# Patient Record
Sex: FEMALE | Race: WHITE | NOT HISPANIC OR LATINO | Employment: OTHER | URBAN - METROPOLITAN AREA
[De-identification: names, ages, dates, MRNs, and addresses within clinical notes are randomized per-mention and may not be internally consistent; named-entity substitution may affect disease eponyms.]

---

## 2017-02-09 ENCOUNTER — ALLSCRIPTS OFFICE VISIT (OUTPATIENT)
Dept: OTHER | Facility: OTHER | Age: 67
End: 2017-02-09

## 2017-02-09 ENCOUNTER — TRANSCRIBE ORDERS (OUTPATIENT)
Dept: ADMINISTRATIVE | Facility: HOSPITAL | Age: 67
End: 2017-02-09

## 2017-02-09 DIAGNOSIS — R10.13 EPIGASTRIC PAIN: Primary | ICD-10-CM

## 2017-02-13 ENCOUNTER — GENERIC CONVERSION - ENCOUNTER (OUTPATIENT)
Dept: OTHER | Facility: OTHER | Age: 67
End: 2017-02-13

## 2017-02-13 ENCOUNTER — LAB CONVERSION - ENCOUNTER (OUTPATIENT)
Dept: OTHER | Facility: OTHER | Age: 67
End: 2017-02-13

## 2017-02-13 LAB
A/G RATIO (HISTORICAL): 1.5 (CALC) (ref 1–2.5)
ALBUMIN SERPL BCP-MCNC: 3.8 G/DL (ref 3.6–5.1)
ALP SERPL-CCNC: 60 U/L (ref 33–130)
ALT SERPL W P-5'-P-CCNC: 6 U/L (ref 6–29)
AMYLASE (HISTORICAL): 26 U/L (ref 21–101)
AST SERPL W P-5'-P-CCNC: 10 U/L (ref 10–35)
BASOPHILS # BLD AUTO: 0.7 %
BASOPHILS # BLD AUTO: 38 CELLS/UL (ref 0–200)
BILIRUB SERPL-MCNC: 0.4 MG/DL (ref 0.2–1.2)
BUN SERPL-MCNC: 14 MG/DL (ref 7–25)
BUN/CREA RATIO (HISTORICAL): NORMAL (CALC) (ref 6–22)
CALCIUM SERPL-MCNC: 9 MG/DL (ref 8.6–10.4)
CHLORIDE SERPL-SCNC: 106 MMOL/L (ref 98–110)
CHOLEST SERPL-MCNC: 242 MG/DL (ref 125–200)
CHOLEST/HDLC SERPL: 3.5 (CALC)
CO2 SERPL-SCNC: 29 MMOL/L (ref 20–31)
CREAT SERPL-MCNC: 0.86 MG/DL (ref 0.5–0.99)
DEPRECATED RDW RBC AUTO: 12.4 % (ref 11–15)
EGFR AFRICAN AMERICAN (HISTORICAL): 82 ML/MIN/1.73M2
EGFR-AMERICAN CALC (HISTORICAL): 71 ML/MIN/1.73M2
EOSINOPHIL # BLD AUTO: 140 CELLS/UL (ref 15–500)
EOSINOPHIL # BLD AUTO: 2.6 %
GAMMA GLOBULIN (HISTORICAL): 2.5 G/DL (CALC) (ref 1.9–3.7)
GLUCOSE (HISTORICAL): 82 MG/DL (ref 65–99)
HCT VFR BLD AUTO: 41.6 % (ref 35–45)
HDLC SERPL-MCNC: 70 MG/DL
HGB BLD-MCNC: 13.8 G/DL (ref 11.7–15.5)
LDL CHOLESTEROL (HISTORICAL): 157 MG/DL (CALC)
LIPASE SERPL-CCNC: 12 U/L (ref 7–60)
LYMPHOCYTES # BLD AUTO: 2111 CELLS/UL (ref 850–3900)
LYMPHOCYTES # BLD AUTO: 39.1 %
MCH RBC QN AUTO: 32.3 PG (ref 27–33)
MCHC RBC AUTO-ENTMCNC: 33.1 G/DL (ref 32–36)
MCV RBC AUTO: 97.4 FL (ref 80–100)
MONOCYTES # BLD AUTO: 405 CELLS/UL (ref 200–950)
MONOCYTES (HISTORICAL): 7.5 %
NEUTROPHILS # BLD AUTO: 2705 CELLS/UL (ref 1500–7800)
NEUTROPHILS # BLD AUTO: 50.1 %
NON-HDL-CHOL (CHOL-HDL) (HISTORICAL): 172 MG/DL (CALC)
PLATELET # BLD AUTO: 226 THOUSAND/UL (ref 140–400)
PMV BLD AUTO: 9.6 FL (ref 7.5–12.5)
POTASSIUM SERPL-SCNC: 4.3 MMOL/L (ref 3.5–5.3)
RBC # BLD AUTO: 4.27 MILLION/UL (ref 3.8–5.1)
SODIUM SERPL-SCNC: 143 MMOL/L (ref 135–146)
T4 FREE SERPL-MCNC: 0.8 NG/DL (ref 0.8–1.8)
TOTAL PROTEIN (HISTORICAL): 6.3 G/DL (ref 6.1–8.1)
TRIGL SERPL-MCNC: 74 MG/DL
TSH SERPL DL<=0.05 MIU/L-ACNC: 5.35 MIU/L (ref 0.4–4.5)
VIT B12 SERPL-MCNC: 246 PG/ML (ref 200–1100)
WBC # BLD AUTO: 5.4 THOUSAND/UL (ref 3.8–10.8)

## 2017-02-16 ENCOUNTER — HOSPITAL ENCOUNTER (OUTPATIENT)
Dept: CT IMAGING | Facility: HOSPITAL | Age: 67
Discharge: HOME | End: 2017-02-16
Payer: MEDICARE

## 2017-02-16 DIAGNOSIS — R10.13 EPIGASTRIC PAIN: ICD-10-CM

## 2017-02-16 PROCEDURE — 74177 CT ABD & PELVIS W/CONTRAST: CPT

## 2017-02-16 RX ADMIN — IOHEXOL 100 ML: 350 INJECTION, SOLUTION INTRAVENOUS at 19:12

## 2017-02-20 ENCOUNTER — GENERIC CONVERSION - ENCOUNTER (OUTPATIENT)
Dept: OTHER | Facility: OTHER | Age: 67
End: 2017-02-20

## 2017-03-30 ENCOUNTER — GENERIC CONVERSION - ENCOUNTER (OUTPATIENT)
Dept: OTHER | Facility: OTHER | Age: 67
End: 2017-03-30

## 2017-05-11 ENCOUNTER — ALLSCRIPTS OFFICE VISIT (OUTPATIENT)
Dept: OTHER | Facility: OTHER | Age: 67
End: 2017-05-11

## 2017-05-30 ENCOUNTER — HOSPITAL ENCOUNTER (OUTPATIENT)
Facility: HOSPITAL | Age: 67
Setting detail: OUTPATIENT SURGERY
Discharge: HOME | End: 2017-05-30
Attending: INTERNAL MEDICINE | Admitting: INTERNAL MEDICINE
Payer: MEDICARE

## 2017-05-30 ENCOUNTER — GENERIC CONVERSION - ENCOUNTER (OUTPATIENT)
Dept: OTHER | Facility: OTHER | Age: 67
End: 2017-05-30

## 2017-05-30 ENCOUNTER — ANESTHESIA EVENT (OUTPATIENT)
Dept: GASTROENTEROLOGY | Facility: HOSPITAL | Age: 67
End: 2017-05-30
Payer: MEDICARE

## 2017-05-30 ENCOUNTER — ANESTHESIA (OUTPATIENT)
Dept: GASTROENTEROLOGY | Facility: HOSPITAL | Age: 67
End: 2017-05-30
Payer: MEDICARE

## 2017-05-30 VITALS
HEIGHT: 65 IN | HEART RATE: 73 BPM | BODY MASS INDEX: 27.58 KG/M2 | DIASTOLIC BLOOD PRESSURE: 78 MMHG | OXYGEN SATURATION: 98 % | SYSTOLIC BLOOD PRESSURE: 121 MMHG | RESPIRATION RATE: 16 BRPM | WEIGHT: 165.57 LBS | TEMPERATURE: 97.6 F

## 2017-05-30 DIAGNOSIS — Q43.3 INTESTINAL MALROTATION: ICD-10-CM

## 2017-05-30 DIAGNOSIS — R10.13 ABDOMINAL PAIN, EPIGASTRIC: ICD-10-CM

## 2017-05-30 PROCEDURE — 88305 TISSUE EXAM BY PATHOLOGIST: CPT | Performed by: INTERNAL MEDICINE

## 2017-05-30 PROCEDURE — 88342 IMHCHEM/IMCYTCHM 1ST ANTB: CPT | Performed by: INTERNAL MEDICINE

## 2017-05-30 RX ORDER — ESCITALOPRAM OXALATE 20 MG/1
20 TABLET ORAL DAILY
COMMUNITY
End: 2018-02-07 | Stop reason: SDUPTHER

## 2017-05-30 RX ORDER — LISINOPRIL 20 MG/1
20 TABLET ORAL DAILY
COMMUNITY
End: 2019-09-05 | Stop reason: SDUPTHER

## 2017-05-30 RX ORDER — ZOLPIDEM TARTRATE 10 MG/1
10 TABLET ORAL
COMMUNITY
End: 2018-02-07 | Stop reason: SDUPTHER

## 2017-05-30 RX ORDER — PANTOPRAZOLE SODIUM 40 MG/1
40 TABLET, DELAYED RELEASE ORAL DAILY
COMMUNITY
End: 2018-07-15 | Stop reason: SDUPTHER

## 2017-05-30 RX ORDER — PROPOFOL 10 MG/ML
INJECTION, EMULSION INTRAVENOUS AS NEEDED
Status: DISCONTINUED | OUTPATIENT
Start: 2017-05-30 | End: 2017-05-30 | Stop reason: SURG

## 2017-05-30 RX ORDER — ALPRAZOLAM 0.5 MG/1
0.5 TABLET ORAL AS NEEDED
COMMUNITY
End: 2018-02-07 | Stop reason: SDUPTHER

## 2017-05-30 RX ORDER — SODIUM CHLORIDE, SODIUM LACTATE, POTASSIUM CHLORIDE, CALCIUM CHLORIDE 600; 310; 30; 20 MG/100ML; MG/100ML; MG/100ML; MG/100ML
50 INJECTION, SOLUTION INTRAVENOUS CONTINUOUS
Status: DISCONTINUED | OUTPATIENT
Start: 2017-05-30 | End: 2017-05-30 | Stop reason: HOSPADM

## 2017-05-30 RX ADMIN — PROPOFOL 30 MG: 10 INJECTION, EMULSION INTRAVENOUS at 10:26

## 2017-05-30 RX ADMIN — PROPOFOL 30 MG: 10 INJECTION, EMULSION INTRAVENOUS at 10:30

## 2017-05-30 RX ADMIN — PROPOFOL 30 MG: 10 INJECTION, EMULSION INTRAVENOUS at 10:31

## 2017-05-30 RX ADMIN — PROPOFOL 30 MG: 10 INJECTION, EMULSION INTRAVENOUS at 10:37

## 2017-05-30 RX ADMIN — PROPOFOL 30 MG: 10 INJECTION, EMULSION INTRAVENOUS at 10:28

## 2017-05-30 RX ADMIN — PROPOFOL 100 MG: 10 INJECTION, EMULSION INTRAVENOUS at 10:22

## 2017-05-30 RX ADMIN — PROPOFOL 30 MG: 10 INJECTION, EMULSION INTRAVENOUS at 10:34

## 2017-05-30 RX ADMIN — PROPOFOL 30 MG: 10 INJECTION, EMULSION INTRAVENOUS at 10:23

## 2017-05-30 RX ADMIN — PROPOFOL 30 MG: 10 INJECTION, EMULSION INTRAVENOUS at 10:32

## 2017-05-30 RX ADMIN — PROPOFOL 30 MG: 10 INJECTION, EMULSION INTRAVENOUS at 10:40

## 2017-05-30 RX ADMIN — PROPOFOL 30 MG: 10 INJECTION, EMULSION INTRAVENOUS at 10:35

## 2017-05-30 RX ADMIN — PROPOFOL 30 MG: 10 INJECTION, EMULSION INTRAVENOUS at 10:38

## 2017-05-30 RX ADMIN — SODIUM CHLORIDE, SODIUM LACTATE, POTASSIUM CHLORIDE, AND CALCIUM CHLORIDE 50 ML/HR: .6; .31; .03; .02 INJECTION, SOLUTION INTRAVENOUS at 09:34

## 2017-05-30 RX ADMIN — PROPOFOL 30 MG: 10 INJECTION, EMULSION INTRAVENOUS at 10:25

## 2017-06-04 ENCOUNTER — GENERIC CONVERSION - ENCOUNTER (OUTPATIENT)
Dept: OTHER | Facility: OTHER | Age: 67
End: 2017-06-04

## 2017-07-21 ENCOUNTER — HOSPITAL ENCOUNTER (EMERGENCY)
Facility: HOSPITAL | Age: 67
Discharge: HOME | End: 2017-07-21
Attending: EMERGENCY MEDICINE
Payer: MEDICARE

## 2017-07-21 VITALS
TEMPERATURE: 98.5 F | HEART RATE: 90 BPM | BODY MASS INDEX: 28.1 KG/M2 | OXYGEN SATURATION: 98 % | DIASTOLIC BLOOD PRESSURE: 79 MMHG | SYSTOLIC BLOOD PRESSURE: 187 MMHG | RESPIRATION RATE: 18 BRPM | WEIGHT: 168.87 LBS

## 2017-07-21 DIAGNOSIS — B37.3 VAGINAL CANDIDIASIS: Primary | ICD-10-CM

## 2017-07-21 PROCEDURE — 99283 EMERGENCY DEPT VISIT LOW MDM: CPT

## 2017-07-21 RX ORDER — FLUCONAZOLE 150 MG/1
150 TABLET ORAL ONCE
Status: COMPLETED | OUTPATIENT
Start: 2017-07-21 | End: 2017-07-21

## 2017-07-21 RX ORDER — FLUCONAZOLE 150 MG/1
TABLET ORAL
Qty: 1 TABLET | Refills: 1 | Status: SHIPPED | OUTPATIENT
Start: 2017-07-21 | End: 2019-09-05 | Stop reason: ALTCHOICE

## 2017-07-21 RX ADMIN — FLUCONAZOLE 150 MG: 150 TABLET ORAL at 18:28

## 2017-09-20 ENCOUNTER — GENERIC CONVERSION - ENCOUNTER (OUTPATIENT)
Dept: OTHER | Facility: OTHER | Age: 67
End: 2017-09-20

## 2018-01-10 NOTE — RESULT NOTES
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  I recommend a repeat colonoscopy in 10 years, please put in for recall  Please have the patient follow up in the office in 6 months or as needed  Please have her call with any questions or concerns or recurrent abdominal pain  Verified Results  (1) TISSUE EXAM [id number] 10:26AM Cyrilla Providence     Test Name Result Flag Reference   LAB AP CASE REPORT (Report)     Surgical Pathology Report             Case: [id number]                   Authorizing Provider: Juan Carlos Altamirano MD      Collected:      05/30/2017 1026        Ordering Location:   [facility]    Received:      05/30/2017 [facility]                               Pathologist:      Júnior Puga MD                                Specimens:  A) - Duodenum, Bx Duodenum                                       B) - Stomach, Bx Stomach body gastritis                                C) - Large Intestine, Cecum, Bx Cecal nodule suspect lipoma   LAB AP FINAL DIAGNOSIS (Report)     A  Duodenum, Bx Duodenum biopsy:   -Benign small intestinal mucosa with retained villous architecture and no   evidence of increased intraepithelial lymphocytes  -Mild non-specific chronic inflammation of lamina propria seen   -No evidence of dysplasia or malignancy  B  Stomach, Bx Stomach body gastritis biopsy:  -Benign gastric-oxyntoantral type mucosa with mild chronic inactive   gastritis   -No evidence of Paneth cell metaplasia seen   -No evidence of dysplasia or malignancy   -No H Pylori organisms identified on immunohistochemical stained slide     Control reacted appropriately    C  Large Intestine, Cecum, Bx Cecal nodule suspect lipoma:  -Benign colonic mucosa with single intramucosal lymphoid aggregate   -No submucosal tissue seen  No evidence of dysplasia or malignancy  Electronically signed by Arie Alexander MD on 6/1/2017 at 11:56 AM   LAB AP NOTE      Interpretation performed at [address] [place] [address]    [facility] (Report)     These tests were developed and their performance characteristics   determined by [facility] or [employer]  They may not be cleared or approved by the [place]  Food and   Drug Administration  The FDA has determined that such clearance or   approval is not necessary  These tests are used for clinical purposes  They should not be regarded as investigational or for research  This   laboratory has been approved by [facility], designated as a high-complexity   laboratory and is qualified to perform these tests  LAB AP GROSS DESCRIPTION (Report)     A  The specimen is received in formalin, labeled with the patient's name   and hospital number, and is designated biopsy duodenum rule out celiac  The specimen consists of 3 tan soft tissue fragments each measuring   0 2-0 3 cm  Entirely submitted  One cassette  B  The specimen is received in formalin, labeled with the patient's name   and hospital number, and is designated biopsy stomach body gastritis   rule out H  pylori  The specimen consists of multiple tan soft tissue   fragments measuring in aggregate 0 6 x 0 6 x 0 1 cm  Entirely submitted  One cassette  Note: The estimated total formalin fixation time based upon information   provided by the submitting clinician and the standard processing schedule   is 18 0 hours  C  The specimen is received in formalin, labeled with the patient's name   and hospital number, and is designated biopsy cecal nodule suspect   lipoma  The specimen consists of multiple tan soft tissue fragments   measuring in aggregate 0 5 x 0 4 x 0 1 cm  Entirely submitted  One   cassette      Note: The estimated total formalin fixation time based upon information   provided by the submitting clinician and the standard processing schedule   is 17 75 hours      [facility]   LAB AP CLINICAL INFORMATION R/O celiac     R/O celiac

## 2018-01-11 NOTE — PROGRESS NOTES
Assessment    1  Encounter for preventive health examination (V70 0) (Z00 00)    Plan  Anxiety, Vitamin B12 deficiency    · (1) TSH WITH FT4 REFLEX; Status:Active; Requested for:22Dww2979;   High blood pressure    · Benicar HCT 40-12 5 MG Oral Tablet; TAKE 1 TABLET DAILY   · Follow-up visit in 6 months Evaluation and Treatment  Follow-up  Status: Hold For -  Scheduling  Requested for: 73Dar6348   · (1) CBC/PLT/DIFF; Status:Active; Requested for:82Tbc6906;    · (1) COMPREHENSIVE METABOLIC PANEL; Status:Active; Requested for:29Dsh3551;    · (1) LIPID PANEL, FASTING; Status:Active; Requested for:95Gxs4755;   Menopause    · * DXA BONE DENSITY SPINE HIP AND PELVIS; Status:Hold For - Scheduling;  Requested for:03Kzj1738;   Menopause, Visit for screening mammogram    · * MAMMO SCREENING BILATERAL W CAD; Status:Hold For - Scheduling; Requested  for:24Umj5508;   PMH: Abdominal pain, RUQ    · Pantoprazole Sodium 40 MG Oral Tablet Delayed Release; TAKE 1 TABLET  DAILY  Vitamin B12 deficiency    · (1) VITAMIN B12; Status:Active; Requested for:36Fvz5085;     Discussion/Summary    PPP given  Impression: Welcome to Medicare Visit, with preventive exam as well as age and risk appropriate counseling completed  Cardiovascular screening and counseling: the risks and benefits of screening were discussed and screening is current  Diabetes screening and counseling: the risks and benefits of screening were discussed and due for blood glucose  Colorectal cancer screening and counseling: screening is current, counseling was given on ways to eat a high fiber diet and had in [address] [place] [address] before moving     Breast cancer screening and counseling: the risks and benefits of screening were discussed and due for a screening mammogram    Osteoporosis screening and counseling: the risks and benefits of screening were discussed, counseling was given on obtaining adequate amounts of calcium and vitamin D on a daily basis, counseling was given on the importance of regular weightbearing exercise and due for DXA axial skeleton  Abdominal aortic aneurysm screening and counseling: the risks and benefits of screening were discussed and screening not indicated  Glaucoma screening and counseling: the risks and benefits of screening were discussed and screening is current  HIV screening and counseling: the risks and benefits of screening were discussed and screening not indicated  Immunizations: the patient declines the influenza vaccination, the patient declines the pneumococcal vaccination, hepatitis B vaccination series is not indicated at this time due to the patient's low risk of anne-marie the disease, the patient declines the Zostavax vaccine, the patient declines the Td vaccine and the patient declines the Tdap vaccine  Advance Directive Planning: complete and up to date, asked to bring in copies for our records  Patient Discussion: plan discussed with the patient, follow-up visit needed in one year  Chief Complaint  Welcome to Medicare, sees GYN      History of Present Illness  HPI: Here for Welcome to Medicare visit, has not taken bp meds in a week or so because she ran out  Welcome to [employer] and Wellness Visits: The patient is being seen for the welcome to medicare visit  Medicare Screening and Risk Factors   Hospitalizations: she has been previously hospitalizied and she has been hospitalized 1 times  Medicare Screening Tests Risk Questions   Abdominal aortic aneurysm risk assessment: none indicated  Osteoporosis risk assessment: , female gender and over 48years of age  HIV risk assessment: none indicated  Co-Managers and Medical Equipment/Suppliers: See Patient Care Team      Review of Systems    Constitutional: negative  Eyes: negative  ENT: negative  Cardiovascular: negative  Respiratory: negative  Gastrointestinal: negative  Genitourinary: negative  Musculoskeletal: negative     Integumentary and Breasts: negative  Neurological: negative  Psychiatric: as noted in HPI  Endocrine: negative  Hematologic and Lymphatic: negative  Over the past 2 weeks, how often have you been bothered by the following problems? 1 ) Little interest or pleasure in doing things? Nearly every day  2 ) Feeling down, depressed or hopeless? Nearly every day  3 ) Trouble falling asleep or sleeping too much? Several days  4 ) Feeling tired or having little energy? Nearly every day  5 ) Poor appetite or overeating? Half the days or more  6 ) Feeling bad about yourself, or that you are a failure, or have let yourself or your family down? Not at all    7 ) Trouble concentrating on things, such as reading a newspaper or watching television? Not at all    8 ) Moving or speaking so slowly that other people could have noticed, or the opposite, moving or speaking faster than usual? Not at all    9 ) Thoughts that you would be better off dead or of hurting yourself in some way? Not at all  severity of depression is moderate   Score 12      Active Problems    1  Anxiety (300 00) (F41 9)   2  Congenital absence of one kidney (753 0) (Q60 0)   3  Depression (311) (F32 9)   4  Esophageal reflux (530 81) (K21 9)   5  High blood pressure (401 9) (I10)   6  History of abnormal mammogram (V15 89) (C82 892)   7  Visit for screening mammogram (V76 12) (Z12 31)   8  Vitamin B12 deficiency (266 2) (E53 8)    Past Medical History    The active problems and past medical history were reviewed and updated today  Surgical History    · History of Breast Surgery Reduction Procedure Bilateral   · History of Shoulder Surgery    The surgical history was reviewed and updated today  Family History  Mother    · Family history of Lung cancer  Father    · Family history of Heart disease  Brother    · Family history of Lung cancer    The family history was reviewed and updated today         Social History    · Never a smoker  The social history was reviewed and updated today  The social history was reviewed and is unchanged  Current Meds   1  ALPRAZolam 0 5 MG Oral Tablet; TAKE ONE TABLET BY MOUTH ONCE DAILY AS   NEEDED; Therapy: 22HZT3400 to (Evaluate:11Oct2016)  Requested for: 11Sep2016; Last   Rx:11Sep2016 Ordered   2  Benicar HCT 40-12 5 MG Oral Tablet; TAKE 1 TABLET DAILY; Therapy: 65FZA6114 to (Luz Marina Parmar)  Requested for: 03Sep2015; Last   Rx:03Sep2015 Ordered   3  Escitalopram Oxalate 20 MG Oral Tablet; TAKE ONE TABLET BY MOUTH ONCE DAILY; Therapy: 99PFC9323 to (Evaluate:11Oct2016)  Requested for: 11Sep2016; Last   Rx:11Sep2016 Ordered   4  Pantoprazole Sodium 40 MG Oral Tablet Delayed Release; TAKE 1 TABLET DAILY; Therapy: 87ISV8474 to (Evaluate:07Sep2016)  Requested for: 67HNQ9811; Last   Rx:53Mtu2305 Ordered    The medication list was reviewed and updated today  Allergies    1  No Known Drug Allergies    Vitals  Signs    Systolic: 352  Diastolic: 90  Heart Rate: 72  Respiration: 16  Temperature: 96 8 F  Height: 5 ft 5 in  Weight: 172 lb   BMI Calculated: 28 62  BSA Calculated: 1 86    Physical Exam    Constitutional   General appearance: No acute distress, well appearing and well nourished  Eyes   Conjunctiva and lids: No swelling, erythema or discharge  Pupils and irises: Equal, round, reactive to light  Ears, Nose, Mouth, and Throat   External inspection of ears and nose: Normal     Otoscopic examination: Tympanic membranes translucent with normal light reflex  Canals patent without erythema  Hearing: Normal     Nasal mucosa, septum, and turbinates: Normal without edema or erythema  Oropharynx: Normal with no erythema, edema, exudate or lesions  Neck   Neck: Supple, symmetric, trachea midline, no masses  Pulmonary   Respiratory effort: No increased work of breathing or signs of respiratory distress  Percussion of chest: Normal     Auscultation of lungs: Clear to auscultation  Cardiovascular   Auscultation of heart: Normal rate and rhythm, normal S1 and S2, no murmurs  Carotid pulses: 2+ bilaterally  Pedal pulses: 2+ bilaterally  Examination of extremities for edema and/or varicosities: Normal     Abdomen   Abdomen: Non-tender, no masses  Liver and spleen: No hepatomegaly or splenomegaly  Examination for hernias: No hernia appreciated  Lymphatic   Palpation of lymph nodes in neck: No lymphadenopathy  Palpation of lymph nodes in other areas: No lymphadenopathy      Musculoskeletal   Gait and station: Normal        Procedure    Procedure:   Results: 20/25 in both eyes with corrective device, 20/25 in the right eye with corrective device, 20/40 in the left eye with corrective device      Signatures   Electronically signed by : BOBO Stanford ; Sep 12 2016  9:29AM EST                       (Author)

## 2018-01-11 NOTE — RESULT NOTES
Verified Results  * CT ABDOMEN PELVIS W CONTRAST 67MZQ2284 06:48PM Pramod Sandoval     Test Name Result Flag Reference   CT ABDOMEN PELVIS W CONTRAST (Report)     CT ABDOMEN AND PELVIS WITH IV CONTRAST     INDICATION: Epigastric pain 2 weeks with nausea  Reported history of congenital unilateral kidney     COMPARISON: None  TECHNIQUE: CT examination of the abdomen and pelvis  Axial, sagittal and coronal reformatted projections were created  This examination, like all CT scans performed in the [facility], was performed utilizing techniques to    minimize radiation dose exposure, including the use of iterative reconstruction and automated exposure control  IV Contrast: iohexol (OMNIPAQUE) 350 MG/ML injection (MULTI-DOSE) 100 mL--barium sulfate 2 1 % suspension 900 mL Note: (SINGLE DOSE/MULTI DOSE) information refers to the container from which the contrast was acquired  Contrast was injected one time    intravenously without immediate complication  Enteric Contrast: Enteric contrast was administered  FINDINGS:     ABDOMEN     LOWER CHEST: Lung bases are clear  Heart size is normal      LIVER/BILIARY TREE: Unremarkable  GALLBLADDER: No calcified gallstones  No pericholecystic inflammatory change  SPLEEN: Unremarkable  PANCREAS: Unremarkable  ADRENAL GLANDS: Unremarkable  KIDNEYS/URETERS: The right kidney is absent  Left kidney appears normal      STOMACH AND BOWEL: 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 or other acute complications  The jejunum is in the right upper quadrant  The cecum is in the midline of the pelvis and the ascending colon is    to the left of midline in the abdomen  There is no area of bowel wall thickening identified  APPENDIX: No findings to suggest appendicitis  ABDOMINOPELVIC CAVITY: No ascites or free intraperitoneal air  No lymphadenopathy       VESSELS: Atherosclerotic changes are present  No evidence of aneurysm  PELVIS     REPRODUCTIVE ORGANS: The uterus is heterogeneous attenuation and the contour suggests the presence of possible fibroid towards the right side  URINARY BLADDER: Urinary bladder has an abnormal appearance  Specifically, there seems to be a thin curvilinear line traversing the lumen near the expected location of the right ureterovesical junction  Ordinarily, I would suggest that this is    probably a ureterocele  However, the patient seems to have congenital absence of the right kidney and therefore I am uncertain as to whether there could be some of the ureter present  There seems to be a somewhat dilated fluid density tubular structure   extending from this region upwards along the right retroperitoneum along the expected course of the ureter, however, it terminates apparently blindly at about the mid abdominal level  Calcification is visible posteriorly in the bladder in the region of   the above-mentioned structure  These presumably represent bladder calculi  The possibility of a low-density bladder mass rather than ureterocele could not be excluded  A targeted ultrasound of the bladder might be helpful for further assessment  Alternatively, repeat CT scan with additional delayed phase images through the pelvic area to allow for filling of the bladder with contrast enhanced urine might be helpful for further assessment of the findings  ABDOMINAL WALL/INGUINAL REGIONS: Unremarkable  OSSEOUS STRUCTURES: There is a mild compression fracture of L1 superior endplate  The area is sclerotic and is probably chronic  Please correlate for any back pain or recent injury  IMPRESSION:     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 kidney and malrotation of the bowel       Indeterminate finding in the bladder at the expected location of the right UVJ including what appears to be possible ureterocele or low density bladder mass with some small calcifications in the region as well  Consider bladder ultrasound or repeat CT    with delayed images to allow filling of the bladder with contrast enhanced urine  Most definitive evaluation would probably require cystoscopy  Slightly dilated low-density tubular structure along the right side of the retroperitoneum seems to extend up from the bladder and would presumably be right ureter although in the absence of her right kidney, I am uncertain as to whether this is    possible  Alternative consideration would be a varix along the gonadal vein although I believe I see the gonadal vein as a separate adjacent small caliber structure  Debbie Cutting        ##sigslh##sigslh       Workstation performed: [id number]     Signed by:   Nazanin Oakley MD   2/20/17

## 2018-01-12 VITALS
TEMPERATURE: 96.7 F | SYSTOLIC BLOOD PRESSURE: 118 MMHG | RESPIRATION RATE: 20 BRPM | BODY MASS INDEX: 28.66 KG/M2 | HEIGHT: 65 IN | DIASTOLIC BLOOD PRESSURE: 86 MMHG | WEIGHT: 172 LBS | HEART RATE: 76 BPM

## 2018-01-13 VITALS
RESPIRATION RATE: 20 BRPM | BODY MASS INDEX: 28.49 KG/M2 | OXYGEN SATURATION: 96 % | HEIGHT: 65 IN | DIASTOLIC BLOOD PRESSURE: 96 MMHG | WEIGHT: 171 LBS | HEART RATE: 71 BPM | SYSTOLIC BLOOD PRESSURE: 160 MMHG | TEMPERATURE: 96 F

## 2018-01-17 NOTE — RESULT NOTES
Verified Results  (1) LIPASE [id number] 08:23AM Ky Rangel   REPORT COMMENT:  INCLUDES LAB REF [id number]  FASTING:YES AN UPDATE OR CORRECTION HAS BEEN MADE TO NAME     Test Name Result Flag Reference   LIPASE 12 U/L  7-60     (1) LIPID PANEL, FASTING [id number] 08:23AM Ky Rangel     Test Name Result Flag Reference   CHOLESTEROL, TOTAL 242 mg/dL H 125-200   HDL CHOLESTEROL 70 mg/dL  > OR = 46   TRIGLICERIDES 74 mg/dL  <176   LDL-CHOLESTEROL 157 mg/dL (calc) H <130   Desirable range <100 mg/dL for patients with CHD or  diabetes and <70 mg/dL for diabetic patients with  known heart disease  CHOL/HDLC RATIO 3 5 (calc)  < OR = 5 0   NON HDL CHOLESTEROL 172 mg/dL (calc) H    Target for non-HDL cholesterol is 30 mg/dL higher than   LDL cholesterol target  (1) COMPREHENSIVE METABOLIC PANEL [id number] 61:28NR Ky Rangel     Test Name Result Flag Reference   GLUCOSE 82 mg/dL  65-99   Fasting reference interval   UREA NITROGEN (BUN) 14 mg/dL  7-25   CREATININE 0 86 mg/dL  0 50-0 99   For patients >52years of age, the reference limit  for Creatinine is approximately 13% higher for people  identified as -American  eGFR NON-AFR   AMERICAN 71 mL/min/1 73m2  > OR = 60   eGFR AFRICAN AMERICAN 82 mL/min/1 73m2  > OR = 60   BUN/CREATININE RATIO   8-71   NOT APPLICABLE (calc)   SODIUM 143 mmol/L  135-146   POTASSIUM 4 3 mmol/L  3 5-5 3   CHLORIDE 106 mmol/L     CARBON DIOXIDE 29 mmol/L  20-31   CALCIUM 9 0 mg/dL  8 6-10 4   PROTEIN, TOTAL 6 3 g/dL  6 1-8 1   ALBUMIN 3 8 g/dL  3 6-5 1   GLOBULIN 2 5 g/dL (calc)  1 9-3 7   ALBUMIN/GLOBULIN RATIO 1 5 (calc)  1 0-2 5   BILIRUBIN, TOTAL 0 4 mg/dL  0 2-1 2   ALKALINE PHOSPHATASE 60 U/L     AST 10 U/L  10-35   ALT 6 U/L  6-29     (1) CBC/PLT/DIFF 57RUE5942 08:23AM Ky Rangel     Test Name Result Flag Reference   WHITE BLOOD CELL COUNT 5 4 Thousand/uL  3 8-10 8   RED BLOOD CELL COUNT 4 27 Million/uL  3 80-5 10   HEMOGLOBIN 13 8 g/dL  11 7-15 5 HEMATOCRIT 41 6 %  35 0-45 0   MCV 97 4 fL  80 0-100 0   MCH 32 3 pg  27 0-33 0   MCHC 33 1 g/dL  32 0-36 0   RDW 12 4 %  11 0-15 0   PLATELET COUNT 793 Thousand/uL  140-400   MPV 9 6 fL  7 5-12 5   ABSOLUTE NEUTROPHILS 2705 cells/uL  2267-3302   ABSOLUTE LYMPHOCYTES 2111 cells/uL  850-3900   ABSOLUTE MONOCYTES 405 cells/uL  200-950   ABSOLUTE EOSINOPHILS 140 cells/uL     ABSOLUTE BASOPHILS 38 cells/uL  0-200   NEUTROPHILS 50 1 %     LYMPHOCYTES 39 1 %     MONOCYTES 7 5 %     EOSINOPHILS 2 6 %     BASOPHILS 0 7 %       (Q) TSH, 3RD GENERATION W/REFLEX TO FT4 [id number] 08:23AM [facility]     Test Name Result Flag Reference   T4, FREE 0 8 ng/dL  0 8-1 8   TSH W/REFLEX TO FT4 5 35 mIU/L H 0 40-4 50     (1) VITAMIN B12 [id number] 08:23AM [facility]     Test Name Result Flag Reference   VITAMIN B12 246 pg/mL  200-1100   Please Note: Although the reference range for vitamin  B12 is 200-1100 pg/mL, it has been reported that between  5 and 10% of patients with values between 200 and 400  pg/mL may experience neuropsychiatric and hematologic  abnormalities due to occult B12 deficiency; less than 1%  of patients with values above 400 pg/mL will have symptoms  (Q) AMYLASE [id number] 08:23AM [facility]     Test Name Result Flag Reference   AMYLASE 26 U/L         Discussion/Summary   Cholesterol is a little high, other bloodwork looks good    Please follow low fat diet and exercise  - Dr Luh Guevara

## 2018-02-07 DIAGNOSIS — F41.9 ANXIETY: ICD-10-CM

## 2018-02-07 DIAGNOSIS — G47.00 INSOMNIA, UNSPECIFIED TYPE: Primary | ICD-10-CM

## 2018-02-07 RX ORDER — ESCITALOPRAM OXALATE 20 MG/1
TABLET ORAL
Qty: 30 TABLET | Refills: 0 | Status: SHIPPED | OUTPATIENT
Start: 2018-02-07 | End: 2019-01-28 | Stop reason: SDUPTHER

## 2018-02-07 RX ORDER — ZOLPIDEM TARTRATE 10 MG/1
TABLET ORAL
Qty: 15 TABLET | Refills: 0 | OUTPATIENT
Start: 2018-02-07 | End: 2018-04-26 | Stop reason: SDUPTHER

## 2018-02-07 RX ORDER — ALPRAZOLAM 0.5 MG/1
TABLET ORAL
Qty: 15 TABLET | Refills: 0 | OUTPATIENT
Start: 2018-02-07 | End: 2018-04-26 | Stop reason: SDUPTHER

## 2018-02-07 NOTE — TELEPHONE ENCOUNTER
I did a small # of her refills  She is due for an appointment, not seen since 2/17  Please call in the xanax and the zolpidem

## 2018-04-26 ENCOUNTER — OFFICE VISIT (OUTPATIENT)
Dept: FAMILY MEDICINE CLINIC | Facility: CLINIC | Age: 68
End: 2018-04-26
Payer: MEDICARE

## 2018-04-26 VITALS
BODY MASS INDEX: 27.32 KG/M2 | WEIGHT: 164 LBS | HEART RATE: 72 BPM | TEMPERATURE: 98.2 F | RESPIRATION RATE: 18 BRPM | HEIGHT: 65 IN | SYSTOLIC BLOOD PRESSURE: 124 MMHG | DIASTOLIC BLOOD PRESSURE: 76 MMHG

## 2018-04-26 DIAGNOSIS — F41.9 ANXIETY: ICD-10-CM

## 2018-04-26 DIAGNOSIS — G47.00 INSOMNIA, UNSPECIFIED TYPE: ICD-10-CM

## 2018-04-26 DIAGNOSIS — Z00.00 MEDICARE ANNUAL WELLNESS VISIT, INITIAL: Primary | ICD-10-CM

## 2018-04-26 DIAGNOSIS — Z12.31 SCREENING MAMMOGRAM, ENCOUNTER FOR: ICD-10-CM

## 2018-04-26 DIAGNOSIS — F32.A DEPRESSION, UNSPECIFIED DEPRESSION TYPE: Primary | ICD-10-CM

## 2018-04-26 DIAGNOSIS — E78.2 MIXED HYPERLIPIDEMIA: ICD-10-CM

## 2018-04-26 PROCEDURE — G0438 PPPS, INITIAL VISIT: HCPCS | Performed by: INTERNAL MEDICINE

## 2018-04-26 RX ORDER — ESCITALOPRAM OXALATE 20 MG/1
1 TABLET ORAL DAILY
COMMUNITY
Start: 2015-01-08 | End: 2018-12-27 | Stop reason: SDUPTHER

## 2018-04-26 RX ORDER — ZOLPIDEM TARTRATE 10 MG/1
10 TABLET ORAL
Qty: 30 TABLET | Refills: 0 | Status: SHIPPED | OUTPATIENT
Start: 2018-04-26 | End: 2018-06-02 | Stop reason: SDUPTHER

## 2018-04-26 RX ORDER — ESCITALOPRAM OXALATE 20 MG/1
TABLET ORAL
Qty: 90 TABLET | Refills: 1 | Status: SHIPPED | OUTPATIENT
Start: 2018-04-26 | End: 2018-11-05 | Stop reason: SDUPTHER

## 2018-04-26 RX ORDER — ALPRAZOLAM 0.5 MG/1
0.5 TABLET ORAL DAILY
Qty: 30 TABLET | Refills: 0 | Status: SHIPPED | OUTPATIENT
Start: 2018-04-26 | End: 2018-06-02 | Stop reason: SDUPTHER

## 2018-04-26 RX ORDER — HYOSCYAMINE SULFATE 0.12 MG/1
1 TABLET SUBLINGUAL 3 TIMES DAILY PRN
COMMUNITY
Start: 2017-05-11 | End: 2018-12-27 | Stop reason: SDUPTHER

## 2018-04-26 NOTE — PROGRESS NOTES
[facility] Clinical     ISAR:   Previous hospitalizations?:  No       Once in a Lifetime Medicare Screening:   EKG performed?:  Yes        Medicare Screening Tests and Risk Assessment:   AAA Risk Assessment    None Indicated:  Yes    Osteoporosis Risk Assessment    :  Yes    Age over 48:  Yes    HIV Risk Assessment    None indicated:  Yes        Drug and Alcohol Use:   Tobacco use    Cigarettes:  never smoker    Tobacco use duration    Tobacco Cessation Readiness    Alcohol use    Alcohol use:  occasional use    Alcohol Treatment Readiness   Illicit Drug Use    Drug use:  never    Drug type:  no sedative use       Diet & Exercise:   Diet   What is your diet?:  Regular   How many servings a day of the following:   Exercise    Do you currently exercise?:  currently not exercising       Cognitive Impairment Screening:   Anxiety screenings preformed:   Yes Anxiety screen score:  0   Depression screening preformed:  Yes Depression screen score:  0   Depression screening results:  negative for symptoms   Cognitive Impairment Screening    Do you have difficulty learning or retaining new information?:  No Do you have difficulty handling new tasks?:  No   Do you have difficulty with reasoning?:  No Do you have difficulty with spatial ability and orientation?:  No   Do you have difficulty with language?:  No Do you have difficulty with behavior?:  No       Functional Ability/Level of Safety:   Hearing    Hearing difficulties:  No    Hearing aid:  No    Hearing Impairment Assessment    Current Activities    Status:  unlimited ADL's, unlimited driving, unlimited IADL's, unlimited social activities   Help needed with the folllowing:    ADL    Fall Risk   Have you fallen in the last 12 months?:  No    Injury History   Polypharmacy:  No Antidepressant Use:  No   Sedative Use:  No Antihypertensive Use:  Yes   Previous Fall:  No Alcohol Use:  No   Deconditioning:  No Visual Impairment:  No   Cogitive Impairment:  No Mmobility Impairment:  No   Postural Hypotension:  No Urinary Incontinence:  No       Home Safety:   Home Safety Risk Factors   Unfamilar with surroundings:  No Uneven floors:  No   Stairs or handrail saftey risk:  No Loose rugs:  No   Household clutter:  No Poor household lighting:  No   No grab bars in bathroom:  No Further evaluation needed:  No       Advanced Directives:   Advanced Directives    Living Will:  Yes Durable POA for healthcare: Yes   Advanced directive:  Yes    Patient's End of Life Decisions        Urinary Incontinence:   Do you have urinary incontinence?:  No        Glaucoma:             HPI:  Maday Sheppard is a 80 y o  female here for her Initial Wellness Visit  Patient Active Problem List   Diagnosis    Anxiety    Depression    Esophageal reflux    Insomnia    Vitamin B12 deficiency    High blood pressure     Past Medical History:   Diagnosis Date    Anxiety     Congenital absence of one kidney     Depression     GERD (gastroesophageal reflux disease)     History of transfusion     Hypertension     Insomnia     Menopause     Vitamin B12 deficiency      Past Surgical History:   Procedure Laterality Date    TN COLONOSCOPY FLX DX W/COLLJ SPEC WHEN PFRMD N/A 5/30/2017    Procedure: EGD AND COLONOSCOPY;  Surgeon: Regi Reich MD;  Location[facility];   Service: Gastroenterology    REDUCTION MAMMAPLASTY      REDUCTION MAMMAPLASTY      SHOULDER SURGERY       Family History   Problem Relation Age of Onset    Lung cancer Mother     Heart disease Father     Lung cancer Brother      History   Smoking Status    Never Smoker   Smokeless Tobacco    Never Used     History   Alcohol Use    4 2 oz/week    7 Glasses of wine per week     Comment: 1 glass of wine nightly       History   Drug Use No     /76   Pulse 72   Temp 98 2 °F (36 8 °C)   Resp 18   Ht 5' 5" (1 651 m)   Wt 74 4 kg (164 lb)   LMP  (LMP Unknown)   BMI 27 29 kg/m²       Current Outpatient Prescriptions   Medication Sig Dispense Refill    ALPRAZolam (XANAX) 0 5 mg tablet Take 1 tablet (0 5 mg total) by mouth daily PRN 30 tablet 0    escitalopram (LEXAPRO) 20 mg tablet TAKE ONE TABLET BY MOUTH ONCE DAILY 30 tablet 0    escitalopram (LEXAPRO) 20 mg tablet Take 1 tablet by mouth daily      Hyoscyamine Sulfate SL (LEVSIN/SL) 0 125 MG SUBL Place 1 tablet under the tongue 3 (three) times a day as needed      pantoprazole (PROTONIX) 40 mg tablet Take 40 mg by mouth daily      zolpidem (AMBIEN) 10 mg tablet Take 1 tablet (10 mg total) by mouth daily at bedtime 30 tablet 0    fluconazole (DIFLUCAN) 150 mg tablet Take 1 tablet on 7/28 if symptoms have not improved 1 tablet 1    hyoscyamine (LEVSIN/SL) 0 125 mg SL tablet Take 0 125 mg by mouth 3 (three) times a day as needed for cramping      lisinopril (ZESTRIL) 20 mg tablet Take 20 mg by mouth daily       No current facility-administered medications for this visit  No Known Allergies    There is no immunization history on file for this patient      Patient Care Team:  Deandre Yates MD as PCP - MD Gabby Curtis MD Nita Claw, MD    Medicare Screening Tests and Risk Assessments:  [facility]     ISAR:   Previous hospitalizations?:  No       Once in a Lifetime Medicare Screening:   EKG performed?:  Yes        Medicare Screening Tests and Risk Assessment:   AAA Risk Assessment    None Indicated:  Yes    Osteoporosis Risk Assessment    :  Yes    Age over 48:  Yes    HIV Risk Assessment    None indicated:  Yes        Drug and Alcohol Use:   Tobacco use    Cigarettes:  never smoker    Tobacco use duration    Tobacco Cessation Readiness    Alcohol use    Alcohol use:  occasional use    Alcohol Treatment Readiness   Illicit Drug Use    Drug use:  never    Drug type:  no sedative use       Diet & Exercise:   Diet   What is your diet?:  Regular   How many servings a day of the following:   Exercise    Do you currently exercise?: currently not exercising       Cognitive Impairment Screening:   Anxiety screenings preformed: Yes Anxiety screen score:  0   Depression screening preformed:  Yes Depression screen score:  0   Depression screening results:  negative for symptoms   Cognitive Impairment Screening    Do you have difficulty learning or retaining new information?:  No Do you have difficulty handling new tasks?:  No   Do you have difficulty with reasoning?:  No Do you have difficulty with spatial ability and orientation?:  No   Do you have difficulty with language?:  No Do you have difficulty with behavior?:  No       Functional Ability/Level of Safety:   Hearing    Hearing difficulties:  No    Hearing aid:  No    Hearing Impairment Assessment    Current Activities    Status:  unlimited ADL's, unlimited driving, unlimited IADL's, unlimited social activities   Help needed with the folllowing:    ADL    Fall Risk   Have you fallen in the last 12 months?:  No    Injury History   Polypharmacy:  No Antidepressant Use:  No   Sedative Use:  No Antihypertensive Use:  Yes   Previous Fall:  No Alcohol Use:  No   Deconditioning:  No Visual Impairment:  No   Cogitive Impairment:  No Mmobility Impairment:  No   Postural Hypotension:  No Urinary Incontinence:  No       Home Safety:   Home Safety Risk Factors   Unfamilar with surroundings:  No Uneven floors:  No   Stairs or handrail saftey risk:  No Loose rugs:  No   Household clutter:  No Poor household lighting:  No   No grab bars in bathroom:  No Further evaluation needed:  No       Advanced Directives:   Advanced Directives    Living Will:  Yes Durable POA for healthcare:   Yes   Advanced directive:  Yes    Patient's End of Life Decisions        Urinary Incontinence:   Do you have urinary incontinence?:  No        Glaucoma:            Provider Screening     Preventative Screening/Counseling:   Cardiovascular Screening/Counseling:   (Labs Q5 years, EKG optional one-time)   General:  Risks and Benefits Discussed, Screening Current  Due for: Lab Panel/Analytes:  Lipid Panel         Diabetes Screening/Counseling:   (2 tests/year if Pre-Diabetes or 1 test/year if no Diabetes)   General:  Risks and Benefits Discussed, Screening Current  Due for: Labs:  Blood Glucose         Colorectal Cancer Screening/Counseling:   (FOBT Q1 yr; Flex Sig Q4 yrs or Q10 yrs after Screening Colonoscopy; Screening Colonoscpy Q2 yrs High Risk or Q10 yrs Low Risk; Barium Enema Q2 yrs High Risk or Q4 yrs Low Risk)   General:  Screening Current           Prostate Cancer Screening/Counseling:   (Annual)          Breast Cancer Screening/Counseling:   (Baseline Age 28 - 43; Annual Age 36+)    Due for: Studies:  mammogram         Cervical Cancer Screening/Counseling:   (Annual for High Risk or Childbearing Age with Abnormal Pap in Last 3 yrs; Every 2 all others)   General:  Screening Not Indicated           Osteoporosis Screening/Counseling:   (Every 2 Yrs if at risk or more if medically necessary)   General:  Patient Declines           AAA Screening/Counseling:   (Once per Lifetime with risk factors)    General:  Screening Not Indicated           Glaucoma Screening/Counseling:   (Annual)   General:  Screening Current          HIV Screening/Counseling:   (Voluntary;  Once annually for high risk OR 3 times for Pregnancy at diagnosis of IUP; 3rd trimester; and at Labor   General:  Screening Not Indicated           Hepatitis C Screening:             Immunizations:   Influenza (annual):  Patient Declines   Pneumococcal (Once in a Lifetime):  Patient Declines   Hepatitis B Series (low risk patients):  Series Not Indicated   Zostavax (Medicare D Coverage, Pt >70 yo):  Patient Declines   TD (Non-Medicare Wellness  Visit required):  Patient Declines       Other Preventative Couseling (Non-Medicare Wellness Visit Required):   nutrition counseling performed, weight reduction was discussed, Increased physical activity counseling given       Referrals (Non-Medicare Wellness Visit Required):       Medical Equipment/Suppliers:           No exam data present    Physical Exam :  Physical Exam   Constitutional: She is oriented to person, place, and time  She appears well-developed and well-nourished  No distress  HENT:   Head: Normocephalic and atraumatic  Right Ear: External ear normal    Left Ear: External ear normal    Nose: Nose normal    Mouth/Throat: Oropharynx is clear and moist    Eyes: EOM are normal  Pupils are equal, round, and reactive to light  Neck: Normal range of motion  Neck supple  No JVD present  No thyromegaly present  Cardiovascular: Normal rate, regular rhythm, normal heart sounds and intact distal pulses  Exam reveals no gallop and no friction rub  No murmur heard  Pulmonary/Chest: Effort normal and breath sounds normal  She has no wheezes  She has no rales  Abdominal: Soft  Bowel sounds are normal  She exhibits no distension  There is no tenderness  Musculoskeletal: Normal range of motion  She exhibits no edema or deformity  Neurological: She is alert and oriented to person, place, and time  She displays normal reflexes  No cranial nerve deficit or sensory deficit  She exhibits normal muscle tone  Coordination normal    Skin: Skin is warm and dry  No rash noted  Psychiatric: She has a normal mood and affect  Her behavior is normal  Judgment and thought content normal    Nursing note and vitals reviewed  Reviewed Updated [facility] Prior Wellness Visits:   Last Medicare wellness visit information was reviewed, patient interviewed , no change since last AWVyes  Last Medicare wellness visit information was reviewed, patient interviewed and updates made to the record today yes    Assessment and Plan:  1  Medicare annual wellness visit, initial     2  Insomnia, unspecified type  zolpidem (AMBIEN) 10 mg tablet   3  Anxiety  ALPRAZolam (XANAX) 0 5 mg tablet   4   Mixed hyperlipidemia  CBC and differential    Comprehensive metabolic panel    Lipid panel   5   Screening mammogram, encounter for  Mammo screening bilateral w cad       Health Maintenance Due   Topic Date Due    Hepatitis C Screening  1950    Depression Screening PHQ-9  03/09/1963    DTaP,Tdap,and Td Vaccines (1 - Tdap) 03/09/1972    Fall Risk  03/09/2016    Urinary Incontinence Screening  03/09/2016    PNEUMOCOCCAL POLYSACCHARIDE VACCINE AGE 65 AND OVER  03/09/2016    INFLUENZA VACCINE  09/01/2017    GLAUCOMA SCREENING 67+ YR  03/09/2018

## 2018-04-26 NOTE — PATIENT INSTRUCTIONS

## 2018-06-02 DIAGNOSIS — F41.9 ANXIETY: ICD-10-CM

## 2018-06-02 DIAGNOSIS — G47.00 INSOMNIA, UNSPECIFIED TYPE: ICD-10-CM

## 2018-06-02 RX ORDER — ALPRAZOLAM 0.5 MG/1
TABLET ORAL
Qty: 30 TABLET | Refills: 0 | Status: SHIPPED | OUTPATIENT
Start: 2018-06-02 | End: 2018-07-15 | Stop reason: SDUPTHER

## 2018-06-02 RX ORDER — ZOLPIDEM TARTRATE 10 MG/1
TABLET ORAL
Qty: 30 TABLET | Refills: 0 | Status: SHIPPED | OUTPATIENT
Start: 2018-06-02 | End: 2018-07-15 | Stop reason: SDUPTHER

## 2018-07-15 DIAGNOSIS — G47.00 INSOMNIA, UNSPECIFIED TYPE: ICD-10-CM

## 2018-07-15 DIAGNOSIS — K21.9 GASTROESOPHAGEAL REFLUX DISEASE, ESOPHAGITIS PRESENCE NOT SPECIFIED: Primary | ICD-10-CM

## 2018-07-15 DIAGNOSIS — F41.9 ANXIETY: ICD-10-CM

## 2018-07-16 RX ORDER — ZOLPIDEM TARTRATE 10 MG/1
TABLET ORAL
Qty: 30 TABLET | Refills: 0 | Status: SHIPPED | OUTPATIENT
Start: 2018-07-16 | End: 2018-08-17 | Stop reason: SDUPTHER

## 2018-07-16 RX ORDER — PANTOPRAZOLE SODIUM 40 MG/1
TABLET, DELAYED RELEASE ORAL
Qty: 30 TABLET | Refills: 0 | Status: SHIPPED | OUTPATIENT
Start: 2018-07-16 | End: 2018-08-17 | Stop reason: SDUPTHER

## 2018-07-16 RX ORDER — ALPRAZOLAM 0.5 MG/1
TABLET ORAL
Qty: 30 TABLET | Refills: 0 | Status: SHIPPED | OUTPATIENT
Start: 2018-07-16 | End: 2018-08-17 | Stop reason: SDUPTHER

## 2018-08-17 DIAGNOSIS — F41.9 ANXIETY: ICD-10-CM

## 2018-08-17 DIAGNOSIS — G47.00 INSOMNIA, UNSPECIFIED TYPE: ICD-10-CM

## 2018-08-17 DIAGNOSIS — K21.9 GASTROESOPHAGEAL REFLUX DISEASE, ESOPHAGITIS PRESENCE NOT SPECIFIED: ICD-10-CM

## 2018-08-18 RX ORDER — ALPRAZOLAM 0.5 MG/1
TABLET ORAL
Qty: 30 TABLET | Refills: 0 | Status: SHIPPED | OUTPATIENT
Start: 2018-08-18 | End: 2018-10-05 | Stop reason: SDUPTHER

## 2018-08-18 RX ORDER — ZOLPIDEM TARTRATE 10 MG/1
TABLET ORAL
Qty: 30 TABLET | Refills: 0 | Status: SHIPPED | OUTPATIENT
Start: 2018-08-18 | End: 2018-11-05 | Stop reason: SDUPTHER

## 2018-08-18 RX ORDER — PANTOPRAZOLE SODIUM 40 MG/1
TABLET, DELAYED RELEASE ORAL
Qty: 30 TABLET | Refills: 5 | Status: SHIPPED | OUTPATIENT
Start: 2018-08-18 | End: 2019-07-07 | Stop reason: SDUPTHER

## 2018-10-05 DIAGNOSIS — F41.9 ANXIETY: ICD-10-CM

## 2018-10-07 RX ORDER — ALPRAZOLAM 0.5 MG/1
TABLET ORAL
Qty: 30 TABLET | Refills: 0 | Status: SHIPPED | OUTPATIENT
Start: 2018-10-07 | End: 2018-11-17 | Stop reason: SDUPTHER

## 2018-11-05 DIAGNOSIS — G47.00 INSOMNIA, UNSPECIFIED TYPE: ICD-10-CM

## 2018-11-05 DIAGNOSIS — F32.A DEPRESSION, UNSPECIFIED DEPRESSION TYPE: ICD-10-CM

## 2018-11-06 RX ORDER — ZOLPIDEM TARTRATE 10 MG/1
TABLET ORAL
Qty: 30 TABLET | Refills: 0 | Status: SHIPPED | OUTPATIENT
Start: 2018-11-06 | End: 2018-12-19 | Stop reason: SDUPTHER

## 2018-11-06 RX ORDER — ESCITALOPRAM OXALATE 20 MG/1
TABLET ORAL
Qty: 30 TABLET | Refills: 0 | Status: SHIPPED | OUTPATIENT
Start: 2018-11-06 | End: 2018-12-19 | Stop reason: SDUPTHER

## 2018-11-06 NOTE — TELEPHONE ENCOUNTER
Left message on machine requesting a call back  Please have pt schedule a follow up appointment  Thank you   Shahriar Prabhakar

## 2018-11-17 DIAGNOSIS — F41.9 ANXIETY: ICD-10-CM

## 2018-11-18 RX ORDER — ALPRAZOLAM 0.5 MG/1
TABLET ORAL
Qty: 30 TABLET | Refills: 0 | Status: SHIPPED | OUTPATIENT
Start: 2018-11-18 | End: 2018-12-19 | Stop reason: SDUPTHER

## 2018-12-19 DIAGNOSIS — G47.00 INSOMNIA, UNSPECIFIED TYPE: ICD-10-CM

## 2018-12-19 DIAGNOSIS — F41.9 ANXIETY: ICD-10-CM

## 2018-12-19 DIAGNOSIS — F32.A DEPRESSION, UNSPECIFIED DEPRESSION TYPE: ICD-10-CM

## 2018-12-20 RX ORDER — ALPRAZOLAM 0.5 MG/1
TABLET ORAL
Qty: 30 TABLET | Refills: 0 | Status: SHIPPED | OUTPATIENT
Start: 2018-12-20 | End: 2019-01-25 | Stop reason: SDUPTHER

## 2018-12-20 RX ORDER — ESCITALOPRAM OXALATE 20 MG/1
TABLET ORAL
Qty: 30 TABLET | Refills: 0 | Status: SHIPPED | OUTPATIENT
Start: 2018-12-20 | End: 2019-01-25 | Stop reason: SDUPTHER

## 2018-12-20 RX ORDER — ZOLPIDEM TARTRATE 10 MG/1
TABLET ORAL
Qty: 30 TABLET | Refills: 0 | Status: SHIPPED | OUTPATIENT
Start: 2018-12-20 | End: 2019-01-25 | Stop reason: SDUPTHER

## 2018-12-21 NOTE — TELEPHONE ENCOUNTER
Left message on machine requesting a call back  Please have pt schedule a follow up appointment as she is due for further refills   Shahriar Diaz

## 2018-12-26 NOTE — PROGRESS NOTES
Subjective:      Patient ID: Naomi Pratt is a 80 y o  female  Chief Complaint   Patient presents with    Follow-up     prcma       She does not feel that the lexapro is helping  She cries all the time  She has some passive suicidal ideation but she would never act on it  Over past year has had 4 episodes of mid substernal chest pain, will get severe "to a 20/10" and her L molar will start to hurt  After about 10-25 minutes will subside  Will get very lightheaded with this and feel like she is going to pass out  Will occur when she is at work, not strenuous labor, does not happen when she is at rest   Father had CAD,  at 79, was a heavy smoker  The following portions of the patient's history were reviewed and updated as appropriate: allergies, current medications, past family history, past medical history, past social history, past surgical history and problem list     Review of Systems   Constitutional: Negative  Respiratory: Negative  Cardiovascular: Positive for chest pain           Current Outpatient Prescriptions   Medication Sig Dispense Refill    ALPRAZolam (XANAX) 0 5 mg tablet TAKE 1 TABLET BY MOUTH ONCE DAILY AS NEEDED 30 tablet 0    escitalopram (LEXAPRO) 20 mg tablet TAKE ONE TABLET BY MOUTH ONCE DAILY 30 tablet 0    escitalopram (LEXAPRO) 20 mg tablet TAKE 1 TABLET BY MOUTH ONCE DAILY 30 tablet 0    fluconazole (DIFLUCAN) 150 mg tablet Take 1 tablet on  if symptoms have not improved 1 tablet 1    hyoscyamine (LEVSIN/SL) 0 125 mg SL tablet Take 0 125 mg by mouth 3 (three) times a day as needed for cramping      lisinopril (ZESTRIL) 20 mg tablet Take 20 mg by mouth daily      pantoprazole (PROTONIX) 40 mg tablet TAKE ONE TABLET BY MOUTH ONCE DAILY 30 tablet 5    zolpidem (AMBIEN) 10 mg tablet TAKE 1 TABLET BY MOUTH AT BEDTIME 30 tablet 0    buPROPion (WELLBUTRIN SR) 100 mg 12 hr tablet Take 1 tablet (100 mg total) by mouth daily 30 tablet 3     No current facility-administered medications for this visit  Objective:    /90   Pulse 60   Temp (!) 97 °F (36 1 °C)   Ht 5' 5" (1 651 m)   Wt 74 4 kg (164 lb)   LMP  (LMP Unknown)   BMI 27 29 kg/m²        Physical Exam   Constitutional: She appears well-developed and well-nourished  Eyes: Conjunctivae are normal    Neck: Neck supple  No JVD present  No thyromegaly present  Cardiovascular: Normal rate, regular rhythm, normal heart sounds and intact distal pulses  Exam reveals no gallop and no friction rub  No murmur heard  Pulmonary/Chest: Effort normal and breath sounds normal  She has no wheezes  She has no rales  Abdominal: Soft  Bowel sounds are normal  She exhibits no distension  There is no tenderness  Musculoskeletal: She exhibits no edema  EKG: normal EKG,  sinus bradycardia @ 58 bpm, no acute ischemia  Assessment/Plan:    Essential hypertension  Stable on lisinopril 20 mg, will continue  Depression  The lexapro had been working for her in the past but she is progressively more depressed  Will add low dose bupropion  Discussed possible side effects  She was asked to follow up in 2-3 weeks for recheck  Diagnoses and all orders for this visit:    Depression, unspecified depression type  -     TSH, 3rd generation with Free T4 reflex; Future  -     buPROPion (WELLBUTRIN SR) 100 mg 12 hr tablet; Take 1 tablet (100 mg total) by mouth daily    Chest pain, unspecified type  Comments: Will check labs and stress test   If this recurs she should go to the ER  Orders:  -     POCT ECG  -     CBC and differential; Future  -     Comprehensive metabolic panel; Future  -     Lipid panel; Future  -     Stress test only, exercise; Future    Screening breast examination  -     Mammo screening bilateral w cad; Future    Essential hypertension  -     Stress test only, exercise; Future          Return in about 3 weeks (around 1/17/2019)         Emma Saini MD

## 2018-12-27 ENCOUNTER — OFFICE VISIT (OUTPATIENT)
Dept: FAMILY MEDICINE CLINIC | Facility: CLINIC | Age: 68
End: 2018-12-27
Payer: MEDICARE

## 2018-12-27 VITALS
WEIGHT: 164 LBS | BODY MASS INDEX: 27.32 KG/M2 | TEMPERATURE: 97 F | SYSTOLIC BLOOD PRESSURE: 130 MMHG | HEART RATE: 60 BPM | HEIGHT: 65 IN | DIASTOLIC BLOOD PRESSURE: 90 MMHG

## 2018-12-27 DIAGNOSIS — Z12.39 SCREENING BREAST EXAMINATION: ICD-10-CM

## 2018-12-27 DIAGNOSIS — I10 ESSENTIAL HYPERTENSION: ICD-10-CM

## 2018-12-27 DIAGNOSIS — F32.A DEPRESSION, UNSPECIFIED DEPRESSION TYPE: Primary | ICD-10-CM

## 2018-12-27 DIAGNOSIS — R07.9 CHEST PAIN, UNSPECIFIED TYPE: ICD-10-CM

## 2018-12-27 PROCEDURE — 99214 OFFICE O/P EST MOD 30 MIN: CPT | Performed by: INTERNAL MEDICINE

## 2018-12-27 PROCEDURE — 93000 ELECTROCARDIOGRAM COMPLETE: CPT | Performed by: INTERNAL MEDICINE

## 2018-12-27 RX ORDER — BUPROPION HYDROCHLORIDE 100 MG/1
100 TABLET, EXTENDED RELEASE ORAL DAILY
Qty: 30 TABLET | Refills: 3 | Status: SHIPPED | OUTPATIENT
Start: 2018-12-27 | End: 2019-01-31 | Stop reason: SINTOL

## 2018-12-27 NOTE — ASSESSMENT & PLAN NOTE
The lexapro had been working for her in the past but she is progressively more depressed  Will add low dose bupropion  Discussed possible side effects  She was asked to follow up in 2-3 weeks for recheck

## 2019-01-25 DIAGNOSIS — F41.9 ANXIETY: ICD-10-CM

## 2019-01-25 DIAGNOSIS — G47.00 INSOMNIA, UNSPECIFIED TYPE: ICD-10-CM

## 2019-01-25 DIAGNOSIS — F32.A DEPRESSION, UNSPECIFIED DEPRESSION TYPE: ICD-10-CM

## 2019-01-26 LAB
ALBUMIN SERPL-MCNC: 4.2 G/DL (ref 3.6–5.1)
ALBUMIN/GLOB SERPL: 1.7 (CALC) (ref 1–2.5)
ALP SERPL-CCNC: 65 U/L (ref 33–130)
ALT SERPL-CCNC: 8 U/L (ref 6–29)
AST SERPL-CCNC: 13 U/L (ref 10–35)
BASOPHILS # BLD AUTO: 51 CELLS/UL (ref 0–200)
BASOPHILS NFR BLD AUTO: 1.1 %
BILIRUB SERPL-MCNC: 0.5 MG/DL (ref 0.2–1.2)
BUN SERPL-MCNC: 12 MG/DL (ref 7–25)
BUN/CREAT SERPL: NORMAL (CALC) (ref 6–22)
CALCIUM SERPL-MCNC: 9.2 MG/DL (ref 8.6–10.4)
CHLORIDE SERPL-SCNC: 103 MMOL/L (ref 98–110)
CHOLEST SERPL-MCNC: 243 MG/DL
CHOLEST/HDLC SERPL: 2.4 (CALC)
CO2 SERPL-SCNC: 28 MMOL/L (ref 20–32)
CREAT SERPL-MCNC: 0.71 MG/DL (ref 0.5–0.99)
EOSINOPHIL # BLD AUTO: 216 CELLS/UL (ref 15–500)
EOSINOPHIL NFR BLD AUTO: 4.7 %
ERYTHROCYTE [DISTWIDTH] IN BLOOD BY AUTOMATED COUNT: 12.5 % (ref 11–15)
GLOBULIN SER CALC-MCNC: 2.5 G/DL (CALC) (ref 1.9–3.7)
GLUCOSE SERPL-MCNC: 87 MG/DL (ref 65–99)
HCT VFR BLD AUTO: 45.2 % (ref 35–45)
HDLC SERPL-MCNC: 103 MG/DL
HGB BLD-MCNC: 15.2 G/DL (ref 11.7–15.5)
LDLC SERPL CALC-MCNC: 119 MG/DL (CALC)
LYMPHOCYTES # BLD AUTO: 2033 CELLS/UL (ref 850–3900)
LYMPHOCYTES NFR BLD AUTO: 44.2 %
MCH RBC QN AUTO: 32.1 PG (ref 27–33)
MCHC RBC AUTO-ENTMCNC: 33.6 G/DL (ref 32–36)
MCV RBC AUTO: 95.6 FL (ref 80–100)
MONOCYTES # BLD AUTO: 713 CELLS/UL (ref 200–950)
MONOCYTES NFR BLD AUTO: 15.5 %
NEUTROPHILS # BLD AUTO: 1587 CELLS/UL (ref 1500–7800)
NEUTROPHILS NFR BLD AUTO: 34.5 %
NONHDLC SERPL-MCNC: 140 MG/DL (CALC)
PLATELET # BLD AUTO: 231 THOUSAND/UL (ref 140–400)
PMV BLD REES-ECKER: 10.8 FL (ref 7.5–12.5)
POTASSIUM SERPL-SCNC: 3.9 MMOL/L (ref 3.5–5.3)
PROT SERPL-MCNC: 6.7 G/DL (ref 6.1–8.1)
RBC # BLD AUTO: 4.73 MILLION/UL (ref 3.8–5.1)
SL AMB EGFR AFRICAN AMERICAN: 102 ML/MIN/1.73M2
SL AMB EGFR NON AFRICAN AMERICAN: 88 ML/MIN/1.73M2
SODIUM SERPL-SCNC: 140 MMOL/L (ref 135–146)
T4 FREE SERPL-MCNC: 1 NG/DL (ref 0.8–1.8)
TRIGL SERPL-MCNC: 106 MG/DL
TSH SERPL-ACNC: 12.41 MIU/L (ref 0.4–4.5)
WBC # BLD AUTO: 4.6 THOUSAND/UL (ref 3.8–10.8)

## 2019-01-26 RX ORDER — ESCITALOPRAM OXALATE 20 MG/1
TABLET ORAL
Qty: 90 TABLET | Refills: 1 | Status: SHIPPED | OUTPATIENT
Start: 2019-01-26 | End: 2020-04-16 | Stop reason: SDUPTHER

## 2019-01-26 RX ORDER — ZOLPIDEM TARTRATE 10 MG/1
TABLET ORAL
Qty: 30 TABLET | Refills: 0 | Status: SHIPPED | OUTPATIENT
Start: 2019-01-26 | End: 2019-02-18 | Stop reason: SDUPTHER

## 2019-01-26 RX ORDER — ALPRAZOLAM 0.5 MG/1
TABLET ORAL
Qty: 30 TABLET | Refills: 0 | Status: SHIPPED | OUTPATIENT
Start: 2019-01-26 | End: 2019-02-18 | Stop reason: SDUPTHER

## 2019-01-28 DIAGNOSIS — E03.8 OTHER SPECIFIED HYPOTHYROIDISM: Primary | ICD-10-CM

## 2019-01-28 DIAGNOSIS — F41.9 ANXIETY: ICD-10-CM

## 2019-01-28 RX ORDER — LEVOTHYROXINE SODIUM 0.03 MG/1
25 TABLET ORAL DAILY
Qty: 30 TABLET | Refills: 1 | Status: SHIPPED | OUTPATIENT
Start: 2019-01-28 | End: 2019-03-23 | Stop reason: SDUPTHER

## 2019-01-28 RX ORDER — ESCITALOPRAM OXALATE 20 MG/1
20 TABLET ORAL DAILY
Qty: 90 TABLET | Refills: 1 | Status: SHIPPED | OUTPATIENT
Start: 2019-01-28 | End: 2019-09-05 | Stop reason: SDUPTHER

## 2019-01-28 NOTE — TELEPHONE ENCOUNTER
I called and LMOM for patient to call back regarding labs  TSH is elevated and she needs to start on levothyroxine  She is hypothyroid and this can make her feel tired, depressed, gain weight  I will send an rx to her pharmacy for levothyroxine and ordered repeat TSH to be done in 6 weeks

## 2019-01-30 ENCOUNTER — TELEPHONE (OUTPATIENT)
Dept: FAMILY MEDICINE CLINIC | Facility: CLINIC | Age: 69
End: 2019-01-30

## 2019-01-30 NOTE — TELEPHONE ENCOUNTER
Pt was just  Calling to let Dr Rashad Chiang know that she received the messages and knows that her thyroid is high and needs to take medications  Her sister in law has pancreatic cancer so she has been in [place] tending to her  She is home now and would like to discuss thyroid issue

## 2019-01-31 NOTE — TELEPHONE ENCOUNTER
I spoke to patient and explained her lab testing  She will start levothyroxine and follow up with labs in 6 weeks, she will  the slip  She has been having some increased depression recently and we discussed that this may be a factor  She is not reacting well with the wellbutrin and will stop this and see if she starts to feel better with the levothyroxine  Will follow up with patient once 6 week labwork is back  No further action is needed

## 2019-02-18 DIAGNOSIS — F41.9 ANXIETY: ICD-10-CM

## 2019-02-18 DIAGNOSIS — G47.00 INSOMNIA, UNSPECIFIED TYPE: ICD-10-CM

## 2019-02-18 RX ORDER — ZOLPIDEM TARTRATE 10 MG/1
TABLET ORAL
Qty: 30 TABLET | Refills: 0 | Status: SHIPPED | OUTPATIENT
Start: 2019-02-18 | End: 2019-03-23 | Stop reason: SDUPTHER

## 2019-02-18 RX ORDER — ALPRAZOLAM 0.5 MG/1
TABLET ORAL
Qty: 30 TABLET | Refills: 0 | Status: SHIPPED | OUTPATIENT
Start: 2019-02-18 | End: 2019-03-23 | Stop reason: SDUPTHER

## 2019-03-06 LAB — TSH SERPL-ACNC: 2.78 MIU/L (ref 0.4–4.5)

## 2019-03-23 DIAGNOSIS — E03.8 OTHER SPECIFIED HYPOTHYROIDISM: ICD-10-CM

## 2019-03-23 DIAGNOSIS — G47.00 INSOMNIA, UNSPECIFIED TYPE: ICD-10-CM

## 2019-03-23 DIAGNOSIS — F41.9 ANXIETY: ICD-10-CM

## 2019-03-24 RX ORDER — LEVOTHYROXINE SODIUM 0.03 MG/1
TABLET ORAL
Qty: 30 TABLET | Refills: 1 | Status: SHIPPED | OUTPATIENT
Start: 2019-03-24 | End: 2019-06-07 | Stop reason: SDUPTHER

## 2019-03-24 RX ORDER — ALPRAZOLAM 0.5 MG/1
TABLET ORAL
Qty: 30 TABLET | Refills: 0 | Status: SHIPPED | OUTPATIENT
Start: 2019-03-24 | End: 2019-12-24 | Stop reason: SDUPTHER

## 2019-03-24 RX ORDER — ZOLPIDEM TARTRATE 10 MG/1
TABLET ORAL
Qty: 30 TABLET | Refills: 0 | Status: SHIPPED | OUTPATIENT
Start: 2019-03-24 | End: 2019-07-07 | Stop reason: SDUPTHER

## 2019-04-26 DIAGNOSIS — F41.9 ANXIETY: ICD-10-CM

## 2019-04-27 RX ORDER — ALPRAZOLAM 0.5 MG/1
TABLET ORAL
Qty: 30 TABLET | Refills: 0 | Status: SHIPPED | OUTPATIENT
Start: 2019-04-27 | End: 2019-06-03 | Stop reason: SDUPTHER

## 2019-05-20 ENCOUNTER — HOSPITAL ENCOUNTER (OUTPATIENT)
Dept: MAMMOGRAPHY | Facility: HOSPITAL | Age: 69
Discharge: HOME | End: 2019-05-20
Payer: MEDICARE

## 2019-05-20 VITALS — WEIGHT: 164 LBS | HEIGHT: 65 IN | BODY MASS INDEX: 27.32 KG/M2

## 2019-05-20 DIAGNOSIS — Z12.31 SCREENING MAMMOGRAM, ENCOUNTER FOR: ICD-10-CM

## 2019-05-20 PROCEDURE — 77067 SCR MAMMO BI INCL CAD: CPT

## 2019-06-03 DIAGNOSIS — F41.9 ANXIETY: ICD-10-CM

## 2019-06-04 RX ORDER — ALPRAZOLAM 0.5 MG/1
TABLET ORAL
Qty: 30 TABLET | Refills: 0 | Status: SHIPPED | OUTPATIENT
Start: 2019-06-04 | End: 2019-07-01 | Stop reason: SDUPTHER

## 2019-06-07 DIAGNOSIS — E03.8 OTHER SPECIFIED HYPOTHYROIDISM: ICD-10-CM

## 2019-06-09 RX ORDER — LEVOTHYROXINE SODIUM 0.03 MG/1
TABLET ORAL
Qty: 30 TABLET | Refills: 5 | Status: SHIPPED | OUTPATIENT
Start: 2019-06-09 | End: 2020-02-02

## 2019-07-01 DIAGNOSIS — F41.9 ANXIETY: ICD-10-CM

## 2019-07-01 RX ORDER — ALPRAZOLAM 0.5 MG/1
TABLET ORAL
Qty: 30 TABLET | Refills: 0 | Status: SHIPPED | OUTPATIENT
Start: 2019-07-01 | End: 2019-09-05 | Stop reason: SDUPTHER

## 2019-07-07 DIAGNOSIS — F41.9 ANXIETY: ICD-10-CM

## 2019-07-07 DIAGNOSIS — K21.9 GASTROESOPHAGEAL REFLUX DISEASE, ESOPHAGITIS PRESENCE NOT SPECIFIED: ICD-10-CM

## 2019-07-07 DIAGNOSIS — G47.00 INSOMNIA, UNSPECIFIED TYPE: ICD-10-CM

## 2019-07-08 ENCOUNTER — TELEPHONE (OUTPATIENT)
Dept: FAMILY MEDICINE CLINIC | Facility: CLINIC | Age: 69
End: 2019-07-08

## 2019-07-08 RX ORDER — ZOLPIDEM TARTRATE 10 MG/1
TABLET ORAL
Qty: 30 TABLET | Refills: 0 | Status: SHIPPED | OUTPATIENT
Start: 2019-07-08 | End: 2019-08-19 | Stop reason: SDUPTHER

## 2019-07-08 RX ORDER — ALPRAZOLAM 0.5 MG/1
TABLET ORAL
Qty: 30 TABLET | Refills: 0 | Status: SHIPPED | OUTPATIENT
Start: 2019-07-08 | End: 2019-09-05 | Stop reason: SDUPTHER

## 2019-07-08 RX ORDER — PANTOPRAZOLE SODIUM 40 MG/1
TABLET, DELAYED RELEASE ORAL
Qty: 30 TABLET | Refills: 5 | Status: SHIPPED | OUTPATIENT
Start: 2019-07-08 | End: 2020-01-07 | Stop reason: SDUPTHER

## 2019-07-08 NOTE — TELEPHONE ENCOUNTER
Left message on machine requesting a call back  Please let her know she is not due for lab work yet   Shahriar Samano

## 2019-07-10 ENCOUNTER — TELEPHONE (OUTPATIENT)
Dept: FAMILY MEDICINE CLINIC | Facility: CLINIC | Age: 69
End: 2019-07-10

## 2019-07-10 NOTE — TELEPHONE ENCOUNTER
Tried to call pt twice, person who was picking up phone, keep hanging up  Will try again later       Erick Zaragoza MA

## 2019-07-11 NOTE — TELEPHONE ENCOUNTER
If patient calls back please inform her that TSH was just done in March and was normal, and per Dr Aretha Carpenter patient not due yet

## 2019-08-05 DIAGNOSIS — F32.A DEPRESSION, UNSPECIFIED DEPRESSION TYPE: ICD-10-CM

## 2019-08-05 DIAGNOSIS — F41.9 ANXIETY: ICD-10-CM

## 2019-08-05 DIAGNOSIS — G47.00 INSOMNIA, UNSPECIFIED TYPE: ICD-10-CM

## 2019-08-12 RX ORDER — ALPRAZOLAM 0.5 MG/1
TABLET ORAL
Qty: 30 TABLET | Refills: 0 | OUTPATIENT
Start: 2019-08-12

## 2019-08-12 RX ORDER — BUPROPION HYDROCHLORIDE 100 MG/1
TABLET, EXTENDED RELEASE ORAL
Qty: 30 TABLET | Refills: 3 | OUTPATIENT
Start: 2019-08-12

## 2019-08-12 RX ORDER — ZOLPIDEM TARTRATE 10 MG/1
TABLET ORAL
Qty: 30 TABLET | Refills: 0 | OUTPATIENT
Start: 2019-08-12

## 2019-08-19 DIAGNOSIS — G47.00 INSOMNIA, UNSPECIFIED TYPE: ICD-10-CM

## 2019-08-19 DIAGNOSIS — F32.A DEPRESSION, UNSPECIFIED DEPRESSION TYPE: ICD-10-CM

## 2019-08-21 RX ORDER — ZOLPIDEM TARTRATE 10 MG/1
TABLET ORAL
Qty: 30 TABLET | Refills: 0 | Status: SHIPPED | OUTPATIENT
Start: 2019-08-21 | End: 2019-10-03 | Stop reason: SDUPTHER

## 2019-08-21 RX ORDER — BUPROPION HYDROCHLORIDE 100 MG/1
TABLET, EXTENDED RELEASE ORAL
Qty: 30 TABLET | Refills: 3 | Status: SHIPPED | OUTPATIENT
Start: 2019-08-21 | End: 2019-09-05 | Stop reason: SDUPTHER

## 2019-09-04 NOTE — PROGRESS NOTES
Subjective:      Patient ID: Charlene Garcia is a 77 y o  female  No chief complaint on file  She has been feeling more depressed  Her sister in law just passed away after a 7 month manzo with stage IV pancreatic cancer  She has always refused counselling but is asking for a couple of names, thinks she would like to start  She is tearful and not sleeping well  She denies active SI but feels sometimes life is "not worth living," denies she would ever act on anything  Does feel as though the medication is still helping her  No side effects  Otherwise denies chest pain, dyspnea, edema, palpitations  Weight has been stable  No cold or heat intolerance  The following portions of the patient's history were reviewed and updated as appropriate: allergies, current medications, past family history, past medical history, past social history, past surgical history and problem list     Review of Systems   Constitutional: Negative  Respiratory: Negative  Cardiovascular: Negative  Psychiatric/Behavioral: Positive for dysphoric mood and sleep disturbance  Negative for suicidal ideas           Current Outpatient Medications   Medication Sig Dispense Refill    ALPRAZolam (XANAX) 0 5 mg tablet TAKE 1 TABLET BY MOUTH ONCE DAILY AS NEEDED 30 tablet 0    ALPRAZolam (XANAX) 0 5 mg tablet TAKE 1 TABLET BY MOUTH ONCE DAILY AS NEEDED 30 tablet 0    ALPRAZolam (XANAX) 0 5 mg tablet TAKE 1 TABLET BY MOUTH ONCE DAILY AS NEEDED 30 tablet 0    buPROPion (WELLBUTRIN SR) 100 mg 12 hr tablet Take 100 mg by mouth daily  3    buPROPion (WELLBUTRIN SR) 100 mg 12 hr tablet TAKE 1 TABLET BY MOUTH ONCE DAILY 30 tablet 3    escitalopram (LEXAPRO) 20 mg tablet TAKE 1 TABLET BY MOUTH ONCE DAILY 90 tablet 1    escitalopram (LEXAPRO) 20 mg tablet Take 1 tablet (20 mg total) by mouth daily 90 tablet 1    fluconazole (DIFLUCAN) 150 mg tablet Take 1 tablet on 7/28 if symptoms have not improved 1 tablet 1    hyoscyamine (LEVSIN/SL) 0 125 mg SL tablet Take 0 125 mg by mouth 3 (three) times a day as needed for cramping      levothyroxine 25 mcg tablet TAKE 1 TABLET BY MOUTH ONCE DAILY 30 tablet 5    lisinopril (ZESTRIL) 20 mg tablet Take 20 mg by mouth daily      pantoprazole (PROTONIX) 40 mg tablet TAKE 1 TABLET BY MOUTH ONCE DAILY 30 tablet 5    zolpidem (AMBIEN) 10 mg tablet TAKE 1 TABLET BY MOUTH AT BEDTIME 30 tablet 0     No current facility-administered medications for this visit  Objective:    LMP  (LMP Unknown)        Physical Exam   Constitutional: She appears well-developed and well-nourished  Eyes: Conjunctivae are normal    Neck: Neck supple  No JVD present  No thyromegaly present  Cardiovascular: Normal rate, regular rhythm, normal heart sounds and intact distal pulses  Exam reveals no gallop and no friction rub  No murmur heard  Pulmonary/Chest: Effort normal and breath sounds normal  She has no wheezes  She has no rales  Abdominal: Soft  Bowel sounds are normal  She exhibits no distension  There is no tenderness  Musculoskeletal: She exhibits no edema  Psychiatric: She exhibits a depressed mood  She expresses no homicidal and no suicidal ideation  She expresses no suicidal plans  Assessment/Plan:    No problem-specific Assessment & Plan notes found for this encounter  BMI Counseling: There is no height or weight on file to calculate BMI  Discussed the patient's BMI with her  The BMI is above average  BMI counseling and education was provided to the patient  Nutrition recommendations include reducing portion sizes and decreasing overall calorie intake  Exercise recommendations include moderate aerobic physical activity for 150 minutes/week  There are no diagnoses linked to this encounter  No follow-ups on file         Cass Bartholomew MD

## 2019-09-05 ENCOUNTER — OFFICE VISIT (OUTPATIENT)
Dept: FAMILY MEDICINE CLINIC | Facility: CLINIC | Age: 69
End: 2019-09-05
Payer: MEDICARE

## 2019-09-05 VITALS
TEMPERATURE: 97.4 F | OXYGEN SATURATION: 97 % | RESPIRATION RATE: 16 BRPM | HEIGHT: 65 IN | BODY MASS INDEX: 27.29 KG/M2 | HEART RATE: 66 BPM | SYSTOLIC BLOOD PRESSURE: 140 MMHG | DIASTOLIC BLOOD PRESSURE: 100 MMHG

## 2019-09-05 DIAGNOSIS — Z11.59 ENCOUNTER FOR HEPATITIS C SCREENING TEST FOR LOW RISK PATIENT: ICD-10-CM

## 2019-09-05 DIAGNOSIS — F32.A DEPRESSION, UNSPECIFIED DEPRESSION TYPE: ICD-10-CM

## 2019-09-05 DIAGNOSIS — Z00.00 MEDICARE ANNUAL WELLNESS VISIT, SUBSEQUENT: Primary | ICD-10-CM

## 2019-09-05 DIAGNOSIS — E03.8 OTHER SPECIFIED HYPOTHYROIDISM: ICD-10-CM

## 2019-09-05 DIAGNOSIS — I10 ESSENTIAL HYPERTENSION: ICD-10-CM

## 2019-09-05 DIAGNOSIS — E53.8 VITAMIN B12 DEFICIENCY: ICD-10-CM

## 2019-09-05 PROCEDURE — G0439 PPPS, SUBSEQ VISIT: HCPCS | Performed by: INTERNAL MEDICINE

## 2019-09-05 PROCEDURE — 99214 OFFICE O/P EST MOD 30 MIN: CPT | Performed by: INTERNAL MEDICINE

## 2019-09-05 RX ORDER — BUPROPION HYDROCHLORIDE 150 MG/1
150 TABLET ORAL DAILY
Qty: 30 TABLET | Refills: 5 | Status: SHIPPED | OUTPATIENT
Start: 2019-09-05 | End: 2020-08-21

## 2019-09-05 RX ORDER — LISINOPRIL 10 MG/1
10 TABLET ORAL DAILY
Qty: 30 TABLET | Refills: 5 | Status: SHIPPED | OUTPATIENT
Start: 2019-09-05 | End: 2019-11-04 | Stop reason: SDUPTHER

## 2019-09-05 NOTE — ASSESSMENT & PLAN NOTE
This is elevated today, and she states her lisinopril was stopped several months ago  Will restart at 10 mg daily and will repeat her bp measurement when she returns for follow up in 2 weeks, will titrate at that time if necessary

## 2019-09-05 NOTE — PATIENT INSTRUCTIONS
Obesity   AMBULATORY CARE:   Obesity  is when your body mass index (BMI) is greater than 30  Your healthcare provider will use your height and weight to measure your BMI  The risks of obesity include  many health problems, such as injuries or physical disability  You may need tests to check for the following:  · Diabetes     · High blood pressure or high cholesterol     · Heart disease     · Gallbladder or liver disease     · Cancer of the colon, breast, prostate, liver, or kidney     · Sleep apnea     · Arthritis or gout  Seek care immediately if:   · You have a severe headache, confusion, or difficulty speaking  · You have weakness on one side of your body  · You have chest pain, sweating, or shortness of breath  Contact your healthcare provider if:   · You have symptoms of gallbladder or liver disease, such as pain in your upper abdomen  · You have knee or hip pain and discomfort while walking  · You have symptoms of diabetes, such as intense hunger and thirst, and frequent urination  · You have symptoms of sleep apnea, such as snoring or daytime sleepiness  · You have questions or concerns about your condition or care  Treatment for obesity  focuses on helping you lose weight to improve your health  Even a small decrease in BMI can reduce the risk for many health problems  Your healthcare provider will help you set a weight-loss goal   · Lifestyle changes  are the first step in treating obesity  These include making healthy food choices and getting regular physical activity  Your healthcare provider may suggest a weight-loss program that involves coaching, education, and therapy  · Medicine  may help you lose weight when it is used with a healthy diet and physical activity  · Surgery  can help you lose weight if you are very obese and have other health problems  There are several types of weight-loss surgery  Ask your healthcare provider for more information    Be successful losing weight:   · Set small, realistic goals  An example of a small goal is to walk for 20 minutes 5 days a week  Anther goal is to lose 5% of your body weight  · Tell friends, family members, and coworkers about your goals  and ask for their support  Ask a friend to lose weight with you, or join a weight-loss support group  · Identify foods or triggers that may cause you to overeat , and find ways to avoid them  Remove tempting high-calorie foods from your home and workplace  Place a bowl of fresh fruit on your kitchen counter  If stress causes you to eat, then find other ways to cope with stress  · Keep a diary to track what you eat and drink  Also write down how many minutes of physical activity you do each day  Weigh yourself once a week and record it in your diary  Eating changes: You will need to eat 500 to 1,000 fewer calories each day than you currently eat to lose 1 to 2 pounds a week  The following changes will help you cut calories:  · Eat smaller portions  Use small plates, no larger than 9 inches in diameter  Fill your plate half full of fruits and vegetables  Measure your food using measuring cups until you know what a serving size looks like  · Eat 3 meals and 1 or 2 snacks each day  Plan your meals in advance  [email or web address] and eat at home most of the time  Eat slowly  · Eat fruits and vegetables at every meal   They are low in calories and high in fiber, which makes you feel full  Do not add butter, margarine, or cream sauce to vegetables  Use herbs to season steamed vegetables  · Eat less fat and fewer fried foods  Eat more baked or grilled chicken and fish  These protein sources are lower in calories and fat than red meat  Limit fast food  Dress your salads with olive oil and vinegar instead of bottled dressing  · Limit the amount of sugar you eat  Do not drink sugary beverages  Limit alcohol  Activity changes:  Physical activity is good for your body in many ways   It helps you burn calories and build strong muscles  It decreases stress and depression, and improves your mood  It can also help you sleep better  Talk to your healthcare provider before you begin an exercise program   · Exercise for at least 30 minutes 5 days a week  Start slowly  Set aside time each day for physical activity that you enjoy and that is convenient for you  It is best to do both weight training and an activity that increases your heart rate, such as walking, bicycling, or swimming  · Find ways to be more active  Do yard work and housecleaning  Walk up the stairs instead of using elevators  Spend your leisure time going to events that require walking, such as outdoor festivals or fairs  This extra physical activity can help you lose weight and keep it off  Follow up with your healthcare provider as directed: You may need to meet with a dietitian  Write down your questions so you remember to ask them during your visits  © 2017 [address] [employer] Information is for End User's use only and may not be sold, redistributed or otherwise used for commercial purposes  All illustrations and images included in CareNotes® are the copyrighted property of [employer]  or Cristhian Schwartz  The above information is an  only  It is not intended as medical advice for individual conditions or treatments  Talk to your doctor, nurse or pharmacist before following any medical regimen to see if it is safe and effective for you  Urinary Incontinence   WHAT YOU NEED TO KNOW:   What is urinary incontinence? Urinary incontinence (UI) is when you lose control of your bladder  What causes UI? UI occurs because your bladder cannot store or empty urine properly  The following are the most common types of UI:  · Stress incontinence  is when you leak urine due to increased bladder pressure  This may happen when you cough, sneeze, or exercise       · Urge incontinence  is when you feel the need to urinate right away and leak urine accidentally  · Mixed incontinence  is when you have both stress and urge UI  What are the signs and symptoms of UI?   · You feel like your bladder does not empty completely when you urinate  · You urinate often and need to urinate immediately  · You leak urine when you sleep, or you wake up with the urge to urinate  · You leak urine when you cough, sneeze, exercise, or laugh  How is UI diagnosed? Your healthcare provider will ask how often you leak urine and whether you have stress or urge symptoms  Tell him which medicines you take, how often you urinate, and how much liquid you drink each day  You may need any of the following tests:  · Urine tests  may show infection or kidney function  · A pelvic exam  may be done to check for blockages  A pelvic exam will also show if your bladder, uterus, or other organs have moved out of place  · An x-ray, ultrasound, or CT  may show problems with parts of your urinary system  You may be given contrast liquid to help your organs show up better in the pictures  Tell the healthcare provider if you have ever had an allergic reaction to contrast liquid  Do not enter the MRI room with anything metal  Metal can cause serious injury  Tell the healthcare provider if you have any metal in or on your body  · A bladder scan  will show how much urine is left in your bladder after you urinate  You will be asked to urinate and then healthcare providers will use a small ultrasound machine to check the urine left in your bladder  · Cystometry  is used to check the function of your urinary system  Your healthcare provider checks the pressure in your bladder while filling it with fluid  Your bladder pressure may also be tested when your bladder is full and while you urinate  How is UI treated? · Medicines  can help strengthen your bladder control      · Electrical stimulation  is used to send a small amount of electrical energy to your pelvic floor muscles  This helps control your bladder function  Electrodes may be placed outside your body or in your rectum  For women, the electrodes may be placed in the vagina  · A bulking agent  may be injected into the wall of your urethra to make it thicker  This helps keep your urethra closed and decreases urine leakage  · Devices  such as a clamp, pessary, or tampon may help stop urine leaks  Ask your healthcare provider for more information about these and other devices  · Surgery  may be needed if other treatments do not work  Several types of surgery can help improve your bladder control  Ask your healthcare provider for more information about the surgery you may need  How can I manage my symptoms? · Do pelvic muscle exercises often  Your pelvic muscles help you stop urinating  Squeeze these muscles tight for 5 seconds, then relax for 5 seconds  Gradually work up to squeezing for 10 seconds  Do 3 sets of 15 repetitions a day, or as directed  This will help strengthen your pelvic muscles and improve bladder control  · A catheter  may be used to help empty your bladder  A catheter is a tiny, plastic tube that is put into your bladder to drain your urine  Your healthcare provider may tell you to use a catheter to prevent your bladder from getting too full and leaking urine  · Keep a UI record  Write down how often you leak urine and how much you leak  Make a note of what you were doing when you leaked urine  · Train your bladder  Go to the bathroom at set times, such as every 2 hours, even if you do not feel the urge to go  You can also try to hold your urine when you feel the urge to go  For example, hold your urine for 5 minutes when you feel the urge to go  As that becomes easier, hold your urine for 10 minutes  · Drink liquids as directed  Ask your healthcare provider how much liquid to drink each day and which liquids are best for you   You may need to limit the amount of liquid you drink to help control your urine leakage  Limit or do not have drinks that contain caffeine or alcohol  Do not drink any liquid right before you go to bed  · Prevent constipation  Eat a variety of high-fiber foods  Good examples are high-fiber cereals, beans, vegetables, and whole-grain breads  Prune juice may help make your bowel movement softer  Walking is the best way to trigger your intestines to have a bowel movement  · Exercise regularly and maintain a healthy weight  Ask your healthcare provider how much you should weigh and about the best exercise plan for you  Weight loss and exercise will decrease pressure on your bladder and help you control your leakage  Ask him to help you create a weight loss plan if you are overweight  When should I seek immediate care? · You have severe pain  · You are confused or cannot think clearly  When should I contact my healthcare provider? · You have a fever  · You see blood in your urine  · You have pain when you urinate  · You have new or worse pain, even after treatment  · Your mouth feels dry or you have vision changes  · Your urine is cloudy or smells bad  · You have questions or concerns about your condition or care  CARE AGREEMENT:   You have the right to help plan your care  Learn about your health condition and how it may be treated  Discuss treatment options with your caregivers to decide what care you want to receive  You always have the right to refuse treatment  The above information is an  only  It is not intended as medical advice for individual conditions or treatments  Talk to your doctor, nurse or pharmacist before following any medical regimen to see if it is safe and effective for you  © 2017 [address] [employer] Information is for End User's use only and may not be sold, redistributed or otherwise used for commercial purposes   All illustrations and images included in CareNotes® are the copyrighted property of A [employer] A M [employer] Inc  or Cristhian Schwartz  Cigarette Smoking and Your Health   AMBULATORY CARE:   Risks to your health if you smoke:  Nicotine and other chemicals found in tobacco damage every cell in your body  Even if you are a light smoker, you have an increased risk for cancer, heart disease, and lung disease  If you are pregnant or have diabetes, smoking increases your risk for complications  Benefits to your health if you stop smoking:   · You decrease respiratory symptoms such as coughing, wheezing, and shortness of breath  · You reduce your risk for cancers of the lung, mouth, throat, kidney, bladder, pancreas, stomach, and cervix  If you already have cancer, you increase the benefits of chemotherapy  You also reduce your risk for cancer returning or a second cancer from developing  · You reduce your risk for heart disease, blood clots, heart attack, and stroke  · You reduce your risk for lung infections, and diseases such as pneumonia, asthma, chronic bronchitis, and emphysema  · Your circulation improves  More oxygen can be delivered to your body  If you have diabetes, you lower your risk for complications, such as kidney, artery, and eye diseases  You also lower your risk for nerve damage  Nerve damage can lead to amputations, poor vision, and blindness  · You improve your body's ability to heal and to fight infections  Benefits to the health of others if you stop smoking:  Tobacco is harmful to nonsmokers who breathe in your secondhand smoke  The following are ways the health of others around you may improve when you stop smoking:  · You lower the risks for lung cancer and heart disease in nonsmoking adults  · If you are pregnant, you lower the risk for miscarriage, early delivery, low birth weight, and stillbirth  You also lower your baby's risk for SIDS, obesity, developmental delay, and neurobehavioral problems, such as ADHD  · If you have children, you lower their risk for ear infections, colds, pneumonia, bronchitis, and asthma  For more information and support to stop smoking:   · Smokefree  gov  Phone: 0- 648 - 658-7952  Web Address: www smokefree  gov  Follow up with your healthcare provider as directed:  Write down your questions so you remember to ask them during your visits  © 2017 [place] Information is for End User's use only and may not be sold, redistributed or otherwise used for commercial purposes  All illustrations and images included in CareNotes® are the copyrighted property of A [employer] M [employer]  or Cristhian Schwartz  The above information is an  only  It is not intended as medical advice for individual conditions or treatments  Talk to your doctor, nurse or pharmacist before following any medical regimen to see if it is safe and effective for you  Fall Prevention   AMBULATORY CARE:   Fall prevention  includes ways to make your home and other areas safer  It also includes ways you can move more carefully to prevent a fall  Health conditions that cause changes in your blood pressure, vision, or muscle strength and coordination may increase your risk for falls  Medicines may also increase your risk for falls if they make you dizzy, weak, or sleepy  Call 911 or have someone else call if:   · You have fallen and are unconscious  · You have fallen and cannot move part of your body  Contact your healthcare provider if:   · You have fallen and have pain or a headache  · You have questions or concerns about your condition or care  Fall prevention tips:   · Stand or sit up slowly  This may help you keep your balance and prevent falls  · Use assistive devices as directed  Your healthcare provider may suggest that you use a cane or walker to help you keep your balance  You may need to have grab bars put in your bathroom near the toilet or in the shower      · Wear shoes that fit well and have soles that   Wear shoes both inside and outside  Use slippers with good   Do not wear shoes with high heels  · Wear a personal alarm  This is a device that allows you to call 911 if you fall and need help  Ask your healthcare provider for more information  · Stay active  Exercise can help strengthen your muscles and improve your balance  Your healthcare provider may recommend water aerobics or walking  He or she may also recommend physical therapy to improve your coordination  Never start an exercise program without talking to your healthcare provider first      · Manage your medical conditions  Keep all appointments with your healthcare providers  Visit your eye doctor as directed  Home safety tips:   · Add items to prevent falls in the bathroom  Put nonslip strips on your bath or shower floor to prevent you from slipping  Use a bath mat if you do not have carpet in the bathroom  This will prevent you from falling when you step out of the bath or shower  Use a shower seat so you do not need to stand while you shower  Sit on the toilet or a chair in your bathroom to dry yourself and put on clothing  This will prevent you from losing your balance from drying or dressing yourself while you are standing  · Keep paths clear  Remove books, shoes, and other objects from walkways and stairs  Place cords for telephones and lamps out of the way so that you do not need to walk over them  Tape them down if you cannot move them  Remove small rugs  If you cannot remove a rug, secure it with double-sided tape  This will prevent you from tripping  · Install bright lights in your home  Use night lights to help light paths to the bathroom or kitchen  Always turn on the light before you start walking  · Keep items you use often on shelves within reach  Do not use a step stool to help you reach an item  · Paint or place reflective tape on the edges of your stairs    This will help you see the stairs better  Follow up with your healthcare provider as directed:  Write down your questions so you remember to ask them during your visits  © 2017 [address] Information is for End User's use only and may not be sold, redistributed or otherwise used for commercial purposes  All illustrations and images included in CareNotes® are the copyrighted property of A [employer]  or Cristhian Schwartz  The above information is an  only  It is not intended as medical advice for individual conditions or treatments  Talk to your doctor, nurse or pharmacist before following any medical regimen to see if it is safe and effective for you  Advance Directives   WHAT YOU NEED TO KNOW:   What are advance directives? Advance directives are legal documents that state your wishes and plans for medical care  These plans are made ahead of time in case you lose your ability to make decisions for yourself  Advance directives can apply to any medical decision, such as the treatments you want, and if you want to donate organs  What are the types of advance directives? There are many types of advance directives, and each state has rules about how to use them  You may choose a combination of any of the following:  · Living will: This is a written record of the treatment you want  You can also choose which treatments you do not want, which to limit, and which to stop at a certain time  This includes surgery, medicine, IV fluid, and tube feedings  · Durable power of  for healthcare [facility]): This is a written record that states who you want to make healthcare choices for you when you are unable to make them for yourself  This person, called a proxy, is usually a family member or a friend  You may choose more than 1 proxy  · Do not resuscitate (DNR) order:  A DNR order is used in case your heart stops beating or you stop breathing   It is a request not to have certain forms of treatment, such as CPR  A DNR order may be included in other types of advance directives  · Medical directive: This covers the care that you want if you are in a coma, near death, or unable to make decisions for yourself  You can list the treatments you want for each condition  Treatment may include pain medicine, surgery, blood transfusions, dialysis, IV or tube feedings, and a ventilator (breathing machine)  · Values history: This document has questions about your views, beliefs, and how you feel and think about life  This information can help others choose the care that you would choose  Why are advance directives important? An advance directive helps you control your care  Although spoken wishes may be used, it is better to have your wishes written down  Spoken wishes can be misunderstood, or not followed  Treatments may be given even if you do not want them  An advance directive may make it easier for your family to make difficult choices about your care  How do I decide what to put in my advance directives? · Make informed decisions:  Make sure you fully understand treatments or care you may receive  Think about the benefits and problems your decisions could cause for you or your family  Talk to healthcare providers if you have concerns or questions before you write down your wishes  You may also want to talk with your [employer] or , or a   Check your state laws to make sure that what you put in your advance directive is legal      · Sign all forms:  Sign and date your advance directive when you have finished  You may also need 2 witnesses to sign the forms  Witnesses cannot be your doctor or his staff, your spouse, heirs or beneficiaries, people you owe money to, or your chosen proxy  Talk to your family, proxy, and healthcare providers about your advance directive  Give each person a copy, and keep one for yourself in a place you can get to easily   Do not keep it hidden or locked away  · Review and revise your plans: You can revise your advance directive at any time, as long as you are able to make decisions  Review your plan every year, and when there are changes in your life, or your health  When you make changes, let your family, proxy, and healthcare providers know  Give each a new copy  Where can I find more information? · [employer]  [address] , [place] [address]  Phone: 3- [phone] - [phone]  Phone: [phone]  Web Address: http://www  aafp org  · [address] [facility])  [address] [address] [facility], [address]  Phone: [phone]  Phone: [phone]  Web Address: Nito cassidy  CARE AGREEMENT:   You have the right to help plan your care  To help with this plan, you must learn about your health condition and treatment options  You must also learn about advance directives and how they are used  Work with your healthcare providers to decide what care will be used to treat you  You always have the right to refuse treatment  The above information is an  only  It is not intended as medical advice for individual conditions or treatments  Talk to your doctor, nurse or pharmacist before following any medical regimen to see if it is safe and effective for you  © 2017 [address] Information is for End User's use only and may not be sold, redistributed or otherwise used for commercial purposes  All illustrations and images included in CareNotes® are the copyrighted property of [employer]  or Cristhian Schwartz  Please call [facility] for counselling at [phone]

## 2019-09-05 NOTE — PROGRESS NOTES
Assessment and Plan:     Problem List Items Addressed This Visit     None         History of Present Illness:     Patient presents for Medicare Annual Wellness visit    Patient Care Team:  Hailee Ayala MD as PCP - MD Berto Poon MD Dolphus Night, MD Dolphus Night, MD as Endoscopist     Problem List:     Patient Active Problem List   Diagnosis    Anxiety    Depression    Esophageal reflux    Insomnia    Vitamin B12 deficiency    Essential hypertension    Other specified hypothyroidism      Past Medical and Surgical History:     Past Medical History:   Diagnosis Date    Anxiety     Congenital absence of one kidney     Depression     GERD (gastroesophageal reflux disease)     History of transfusion     Hypertension     Insomnia     Menopause     Other specified hypothyroidism 1/28/2019    Vitamin B12 deficiency      Past Surgical History:   Procedure Laterality Date    [place] COLONOSCOPY FLX DX W/COLLJ SPEC WHEN PFRMD N/A 5/30/2017    Procedure: EGD AND COLONOSCOPY;  Surgeon: Trent Lowry MD;  [facility];   Service: Gastroenterology    REDUCTION MAMMAPLASTY Bilateral 06/06/2011    REDUCTION MAMMAPLASTY      SHOULDER SURGERY        Family History:     Family History   Problem Relation Age of Onset    Lung cancer Mother 79    Heart disease Father     Lung cancer Brother 48      Social History:     Social History     Tobacco Use   Smoking Status Never Smoker   Smokeless Tobacco Never Used     Social History     Substance and Sexual Activity   Alcohol Use Yes    Alcohol/week: 7 0 standard drinks    Types: 7 Glasses of wine per week    Comment: 1 glass of wine nightly      Social History     Substance and Sexual Activity   Drug Use No      Medications and Allergies:     Current Outpatient Medications   Medication Sig Dispense Refill    ALPRAZolam (XANAX) 0 5 mg tablet TAKE 1 TABLET BY MOUTH ONCE DAILY AS NEEDED 30 tablet 0    buPROPion (WELLBUTRIN SR) 100 mg 12 hr tablet Take 100 mg by mouth daily  3    escitalopram (LEXAPRO) 20 mg tablet TAKE 1 TABLET BY MOUTH ONCE DAILY 90 tablet 1    levothyroxine 25 mcg tablet TAKE 1 TABLET BY MOUTH ONCE DAILY 30 tablet 5    pantoprazole (PROTONIX) 40 mg tablet TAKE 1 TABLET BY MOUTH ONCE DAILY 30 tablet 5    zolpidem (AMBIEN) 10 mg tablet TAKE 1 TABLET BY MOUTH AT BEDTIME 30 tablet 0    lisinopril (ZESTRIL) 20 mg tablet Take 20 mg by mouth daily       No current facility-administered medications for this visit  No Known Allergies   Immunizations: There is no immunization history on file for this patient  Medicare Screening Tests and Risk Assessments:     Todd Sanchez is here for her Subsequent Wellness visit  Health Risk Assessment:  Patient rates overall health as good  Patient feels that their physical health rating is Same  Eyesight was rated as Same  Hearing was rated as Same  Patient feels that their emotional and mental health rating is Slightly worse  Pain experienced by patient in the last 7 days has been None  Patient states that she has experienced no weight loss or gain in last 6 months  Emotional/Mental Health:  Patient has been feeling nervous/anxious  PHQ-9 Depression Screening:    Frequency of the following problems over the past two weeks:      1  Little interest or pleasure in doing things: 3 - nearly every day      2  Feeling down, depressed, or hopeless: 3 - nearly every day      3  Trouble falling or staying asleep, or sleeping too much: 3 - nearly every day      4  Feeling tired or having little energy: 3 - nearly every day      5  Poor appetite or overeatin - not at all      6  Feeling bad about yourself - or that you are a failure or have let yourself or your family down: 3 - nearly every day      7  Trouble concentrating on things, such as reading the newspaper or watching television: 3 - nearly every day      8  Moving or speaking so slowly that other people could have noticed  Or the opposite - being so fidgety or restless that you have been moving around a lot more than usual: 0 - not at all      9  Thoughts that you would be better off dead, or of hurting yourself in some way: 0 - not at all  PHQ-2 Score: 6  PHQ-9 Score: 18    Broken Bones/Falls: Fall Risk Assessment:    In the past year, patient has experienced: No history of falling in past year          Bladder/Bowel:  Patient has not leaked urine accidently in the last six months  Patient reports no loss of bowel control  Immunizations:  Patient has not had a flu vaccination within the last year  Patient has not received a pneumonia shot  Patient has not received a shingles shot  Patient has not received tetanus/diphtheria shot  Home Safety:  Patient does not have trouble with stairs inside or outside of their home  Patient currently reports that there are no safety hazards present in home, working smoke alarms, working carbon monoxide detectors  Preventative Screenings:   Breast cancer screening performed, colon cancer screen completed, cholesterol screen completed, glaucoma eye exam completed,     Nutrition:  Current diet: Regular and Limited junk food with servings of the following:    Medications:  Patient is not currently taking any over-the-counter supplements  Patient is able to manage medications  Lifestyle Choices:  Patient reports no tobacco use  Patient has not smoked or used tobacco in the past   Patient reports alcohol use  Alcohol use per week: socially  Patient drives a vehicle  Patient wears seat belt  Current level of exercise of physical activity described by patient as: N/A          Activities of Daily Living:  Can get out of bed by his or her self, able to dress self, able to make own meals, able to do own shopping, able to bathe self, can do own laundry/housekeeping, can manage own money, pay bills and track expenses    Previous Hospitalizations:  No hospitalization or ED visit in past 12 months        Advanced Directives:  Patient has decided on a power of   Patient has spoken to designated power of   Patient has completed advanced directive  Preventative Screening/Counseling:      Cardiovascular:      General: Risks and Benefits Discussed and Screening Current      Counseling: Healthy Diet, Healthy Weight, Improve Cholesterol, Improve Blood Pressure and Improve Exercise Tolerance          Diabetes:      General: Risks and Benefits Discussed and Screening Current          Colorectal Cancer:      General: Screening Current      Counseling: high fiber diet          Breast Cancer:      General: Screening Current          Cervical Cancer:      General: Screening Not Indicated          Osteoporosis:      General: Patient Declines      Counseling: Calcium and Vitamin D Intake and Regular Weightbearing Exercise          AAA:      General: Screening Not Indicated          Glaucoma:      General: Screening Current          HIV:      General: Screening Not Indicated          Hepatitis C:      General: Risks and Benefits Discussed      Counseling: has received general HCV counseling        Advanced Directives:   Patient has living will for healthcare, has durable POA for healthcare,     Immunizations:      Influenza: Patient Declines      Pneumococcal: Patient Declines      Shingrix: Patient Declines      Hepatitis B (Low risk patients): Series Not Indicated      Other Preventative Counseling (Non-Medicare):   Fall Prevention, Increase physical activity and Weight reduction discussed

## 2019-09-05 NOTE — ASSESSMENT & PLAN NOTE
This is worsening  She was given several names for counselling in the area  Will continue escitalopram and increase bupropion slightly from 100 to 150 mg daily  She will return in 2 weeks for follow up and was urged to call sooner for any complaints, issues, or worsening depression

## 2019-09-20 LAB
ALBUMIN SERPL-MCNC: 3.9 G/DL (ref 3.6–5.1)
ALBUMIN/GLOB SERPL: 1.6 (CALC) (ref 1–2.5)
ALP SERPL-CCNC: 55 U/L (ref 33–130)
ALT SERPL-CCNC: 7 U/L (ref 6–29)
AST SERPL-CCNC: 10 U/L (ref 10–35)
BASOPHILS # BLD AUTO: 29 CELLS/UL (ref 0–200)
BASOPHILS NFR BLD AUTO: 0.6 %
BILIRUB SERPL-MCNC: 0.6 MG/DL (ref 0.2–1.2)
BUN SERPL-MCNC: 12 MG/DL (ref 7–25)
BUN/CREAT SERPL: NORMAL (CALC) (ref 6–22)
CALCIUM SERPL-MCNC: 9.3 MG/DL (ref 8.6–10.4)
CHLORIDE SERPL-SCNC: 105 MMOL/L (ref 98–110)
CHOLEST SERPL-MCNC: 237 MG/DL
CHOLEST/HDLC SERPL: 3.2 (CALC)
CO2 SERPL-SCNC: 30 MMOL/L (ref 20–32)
CREAT SERPL-MCNC: 0.85 MG/DL (ref 0.5–0.99)
EOSINOPHIL # BLD AUTO: 201 CELLS/UL (ref 15–500)
EOSINOPHIL NFR BLD AUTO: 4.1 %
ERYTHROCYTE [DISTWIDTH] IN BLOOD BY AUTOMATED COUNT: 12.3 % (ref 11–15)
GLOBULIN SER CALC-MCNC: 2.5 G/DL (CALC) (ref 1.9–3.7)
GLUCOSE SERPL-MCNC: 90 MG/DL (ref 65–99)
HCT VFR BLD AUTO: 42.2 % (ref 35–45)
HCV AB S/CO SERPL IA: 0.02
HCV AB SERPL QL IA: NORMAL
HDLC SERPL-MCNC: 75 MG/DL
HGB BLD-MCNC: 14.2 G/DL (ref 11.7–15.5)
LDLC SERPL CALC-MCNC: 141 MG/DL (CALC)
LYMPHOCYTES # BLD AUTO: 1558 CELLS/UL (ref 850–3900)
LYMPHOCYTES NFR BLD AUTO: 31.8 %
MCH RBC QN AUTO: 32.5 PG (ref 27–33)
MCHC RBC AUTO-ENTMCNC: 33.6 G/DL (ref 32–36)
MCV RBC AUTO: 96.6 FL (ref 80–100)
MONOCYTES # BLD AUTO: 431 CELLS/UL (ref 200–950)
MONOCYTES NFR BLD AUTO: 8.8 %
NEUTROPHILS # BLD AUTO: 2680 CELLS/UL (ref 1500–7800)
NEUTROPHILS NFR BLD AUTO: 54.7 %
NONHDLC SERPL-MCNC: 162 MG/DL (CALC)
PLATELET # BLD AUTO: 235 THOUSAND/UL (ref 140–400)
PMV BLD REES-ECKER: 11.2 FL (ref 7.5–12.5)
POTASSIUM SERPL-SCNC: 4.5 MMOL/L (ref 3.5–5.3)
PROT SERPL-MCNC: 6.4 G/DL (ref 6.1–8.1)
RBC # BLD AUTO: 4.37 MILLION/UL (ref 3.8–5.1)
SL AMB EGFR AFRICAN AMERICAN: 82 ML/MIN/1.73M2
SL AMB EGFR NON AFRICAN AMERICAN: 70 ML/MIN/1.73M2
SODIUM SERPL-SCNC: 142 MMOL/L (ref 135–146)
TRIGL SERPL-MCNC: 99 MG/DL
TSH SERPL-ACNC: 3.05 MIU/L (ref 0.4–4.5)
VIT B12 SERPL-MCNC: 176 PG/ML (ref 200–1100)
WBC # BLD AUTO: 4.9 THOUSAND/UL (ref 3.8–10.8)

## 2019-09-30 NOTE — PROGRESS NOTES
Subjective:      Patient ID: Markell Thorpe is a 77 y o  female  Chief Complaint   Patient presents with    [facility]       Here fr follow up visit  She is feeling better  She is not as tearful  She still does not have a lot of energy and is sitting around a little too much but feels this is improving as well  Denies side effects  Denies SI/HI  The following portions of the patient's history were reviewed and updated as appropriate: allergies, current medications, past family history, past medical history, past social history, past surgical history and problem list     Review of Systems   Constitutional: Negative  Respiratory: Negative  Cardiovascular: Negative  Psychiatric/Behavioral: Negative for dysphoric mood  The patient is not nervous/anxious  Current Outpatient Medications   Medication Sig Dispense Refill    ALPRAZolam (XANAX) 0 5 mg tablet TAKE 1 TABLET BY MOUTH ONCE DAILY AS NEEDED 30 tablet 0    buPROPion (WELLBUTRIN XL) 150 mg 24 hr tablet Take 1 tablet (150 mg total) by mouth daily 30 tablet 5    escitalopram (LEXAPRO) 20 mg tablet TAKE 1 TABLET BY MOUTH ONCE DAILY 90 tablet 1    levothyroxine 25 mcg tablet TAKE 1 TABLET BY MOUTH ONCE DAILY 30 tablet 5    lisinopril (ZESTRIL) 10 mg tablet Take 1 tablet (10 mg total) by mouth daily 30 tablet 5    pantoprazole (PROTONIX) 40 mg tablet TAKE 1 TABLET BY MOUTH ONCE DAILY 30 tablet 5    zolpidem (AMBIEN) 10 mg tablet TAKE 1 TABLET BY MOUTH AT BEDTIME 30 tablet 0     No current facility-administered medications for this visit  Objective:    /84   Pulse 56   Temp 97 7 °F (36 5 °C)   Resp 18   Ht 5' 5" (1 651 m)   Wt 78 3 kg (172 lb 9 6 oz)   LMP  (LMP Unknown)   SpO2 98%   BMI 28 72 kg/m²        Physical Exam   Constitutional: She appears well-developed and well-nourished  Eyes: Conjunctivae are normal    Neck: Neck supple  No JVD present  No thyromegaly present     Cardiovascular: Normal rate, regular rhythm, normal heart sounds and intact distal pulses  Exam reveals no gallop and no friction rub  No murmur heard  Pulmonary/Chest: Effort normal and breath sounds normal  She has no wheezes  She has no rales  Abdominal: Soft  Bowel sounds are normal  She exhibits no distension  There is no tenderness  Musculoskeletal: She exhibits no edema  Psychiatric: She has a normal mood and affect  Her behavior is normal  Judgment and thought content normal  She expresses no homicidal and no suicidal ideation  Assessment/Plan:    Anxiety and depression  This is improving and she does not want further adjustment on her medications  She will continue current doses of escitalopram and bupropion at this time and will return in 3 months  Encouraged to call sooner if she does not continue to improve or does not feel things are going well  Diagnoses and all orders for this visit:    Anxiety and depression    Vitamin B12 deficiency          No follow-ups on file         Wilton Stokes MD

## 2019-10-02 ENCOUNTER — OFFICE VISIT (OUTPATIENT)
Dept: FAMILY MEDICINE CLINIC | Facility: CLINIC | Age: 69
End: 2019-10-02
Payer: MEDICARE

## 2019-10-02 VITALS
TEMPERATURE: 97.7 F | BODY MASS INDEX: 28.76 KG/M2 | WEIGHT: 172.6 LBS | DIASTOLIC BLOOD PRESSURE: 84 MMHG | HEIGHT: 65 IN | RESPIRATION RATE: 18 BRPM | SYSTOLIC BLOOD PRESSURE: 136 MMHG | OXYGEN SATURATION: 98 % | HEART RATE: 56 BPM

## 2019-10-02 DIAGNOSIS — F41.9 ANXIETY AND DEPRESSION: Primary | ICD-10-CM

## 2019-10-02 DIAGNOSIS — F32.A ANXIETY AND DEPRESSION: Primary | ICD-10-CM

## 2019-10-02 DIAGNOSIS — E53.8 VITAMIN B12 DEFICIENCY: ICD-10-CM

## 2019-10-02 PROCEDURE — 99213 OFFICE O/P EST LOW 20 MIN: CPT | Performed by: INTERNAL MEDICINE

## 2019-10-02 NOTE — ASSESSMENT & PLAN NOTE
This is improving and she does not want further adjustment on her medications  She will continue current doses of escitalopram and bupropion at this time and will return in 3 months  Encouraged to call sooner if she does not continue to improve or does not feel things are going well

## 2019-10-03 DIAGNOSIS — F41.9 ANXIETY: ICD-10-CM

## 2019-10-03 DIAGNOSIS — G47.00 INSOMNIA, UNSPECIFIED TYPE: ICD-10-CM

## 2019-10-03 RX ORDER — ZOLPIDEM TARTRATE 10 MG/1
TABLET ORAL
Qty: 30 TABLET | Refills: 0 | Status: SHIPPED | OUTPATIENT
Start: 2019-10-03 | End: 2019-11-14 | Stop reason: SDUPTHER

## 2019-10-03 RX ORDER — ALPRAZOLAM 0.5 MG/1
TABLET ORAL
Qty: 30 TABLET | Refills: 0 | Status: SHIPPED | OUTPATIENT
Start: 2019-10-03 | End: 2019-11-11 | Stop reason: SDUPTHER

## 2019-11-04 DIAGNOSIS — I10 ESSENTIAL HYPERTENSION: ICD-10-CM

## 2019-11-04 RX ORDER — LISINOPRIL 10 MG/1
10 TABLET ORAL DAILY
Qty: 30 TABLET | Refills: 5 | Status: SHIPPED | OUTPATIENT
Start: 2019-11-04 | End: 2019-12-02 | Stop reason: SDUPTHER

## 2019-11-11 DIAGNOSIS — F41.9 ANXIETY: ICD-10-CM

## 2019-11-12 RX ORDER — ALPRAZOLAM 0.5 MG/1
TABLET ORAL
Qty: 30 TABLET | Refills: 0 | Status: SHIPPED | OUTPATIENT
Start: 2019-11-12 | End: 2019-12-24 | Stop reason: SDUPTHER

## 2019-11-14 DIAGNOSIS — G47.00 INSOMNIA, UNSPECIFIED TYPE: ICD-10-CM

## 2019-11-14 RX ORDER — ZOLPIDEM TARTRATE 10 MG/1
TABLET ORAL
Qty: 30 TABLET | Refills: 0 | Status: SHIPPED | OUTPATIENT
Start: 2019-11-14 | End: 2019-12-24 | Stop reason: SDUPTHER

## 2019-12-02 DIAGNOSIS — I10 ESSENTIAL HYPERTENSION: ICD-10-CM

## 2019-12-02 RX ORDER — LISINOPRIL 10 MG/1
10 TABLET ORAL DAILY
Qty: 30 TABLET | Refills: 5 | Status: SHIPPED | OUTPATIENT
Start: 2019-12-02 | End: 2019-12-09 | Stop reason: SDUPTHER

## 2019-12-09 ENCOUNTER — TELEPHONE (OUTPATIENT)
Dept: FAMILY MEDICINE CLINIC | Facility: CLINIC | Age: 69
End: 2019-12-09

## 2019-12-09 DIAGNOSIS — I10 ESSENTIAL HYPERTENSION: ICD-10-CM

## 2019-12-09 RX ORDER — LISINOPRIL 10 MG/1
10 TABLET ORAL DAILY
Qty: 90 TABLET | Refills: 3 | Status: SHIPPED | OUTPATIENT
Start: 2019-12-09 | End: 2021-05-14 | Stop reason: SDUPTHER

## 2019-12-09 NOTE — TELEPHONE ENCOUNTER
Pharmacy requesting a # 90 day refill of lisinopril -can you change this?  They have #30  With refills--lj

## 2019-12-18 NOTE — TELEPHONE ENCOUNTER
I called her back, no answer and no message  Subsequent Stages Histo Method Verbiage: Using a similar technique to that described above, a thin layer of tissue was removed from all areas where tumor was visible on the previous stage.  The tissue was again oriented, mapped, dyed, and processed as above.

## 2019-12-24 DIAGNOSIS — F41.9 ANXIETY: ICD-10-CM

## 2019-12-24 DIAGNOSIS — G47.00 INSOMNIA, UNSPECIFIED TYPE: ICD-10-CM

## 2019-12-24 RX ORDER — ALPRAZOLAM 0.5 MG/1
TABLET ORAL
Qty: 30 TABLET | Refills: 0 | Status: SHIPPED | OUTPATIENT
Start: 2019-12-24 | End: 2020-02-02

## 2019-12-24 RX ORDER — ZOLPIDEM TARTRATE 10 MG/1
TABLET ORAL
Qty: 30 TABLET | Refills: 0 | Status: SHIPPED | OUTPATIENT
Start: 2019-12-24 | End: 2020-02-02

## 2020-01-07 ENCOUNTER — OFFICE VISIT (OUTPATIENT)
Dept: GASTROENTEROLOGY | Facility: CLINIC | Age: 70
End: 2020-01-07
Payer: MEDICARE

## 2020-01-07 VITALS
HEART RATE: 57 BPM | DIASTOLIC BLOOD PRESSURE: 76 MMHG | SYSTOLIC BLOOD PRESSURE: 120 MMHG | TEMPERATURE: 98.6 F | WEIGHT: 177.13 LBS | BODY MASS INDEX: 29.51 KG/M2 | HEIGHT: 65 IN

## 2020-01-07 DIAGNOSIS — K21.9 GASTROESOPHAGEAL REFLUX DISEASE, ESOPHAGITIS PRESENCE NOT SPECIFIED: ICD-10-CM

## 2020-01-07 DIAGNOSIS — K29.70 GASTRITIS WITHOUT BLEEDING, UNSPECIFIED CHRONICITY, UNSPECIFIED GASTRITIS TYPE: ICD-10-CM

## 2020-01-07 DIAGNOSIS — R10.13 EPIGASTRIC PAIN: Primary | ICD-10-CM

## 2020-01-07 PROCEDURE — 99214 OFFICE O/P EST MOD 30 MIN: CPT | Performed by: PHYSICIAN ASSISTANT

## 2020-01-07 RX ORDER — PANTOPRAZOLE SODIUM 40 MG/1
40 TABLET, DELAYED RELEASE ORAL DAILY
Qty: 30 TABLET | Refills: 5 | Status: SHIPPED | OUTPATIENT
Start: 2020-01-07 | End: 2020-09-28 | Stop reason: SDUPTHER

## 2020-01-08 NOTE — PROGRESS NOTES
[facility] - Outpatient Follow-up Note  Alise Strickland 77 y o  female MRN: [id number]  Encounter: [id number]  ASSESSMENT AND PLAN:      Chronic intermittent abdominal pain  Nausea  -epigastric pain, this has been chronic with negative workup in the past  She does have a history of esophagitis/gastritis/duodenitis and is not on medication  -she believes PPI was helpful in the past and we will retrial this, she only took it for a couple of weeks after her EGD  -we will also plan for HIDA scan rule out biliary dyskinesia  ______________________________________________________________________  SUBJECTIVE:  78 yo female with a history of congenital intestinal malrotation presents for chronic abdominal pain presents for follow up  She states she has had this chronic intermittent epigastric pain for over 20 years with extensive workup in the past  EGD in 2017 with esophagitis, gastritis, duodenitis  She denies any reflux or heartburn  When the pain is severe she does have intermittent nausea without vomiting  She is up to date with colonoscopy  She states U/S and CT in the past were negative  The pain occurs once or twice a week  REVIEW OF SYSTEMS IS OTHERWISE NEGATIVE  Historical Information   Past Medical History:   Diagnosis Date    Anxiety     Congenital absence of one kidney     Depression     GERD (gastroesophageal reflux disease)     History of transfusion     Hypertension     Insomnia     Menopause     Other specified hypothyroidism 1/28/2019    Vitamin B12 deficiency      Past Surgical History:   Procedure Laterality Date    [place] COLONOSCOPY FLX DX W/COLLJ SPEC WHEN PFRMD N/A 5/30/2017    Procedure: EGD AND COLONOSCOPY;  Surgeon: Bree Saldana MD;  Location[facility];   Service: Gastroenterology    REDUCTION MAMMAPLASTY Bilateral 06/06/2011    REDUCTION MAMMAPLASTY      SHOULDER SURGERY       Social History   Social History     Substance and Sexual Activity   Alcohol Use Yes    Alcohol/week: 7 0 standard drinks    Types: 7 Glasses of wine per week    Comment: 1 glass of wine nightly      Social History     Substance and Sexual Activity   Drug Use No     Social History     Tobacco Use   Smoking Status Never Smoker   Smokeless Tobacco Never Used     Family History   Problem Relation Age of Onset    Lung cancer Mother 79    Heart disease Father     Lung cancer Brother 48       Meds/Allergies       Current Outpatient Medications:     ALPRAZolam (XANAX) 0 5 mg tablet    buPROPion (WELLBUTRIN XL) 150 mg 24 hr tablet    escitalopram (LEXAPRO) 20 mg tablet    levothyroxine 25 mcg tablet    lisinopril (ZESTRIL) 10 mg tablet    zolpidem (AMBIEN) 10 mg tablet    pantoprazole (PROTONIX) 40 mg tablet    No Known Allergies        Objective     Blood pressure 120/76, pulse 57, temperature 98 6 °F (37 °C), height 5' 5" (1 651 m), weight 80 3 kg (177 lb 2 oz)  Body mass index is 29 48 kg/m²  PHYSICAL EXAM:      General Appearance:   Alert, cooperative, no distress   HEENT:   Normocephalic, atraumatic, anicteric      Neck:  Supple, symmetrical, trachea midline   Lungs:   Clear to auscultation bilaterally; no rales, rhonchi or wheezing; respirations unlabored    Heart[de-identified]   Regular rate and rhythm; no murmur, rub, or gallop  Abdomen:   Soft, non-tender, non-distended; normal bowel sounds; no masses, no organomegaly    Genitalia:   Deferred    Rectal:   Deferred    Extremities:  No cyanosis, clubbing or edema    Pulses:  2+ and symmetric    Skin:  No jaundice, rashes, or lesions    Lymph nodes:  No palpable cervical lymphadenopathy        Lab Results:   No visits with results within 1 Day(s) from this visit     Latest known visit with results is:   Orders Only on 09/19/2019   Component Date Value    Total Cholesterol 09/19/2019 237*    HDL 09/19/2019 75     Triglycerides 09/19/2019 99     LDL Direct 09/19/2019 141*    Chol HDLC Ratio 09/19/2019 3 2     Non-HDL Cholesterol 09/19/2019 162*    Glucose, Random 09/19/2019 90     BUN 09/19/2019 12     Creatinine 09/19/2019 0 85     eGFR Non  09/19/2019 70     eGFR  09/19/2019 82     SL AMB BUN/CREATININE RA* 77/09/9900 NOT APPLICABLE     Sodium 40/69/7335 142     Potassium 09/19/2019 4 5     Chloride 09/19/2019 105     CO2 09/19/2019 30     SL AMB CALCIUM 09/19/2019 9 3     Protein, Total 09/19/2019 6 4     Albumin 09/19/2019 3 9     Globulin 09/19/2019 2 5     Albumin/Globulin Ratio 09/19/2019 1 6     TOTAL BILIRUBIN 09/19/2019 0 6     Alkaline Phosphatase 09/19/2019 55     AST 09/19/2019 10     ALT 09/19/2019 7     White Blood Cell Count 09/19/2019 4 9     Red Blood Cell Count 09/19/2019 4 37     Hemoglobin 09/19/2019 14 2     HCT 09/19/2019 42 2     MCV 09/19/2019 96 6     MCH 09/19/2019 32 5     MCHC 09/19/2019 33 6     RDW 09/19/2019 12 3     Platelet Count 29/60/9254 235     SL AMB MPV 09/19/2019 11 2     Neutrophils (Absolute) 09/19/2019 2,680     Lymphocytes (Absolute) 09/19/2019 1,558     Monocytes (Absolute) 09/19/2019 431     Eosinophils (Absolute) 09/19/2019 201     Basophils ABS 09/19/2019 29     Neutrophils 09/19/2019 54 7     Lymphocytes 09/19/2019 31 8     Monocytes 09/19/2019 8 8     Eosinophils 09/19/2019 4 1     Basophils PCT 09/19/2019 0 6     Always Message 09/19/2019      HEP C AB 09/19/2019 NON-REACTIVE     Signal to Cut-Off 09/19/2019 0 02     Vitamin B-12 09/19/2019 176*    TSH W/RFX TO FREE T4 09/19/2019 3 05          Radiology Results:   No results found

## 2020-01-10 ENCOUNTER — TELEPHONE (OUTPATIENT)
Dept: GASTROENTEROLOGY | Facility: CLINIC | Age: 70
End: 2020-01-10

## 2020-01-10 NOTE — TELEPHONE ENCOUNTER
Patients GI provider:  Dr Nancy Wolff    Number to return call: [phone]    Reason for call: Pt calling stated pharmacy said pantoprazole was not called in if you can please resend it        Scheduled procedure/appointment date if applicable: Apt/procedure

## 2020-01-10 NOTE — TELEPHONE ENCOUNTER
I confirmed with [facility] pharmacy prescription was received however it is too early to fill  The fill date will be 1/21  I called patient, reached , left message with details of above

## 2020-01-13 NOTE — TELEPHONE ENCOUNTER
Patient has hx of chronic intermittent abd pain seen in office 1/7  She said Dr  Jayson Call had prescribed hyoscyamine a few years ago which relieved the pain but she hasn't been on it for at least a  year  At office visit, she said she discussed with Mauri Dumont PA-C who suggested HIDA scan but would re-order in the meantime for symptom control  Please order if in agreement, thank you

## 2020-01-13 NOTE — TELEPHONE ENCOUNTER
Pt called in to advise that the script requested is for hyoscyamine   125mg not pantoprazole   Please send to [id number] in [place]

## 2020-01-14 DIAGNOSIS — R10.9 ABDOMINAL PAIN, UNSPECIFIED ABDOMINAL LOCATION: Primary | ICD-10-CM

## 2020-01-30 DIAGNOSIS — F41.9 ANXIETY: ICD-10-CM

## 2020-01-30 DIAGNOSIS — G47.00 INSOMNIA, UNSPECIFIED TYPE: ICD-10-CM

## 2020-01-30 DIAGNOSIS — E03.8 OTHER SPECIFIED HYPOTHYROIDISM: ICD-10-CM

## 2020-02-02 RX ORDER — LEVOTHYROXINE SODIUM 0.03 MG/1
TABLET ORAL
Qty: 30 TABLET | Refills: 5 | Status: SHIPPED | OUTPATIENT
Start: 2020-02-02 | End: 2020-08-21

## 2020-02-02 RX ORDER — ZOLPIDEM TARTRATE 10 MG/1
TABLET ORAL
Qty: 30 TABLET | Refills: 0 | Status: SHIPPED | OUTPATIENT
Start: 2020-02-02 | End: 2020-03-09

## 2020-02-02 RX ORDER — ALPRAZOLAM 0.5 MG/1
TABLET ORAL
Qty: 30 TABLET | Refills: 0 | Status: SHIPPED | OUTPATIENT
Start: 2020-02-02 | End: 2020-03-18

## 2020-03-06 DIAGNOSIS — G47.00 INSOMNIA, UNSPECIFIED TYPE: ICD-10-CM

## 2020-03-09 RX ORDER — ZOLPIDEM TARTRATE 10 MG/1
TABLET ORAL
Qty: 30 TABLET | Refills: 0 | Status: SHIPPED | OUTPATIENT
Start: 2020-03-09 | End: 2020-04-13

## 2020-03-18 DIAGNOSIS — F41.9 ANXIETY: ICD-10-CM

## 2020-03-18 RX ORDER — ALPRAZOLAM 0.5 MG/1
TABLET ORAL
Qty: 30 TABLET | Refills: 0 | Status: SHIPPED | OUTPATIENT
Start: 2020-03-18 | End: 2020-04-13

## 2020-04-10 DIAGNOSIS — F41.9 ANXIETY: ICD-10-CM

## 2020-04-10 DIAGNOSIS — G47.00 INSOMNIA, UNSPECIFIED TYPE: ICD-10-CM

## 2020-04-13 RX ORDER — ZOLPIDEM TARTRATE 10 MG/1
TABLET ORAL
Qty: 30 TABLET | Refills: 0 | Status: SHIPPED | OUTPATIENT
Start: 2020-04-13 | End: 2020-05-15

## 2020-04-13 RX ORDER — ALPRAZOLAM 0.5 MG/1
TABLET ORAL
Qty: 30 TABLET | Refills: 0 | Status: SHIPPED | OUTPATIENT
Start: 2020-04-13 | End: 2020-05-15

## 2020-04-16 DIAGNOSIS — F32.A DEPRESSION, UNSPECIFIED DEPRESSION TYPE: ICD-10-CM

## 2020-04-20 RX ORDER — ESCITALOPRAM OXALATE 20 MG/1
20 TABLET ORAL DAILY
Qty: 90 TABLET | Refills: 1 | Status: SHIPPED | OUTPATIENT
Start: 2020-04-20 | End: 2020-11-06

## 2020-04-22 ENCOUNTER — TELEMEDICINE (OUTPATIENT)
Dept: FAMILY MEDICINE CLINIC | Facility: CLINIC | Age: 70
End: 2020-04-22
Payer: MEDICARE

## 2020-04-22 VITALS — BODY MASS INDEX: 28.32 KG/M2 | HEIGHT: 65 IN | WEIGHT: 170 LBS

## 2020-04-22 DIAGNOSIS — F41.9 ANXIETY AND DEPRESSION: ICD-10-CM

## 2020-04-22 DIAGNOSIS — M25.562 CHRONIC PAIN OF LEFT KNEE: ICD-10-CM

## 2020-04-22 DIAGNOSIS — G89.29 CHRONIC PAIN OF LEFT KNEE: ICD-10-CM

## 2020-04-22 DIAGNOSIS — F32.A ANXIETY AND DEPRESSION: ICD-10-CM

## 2020-04-22 DIAGNOSIS — E03.8 OTHER SPECIFIED HYPOTHYROIDISM: ICD-10-CM

## 2020-04-22 DIAGNOSIS — I10 ESSENTIAL HYPERTENSION: Primary | ICD-10-CM

## 2020-04-22 PROCEDURE — 99442 PR PHYS/QHP TELEPHONE EVALUATION 11-20 MIN: CPT | Performed by: INTERNAL MEDICINE

## 2020-05-15 DIAGNOSIS — G47.00 INSOMNIA, UNSPECIFIED TYPE: ICD-10-CM

## 2020-05-15 DIAGNOSIS — F41.9 ANXIETY: ICD-10-CM

## 2020-05-15 RX ORDER — ZOLPIDEM TARTRATE 10 MG/1
TABLET ORAL
Qty: 30 TABLET | Refills: 0 | Status: SHIPPED | OUTPATIENT
Start: 2020-05-15 | End: 2020-06-15

## 2020-05-15 RX ORDER — ALPRAZOLAM 0.5 MG/1
TABLET ORAL
Qty: 30 TABLET | Refills: 0 | Status: SHIPPED | OUTPATIENT
Start: 2020-05-15 | End: 2020-07-01

## 2020-06-02 ENCOUNTER — HOSPITAL ENCOUNTER (OUTPATIENT)
Dept: NUCLEAR MEDICINE | Facility: HOSPITAL | Age: 70
Discharge: HOME | End: 2020-06-02
Payer: MEDICARE

## 2020-06-02 DIAGNOSIS — R10.13 EPIGASTRIC PAIN: ICD-10-CM

## 2020-06-02 PROCEDURE — 78227 HEPATOBIL SYST IMAGE W/DRUG: CPT

## 2020-06-02 PROCEDURE — A9537 TC99M MEBROFENIN: HCPCS

## 2020-06-02 RX ADMIN — SINCALIDE 1.6 MCG: 5 INJECTION, POWDER, LYOPHILIZED, FOR SOLUTION INTRAVENOUS at 11:04

## 2020-06-04 ENCOUNTER — TELEPHONE (OUTPATIENT)
Dept: OTHER | Facility: OTHER | Age: 70
End: 2020-06-04

## 2020-06-05 ENCOUNTER — TELEPHONE (OUTPATIENT)
Dept: GASTROENTEROLOGY | Facility: CLINIC | Age: 70
End: 2020-06-05

## 2020-06-11 ENCOUNTER — TELEPHONE (OUTPATIENT)
Dept: GASTROENTEROLOGY | Facility: AMBULARY SURGERY CENTER | Age: 70
End: 2020-06-11

## 2020-06-11 NOTE — TELEPHONE ENCOUNTER
----- Message from Renee Cárdenas PA-C sent at 6/10/2020  9:28 AM EDT -----  Please inform patient that her gallbladder scan was completely normal

## 2020-06-14 DIAGNOSIS — G47.00 INSOMNIA, UNSPECIFIED TYPE: ICD-10-CM

## 2020-06-15 RX ORDER — ZOLPIDEM TARTRATE 10 MG/1
TABLET ORAL
Qty: 30 TABLET | Refills: 0 | Status: SHIPPED | OUTPATIENT
Start: 2020-06-15 | End: 2020-06-22

## 2020-06-17 DIAGNOSIS — G47.00 INSOMNIA, UNSPECIFIED TYPE: ICD-10-CM

## 2020-06-17 RX ORDER — ZOLPIDEM TARTRATE 10 MG/1
TABLET ORAL
Qty: 30 TABLET | Refills: 0 | OUTPATIENT
Start: 2020-06-17

## 2020-06-17 NOTE — TELEPHONE ENCOUNTER
I called the patient and answered her questions regarding her upcoming injection   Requested medication(s) are due for refill today: Yes  Patient has already received a courtesy refill: No  Other reason request has been forwarded to provider:failed protocol

## 2020-06-20 DIAGNOSIS — G47.00 INSOMNIA, UNSPECIFIED TYPE: ICD-10-CM

## 2020-06-22 DIAGNOSIS — G47.00 INSOMNIA, UNSPECIFIED TYPE: ICD-10-CM

## 2020-06-22 RX ORDER — ZOLPIDEM TARTRATE 10 MG/1
TABLET ORAL
Qty: 30 TABLET | Refills: 0 | Status: SHIPPED | OUTPATIENT
Start: 2020-06-22 | End: 2020-06-24 | Stop reason: SDUPTHER

## 2020-06-23 NOTE — TELEPHONE ENCOUNTER
Dr Maximino Cao, you sent medication in on 6/22  Can you please refuse so that we can close encounter? Thank you    Norma Julio MA

## 2020-06-24 DIAGNOSIS — G47.00 INSOMNIA, UNSPECIFIED TYPE: ICD-10-CM

## 2020-06-24 RX ORDER — ZOLPIDEM TARTRATE 10 MG/1
10 TABLET ORAL
Qty: 30 TABLET | Refills: 0 | Status: SHIPPED | OUTPATIENT
Start: 2020-06-24 | End: 2020-07-22 | Stop reason: SDUPTHER

## 2020-06-24 RX ORDER — ZOLPIDEM TARTRATE 10 MG/1
TABLET ORAL
Qty: 30 TABLET | Refills: 0 | OUTPATIENT
Start: 2020-06-24

## 2020-06-25 RX ORDER — ZOLPIDEM TARTRATE 10 MG/1
TABLET ORAL
Qty: 30 TABLET | Refills: 0 | OUTPATIENT
Start: 2020-06-25

## 2020-06-25 NOTE — TELEPHONE ENCOUNTER
Requested medication(s) are due for refill today: Yes  Patient has already received a courtesy refill: No  Other reason request has been forwarded to provider: DUPLICATE ORDER/failed protocol

## 2020-06-26 ENCOUNTER — APPOINTMENT (OUTPATIENT)
Dept: RADIOLOGY | Facility: CLINIC | Age: 70
End: 2020-06-26
Payer: MEDICARE

## 2020-06-26 ENCOUNTER — OFFICE VISIT (OUTPATIENT)
Dept: URGENT CARE | Facility: CLINIC | Age: 70
End: 2020-06-26
Payer: MEDICARE

## 2020-06-26 VITALS
BODY MASS INDEX: 28.32 KG/M2 | HEART RATE: 61 BPM | DIASTOLIC BLOOD PRESSURE: 88 MMHG | TEMPERATURE: 97.3 F | WEIGHT: 170 LBS | RESPIRATION RATE: 18 BRPM | SYSTOLIC BLOOD PRESSURE: 202 MMHG | HEIGHT: 65 IN | OXYGEN SATURATION: 100 %

## 2020-06-26 DIAGNOSIS — M25.562 CHRONIC PAIN OF LEFT KNEE: ICD-10-CM

## 2020-06-26 DIAGNOSIS — S99.912A INJURY OF LEFT ANKLE, INITIAL ENCOUNTER: ICD-10-CM

## 2020-06-26 DIAGNOSIS — G89.29 CHRONIC PAIN OF LEFT KNEE: ICD-10-CM

## 2020-06-26 DIAGNOSIS — S99.912A INJURY OF LEFT ANKLE, INITIAL ENCOUNTER: Primary | ICD-10-CM

## 2020-06-26 DIAGNOSIS — S82.832A OTHER CLOSED FRACTURE OF DISTAL END OF LEFT FIBULA, INITIAL ENCOUNTER: ICD-10-CM

## 2020-06-26 PROCEDURE — 99213 OFFICE O/P EST LOW 20 MIN: CPT | Performed by: NURSE PRACTITIONER

## 2020-06-26 PROCEDURE — 73562 X-RAY EXAM OF KNEE 3: CPT

## 2020-06-26 PROCEDURE — 73610 X-RAY EXAM OF ANKLE: CPT

## 2020-06-26 PROCEDURE — 73630 X-RAY EXAM OF FOOT: CPT

## 2020-06-26 PROCEDURE — 29515 APPLICATION SHORT LEG SPLINT: CPT | Performed by: NURSE PRACTITIONER

## 2020-06-26 PROCEDURE — G0463 HOSPITAL OUTPT CLINIC VISIT: HCPCS | Performed by: NURSE PRACTITIONER

## 2020-06-30 DIAGNOSIS — F41.9 ANXIETY: ICD-10-CM

## 2020-07-01 ENCOUNTER — APPOINTMENT (OUTPATIENT)
Dept: RADIOLOGY | Facility: CLINIC | Age: 70
End: 2020-07-01
Payer: MEDICARE

## 2020-07-01 ENCOUNTER — OFFICE VISIT (OUTPATIENT)
Dept: OBGYN CLINIC | Facility: CLINIC | Age: 70
End: 2020-07-01
Payer: MEDICARE

## 2020-07-01 VITALS — DIASTOLIC BLOOD PRESSURE: 85 MMHG | SYSTOLIC BLOOD PRESSURE: 143 MMHG | HEART RATE: 69 BPM

## 2020-07-01 DIAGNOSIS — M25.572 PAIN, JOINT, ANKLE AND FOOT, LEFT: ICD-10-CM

## 2020-07-01 DIAGNOSIS — S82.832A CLOSED FRACTURE OF DISTAL END OF LEFT FIBULA, UNSPECIFIED FRACTURE MORPHOLOGY, INITIAL ENCOUNTER: Primary | ICD-10-CM

## 2020-07-01 DIAGNOSIS — F41.9 ANXIETY: ICD-10-CM

## 2020-07-01 PROCEDURE — 1036F TOBACCO NON-USER: CPT | Performed by: ORTHOPAEDIC SURGERY

## 2020-07-01 PROCEDURE — 73610 X-RAY EXAM OF ANKLE: CPT

## 2020-07-01 PROCEDURE — 3079F DIAST BP 80-89 MM HG: CPT | Performed by: ORTHOPAEDIC SURGERY

## 2020-07-01 PROCEDURE — 99203 OFFICE O/P NEW LOW 30 MIN: CPT | Performed by: ORTHOPAEDIC SURGERY

## 2020-07-01 PROCEDURE — 3077F SYST BP >= 140 MM HG: CPT | Performed by: ORTHOPAEDIC SURGERY

## 2020-07-01 PROCEDURE — 1160F RVW MEDS BY RX/DR IN RCRD: CPT | Performed by: ORTHOPAEDIC SURGERY

## 2020-07-01 RX ORDER — ALPRAZOLAM 0.5 MG/1
0.5 TABLET ORAL DAILY PRN
Qty: 30 TABLET | Refills: 0 | OUTPATIENT
Start: 2020-07-01

## 2020-07-01 RX ORDER — ALPRAZOLAM 0.5 MG/1
TABLET ORAL
Qty: 30 TABLET | Refills: 0 | Status: SHIPPED | OUTPATIENT
Start: 2020-07-01 | End: 2020-07-02 | Stop reason: SDUPTHER

## 2020-07-01 NOTE — PROGRESS NOTES
Assessment/Plan:  1  Closed fracture of distal end of left fibula, unspecified fracture morphology, initial encounter     2  Pain, joint, ankle and foot, left  XR ankle 3+ vw left     Scribe Attestation    I,:   Graciela Tomlin MA am acting as a scribe while in the presence of the attending physician :        I,:   Devante Emery,  personally performed the services described in this documentation    as scribed in my presence :              I discussed with Sabi Ramsey today that x-rays demonstrate a comminuted distal fibular fracture  This is equivocal to a bimalleolar ankle fracture due to talar tilt and incongruity of the medial tibial talar joint  There is no lateral subluxation of the talus at this point  With these findings, this does meet surgical requirement however, patient does have significant swelling on exam today  She was placed in a well-padded posterior and U splint  She was advised no weightbearing and may use a kneeling scooter  Patient was provided with stick elevation instructions  She will follow up in 1 week for repeat evaluation and possible surgical planning at that time  Subjective: left ankle    Patient ID: Stuart Benitez is a 72 y o  female  HPI  Sabi Ramsey is a 58-year-old female who presents to the office today for evaluation of left ankle pain  Patient states one week ago she tripped over the garden hose injuring her ankle  She noted immediate pain and swelling to the lateral aspect of her ankle  She presented to urgent care on 6/26/20 where x-rays were taken and she was diagnosed with an ankle fracture  Patient was placed in a splint and told to follow up with orthopedics  Patient states she has been putting some weight on the ankle  She has been taking Aleve OTC as needed with mild relief  Review of Systems   Constitutional: Positive for activity change  Negative for chills and fever  HENT: Negative for drooling and sneezing  Eyes: Negative for redness     Respiratory: Negative for cough and wheezing  Gastrointestinal: Negative for nausea and vomiting  Musculoskeletal: Positive for arthralgias, gait problem and joint swelling  Negative for myalgias  Neurological: Negative for weakness and numbness  Psychiatric/Behavioral: Negative for behavioral problems  The patient is not nervous/anxious  Past Medical History:   Diagnosis Date    Anxiety     Congenital absence of one kidney     Depression     GERD (gastroesophageal reflux disease)     History of transfusion     Hypertension     Insomnia     Menopause     Other specified hypothyroidism 1/28/2019    Vitamin B12 deficiency        Past Surgical History:   Procedure Laterality Date    [place] COLONOSCOPY FLX DX W/COLLJ SPEC WHEN PFRMD N/A 5/30/2017    Procedure: EGD AND COLONOSCOPY;  Surgeon: Yuniel Ordoñez MD;  Location: [facility]; Service: Gastroenterology    REDUCTION MAMMAPLASTY Bilateral 06/06/2011    REDUCTION MAMMAPLASTY      SHOULDER SURGERY         Family History   Problem Relation Age of Onset    Lung cancer Mother 79    Heart disease Father     Lung cancer Brother 48       Social History     Occupational History    Not on file   Tobacco Use    Smoking status: Never Smoker    Smokeless tobacco: Never Used   Substance and Sexual Activity    Alcohol use:  Yes     Alcohol/week: 7 0 standard drinks     Types: 7 Glasses of wine per week     Frequency: Monthly or less     Comment: 1 glass of wine nightly     Drug use: No    Sexual activity: Not on file         Current Outpatient Medications:     ALPRAZolam (XANAX) 0 5 mg tablet, TAKE 1 TABLET BY MOUTH ONCE DAILY AS NEEDED, Disp: 30 tablet, Rfl: 0    buPROPion (WELLBUTRIN XL) 150 mg 24 hr tablet, Take 1 tablet (150 mg total) by mouth daily, Disp: 30 tablet, Rfl: 5    escitalopram (LEXAPRO) 20 mg tablet, Take 1 tablet (20 mg total) by mouth daily, Disp: 90 tablet, Rfl: 1    hyoscyamine (LEVSIN/SL) 0 125 mg SL tablet, Take 1 tablet (0 125 mg total) by mouth every 4 (four) hours as needed for cramping, Disp: 60 tablet, Rfl: 3    levothyroxine 25 mcg tablet, TAKE 1 TABLET BY MOUTH ONCE DAILY, Disp: 30 tablet, Rfl: 5    lisinopril (ZESTRIL) 10 mg tablet, Take 1 tablet (10 mg total) by mouth daily, Disp: 90 tablet, Rfl: 3    pantoprazole (PROTONIX) 40 mg tablet, Take 1 tablet (40 mg total) by mouth daily, Disp: 30 tablet, Rfl: 5    zolpidem (AMBIEN) 10 mg tablet, Take 1 tablet (10 mg total) by mouth daily at bedtime, Disp: 30 tablet, Rfl: 0    No Known Allergies    Objective:  Vitals:    07/01/20 1208   BP: 143/85   Pulse: 69       There is no height or weight on file to calculate BMI  Left Ankle Exam     Tenderness   The patient is experiencing tenderness in the medial malleolus, ATF, lateral malleolus and deltoid (distal fibula )  Swelling: moderate    Range of Motion   Dorsiflexion: abnormal   Plantar flexion: abnormal   Eversion: abnormal   Inversion: abnormal     Muscle Strength   Left ankle normal muscle strength: Decreased secondary to injury  Tests   Anterior drawer: negative  Varus tilt: negative    Other   Erythema: absent  Scars: absent  Sensation: normal  Pulse: present    Comments:  Extensive bruising mid-foot, hind foot, and dorsum   ecchymosis spreading to the toes in various stages of resolution  No fracture blisters  TTP mid foot   Diffuse tenderness palpation on the deltoid ligament complex  No gross deformity of the ankle joint on examination  Neurovascular intact  Compartments are soft in the lower extremity in foot          Observations   Left Ankle/Foot   Negative for adhesive scar  Physical Exam   Constitutional: She is oriented to person, place, and time  She appears well-developed and well-nourished  HENT:   Head: Normocephalic and atraumatic  Eyes: Conjunctivae are normal  Right eye exhibits no discharge  Left eye exhibits no discharge  Neck: Normal range of motion  Neck supple     Cardiovascular: Normal rate and intact distal pulses  Pulmonary/Chest: Effort normal  No respiratory distress  Musculoskeletal:   As noted in HPI   Neurological: She is alert and oriented to person, place, and time  Skin: Skin is warm and dry  Psychiatric: She has a normal mood and affect  Her behavior is normal  Judgment and thought content normal    Nursing note and vitals reviewed  I have personally reviewed pertinent films in PACS  X-ray left ankle performed in the office today demonstrates a comminuted distal fibular fracture at and above syndesmosis with incongruent medial joint

## 2020-07-02 DIAGNOSIS — F41.9 ANXIETY: ICD-10-CM

## 2020-07-02 RX ORDER — ALPRAZOLAM 0.5 MG/1
0.5 TABLET ORAL DAILY PRN
Qty: 30 TABLET | Refills: 0 | Status: SHIPPED | OUTPATIENT
Start: 2020-07-02 | End: 2020-07-22 | Stop reason: SDUPTHER

## 2020-07-08 ENCOUNTER — OFFICE VISIT (OUTPATIENT)
Dept: OBGYN CLINIC | Facility: CLINIC | Age: 70
End: 2020-07-08
Payer: MEDICARE

## 2020-07-08 VITALS — HEART RATE: 65 BPM | TEMPERATURE: 96.6 F | DIASTOLIC BLOOD PRESSURE: 88 MMHG | SYSTOLIC BLOOD PRESSURE: 153 MMHG

## 2020-07-08 DIAGNOSIS — S82.832A CLOSED FRACTURE OF DISTAL END OF LEFT FIBULA, UNSPECIFIED FRACTURE MORPHOLOGY, INITIAL ENCOUNTER: Primary | ICD-10-CM

## 2020-07-08 DIAGNOSIS — M25.572 PAIN, JOINT, ANKLE AND FOOT, LEFT: ICD-10-CM

## 2020-07-08 PROCEDURE — 3079F DIAST BP 80-89 MM HG: CPT | Performed by: ORTHOPAEDIC SURGERY

## 2020-07-08 PROCEDURE — 1036F TOBACCO NON-USER: CPT | Performed by: ORTHOPAEDIC SURGERY

## 2020-07-08 PROCEDURE — 1160F RVW MEDS BY RX/DR IN RCRD: CPT | Performed by: ORTHOPAEDIC SURGERY

## 2020-07-08 PROCEDURE — 99214 OFFICE O/P EST MOD 30 MIN: CPT | Performed by: ORTHOPAEDIC SURGERY

## 2020-07-08 PROCEDURE — 3077F SYST BP >= 140 MM HG: CPT | Performed by: ORTHOPAEDIC SURGERY

## 2020-07-08 RX ORDER — CHLORHEXIDINE GLUCONATE 0.12 MG/ML
15 RINSE ORAL ONCE
Status: CANCELLED | OUTPATIENT
Start: 2020-07-13 | End: 2020-07-08

## 2020-07-08 NOTE — H&P (VIEW-ONLY)
Assessment/Plan:  1  Closed fracture of distal end of left fibula, unspecified fracture morphology, initial encounter  Case request operating room: OPEN REDUCTION W/ INTERNAL FIXATION (ORIF) ANKLE    Ambulatory referral to [facility]    PAT Covid Screening    Type and screen    Comprehensive metabolic panel    CBC and differential    APTT    Protime-INR    MRSA culture    EKG 12 lead    Case request operating room: OPEN REDUCTION W/ INTERNAL FIXATION (ORIF) ANKLE   2  Pain, joint, ankle and foot, left  Case request operating room: OPEN REDUCTION W/ INTERNAL FIXATION (ORIF) ANKLE    Ambulatory referral to [facility]    PAT Covid Screening    Type and screen    Comprehensive metabolic panel    CBC and differential    APTT    Protime-INR    MRSA culture    EKG 12 lead    Case request operating room: OPEN REDUCTION W/ INTERNAL FIXATION (ORIF) ANKLE     Scribe Attestation    I,:   Graciela Tomlin MA am acting as a scribe while in the presence of the attending physician :        I,:   Allyn Parsons, DO personally performed the services described in this documentation    as scribed in my presence :            Lucy Huang is a 78-year-old female who presents to the office toady for follow up evaluation of her left ankle  Patient presents for a skin check prior to surgery  Patient's swelling has greatly improved compared to her previous visit  Patient was informed that her comminuted Farfan C type fibular fracture in combination with medial mortise widening represents a bimalleolar equivalent ankle fracture and is by definition unstable  Non operative and operative treatment interventions were discussed  Surgical intervention was recommended to optimize bone healing potential and maintenance of appropriate ankle mortise mechanics in hopes of a quicker return to weight-bearing  Surgical intervention was discussed in the form of open reduction internal fixation left ankle  Risks and benefits were discussed    Risks included but were not limited to; bleeding , infection , wound healing problems, neurovascular damage , DVT, PE, persistent pain, limp, scarring, nonunion, malunion, hardware failure, need for reoperation, and those risks associated with anesthesia  Consents were signed and placed in the chart  Patient denies a history of blood clot, gastric or peptic ulcers, stroke, heard attack, and a history of MRSA  Patient was placed back into a well-padded posterior an U splint  She will remain in this until surgery  She will continue to be nonweightbearing with the LLE  Patient will require PCP clearance and preoperative lab work  I will see her back in approximately 7-10 days postoperatively for wound check  Subjective: follow up left ankle    Patient ID: Darío Baker is a 72 y o  female  HPI  Candice Pop is a 42-year-old female who presents to the office today for follow up evaluation comminuted distal fibular fracture left ankle  Patient states she is doing well  She states she has been using a seated walker and a wheel chair  Patient states she has been elevating the leg  She states her pain is well controlled  She has been taking aleve OTC as needed for pain  She denies any distal paraesthesias  Patient presents to the office today for a skin check  Review of Systems   Constitutional: Positive for activity change  Negative for chills and fever  HENT: Negative for drooling and sneezing  Eyes: Negative for redness  Respiratory: Negative for cough and wheezing  Gastrointestinal: Negative for nausea and vomiting  Musculoskeletal: Positive for arthralgias, gait problem and joint swelling  Negative for myalgias  Neurological: Negative for weakness and numbness  Psychiatric/Behavioral: Negative for behavioral problems  The patient is not nervous/anxious            Past Medical History:   Diagnosis Date    Anxiety     Congenital absence of one kidney     Depression     GERD (gastroesophageal reflux disease)     History of transfusion     Hypertension     Insomnia     Menopause     Other specified hypothyroidism 1/28/2019    Vitamin B12 deficiency        Past Surgical History:   Procedure Laterality Date    [place] COLONOSCOPY FLX DX W/COLLJ SPEC WHEN PFRMD N/A 5/30/2017    Procedure: EGD AND COLONOSCOPY;  Surgeon: Shari Barnes MD;  Location: [facility]; Service: Gastroenterology    REDUCTION MAMMAPLASTY Bilateral 06/06/2011    REDUCTION MAMMAPLASTY      SHOULDER SURGERY         Family History   Problem Relation Age of Onset    Lung cancer Mother 79    Heart disease Father     Lung cancer Brother 48       Social History     Occupational History    Not on file   Tobacco Use    Smoking status: Never Smoker    Smokeless tobacco: Never Used   Substance and Sexual Activity    Alcohol use:  Yes     Alcohol/week: 7 0 standard drinks     Types: 7 Glasses of wine per week     Frequency: Monthly or less     Comment: 1 glass of wine nightly     Drug use: No    Sexual activity: Not on file         Current Outpatient Medications:     ALPRAZolam (XANAX) 0 5 mg tablet, Take 1 tablet (0 5 mg total) by mouth daily as needed for anxiety, Disp: 30 tablet, Rfl: 0    buPROPion (WELLBUTRIN XL) 150 mg 24 hr tablet, Take 1 tablet (150 mg total) by mouth daily, Disp: 30 tablet, Rfl: 5    escitalopram (LEXAPRO) 20 mg tablet, Take 1 tablet (20 mg total) by mouth daily, Disp: 90 tablet, Rfl: 1    hyoscyamine (LEVSIN/SL) 0 125 mg SL tablet, Take 1 tablet (0 125 mg total) by mouth every 4 (four) hours as needed for cramping, Disp: 60 tablet, Rfl: 3    levothyroxine 25 mcg tablet, TAKE 1 TABLET BY MOUTH ONCE DAILY, Disp: 30 tablet, Rfl: 5    lisinopril (ZESTRIL) 10 mg tablet, Take 1 tablet (10 mg total) by mouth daily, Disp: 90 tablet, Rfl: 3    pantoprazole (PROTONIX) 40 mg tablet, Take 1 tablet (40 mg total) by mouth daily, Disp: 30 tablet, Rfl: 5    zolpidem (AMBIEN) 10 mg tablet, Take 1 tablet (10 mg total) by mouth daily at bedtime, Disp: 30 tablet, Rfl: 0    No Known Allergies    Objective:  Vitals:    07/08/20 1507   BP: 153/88   Pulse: 65   Temp: (!) 96 6 °F (35 9 °C)       There is no height or weight on file to calculate BMI  Left Ankle Exam     Tenderness   The patient is experiencing tenderness in the medial malleolus, ATF, lateral malleolus and deltoid (distal fibula )  Swelling: moderate    Range of Motion   Dorsiflexion: abnormal   Plantar flexion: abnormal   Eversion: abnormal   Inversion: abnormal     Muscle Strength   Left ankle normal muscle strength: Decreased secondary to injury  Tests   Anterior drawer: negative  Varus tilt: negative    Other   Erythema: absent  Scars: absent  Sensation: normal  Pulse: present    Comments:  No fracture blisters  TTP mid foot   Diffuse tenderness palpation on the deltoid ligament complex  No gross deformity of the ankle joint on examination  Neurovascular intact  Compartments are soft in the lower extremity in foot  Swelling much improved compared to prior visit           Observations   Left Ankle/Foot   Negative for adhesive scar  Physical Exam   Constitutional: She is oriented to person, place, and time  She appears well-developed and well-nourished  HENT:   Head: Normocephalic and atraumatic  Eyes: Conjunctivae are normal  Right eye exhibits no discharge  Left eye exhibits no discharge  Neck: Normal range of motion  Neck supple  Cardiovascular: Normal rate and intact distal pulses  Pulmonary/Chest: Effort normal  No respiratory distress  Musculoskeletal:   As noted in HPI   Neurological: She is alert and oriented to person, place, and time  Skin: Skin is warm and dry  Psychiatric: She has a normal mood and affect  Her behavior is normal  Judgment and thought content normal    Nursing note and vitals reviewed

## 2020-07-08 NOTE — PROGRESS NOTES
Assessment/Plan:  1  Closed fracture of distal end of left fibula, unspecified fracture morphology, initial encounter  Case request operating room: OPEN REDUCTION W/ INTERNAL FIXATION (ORIF) ANKLE    Ambulatory referral to [facility]    PAT Covid Screening    Type and screen    Comprehensive metabolic panel    CBC and differential    APTT    Protime-INR    MRSA culture    EKG 12 lead    Case request operating room: OPEN REDUCTION W/ INTERNAL FIXATION (ORIF) ANKLE   2  Pain, joint, ankle and foot, left  Case request operating room: OPEN REDUCTION W/ INTERNAL FIXATION (ORIF) ANKLE    Ambulatory referral to [facility]    PAT Covid Screening    Type and screen    Comprehensive metabolic panel    CBC and differential    APTT    Protime-INR    MRSA culture    EKG 12 lead    Case request operating room: OPEN REDUCTION W/ INTERNAL FIXATION (ORIF) ANKLE     Scribe Attestation    I,:   Graciela Tomlin MA am acting as a scribe while in the presence of the attending physician :        I,:   Suleman Bay DO personally performed the services described in this documentation    as scribed in my presence :            Stacy Kiran is a 29-year-old female who presents to the office toady for follow up evaluation of her left ankle  Patient presents for a skin check prior to surgery  Patient's swelling has greatly improved compared to her previous visit  Patient was informed that her comminuted Farfan C type fibular fracture in combination with medial mortise widening represents a bimalleolar equivalent ankle fracture and is by definition unstable  Non operative and operative treatment interventions were discussed  Surgical intervention was recommended to optimize bone healing potential and maintenance of appropriate ankle mortise mechanics in hopes of a quicker return to weight-bearing  Surgical intervention was discussed in the form of open reduction internal fixation left ankle  Risks and benefits were discussed    Risks included but were not limited to; bleeding , infection , wound healing problems, neurovascular damage , DVT, PE, persistent pain, limp, scarring, nonunion, malunion, hardware failure, need for reoperation, and those risks associated with anesthesia  Consents were signed and placed in the chart  Patient denies a history of blood clot, gastric or peptic ulcers, stroke, heard attack, and a history of MRSA  Patient was placed back into a well-padded posterior an U splint  She will remain in this until surgery  She will continue to be nonweightbearing with the LLE  Patient will require PCP clearance and preoperative lab work  I will see her back in approximately 7-10 days postoperatively for wound check  Subjective: follow up left ankle    Patient ID: Irving Adamson is a 72 y o  female  HPI  Aziza Adams is a 71-year-old female who presents to the office today for follow up evaluation comminuted distal fibular fracture left ankle  Patient states she is doing well  She states she has been using a seated walker and a wheel chair  Patient states she has been elevating the leg  She states her pain is well controlled  She has been taking aleve OTC as needed for pain  She denies any distal paraesthesias  Patient presents to the office today for a skin check  Review of Systems   Constitutional: Positive for activity change  Negative for chills and fever  HENT: Negative for drooling and sneezing  Eyes: Negative for redness  Respiratory: Negative for cough and wheezing  Gastrointestinal: Negative for nausea and vomiting  Musculoskeletal: Positive for arthralgias, gait problem and joint swelling  Negative for myalgias  Neurological: Negative for weakness and numbness  Psychiatric/Behavioral: Negative for behavioral problems  The patient is not nervous/anxious            Past Medical History:   Diagnosis Date    Anxiety     Congenital absence of one kidney     Depression     GERD (gastroesophageal reflux disease)     History of transfusion     Hypertension     Insomnia     Menopause     Other specified hypothyroidism 1/28/2019    Vitamin B12 deficiency        Past Surgical History:   Procedure Laterality Date    [place] COLONOSCOPY FLX DX W/COLLJ SPEC WHEN PFRMD N/A 5/30/2017    Procedure: EGD AND COLONOSCOPY;  Surgeon: Chucho Heaton MD;  Location: [facility]; Service: Gastroenterology    REDUCTION MAMMAPLASTY Bilateral 06/06/2011    REDUCTION MAMMAPLASTY      SHOULDER SURGERY         Family History   Problem Relation Age of Onset    Lung cancer Mother 79    Heart disease Father     Lung cancer Brother 48       Social History     Occupational History    Not on file   Tobacco Use    Smoking status: Never Smoker    Smokeless tobacco: Never Used   Substance and Sexual Activity    Alcohol use:  Yes     Alcohol/week: 7 0 standard drinks     Types: 7 Glasses of wine per week     Frequency: Monthly or less     Comment: 1 glass of wine nightly     Drug use: No    Sexual activity: Not on file         Current Outpatient Medications:     ALPRAZolam (XANAX) 0 5 mg tablet, Take 1 tablet (0 5 mg total) by mouth daily as needed for anxiety, Disp: 30 tablet, Rfl: 0    buPROPion (WELLBUTRIN XL) 150 mg 24 hr tablet, Take 1 tablet (150 mg total) by mouth daily, Disp: 30 tablet, Rfl: 5    escitalopram (LEXAPRO) 20 mg tablet, Take 1 tablet (20 mg total) by mouth daily, Disp: 90 tablet, Rfl: 1    hyoscyamine (LEVSIN/SL) 0 125 mg SL tablet, Take 1 tablet (0 125 mg total) by mouth every 4 (four) hours as needed for cramping, Disp: 60 tablet, Rfl: 3    levothyroxine 25 mcg tablet, TAKE 1 TABLET BY MOUTH ONCE DAILY, Disp: 30 tablet, Rfl: 5    lisinopril (ZESTRIL) 10 mg tablet, Take 1 tablet (10 mg total) by mouth daily, Disp: 90 tablet, Rfl: 3    pantoprazole (PROTONIX) 40 mg tablet, Take 1 tablet (40 mg total) by mouth daily, Disp: 30 tablet, Rfl: 5    zolpidem (AMBIEN) 10 mg tablet, Take 1 tablet (10 mg total) by mouth daily at bedtime, Disp: 30 tablet, Rfl: 0    No Known Allergies    Objective:  Vitals:    07/08/20 1507   BP: 153/88   Pulse: 65   Temp: (!) 96 6 °F (35 9 °C)       There is no height or weight on file to calculate BMI  Left Ankle Exam     Tenderness   The patient is experiencing tenderness in the medial malleolus, ATF, lateral malleolus and deltoid (distal fibula )  Swelling: moderate    Range of Motion   Dorsiflexion: abnormal   Plantar flexion: abnormal   Eversion: abnormal   Inversion: abnormal     Muscle Strength   Left ankle normal muscle strength: Decreased secondary to injury  Tests   Anterior drawer: negative  Varus tilt: negative    Other   Erythema: absent  Scars: absent  Sensation: normal  Pulse: present    Comments:  No fracture blisters  TTP mid foot   Diffuse tenderness palpation on the deltoid ligament complex  No gross deformity of the ankle joint on examination  Neurovascular intact  Compartments are soft in the lower extremity in foot  Swelling much improved compared to prior visit           Observations   Left Ankle/Foot   Negative for adhesive scar  Physical Exam   Constitutional: She is oriented to person, place, and time  She appears well-developed and well-nourished  HENT:   Head: Normocephalic and atraumatic  Eyes: Conjunctivae are normal  Right eye exhibits no discharge  Left eye exhibits no discharge  Neck: Normal range of motion  Neck supple  Cardiovascular: Normal rate and intact distal pulses  Pulmonary/Chest: Effort normal  No respiratory distress  Musculoskeletal:   As noted in HPI   Neurological: She is alert and oriented to person, place, and time  Skin: Skin is warm and dry  Psychiatric: She has a normal mood and affect  Her behavior is normal  Judgment and thought content normal    Nursing note and vitals reviewed

## 2020-07-08 NOTE — TELEPHONE ENCOUNTER
Medication:xanax  PDMP  Active agreement on file -No
Alert-The patient is alert, awake and responds to voice. The patient is oriented to time, place, and person. The triage nurse is able to obtain subjective information.

## 2020-07-09 ENCOUNTER — TRANSCRIBE ORDERS (OUTPATIENT)
Dept: ADMINISTRATIVE | Facility: HOSPITAL | Age: 70
End: 2020-07-09

## 2020-07-09 ENCOUNTER — APPOINTMENT (OUTPATIENT)
Dept: LAB | Facility: HOSPITAL | Age: 70
End: 2020-07-09
Payer: MEDICARE

## 2020-07-09 ENCOUNTER — OFFICE VISIT (OUTPATIENT)
Dept: LAB | Facility: HOSPITAL | Age: 70
End: 2020-07-09
Payer: MEDICARE

## 2020-07-09 DIAGNOSIS — S82.832A CLOSED FRACTURE OF DISTAL END OF LEFT FIBULA, UNSPECIFIED FRACTURE MORPHOLOGY, INITIAL ENCOUNTER: ICD-10-CM

## 2020-07-09 DIAGNOSIS — M25.572 PAIN, JOINT, ANKLE AND FOOT, LEFT: ICD-10-CM

## 2020-07-09 LAB
ABO GROUP BLD: NORMAL
ALBUMIN SERPL BCP-MCNC: 3.6 G/DL (ref 3.5–5)
ALP SERPL-CCNC: 108 U/L (ref 46–116)
ALT SERPL W P-5'-P-CCNC: 17 U/L (ref 12–78)
ANION GAP SERPL CALCULATED.3IONS-SCNC: 6 MMOL/L (ref 4–13)
APTT PPP: 30 SECONDS (ref 23–37)
AST SERPL W P-5'-P-CCNC: 11 U/L (ref 5–45)
BASOPHILS # BLD AUTO: 0.04 THOUSANDS/ΜL (ref 0–0.1)
BASOPHILS NFR BLD AUTO: 1 % (ref 0–1)
BILIRUB SERPL-MCNC: 0.4 MG/DL (ref 0.2–1)
BLD GP AB SCN SERPL QL: NEGATIVE
BUN SERPL-MCNC: 18 MG/DL (ref 5–25)
CALCIUM SERPL-MCNC: 8.5 MG/DL (ref 8.3–10.1)
CHLORIDE SERPL-SCNC: 103 MMOL/L (ref 100–108)
CO2 SERPL-SCNC: 28 MMOL/L (ref 21–32)
CREAT SERPL-MCNC: 0.93 MG/DL (ref 0.6–1.3)
EOSINOPHIL # BLD AUTO: 0.07 THOUSAND/ΜL (ref 0–0.61)
EOSINOPHIL NFR BLD AUTO: 1 % (ref 0–6)
ERYTHROCYTE [DISTWIDTH] IN BLOOD BY AUTOMATED COUNT: 12.5 % (ref 11.6–15.1)
GFR SERPL CREATININE-BSD FRML MDRD: 63 ML/MIN/1.73SQ M
GLUCOSE P FAST SERPL-MCNC: 96 MG/DL (ref 65–99)
HCT VFR BLD AUTO: 42.2 % (ref 34.8–46.1)
HGB BLD-MCNC: 13.7 G/DL (ref 11.5–15.4)
IMM GRANULOCYTES # BLD AUTO: 0.04 THOUSAND/UL (ref 0–0.2)
IMM GRANULOCYTES NFR BLD AUTO: 1 % (ref 0–2)
INR PPP: 0.94 (ref 0.84–1.19)
LYMPHOCYTES # BLD AUTO: 1.71 THOUSANDS/ΜL (ref 0.6–4.47)
LYMPHOCYTES NFR BLD AUTO: 20 % (ref 14–44)
MCH RBC QN AUTO: 31.6 PG (ref 26.8–34.3)
MCHC RBC AUTO-ENTMCNC: 32.5 G/DL (ref 31.4–37.4)
MCV RBC AUTO: 98 FL (ref 82–98)
MONOCYTES # BLD AUTO: 0.73 THOUSAND/ΜL (ref 0.17–1.22)
MONOCYTES NFR BLD AUTO: 8 % (ref 4–12)
NEUTROPHILS # BLD AUTO: 6.16 THOUSANDS/ΜL (ref 1.85–7.62)
NEUTS SEG NFR BLD AUTO: 69 % (ref 43–75)
NRBC BLD AUTO-RTO: 0 /100 WBCS
PLATELET # BLD AUTO: 358 THOUSANDS/UL (ref 149–390)
PMV BLD AUTO: 10.4 FL (ref 8.9–12.7)
POTASSIUM SERPL-SCNC: 4.3 MMOL/L (ref 3.5–5.3)
PROT SERPL-MCNC: 7.1 G/DL (ref 6.4–8.2)
PROTHROMBIN TIME: 12.4 SECONDS (ref 11.6–14.5)
RBC # BLD AUTO: 4.33 MILLION/UL (ref 3.81–5.12)
RH BLD: POSITIVE
SODIUM SERPL-SCNC: 137 MMOL/L (ref 136–145)
SPECIMEN EXPIRATION DATE: NORMAL
WBC # BLD AUTO: 8.75 THOUSAND/UL (ref 4.31–10.16)

## 2020-07-09 PROCEDURE — 86850 RBC ANTIBODY SCREEN: CPT

## 2020-07-09 PROCEDURE — 87081 CULTURE SCREEN ONLY: CPT

## 2020-07-09 PROCEDURE — 86901 BLOOD TYPING SEROLOGIC RH(D): CPT

## 2020-07-09 PROCEDURE — 85730 THROMBOPLASTIN TIME PARTIAL: CPT

## 2020-07-09 PROCEDURE — 85610 PROTHROMBIN TIME: CPT

## 2020-07-09 PROCEDURE — 85025 COMPLETE CBC W/AUTO DIFF WBC: CPT

## 2020-07-09 PROCEDURE — 36415 COLL VENOUS BLD VENIPUNCTURE: CPT

## 2020-07-09 PROCEDURE — 93005 ELECTROCARDIOGRAM TRACING: CPT

## 2020-07-09 PROCEDURE — 80053 COMPREHEN METABOLIC PANEL: CPT

## 2020-07-09 PROCEDURE — 86900 BLOOD TYPING SEROLOGIC ABO: CPT

## 2020-07-10 ENCOUNTER — OFFICE VISIT (OUTPATIENT)
Dept: FAMILY MEDICINE CLINIC | Facility: CLINIC | Age: 70
End: 2020-07-10
Payer: MEDICARE

## 2020-07-10 ENCOUNTER — ANESTHESIA EVENT (OUTPATIENT)
Dept: PERIOP | Facility: HOSPITAL | Age: 70
End: 2020-07-10
Payer: MEDICARE

## 2020-07-10 VITALS
SYSTOLIC BLOOD PRESSURE: 140 MMHG | RESPIRATION RATE: 18 BRPM | DIASTOLIC BLOOD PRESSURE: 70 MMHG | TEMPERATURE: 97.8 F | HEART RATE: 80 BPM | OXYGEN SATURATION: 98 % | WEIGHT: 165 LBS | HEIGHT: 65 IN | BODY MASS INDEX: 27.49 KG/M2

## 2020-07-10 DIAGNOSIS — M25.572 PAIN, JOINT, ANKLE AND FOOT, LEFT: ICD-10-CM

## 2020-07-10 DIAGNOSIS — Z01.818 PREOP EXAMINATION: Primary | ICD-10-CM

## 2020-07-10 DIAGNOSIS — S82.832A CLOSED FRACTURE OF DISTAL END OF LEFT FIBULA, UNSPECIFIED FRACTURE MORPHOLOGY, INITIAL ENCOUNTER: ICD-10-CM

## 2020-07-10 LAB
ATRIAL RATE: 74 BPM
MRSA NOSE QL CULT: NORMAL
P AXIS: 7 DEGREES
PR INTERVAL: 134 MS
QRS AXIS: 15 DEGREES
QRSD INTERVAL: 74 MS
QT INTERVAL: 420 MS
QTC INTERVAL: 466 MS
T WAVE AXIS: 20 DEGREES
VENTRICULAR RATE: 74 BPM

## 2020-07-10 PROCEDURE — 1160F RVW MEDS BY RX/DR IN RCRD: CPT | Performed by: FAMILY MEDICINE

## 2020-07-10 PROCEDURE — 3008F BODY MASS INDEX DOCD: CPT | Performed by: FAMILY MEDICINE

## 2020-07-10 PROCEDURE — 3077F SYST BP >= 140 MM HG: CPT | Performed by: FAMILY MEDICINE

## 2020-07-10 PROCEDURE — 1036F TOBACCO NON-USER: CPT | Performed by: FAMILY MEDICINE

## 2020-07-10 PROCEDURE — 3078F DIAST BP <80 MM HG: CPT | Performed by: FAMILY MEDICINE

## 2020-07-10 PROCEDURE — 99214 OFFICE O/P EST MOD 30 MIN: CPT | Performed by: FAMILY MEDICINE

## 2020-07-10 PROCEDURE — 93010 ELECTROCARDIOGRAM REPORT: CPT | Performed by: INTERNAL MEDICINE

## 2020-07-10 NOTE — PROGRESS NOTES
[address] [facility]    NAME: Anita Reese  AGE: 72 y o  SEX: female  : 1950     DATE: 7/10/2020        Surgeon:  Dr Uli Parmar  Planned procedure:  Open reduction with internal fixation of left ankle  Diagnosis for procedure:  Closed fracture of distal end of left fibula  Procedure date: 2020    Assessment/Plan:    Patient is medically optimized (cleared) for the planned procedure  Further testing/evaluation is not required  Postop concerns: no    Problem List Items Addressed This Visit     None      Visit Diagnoses     Closed fracture of distal end of left fibula, unspecified fracture morphology, initial encounter        Pain, joint, ankle and foot, left                Pre-Surgery Instructions:   Medication Instructions    ALPRAZolam (XANAX) 0 5 mg tablet per anesthesia guidelines     buPROPion (WELLBUTRIN XL) 150 mg 24 hr tablet per anesthesia guidelines     escitalopram (LEXAPRO) 20 mg tablet per anesthesia guidelines     hyoscyamine (LEVSIN/SL) 0 125 mg SL tablet per anesthesia guidelines     levothyroxine 25 mcg tablet per anesthesia guidelines     lisinopril (ZESTRIL) 10 mg tablet per anesthesia guidelines     pantoprazole (PROTONIX) 40 mg tablet per anesthesia guidelines     zolpidem (AMBIEN) 10 mg tablet per anesthesia guidelines         Subjective:      Patient ID: Anita Reese is a 72 y o  female      Chief Complaint   Patient presents with   Demetrio Parmar L ankle 2020 [facility]    The following portions of the patient's history were reviewed and updated as appropriate: allergies, current medications, past family history, past medical history, past social history, past surgical history and problem list       Prior anesthesia: Yes   General; Complications:  none    CAD History: None    Pulmonary History: None    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    Diabetes History:  None     Neurological History: None     On Immunosuppressant meds/biologics: No      Review of Systems   Constitutional: Negative for activity change, appetite change, chills, diaphoresis, fatigue and fever  HENT: Negative  Respiratory: Negative for cough, choking, chest tightness, shortness of breath and wheezing  Cardiovascular: Negative for chest pain, palpitations and leg swelling  Gastrointestinal: Negative for abdominal distention, abdominal pain, constipation, diarrhea, nausea and vomiting  Genitourinary: Negative for difficulty urinating, dyspareunia, dysuria, enuresis, flank pain, frequency, menstrual problem, pelvic pain and urgency  Musculoskeletal: Negative for arthralgias, back pain, gait problem, joint swelling, myalgias, neck pain and neck stiffness  As noted in HPI   Skin: Negative  Neurological: Negative for dizziness, tremors, syncope, facial asymmetry, weakness, light-headedness, numbness and headaches  Psychiatric/Behavioral: Negative            Current Outpatient Medications   Medication Sig Dispense Refill    ALPRAZolam (XANAX) 0 5 mg tablet Take 1 tablet (0 5 mg total) by mouth daily as needed for anxiety 30 tablet 0    buPROPion (WELLBUTRIN XL) 150 mg 24 hr tablet Take 1 tablet (150 mg total) by mouth daily 30 tablet 5    escitalopram (LEXAPRO) 20 mg tablet Take 1 tablet (20 mg total) by mouth daily 90 tablet 1    hyoscyamine (LEVSIN/SL) 0 125 mg SL tablet Take 1 tablet (0 125 mg total) by mouth every 4 (four) hours as needed for cramping 60 tablet 3    levothyroxine 25 mcg tablet TAKE 1 TABLET BY MOUTH ONCE DAILY 30 tablet 5    lisinopril (ZESTRIL) 10 mg tablet Take 1 tablet (10 mg total) by mouth daily 90 tablet 3    pantoprazole (PROTONIX) 40 mg tablet Take 1 tablet (40 mg total) by mouth daily 30 tablet 5    zolpidem (AMBIEN) 10 mg tablet Take 1 tablet (10 mg total) by mouth daily at bedtime 30 tablet 0     No current facility-administered medications for this visit  Allergies on file:   Patient has no known allergies  Patient Active Problem List   Diagnosis    Anxiety and depression    Esophageal reflux    Insomnia    Vitamin B12 deficiency    Essential hypertension    Other specified hypothyroidism    Epigastric pain    Gastritis without bleeding        Past Medical History:   Diagnosis Date    Anxiety     Congenital absence of one kidney     Depression     GERD (gastroesophageal reflux disease)     History of transfusion     Hypertension     Insomnia     Menopause     Other specified hypothyroidism 1/28/2019    Vitamin B12 deficiency        Past Surgical History:   Procedure Laterality Date    SD COLONOSCOPY FLX DX W/COLLJ SPEC WHEN PFRMD N/A 5/30/2017    Procedure: EGD AND COLONOSCOPY;  Surgeon: Edmar Anglin MD;  Location: [facility]; Service: Gastroenterology    REDUCTION MAMMAPLASTY Bilateral 06/06/2011    REDUCTION MAMMAPLASTY      SHOULDER SURGERY         Family History   Problem Relation Age of Onset    Lung cancer Mother 79    Heart disease Father     Lung cancer Brother 48       Social History     Tobacco Use    Smoking status: Never Smoker    Smokeless tobacco: Never Used   Substance Use Topics    Alcohol use: Not Currently     Alcohol/week: 7 0 standard drinks     Types: 7 Glasses of wine per week     Comment: 1 glass of wine nightly     Drug use: No       Objective:    Vitals:    07/10/20 1110   BP: 140/70   Pulse: 80   Resp: 18   Temp: 97 8 °F (36 6 °C)   SpO2: 98%   Weight: 74 8 kg (165 lb)   Height: 5' 5" (1 651 m)        Physical Exam   Constitutional: She is oriented to person, place, and time  She appears well-developed and well-nourished  No distress  HENT:   Head: Normocephalic and atraumatic  Neck: Normal range of motion  Neck supple  Cardiovascular: Normal rate, regular rhythm, normal heart sounds and intact distal pulses   Exam reveals no gallop and no friction rub    No murmur heard  Pulmonary/Chest: Effort normal and breath sounds normal  No respiratory distress  She has no wheezes  She has no rales  She exhibits no tenderness  Abdominal: Soft  Bowel sounds are normal  She exhibits no distension and no mass  There is no tenderness  There is no rebound and no guarding  Neurological: She is alert and oriented to person, place, and time  Skin: Skin is warm and dry  She is not diaphoretic  Psychiatric: She has a normal mood and affect  Her behavior is normal  Judgment and thought content normal          Preop labs/testing available and reviewed: yes    eGFR   Date Value Ref Range Status   2020 63 ml/min/1 73sq m Final     WBC   Date Value Ref Range Status   2020 8 75 4 31 - 10 16 Thousand/uL Final     INR   Date Value Ref Range Status   2020 0 94 0 84 - 1 19 Final       EKG yes- normal      Echo no    Stress test/cath no    PFT/Mayer no    Functional capacity: Singles tennis                       7-12 Mets   Pick the highest level patient can comfortably perform   4 mets or greater for surgery    RCRI  High Risk surgery? 1 Point  CAD History:         1 Point   MI; Positive Stress Test; CP due to Mi;  Nitrate Usage to control Angina; Pathologic Q wave on EKG  CHF Active:         1 Point   Pulm Edema; Paroxysmal Nocturnal Dyspnea;  Bibasilar Rales (crackles);S3; CHF on CXR  Cerebrovascular Disease (TIA or CVA):     1 Point  DM on Insulin:        1 Point  Serum Creat >2 0 mg/dl:       1 Point          Total Points: 0     Scorin: Class I, Very Low Risk (0 4%)     1: Class II, Low risk (0 9%)     2: Class III Moderate (6 6%)     3: Class IV High (>11%)      ROGER Risk:  GFR:   eGFR   Date Value Ref Range Status   2020 63 ml/min/1 73sq m Final         For PCP:  If GFR>60, Hold ACE/ARB/Diuretic on the day of surgery, and NSAIDS 10 days before  If GFR<45, Consider PRE and POST op Nephrology Consult  If 46 <GFR> 59 :  Has Patient had ROGER in last 6 Months? no   If YES: Preop Nephrology consult   If No:  [place] 26 Nephrology consult           Curt Edwards MD

## 2020-07-10 NOTE — PRE-PROCEDURE INSTRUCTIONS
My Surgical Experience    The following information was developed to assist you to prepare for your operation  What do I need to do before coming to the hospital?   Arrange for a responsible person to drive you to and from the hospital    Arrange care for your children at home  Children are not allowed in the recovery areas of the hospital   Plan to wear clothing that is easy to put on and take off  If you are having shoulder surgery, wear a shirt that buttons or zippers in the front  Bathing  o Shower the evening before and the morning of your surgery with an antibacterial soap  Please refer to the Pre Op Showering Instructions for Surgery Patients Sheet   o Remove nail polish and all body piercing jewelry  o Do not shave any body part for at least 24 hours before surgery-this includes face, arms, legs and upper body  Food  o Nothing to eat or drink after midnight the night before your surgery  This includes candy and chewing gum  o Exception: If your surgery is after 12:00pm (noon), you may have clear liquids such as 7-Up®, ginger ale, apple or cranberry juice, Jell-O®, water, or clear broth until 8:00 am  o Do not drink milk or juice with pulp on the morning before surgery  o Do not drink alcohol 24 hours before surgery  Medicine  o Follow instructions you received from your surgeon about which medicines you may take on the day of surgery  o If instructed to take medicine on the morning of surgery, take pills with just a small sip of water  Call your prescribing doctor for specific infroamtion on what to do if you take insulin    What should I bring to the hospital?    Bring:  Campbell Nims or a walker, if you have them, for foot or knee surgery   A list of the daily medicines, vitamins, minerals, herbals and nutritional supplements you take   Include the dosages of medicines and the time you take them each day   Glasses, dentures or hearing aids   Minimal clothing; you will be wearing hospital sleepwear   Photo ID; required to verify your identity   If you have a Living Will or Power of , bring a copy of the documents   If you have an ostomy, bring an extra pouch and any supplies you use    Do not bring   Medicines or inhalers   Money, valuables or jewelry    What other information should I know about the day of surgery?  Notify your surgeons if you develop a cold, sore throat, cough, fever, rash or any other illness   Report to the Ambulatory Surgical/Same Day Surgery Unit   You will be instructed to stop at Registration only if you have not been pre-registered   Inform your  fi they do not stay that they will be asked by the staff to leave a phone number where they can be reached   Be available to be reached before surgery  In the event the operating room schedule changes, you may be asked to come in earlier or later than expected    *It is important to tell your doctor and others involved in your health care if you are taking or have been taking any non-prescription drugs, vitamins, minerals, herbals or other nutritional supplements  Any of these may interact with some food or medicines and cause a reaction      Pre-Surgery Instructions:   Medication Instructions    ALPRAZolam (XANAX) 0 5 mg tablet Instructed patient per Anesthesia Guidelines   buPROPion (WELLBUTRIN XL) 150 mg 24 hr tablet Instructed patient per Anesthesia Guidelines   escitalopram (LEXAPRO) 20 mg tablet Instructed patient per Anesthesia Guidelines   hyoscyamine (LEVSIN/SL) 0 125 mg SL tablet Instructed patient per Anesthesia Guidelines   levothyroxine 25 mcg tablet Instructed patient per Anesthesia Guidelines   lisinopril (ZESTRIL) 10 mg tablet Instructed patient per Anesthesia Guidelines   pantoprazole (PROTONIX) 40 mg tablet Instructed patient per Anesthesia Guidelines   zolpidem (AMBIEN) 10 mg tablet Instructed patient per Anesthesia Guidelines  May take xanax a m  Of surgery

## 2020-07-13 ENCOUNTER — HOSPITAL ENCOUNTER (OUTPATIENT)
Dept: RADIOLOGY | Facility: HOSPITAL | Age: 70
Setting detail: OUTPATIENT SURGERY
Discharge: HOME | End: 2020-07-13
Payer: MEDICARE

## 2020-07-13 ENCOUNTER — HOSPITAL ENCOUNTER (OUTPATIENT)
Facility: HOSPITAL | Age: 70
Setting detail: OUTPATIENT SURGERY
Discharge: HOME | End: 2020-07-13
Attending: ORTHOPAEDIC SURGERY | Admitting: ORTHOPAEDIC SURGERY
Payer: MEDICARE

## 2020-07-13 ENCOUNTER — ANESTHESIA (OUTPATIENT)
Dept: PERIOP | Facility: HOSPITAL | Age: 70
End: 2020-07-13
Payer: MEDICARE

## 2020-07-13 VITALS
HEIGHT: 65 IN | HEART RATE: 59 BPM | RESPIRATION RATE: 16 BRPM | TEMPERATURE: 98 F | BODY MASS INDEX: 28.32 KG/M2 | DIASTOLIC BLOOD PRESSURE: 60 MMHG | OXYGEN SATURATION: 95 % | SYSTOLIC BLOOD PRESSURE: 120 MMHG | WEIGHT: 170 LBS

## 2020-07-13 DIAGNOSIS — S82.832D CLOSED FRACTURE OF DISTAL END OF LEFT FIBULA WITH ROUTINE HEALING, UNSPECIFIED FRACTURE MORPHOLOGY, SUBSEQUENT ENCOUNTER: Primary | ICD-10-CM

## 2020-07-13 DIAGNOSIS — T14.8XXA FX: ICD-10-CM

## 2020-07-13 DIAGNOSIS — S82.832A CLOSED FRACTURE OF DISTAL END OF LEFT FIBULA, UNSPECIFIED FRACTURE MORPHOLOGY, INITIAL ENCOUNTER: Primary | ICD-10-CM

## 2020-07-13 LAB
ABO GROUP BLD: NORMAL
RH BLD: POSITIVE
SARS-COV-2 RNA RESP QL NAA+PROBE: NEGATIVE

## 2020-07-13 PROCEDURE — G9197 ORDER FOR CEPH: HCPCS | Performed by: ORTHOPAEDIC SURGERY

## 2020-07-13 PROCEDURE — 73600 X-RAY EXAM OF ANKLE: CPT | Performed by: ORTHOPAEDIC SURGERY

## 2020-07-13 PROCEDURE — C1713 ANCHOR/SCREW BN/BN,TIS/BN: HCPCS | Performed by: ORTHOPAEDIC SURGERY

## 2020-07-13 PROCEDURE — 87635 SARS-COV-2 COVID-19 AMP PRB: CPT | Performed by: ORTHOPAEDIC SURGERY

## 2020-07-13 PROCEDURE — 27792 TREATMENT OF ANKLE FRACTURE: CPT | Performed by: PHYSICIAN ASSISTANT

## 2020-07-13 PROCEDURE — 73600 X-RAY EXAM OF ANKLE: CPT

## 2020-07-13 PROCEDURE — 77071 MNL APPL STRS JT RADIOGRAPHY: CPT | Performed by: ORTHOPAEDIC SURGERY

## 2020-07-13 PROCEDURE — 27792 TREATMENT OF ANKLE FRACTURE: CPT | Performed by: ORTHOPAEDIC SURGERY

## 2020-07-13 DEVICE — 4.0MM CANCELLOUS BONE SCREW PARTIALLY THREADED/16MM: Type: IMPLANTABLE DEVICE | Status: FUNCTIONAL

## 2020-07-13 DEVICE — 4.0MM CANCELLOUS BONE SCREW PARTIALLY THREADED/12MM: Type: IMPLANTABLE DEVICE | Status: FUNCTIONAL

## 2020-07-13 DEVICE — 3.5MM CORTEX SCREW SELF-TAPPING 20MM: Type: IMPLANTABLE DEVICE | Status: FUNCTIONAL

## 2020-07-13 DEVICE — LCP ONE-THIRD TUBULAR PLATE WITH COLLAR 10 HOLES/117MM
Type: IMPLANTABLE DEVICE | Status: FUNCTIONAL
Brand: LCP

## 2020-07-13 DEVICE — 3.5MM CORTEX SCREW SELF-TAPPING 14MM: Type: IMPLANTABLE DEVICE | Status: FUNCTIONAL

## 2020-07-13 DEVICE — 3.5MM CORTEX SCREW SELF-TAPPING 12MM: Type: IMPLANTABLE DEVICE | Status: FUNCTIONAL

## 2020-07-13 RX ORDER — SODIUM CHLORIDE, SODIUM LACTATE, POTASSIUM CHLORIDE, CALCIUM CHLORIDE 600; 310; 30; 20 MG/100ML; MG/100ML; MG/100ML; MG/100ML
125 INJECTION, SOLUTION INTRAVENOUS CONTINUOUS
Status: DISCONTINUED | OUTPATIENT
Start: 2020-07-13 | End: 2020-07-13 | Stop reason: HOSPADM

## 2020-07-13 RX ORDER — SODIUM CHLORIDE, SODIUM LACTATE, POTASSIUM CHLORIDE, CALCIUM CHLORIDE 600; 310; 30; 20 MG/100ML; MG/100ML; MG/100ML; MG/100ML
INJECTION, SOLUTION INTRAVENOUS CONTINUOUS PRN
Status: DISCONTINUED | OUTPATIENT
Start: 2020-07-13 | End: 2020-07-13 | Stop reason: SURG

## 2020-07-13 RX ORDER — LIDOCAINE HYDROCHLORIDE 10 MG/ML
INJECTION, SOLUTION EPIDURAL; INFILTRATION; INTRACAUDAL; PERINEURAL AS NEEDED
Status: DISCONTINUED | OUTPATIENT
Start: 2020-07-13 | End: 2020-07-13 | Stop reason: SURG

## 2020-07-13 RX ORDER — BUPIVACAINE HYDROCHLORIDE 5 MG/ML
INJECTION, SOLUTION PERINEURAL
Status: COMPLETED | OUTPATIENT
Start: 2020-07-13 | End: 2020-07-13

## 2020-07-13 RX ORDER — HYDROMORPHONE HCL/PF 1 MG/ML
0.5 SYRINGE (ML) INJECTION
Status: DISCONTINUED | OUTPATIENT
Start: 2020-07-13 | End: 2020-07-13 | Stop reason: HOSPADM

## 2020-07-13 RX ORDER — CEFAZOLIN SODIUM 2 G/50ML
2000 SOLUTION INTRAVENOUS ONCE
Status: COMPLETED | OUTPATIENT
Start: 2020-07-13 | End: 2020-07-13

## 2020-07-13 RX ORDER — CHLORHEXIDINE GLUCONATE 0.12 MG/ML
15 RINSE ORAL ONCE
Status: COMPLETED | OUTPATIENT
Start: 2020-07-13 | End: 2020-07-13

## 2020-07-13 RX ORDER — MAGNESIUM HYDROXIDE 1200 MG/15ML
LIQUID ORAL AS NEEDED
Status: DISCONTINUED | OUTPATIENT
Start: 2020-07-13 | End: 2020-07-13 | Stop reason: HOSPADM

## 2020-07-13 RX ORDER — BUPIVACAINE HYDROCHLORIDE 2.5 MG/ML
INJECTION, SOLUTION EPIDURAL; INFILTRATION; INTRACAUDAL AS NEEDED
Status: DISCONTINUED | OUTPATIENT
Start: 2020-07-13 | End: 2020-07-13 | Stop reason: HOSPADM

## 2020-07-13 RX ORDER — ASPIRIN 325 MG
325 TABLET, DELAYED RELEASE (ENTERIC COATED) ORAL DAILY
Qty: 28 TABLET | Refills: 0 | Status: SHIPPED | OUTPATIENT
Start: 2020-07-13 | End: 2021-04-26

## 2020-07-13 RX ORDER — OXYCODONE HYDROCHLORIDE 5 MG/1
5 TABLET ORAL EVERY 4 HOURS PRN
Qty: 30 TABLET | Refills: 0 | Status: SHIPPED | OUTPATIENT
Start: 2020-07-13 | End: 2020-07-18

## 2020-07-13 RX ORDER — FENTANYL CITRATE 50 UG/ML
INJECTION, SOLUTION INTRAMUSCULAR; INTRAVENOUS AS NEEDED
Status: DISCONTINUED | OUTPATIENT
Start: 2020-07-13 | End: 2020-07-13 | Stop reason: SURG

## 2020-07-13 RX ORDER — GABAPENTIN 300 MG/1
300 CAPSULE ORAL ONCE
Status: COMPLETED | OUTPATIENT
Start: 2020-07-13 | End: 2020-07-13

## 2020-07-13 RX ORDER — ONDANSETRON 2 MG/ML
INJECTION INTRAMUSCULAR; INTRAVENOUS AS NEEDED
Status: DISCONTINUED | OUTPATIENT
Start: 2020-07-13 | End: 2020-07-13 | Stop reason: SURG

## 2020-07-13 RX ORDER — MIDAZOLAM HYDROCHLORIDE 2 MG/2ML
INJECTION, SOLUTION INTRAMUSCULAR; INTRAVENOUS AS NEEDED
Status: DISCONTINUED | OUTPATIENT
Start: 2020-07-13 | End: 2020-07-13 | Stop reason: SURG

## 2020-07-13 RX ORDER — PROPOFOL 10 MG/ML
INJECTION, EMULSION INTRAVENOUS AS NEEDED
Status: DISCONTINUED | OUTPATIENT
Start: 2020-07-13 | End: 2020-07-13 | Stop reason: SURG

## 2020-07-13 RX ORDER — DEXAMETHASONE SODIUM PHOSPHATE 10 MG/ML
INJECTION, SOLUTION INTRAMUSCULAR; INTRAVENOUS AS NEEDED
Status: DISCONTINUED | OUTPATIENT
Start: 2020-07-13 | End: 2020-07-13 | Stop reason: SURG

## 2020-07-13 RX ORDER — ONDANSETRON 2 MG/ML
4 INJECTION INTRAMUSCULAR; INTRAVENOUS ONCE AS NEEDED
Status: DISCONTINUED | OUTPATIENT
Start: 2020-07-13 | End: 2020-07-13 | Stop reason: HOSPADM

## 2020-07-13 RX ORDER — ACETAMINOPHEN 325 MG/1
975 TABLET ORAL ONCE
Status: COMPLETED | OUTPATIENT
Start: 2020-07-13 | End: 2020-07-13

## 2020-07-13 RX ORDER — NALOXONE HYDROCHLORIDE 1 MG/ML
2 INJECTION INTRAMUSCULAR; INTRAVENOUS; SUBCUTANEOUS ONCE
Qty: 2 ML | Refills: 0 | Status: SHIPPED | OUTPATIENT
Start: 2020-07-13 | End: 2021-04-26

## 2020-07-13 RX ADMIN — ONDANSETRON 4 MG: 2 INJECTION INTRAMUSCULAR; INTRAVENOUS at 11:35

## 2020-07-13 RX ADMIN — HYDROMORPHONE HYDROCHLORIDE 0.5 MG: 1 INJECTION, SOLUTION INTRAMUSCULAR; INTRAVENOUS; SUBCUTANEOUS at 13:20

## 2020-07-13 RX ADMIN — CEFAZOLIN SODIUM 2000 MG: 2 SOLUTION INTRAVENOUS at 11:23

## 2020-07-13 RX ADMIN — SODIUM CHLORIDE, SODIUM LACTATE, POTASSIUM CHLORIDE, AND CALCIUM CHLORIDE: .6; .31; .03; .02 INJECTION, SOLUTION INTRAVENOUS at 11:23

## 2020-07-13 RX ADMIN — LIDOCAINE HYDROCHLORIDE 50 MG: 10 INJECTION, SOLUTION EPIDURAL; INFILTRATION; INTRACAUDAL; PERINEURAL at 11:26

## 2020-07-13 RX ADMIN — GABAPENTIN 300 MG: 300 CAPSULE ORAL at 10:20

## 2020-07-13 RX ADMIN — CHLORHEXIDINE GLUCONATE 0.12% ORAL RINSE 15 ML: 1.2 LIQUID ORAL at 10:21

## 2020-07-13 RX ADMIN — DEXAMETHASONE SODIUM PHOSPHATE 4 MG: 10 INJECTION, SOLUTION INTRAMUSCULAR; INTRAVENOUS at 11:35

## 2020-07-13 RX ADMIN — SODIUM CHLORIDE, SODIUM LACTATE, POTASSIUM CHLORIDE, AND CALCIUM CHLORIDE 125 ML/HR: .6; .31; .03; .02 INJECTION, SOLUTION INTRAVENOUS at 10:35

## 2020-07-13 RX ADMIN — BUPIVACAINE HYDROCHLORIDE 15 ML: 5 INJECTION, SOLUTION PERINEURAL at 13:45

## 2020-07-13 RX ADMIN — HYDROMORPHONE HYDROCHLORIDE 0.5 MG: 1 INJECTION, SOLUTION INTRAMUSCULAR; INTRAVENOUS; SUBCUTANEOUS at 13:35

## 2020-07-13 RX ADMIN — FENTANYL CITRATE 50 MCG: 50 INJECTION, SOLUTION INTRAMUSCULAR; INTRAVENOUS at 11:31

## 2020-07-13 RX ADMIN — PROPOFOL 200 MG: 10 INJECTION, EMULSION INTRAVENOUS at 11:26

## 2020-07-13 RX ADMIN — MIDAZOLAM HYDROCHLORIDE 2 MG: 1 INJECTION, SOLUTION INTRAMUSCULAR; INTRAVENOUS at 11:22

## 2020-07-13 RX ADMIN — FENTANYL CITRATE 50 MCG: 50 INJECTION, SOLUTION INTRAMUSCULAR; INTRAVENOUS at 11:58

## 2020-07-13 RX ADMIN — ACETAMINOPHEN 975 MG: 325 TABLET, FILM COATED ORAL at 10:20

## 2020-07-13 NOTE — ANESTHESIA PREPROCEDURE EVALUATION
Review of Systems/Medical History  Patient summary reviewed  Chart reviewed  No history of anesthetic complications     Cardiovascular  Hypertension ,    Pulmonary       GI/Hepatic    GERD ,             Endo/Other     GYN       Hematology   Musculoskeletal       Neurology   Psychology   Anxiety, Depression ,                   Anesthesia Plan  ASA Score- 2     Anesthesia Type- general with ASA Monitors  Additional Monitors:   Airway Plan: LMA  Plan Factors-    Induction- intravenous  Postoperative Plan- Plan for postoperative opioid use  Informed Consent- Anesthetic plan and risks discussed with patient  I personally reviewed this patient with the CRNA  Discussed and agreed on the Anesthesia Plan with the CRNA  Zuly Lopez

## 2020-07-13 NOTE — ANESTHESIA PROCEDURE NOTES
Peripheral Block    Patient location during procedure: post-op  Start time: 7/13/2020 1:45 PM  Reason for block: at surgeon's request and post-op pain management  Staffing  Anesthesiologist: Glendy Watkins MD  Performed: anesthesiologist   Preanesthetic Checklist  Completed: patient identified, site marked, surgical consent, pre-op evaluation, timeout performed, IV checked, risks and benefits discussed and monitors and equipment checked  Peripheral Block  Patient position: supine  Prep: ChloraPrep  Patient monitoring: heart rate, continuous pulse ox and frequent blood pressure checks  Block type: popliteal  Laterality: left  Injection technique: single-shot  Procedures: ultrasound guided, Ultrasound guidance required for the procedure to increase accuracy and safety of medication placement and decrease risk of complications    Ultrasound permanent image savedbupivacaine (MARCAINE) 0 5 % perineural infiltration, 15 mL  Needle  Needle type: Stimuplex   Needle gauge: 21 G  Needle length: 10 cm  Needle localization: ultrasound guidance  Assessment  Injection assessment: incremental injection, local visualized surrounding nerve on ultrasound, negative aspiration for heme and no paresthesia on injection  Paresthesia pain: none  Heart rate change: no  Slow fractionated injection: yes  Post-procedure:  site cleaned  patient tolerated the procedure well with no immediate complications

## 2020-07-13 NOTE — INTERVAL H&P NOTE
H&P reviewed  After examining the patient I find no changes in the patients condition since the H&P had been written  Patient was seen examined in the preoperative holding area the left lower extremity was identified marked  She denies any changes in her overall health since I I evaluated her last week  She denies any fever, chills, nausea, vomiting, headache, blurred vision, chest pain or trouble breathing  She has been cleared by her PCP    We will proceed to the OR for left ankle ORIF pending the results of her COVID test       Vitals:    07/13/20 1014   BP: 136/70   Pulse: 75   Resp: 17   Temp: 98 4 °F (36 9 °C)   SpO2: 97%

## 2020-07-13 NOTE — DISCHARGE INSTRUCTIONS
ORIF of an Ankle Fracture     WHAT YOU NEED TO KNOW:   Open reduction and internal fixation (ORIF) of an ankle fracture is surgery to fix a broken ankle  Medical wires, screws, pins, or plates are used to hold the bones in place while they heal      DISCHARGE INSTRUCTIONS:   Medicines:   · Pain medicine: You may be given medicine to take away or decrease pain  Do not wait until the pain is severe before you take your medicine  · Anticoagulation: This Medicine will help prevent blood clots  Please take a full-dose aspirin 325 mg daily for the next 4 weeks    · Take your medicine as directed  Contact your healthcare provider if you think your medicine is not helping or if you have side effects  Tell him or her if you are allergic to any medicine  Keep a list of the medicines, vitamins, and herbs you take  Include the amounts, and when and why you take them  Bring the list or the pill bottles to follow-up visits  Carry your medicine list with you in case of an emergency  Follow up with your healthcare provider as directed: You will need to return to have your wound checked and stitches removed  Write down your questions so you remember to ask them during your visits  Please return to see Dr Trent Arroyo in 1 week for a wound check  Call [phone] with any questions or concerns    Self-care:   · Use crutches:  Crutches will decrease the stress on your ankle when you walk  It is important to use crutches correctly  Ask your healthcare provider for more information about how to use crutches  Do not bear any weight on her left lower extremity  · Ask when you can bathe:  When you are allowed to bathe, cover your cast with 2 plastic bags  Tape the bags to your skin to keep the water out  Keep the cast out of the water so it does not get wet  If you do not have a cast, carefully wash the wound with soap and water  Dry the area and put on new, clean bandages as directed   Change your bandages when they get wet or dirty  Cast or splint care:   · Check the skin around the cast or splint every day  Apply lotion on any red or sore areas  · Do not push down or lean on any part of the cast or splint, because it may break  · Do not scratch the skin under the cast with any sharp or pointed object inside the cast     · If your splint is too tight, gently loosen it so that your toes are comfortable  Contact your healthcare provider if:   · You have a fever  · Your cast gets wet  · Your cast or splint begin to smell  · You have more pain or swelling than you did before the cast or splint was put on  · You have questions or concerns about your condition or care  Seek care immediately or call 911 if:   · Blood soaks through your bandage  · Your cast or splint breaks or gets damaged  · Your toes look pale or blue, feel numb, or tingle  · Your arm or leg feels warm, tender, and painful  It may look swollen and red  · You suddenly feel lightheaded and short of breath  · You have chest pain when you take a deep breath or cough  You cough up blood  © 2017 [employer]  Information is for End User's use only and may not be sold, redistributed or otherwise used for commercial purposes  All illustrations and images included in CareNotes® are the copyrighted property of [employer]  or Cristhian Schwartz  The above information is an  only  It is not intended as medical advice for individual conditions or treatments  Talk to your doctor, nurse or pharmacist before following any medical regimen to see if it is safe and effective for you

## 2020-07-13 NOTE — PERIOPERATIVE NURSING NOTE
Post-Op processes and procedures were explained and all related patient and family questions were answered  Discharge instructions were given and all related patient and family questions were answered

## 2020-07-13 NOTE — OP NOTE
OPERATIVE REPORT  PATIENT NAME: Niki Funes    :  1950  MRN: [id number]  Pt Location: [facility]OM 01    SURGERY DATE: 2020    Surgeon(s) and Role:     * Santos Oneil, DO - Primary     * Yamile Greco PA-C - Assisting, no qualified resident was available and an assistant was needed for patient positioning, prepping and draping, soft tissue retraction, maintenance of reduction, protection of vital structures throughout the case, to complete the case safely  Preop Diagnosis:  Closed fracture of distal end of left fibula, unspecified fracture morphology, initial encounter [S82 832A]  Pain, joint, ankle and foot, left [M25 572]    Post-Op Diagnosis Codes:     * Closed fracture of distal end of left fibula, unspecified fracture morphology, initial encounter [S82 832A]     * Pain, joint, ankle and foot, left [M25 572]    Procedure(s) (LRB):  OPEN REDUCTION W/ INTERNAL FIXATION (ORIF) ANKLE (Left)    Specimen(s):  * No specimens in log *    Estimated Blood Loss:   Minimal    Drains:  * No LDAs found *    Anesthesia Type:   General/LMA, popliteal nerve block in PACU    Antibiotics:  Ancef 2 g    Intravenous fluids:  500 cc    Tourniquet time:  63 minutes at 300 mmHg    Implants:  Synthes 1/3 tubular 10 hole plate, 4 0 cancellous screws x3 distally, 3 5 cortical screws x3 proximally, 3 5 cortical screw lag screw fixation    Operative Indications:  Closed fracture of distal end of left fibula, unspecified fracture morphology, initial encounter [S82 832A]  Pain, joint, ankle and foot, left [M25 572]  Patient is a 60-year-old female that presented to the outpatient orthopedic clinic after tripping over a garden hose and presenting with pain and swelling of the left ankle  She was found to have a comminuted Farfan C type fracture of the distal fibula with medial mortise widening greater than 4 mm    Her skin was significantly swollen and bruised and was recommended that time be given to optimize her soft tissue envelope prior to surgical intervention  She was agreeable to this  Once her skin was amenable to surgery it was scheduled in the form of ORIF of her left ankle  The risks and benefits of undergoing the procedure were discussed at length  She was cleared by her PCP for the intended procedure  She underwent left ankle ORIF on 7/13/2020  Operative Findings:  After exposure there was evidence of a comminuted Farfan C type fracture  Comminution began at the level the syndesmosis and carried proximally  There were 2 large fragments associated with the anterior segment of comminution  The large fragments were accommodated a lag screw nicely  Inter fragmentary compression was provided for these 2 major fragments the a leg screw fixation  A 10 hole 1/3 tubular plate was then utilized in neutralization mode to span the fracture site as well as the comminution with 3 screws proximally and 3 screws distally  The construct was stable  Stress examination of the left ankle after the distal fibula was repaired demonstrated no medial clear space widening and the syndesmosis was stable  Patient tolerated the procedure well  There were no complications  Patient was neurovascular intact in PACU prior to popliteal nerve block  Complications:   None    Procedure and Technique:  Patient was seen examined in the preoperative holding area and the left lower extremity was identified marked  All of her questions were addressed preoperatively  She was taken back to the operating room and placed in a supine position where she was administered general anesthesia by the anesthesia staff  She was administered 2 g of IV Ancef as a form of perioperative antibiotics  After the patient was anesthetized their position on the operating room table with all bony prominences well padded  The left lower extremity was prepped and draped in standard sterile fashion after a tourniquet had been applied to the left thigh  Final time-out was performed and all members of the operating room staff were in agreement that the correct patient was identified, the correct extremity was identified, and the correct procedure was identified  Fluoroscopic imaging was used to localize the level of fracture in order for a proper incision to be placed on lateral aspect of the ankle  Once level the fracture was identified a longitudinal incision along the fibula was delineated using a marking pen  The leg was then elevated and exsanguinated and the tourniquet was inflated to 300 mmHg  Using a 15 blade scalpel the skin was incised and the subcutaneous tissues were dissected bluntly down to the lateral border of the distal fibula  With the distal fibular exposed and elevated was used to bluntly dissect up the shaft of the fibula bluntly dissecting the soft tissue of the proximal aspect of the incision  During this point the superficial peroneal nerve was not visualized within the surgical exposure  Hemostasis was achieved with electrocautery  The fracture fragments were visualized and it represented a comminuted Farfan C type fracture with 3-4 fragments  There were 2 large fragments that were oblique in nature that would accommodate a 3 5 mm lag screw  There was area of comminution starting at the level of the syndesmosis anteriorly on the distal fibula  It was deemed that the 2 large oblique fragments were, and a lag screw and good interfragmentary compression could be obtained  The fracture edges were mobilized and cleaned and provisionally reduced with reduction clamps  A 3 5 lag screw was then inserted anteriorly to posteriorly perpendicular to the fracture line utilizing lag technique  This provided excellent inter fragmentary compression  Next a 10 hole 1/3 tubular plate was size and deemed to be of appropriate length to provide proximal and distal fixation to the fracture site    Once the plate was appropriately oriented a 3 5 mm cortical screw was inserted into the shaft of the fibula and the plate was compressed to the bone nicely  Attention was then turned distally and three 4 0 cancellous screws were inserted in the distal fibula obtaining good fixation below the level of the comminution  Two more cortical screws were then drilled measured and inserted into the shaft of the fibula through the proximal plate and these all demonstrate excellent purchase  At the conclusion all screws demonstrated good purchase and the plate in neutralization mode appeared to provide rigid fixation  The lag screw demonstrate approximately 2 threads prominence posterior to the fibula and was not in close proximity to the peroneal tendons  Utilizing fluoroscopy AP mortise and lateral images of the ankle were taken and the fracture appeared to be well reduced and the plate was in appropriate orientation  The mortise was evaluated and stress examination under fluoroscopy was performed utilizing a external rotation stress test   There was no gapping of the medial clear space or the syndesmosis  Closure began with irrigation of the wound with normal saline solution  The proximal aspect of the wound had the deep subcuticular tissues reapproximated with 0 Vicryl followed by reapproximation of the superficial subcuticular tissues with 2-0Vicryl  These Vicryl sutures were undyed  The subcuticular tissues were infiltrated with 0 5% Marcaine plain for local analgesia  The skin edges were reapproximated utilizing staples  Xeroform dressing was applied to the incision and a well-padded dressing of 4 x 4 gauze and ABD pads secured with Webril was applied with the foot in neutral   The tourniquet was released after 63 minutes at 300 mmHg  With the foot in neutral posterior U Ortho Glass splint was applied to the left lower extremity and secured in place with an Ace wrap  Patient was awaken from general anesthesia after the splint had been applied    The patient was transferred to PACU in stable condition  Patient tolerated the procedure well  Patient was neurovascularly in the foot and toes postoperatively in PACU prior to the popliteal block     I was present for the entire procedure    Patient Disposition:  PACU     SIGNATURE: Ming Troncoso DO  DATE: July 13, 2020  TIME: 1:35 PM

## 2020-07-19 DIAGNOSIS — F41.9 ANXIETY: ICD-10-CM

## 2020-07-19 DIAGNOSIS — G47.00 INSOMNIA, UNSPECIFIED TYPE: ICD-10-CM

## 2020-07-21 RX ORDER — ZOLPIDEM TARTRATE 10 MG/1
TABLET ORAL
Qty: 30 TABLET | Refills: 0 | OUTPATIENT
Start: 2020-07-21

## 2020-07-21 RX ORDER — ALPRAZOLAM 0.5 MG/1
TABLET ORAL
Qty: 30 TABLET | Refills: 0 | OUTPATIENT
Start: 2020-07-21

## 2020-07-22 ENCOUNTER — OFFICE VISIT (OUTPATIENT)
Dept: OBGYN CLINIC | Facility: CLINIC | Age: 70
End: 2020-07-22

## 2020-07-22 VITALS — HEART RATE: 59 BPM | SYSTOLIC BLOOD PRESSURE: 153 MMHG | DIASTOLIC BLOOD PRESSURE: 73 MMHG | TEMPERATURE: 99.8 F

## 2020-07-22 DIAGNOSIS — F41.9 ANXIETY: ICD-10-CM

## 2020-07-22 DIAGNOSIS — G47.00 INSOMNIA, UNSPECIFIED TYPE: ICD-10-CM

## 2020-07-22 DIAGNOSIS — Z98.890 S/P ORIF (OPEN REDUCTION INTERNAL FIXATION) FRACTURE: Primary | ICD-10-CM

## 2020-07-22 DIAGNOSIS — S82.832D CLOSED FRACTURE OF DISTAL END OF LEFT FIBULA WITH ROUTINE HEALING, UNSPECIFIED FRACTURE MORPHOLOGY, SUBSEQUENT ENCOUNTER: ICD-10-CM

## 2020-07-22 DIAGNOSIS — Z87.81 S/P ORIF (OPEN REDUCTION INTERNAL FIXATION) FRACTURE: Primary | ICD-10-CM

## 2020-07-22 PROCEDURE — 3078F DIAST BP <80 MM HG: CPT | Performed by: ORTHOPAEDIC SURGERY

## 2020-07-22 PROCEDURE — 3077F SYST BP >= 140 MM HG: CPT | Performed by: ORTHOPAEDIC SURGERY

## 2020-07-22 PROCEDURE — 99024 POSTOP FOLLOW-UP VISIT: CPT | Performed by: ORTHOPAEDIC SURGERY

## 2020-07-22 PROCEDURE — 1160F RVW MEDS BY RX/DR IN RCRD: CPT | Performed by: ORTHOPAEDIC SURGERY

## 2020-07-22 RX ORDER — CEPHALEXIN 500 MG/1
500 CAPSULE ORAL EVERY 12 HOURS SCHEDULED
Qty: 14 CAPSULE | Refills: 0 | Status: SHIPPED | OUTPATIENT
Start: 2020-07-22 | End: 2020-07-29

## 2020-07-22 RX ORDER — ZOLPIDEM TARTRATE 10 MG/1
10 TABLET ORAL
Qty: 30 TABLET | Refills: 0 | Status: SHIPPED | OUTPATIENT
Start: 2020-07-22 | End: 2020-09-14

## 2020-07-22 RX ORDER — ALPRAZOLAM 0.5 MG/1
0.5 TABLET ORAL DAILY PRN
Qty: 30 TABLET | Refills: 0 | Status: SHIPPED | OUTPATIENT
Start: 2020-08-02 | End: 2020-09-14

## 2020-07-22 RX ORDER — OXYCODONE HYDROCHLORIDE 5 MG/1
5 TABLET ORAL EVERY 6 HOURS PRN
Qty: 30 TABLET | Refills: 0 | Status: SHIPPED | OUTPATIENT
Start: 2020-07-22 | End: 2021-04-26

## 2020-07-22 NOTE — PROGRESS NOTES
Assessment/Plan:  1  S/P ORIF (open reduction internal fixation) fracture     2  Closed fracture of distal end of left fibula with routine healing, unspecified fracture morphology, subsequent encounter       Scribe Attestation    I,:   Graciela Tomlin MA am acting as a scribe while in the presence of the attending physician :        I,:   Alvarado Boateng,  personally performed the services described in this documentation    as scribed in my presence :            Nicki Mcdonald is a 45-year-old female who presents to the office today for follow up evaluation 1 week status post ORIF left ankle  Patient is doing well overall postoperatively  There is some ivory incisional erythema which is benign appearing and consistent with a hematoma versus early cellulitis  There is no signs of gross infection  A prescription was provided for Keflex for 7 days  A refill was also provided for oxycodone  Patient was placed in a well-padded posterior splint  She will remain nonweightbearing with the LLE  She will follow up in 1 week for repeat evaluation, repeat x-ray and staple removal      Subjective: 1 week status post left ankle ORIF    Patient ID: Charan Champagne is a 72 y o  female  HPI  Nicki Mcdonald is a 45-year-old female who presents to the office today for evaluation 1 week status post left ankle ORIF  Patient states she is doing well overall postoperatively  She notes some soreness about the ankle  Patient states she feels as though the splint was rubbing  She states her pain is improving  She states she has been compliant with Aspirin  She denies any numbness or tingling  Review of Systems   Constitutional: Positive for activity change  Negative for chills and fever  HENT: Negative for drooling and sneezing  Eyes: Negative for redness  Respiratory: Negative for cough and wheezing  Gastrointestinal: Negative for nausea and vomiting  Musculoskeletal: Positive for arthralgias  Negative for joint swelling and myalgias  Neurological: Negative for weakness and numbness  Psychiatric/Behavioral: Negative for behavioral problems  The patient is not nervous/anxious  Past Medical History:   Diagnosis Date    Anxiety     Congenital absence of one kidney     Depression     GERD (gastroesophageal reflux disease)     History of transfusion     Hypertension     Insomnia     Menopause     Other specified hypothyroidism 1/28/2019    Vitamin B12 deficiency        Past Surgical History:   Procedure Laterality Date    COLONOSCOPY      [place] COLONOSCOPY FLX DX W/COLLJ SPEC WHEN PFRMD N/A 5/30/2017    Procedure: EGD AND COLONOSCOPY;  Surgeon: Yuniel Ordoñez MD;  [facility];   Service: Gastroenterology    [place] OPEN TX DISTAL FIBULAR FRACTURE LAT MALLEOLUS Left 7/13/2020    Procedure: OPEN REDUCTION W/ INTERNAL FIXATION (ORIF) ANKLE;  Surgeon: Surendra Mcginnis DO;  Location: [facility];  Service: Orthopedics    REDUCTION MAMMAPLASTY Bilateral 06/06/2011    REDUCTION MAMMAPLASTY      SHOULDER SURGERY      TONSILLECTOMY         Family History   Problem Relation Age of Onset    Lung cancer Mother 79    Heart disease Father     Lung cancer Brother 48       Social History     Occupational History    Not on file   Tobacco Use    Smoking status: Never Smoker    Smokeless tobacco: Never Used   Substance and Sexual Activity    Alcohol use: Not Currently     Alcohol/week: 7 0 standard drinks     Types: 7 Glasses of wine per week     Comment: 1 glass of wine nightly     Drug use: No    Sexual activity: Not on file         Current Outpatient Medications:     [START ON 8/2/2020] ALPRAZolam (XANAX) 0 5 mg tablet, Take 1 tablet (0 5 mg total) by mouth daily as needed for anxiety, Disp: 30 tablet, Rfl: 0    aspirin (ECOTRIN) 325 mg EC tablet, Take 1 tablet (325 mg total) by mouth daily, Disp: 28 tablet, Rfl: 0    buPROPion (WELLBUTRIN XL) 150 mg 24 hr tablet, Take 1 tablet (150 mg total) by mouth daily, Disp: 30 tablet, Rfl: 5    escitalopram (LEXAPRO) 20 mg tablet, Take 1 tablet (20 mg total) by mouth daily, Disp: 90 tablet, Rfl: 1    hyoscyamine (LEVSIN/SL) 0 125 mg SL tablet, Take 1 tablet (0 125 mg total) by mouth every 4 (four) hours as needed for cramping, Disp: 60 tablet, Rfl: 3    levothyroxine 25 mcg tablet, TAKE 1 TABLET BY MOUTH ONCE DAILY, Disp: 30 tablet, Rfl: 5    lisinopril (ZESTRIL) 10 mg tablet, Take 1 tablet (10 mg total) by mouth daily, Disp: 90 tablet, Rfl: 3    pantoprazole (PROTONIX) 40 mg tablet, Take 1 tablet (40 mg total) by mouth daily, Disp: 30 tablet, Rfl: 5    zolpidem (AMBIEN) 10 mg tablet, Take 1 tablet (10 mg total) by mouth daily at bedtime, Disp: 30 tablet, Rfl: 0    No Known Allergies    Objective:  Vitals:    07/22/20 1441   BP: 153/73   Pulse: 59   Temp: 99 8 °F (37 7 °C)       There is no height or weight on file to calculate BMI  Left Ankle Exam   Swelling: mild    Range of Motion   Dorsiflexion: abnormal   Plantar flexion: abnormal   Eversion: abnormal   Inversion: abnormal     Other   Erythema: present  Scars: present  Sensation: normal  Pulse: present    Comments:  Laura incisional erythema which is benign appearing   No signs of gross infection  Staples intact incision healing well  No subq collection          Observations   Left Ankle/Foot   Positive for adhesive scar  Physical Exam   Constitutional: She is oriented to person, place, and time  She appears well-developed and well-nourished  HENT:   Head: Normocephalic and atraumatic  Eyes: Conjunctivae are normal  Right eye exhibits no discharge  Left eye exhibits no discharge  Neck: Normal range of motion  Neck supple  Cardiovascular: Normal rate and intact distal pulses  Pulmonary/Chest: Effort normal  No respiratory distress  Musculoskeletal:   As noted in HPI   Neurological: She is alert and oriented to person, place, and time  Skin: Skin is warm and dry  Psychiatric: She has a normal mood and affect   Her behavior is normal  Judgment and thought content normal    Nursing note and vitals reviewed

## 2020-07-29 ENCOUNTER — OFFICE VISIT (OUTPATIENT)
Dept: OBGYN CLINIC | Facility: CLINIC | Age: 70
End: 2020-07-29

## 2020-07-29 ENCOUNTER — APPOINTMENT (OUTPATIENT)
Dept: RADIOLOGY | Facility: CLINIC | Age: 70
End: 2020-07-29
Payer: MEDICARE

## 2020-07-29 VITALS — HEART RATE: 83 BPM | SYSTOLIC BLOOD PRESSURE: 121 MMHG | TEMPERATURE: 98.4 F | DIASTOLIC BLOOD PRESSURE: 67 MMHG

## 2020-07-29 DIAGNOSIS — Z87.81 S/P ORIF (OPEN REDUCTION INTERNAL FIXATION) FRACTURE: Primary | ICD-10-CM

## 2020-07-29 DIAGNOSIS — Z98.890 S/P ORIF (OPEN REDUCTION INTERNAL FIXATION) FRACTURE: Primary | ICD-10-CM

## 2020-07-29 DIAGNOSIS — S82.832A CLOSED FRACTURE OF DISTAL END OF LEFT FIBULA, UNSPECIFIED FRACTURE MORPHOLOGY, INITIAL ENCOUNTER: ICD-10-CM

## 2020-07-29 PROCEDURE — 73610 X-RAY EXAM OF ANKLE: CPT

## 2020-07-29 PROCEDURE — 3074F SYST BP LT 130 MM HG: CPT | Performed by: ORTHOPAEDIC SURGERY

## 2020-07-29 PROCEDURE — 99024 POSTOP FOLLOW-UP VISIT: CPT | Performed by: ORTHOPAEDIC SURGERY

## 2020-07-29 PROCEDURE — 1160F RVW MEDS BY RX/DR IN RCRD: CPT | Performed by: ORTHOPAEDIC SURGERY

## 2020-07-29 PROCEDURE — 3078F DIAST BP <80 MM HG: CPT | Performed by: ORTHOPAEDIC SURGERY

## 2020-07-29 RX ORDER — BUPROPION HYDROCHLORIDE 100 MG/1
100 TABLET, EXTENDED RELEASE ORAL DAILY
COMMUNITY
Start: 2020-07-19 | End: 2021-04-26

## 2020-07-29 NOTE — PROGRESS NOTES
Assessment/Plan:  1  Closed fracture of distal end of left fibula, unspecified fracture morphology, initial encounter  XR ankle 3+ vw left     25-year-old female presents to the office today for a postoperative visit for her left ankle  She is now 2 weeks status post ORIF of the left ankle due to lateral malleolus fracture  Continues to do well postoperatively  Benign-appearing incision  No signs of gross infection  Patient is to finish use of Keflex the next week  She is to remain nonweightbearing until her next follow-up visit  Keep incision clean  Continue aspirin for 4 weeks postoperatively  She will follow up in 4 weeks for re-evaluation of symptoms  Repeat x-rays of the left ankle will be obtained upon arrival     Subjective: 2 weeks status post left ankle ORIF    Patient ID: Jeannie Arndt is a 72 y o  female  25-year-old female presents to the office today for postoperative evaluation of her left ankle  She is now 2 weeks status post ORIF of left ankle  Patient states that she continues to do well postoperatively  She has been compliant with her Keflex and aspirin as prescribed postoperatively  She continues to note mild soreness about the ankle but this has improved since her last visit and improved on a daily basis  Denies numbness and tingling, fever, chills  Review of Systems   Constitutional: Negative for chills, fever and unexpected weight change  HENT: Negative for hearing loss, nosebleeds and sore throat  Eyes: Negative for pain, redness and visual disturbance  Respiratory: Negative for cough, shortness of breath and wheezing  Cardiovascular: Negative for chest pain, palpitations and leg swelling  Gastrointestinal: Negative for abdominal distention, nausea and vomiting  Endocrine: Negative for polydipsia and polyuria  Genitourinary: Negative for dysuria and hematuria  Skin: Negative for rash and wound     Neurological: Negative for dizziness, numbness and headaches  Psychiatric/Behavioral: Negative for decreased concentration and suicidal ideas  Past Medical History:   Diagnosis Date    Anxiety     Congenital absence of one kidney     Depression     GERD (gastroesophageal reflux disease)     History of transfusion     Hypertension     Insomnia     Menopause     Other specified hypothyroidism 1/28/2019    Vitamin B12 deficiency        Past Surgical History:   Procedure Laterality Date    COLONOSCOPY      [place] COLONOSCOPY FLX DX W/COLLJ SPEC WHEN PFRMD N/A 5/30/2017    Procedure: EGD AND COLONOSCOPY;  Surgeon: Aurelia Rollins MD;  [facility];   Service: Gastroenterology    [place] OPEN TX DISTAL FIBULAR FRACTURE LAT MALLEOLUS Left 7/13/2020    Procedure: OPEN REDUCTION W/ INTERNAL FIXATION (ORIF) ANKLE;  Surgeon: Rodrigo Otero DO;  Location: [facility];  Service: Orthopedics    REDUCTION MAMMAPLASTY Bilateral 06/06/2011    REDUCTION MAMMAPLASTY      SHOULDER SURGERY      TONSILLECTOMY         Family History   Problem Relation Age of Onset    Lung cancer Mother 79    Heart disease Father     Lung cancer Brother 48       Social History     Occupational History    Not on file   Tobacco Use    Smoking status: Never Smoker    Smokeless tobacco: Never Used   Substance and Sexual Activity    Alcohol use: Not Currently     Alcohol/week: 7 0 standard drinks     Types: 7 Glasses of wine per week     Comment: 1 glass of wine nightly     Drug use: No    Sexual activity: Not on file         Current Outpatient Medications:     [START ON 8/2/2020] ALPRAZolam (XANAX) 0 5 mg tablet, Take 1 tablet (0 5 mg total) by mouth daily as needed for anxiety, Disp: 30 tablet, Rfl: 0    aspirin (ECOTRIN) 325 mg EC tablet, Take 1 tablet (325 mg total) by mouth daily, Disp: 28 tablet, Rfl: 0    buPROPion (WELLBUTRIN SR) 100 mg 12 hr tablet, Take 100 mg by mouth daily, Disp: , Rfl:     buPROPion (WELLBUTRIN XL) 150 mg 24 hr tablet, Take 1 tablet (150 mg total) by mouth daily, Disp: 30 tablet, Rfl: 5    cephalexin (KEFLEX) 500 mg capsule, Take 1 capsule (500 mg total) by mouth every 12 (twelve) hours for 7 days, Disp: 14 capsule, Rfl: 0    escitalopram (LEXAPRO) 20 mg tablet, Take 1 tablet (20 mg total) by mouth daily, Disp: 90 tablet, Rfl: 1    hyoscyamine (LEVSIN/SL) 0 125 mg SL tablet, Take 1 tablet (0 125 mg total) by mouth every 4 (four) hours as needed for cramping, Disp: 60 tablet, Rfl: 3    levothyroxine 25 mcg tablet, TAKE 1 TABLET BY MOUTH ONCE DAILY, Disp: 30 tablet, Rfl: 5    lisinopril (ZESTRIL) 10 mg tablet, Take 1 tablet (10 mg total) by mouth daily, Disp: 90 tablet, Rfl: 3    naloxone (NARCAN) 1 mg/mL intranasal, 2 mL (2 mg total) into each nostril once for 1 dose, Disp: 2 mL, Rfl: 0    oxyCODONE (ROXICODONE) 5 mg immediate release tablet, Take 1 tablet (5 mg total) by mouth every 6 (six) hours as needed for moderate painMax Daily Amount: 20 mg, Disp: 30 tablet, Rfl: 0    pantoprazole (PROTONIX) 40 mg tablet, Take 1 tablet (40 mg total) by mouth daily, Disp: 30 tablet, Rfl: 5    zolpidem (AMBIEN) 10 mg tablet, Take 1 tablet (10 mg total) by mouth daily at bedtime, Disp: 30 tablet, Rfl: 0    No Known Allergies    Objective:  Vitals:    07/29/20 1414   BP: 121/67   Pulse: 83   Temp: 98 4 °F (36 9 °C)       There is no height or weight on file to calculate BMI  Left Ankle Exam     Tenderness   The patient is experiencing no tenderness  Swelling: mild    Range of Motion   Dorsiflexion: abnormal   Plantar flexion: abnormal     Other   Erythema: absent  Sensation: normal  Pulse: present    Comments:  Lateral longitudinal incision remains C/D/I  No signs of infection  Neurovascular intact  Skin is intact  Physical Exam   Constitutional: She is oriented to person, place, and time  She appears well-developed and well-nourished  Eyes: Pupils are equal, round, and reactive to light     Pulmonary/Chest: Effort normal and breath sounds normal    Neurological: She is alert and oriented to person, place, and time  Skin: Skin is warm and dry  Psychiatric: She has a normal mood and affect  Her behavior is normal  Judgment and thought content normal        I have personally reviewed pertinent films in PACS  3+ views of the left ankle performed today in the office reveal stable and intact orthopedic hardware without complications  Healing fracture about the distal fibula    Ankle mortise intact    Scribe Attestation    I,:   Abby Pisano am acting as a scribe while in the presence of the attending physician :        I,:   Alex Cardozo DO personally performed the services described in this documentation    as scribed in my presence :

## 2020-08-21 DIAGNOSIS — F32.A DEPRESSION, UNSPECIFIED DEPRESSION TYPE: ICD-10-CM

## 2020-08-21 DIAGNOSIS — E03.8 OTHER SPECIFIED HYPOTHYROIDISM: ICD-10-CM

## 2020-08-21 RX ORDER — BUPROPION HYDROCHLORIDE 150 MG/1
TABLET ORAL
Qty: 30 TABLET | Refills: 0 | Status: SHIPPED | OUTPATIENT
Start: 2020-08-21 | End: 2020-10-12

## 2020-08-21 RX ORDER — LEVOTHYROXINE SODIUM 25 UG/1
TABLET ORAL
Qty: 30 TABLET | Refills: 0 | Status: SHIPPED | OUTPATIENT
Start: 2020-08-21 | End: 2020-10-12

## 2020-08-27 ENCOUNTER — OFFICE VISIT (OUTPATIENT)
Dept: OBGYN CLINIC | Facility: CLINIC | Age: 70
End: 2020-08-27

## 2020-08-27 ENCOUNTER — APPOINTMENT (OUTPATIENT)
Dept: RADIOLOGY | Facility: CLINIC | Age: 70
End: 2020-08-27
Payer: MEDICARE

## 2020-08-27 VITALS — TEMPERATURE: 98.2 F | SYSTOLIC BLOOD PRESSURE: 121 MMHG | DIASTOLIC BLOOD PRESSURE: 79 MMHG | HEART RATE: 64 BPM

## 2020-08-27 DIAGNOSIS — Z87.81 S/P ORIF (OPEN REDUCTION INTERNAL FIXATION) FRACTURE: ICD-10-CM

## 2020-08-27 DIAGNOSIS — S82.832D CLOSED FRACTURE OF DISTAL END OF LEFT FIBULA WITH ROUTINE HEALING, UNSPECIFIED FRACTURE MORPHOLOGY, SUBSEQUENT ENCOUNTER: ICD-10-CM

## 2020-08-27 DIAGNOSIS — Z98.890 S/P ORIF (OPEN REDUCTION INTERNAL FIXATION) FRACTURE: ICD-10-CM

## 2020-08-27 DIAGNOSIS — Z98.890 S/P ORIF (OPEN REDUCTION INTERNAL FIXATION) FRACTURE: Primary | ICD-10-CM

## 2020-08-27 DIAGNOSIS — Z87.81 S/P ORIF (OPEN REDUCTION INTERNAL FIXATION) FRACTURE: Primary | ICD-10-CM

## 2020-08-27 PROCEDURE — 1160F RVW MEDS BY RX/DR IN RCRD: CPT | Performed by: ORTHOPAEDIC SURGERY

## 2020-08-27 PROCEDURE — 3078F DIAST BP <80 MM HG: CPT | Performed by: ORTHOPAEDIC SURGERY

## 2020-08-27 PROCEDURE — 73610 X-RAY EXAM OF ANKLE: CPT

## 2020-08-27 PROCEDURE — 99024 POSTOP FOLLOW-UP VISIT: CPT | Performed by: ORTHOPAEDIC SURGERY

## 2020-08-27 PROCEDURE — 3074F SYST BP LT 130 MM HG: CPT | Performed by: ORTHOPAEDIC SURGERY

## 2020-08-27 NOTE — PROGRESS NOTES
Assessment/Plan:  1  S/P ORIF (open reduction internal fixation) fracture  XR ankle 3+ vw left   2  Closed fracture of distal end of left fibula with routine healing, unspecified fracture morphology, subsequent encounter       Jhonatan Guido is a pleasant 67-year-old female presenting 6 weeks after undergoing an open reduction internal fixation of a left distal fibula fracture  On imaging today, there is early callus development and no signs of failure of the hardware  She does have a small scab over the proximal incision, but there is no sign of infection at this time  We encouraged her to remain nonweightbearing for the next 2 weeks  She may discontinue use of the boot as it is cause a friction abrasion medial calf  After 2 weeks, she may begin to use 20% weight-bearing on the left lower extremity  We would like to see her back in 4 weeks for re-evaluation with an x-ray on arrival of her left ankle  We anticipate being able to advance her weight-bearing at that time  She expressed understanding all of her questions were addressed today  Subjective:  6 weeks status post ORIF left distal fibula    Patient ID: Yazniyah Diaz is a 72 y o  female  Jhonatan Guido is a pleasant 67-year-old female presenting today for follow-up 6 weeks after undergoing an open reduction internal fixation of a left distal fibula fracture  She does admit that she has been bearing weight is occasionally on the left lower extremity  She has been compliant with use of the boot  She has been doing gentle range-of-motion exercises  She denies any new injuries or significant pain  She does have some aching in the left ankle     Review of Systems   Constitutional: Negative  HENT: Negative  Eyes: Negative  Respiratory: Negative  Cardiovascular: Negative  Gastrointestinal: Negative  Endocrine: Negative  Genitourinary: Negative  Musculoskeletal: Positive for arthralgias, joint swelling and myalgias  Skin: Negative  Allergic/Immunologic: Negative  Neurological: Negative  Hematological: Negative  Psychiatric/Behavioral: Negative  Past Medical History:   Diagnosis Date    Anxiety     Congenital absence of one kidney     Depression     GERD (gastroesophageal reflux disease)     History of transfusion     Hypertension     Insomnia     Menopause     Other specified hypothyroidism 1/28/2019    Vitamin B12 deficiency        Past Surgical History:   Procedure Laterality Date    COLONOSCOPY      [place] COLONOSCOPY FLX DX W/COLLJ SPEC WHEN PFRMD N/A 5/30/2017    Procedure: EGD AND COLONOSCOPY;  Surgeon: Tracey Tse MD;  [facility];   Service: Gastroenterology    OK OPEN TX DISTAL FIBULAR FRACTURE LAT MALLEOLUS Left 7/13/2020    Procedure: OPEN REDUCTION W/ INTERNAL FIXATION (ORIF) ANKLE;  Surgeon: Roland Son DO;  Location: [facility];  Service: Orthopedics    REDUCTION MAMMAPLASTY Bilateral 06/06/2011    REDUCTION MAMMAPLASTY      SHOULDER SURGERY      TONSILLECTOMY         Family History   Problem Relation Age of Onset    Lung cancer Mother 79    Heart disease Father     Lung cancer Brother 48       Social History     Occupational History    Not on file   Tobacco Use    Smoking status: Never Smoker    Smokeless tobacco: Never Used   Substance and Sexual Activity    Alcohol use: Not Currently     Alcohol/week: 7 0 standard drinks     Types: 7 Glasses of wine per week     Comment: 1 glass of wine nightly     Drug use: No    Sexual activity: Not on file         Current Outpatient Medications:     ALPRAZolam (XANAX) 0 5 mg tablet, Take 1 tablet (0 5 mg total) by mouth daily as needed for anxiety, Disp: 30 tablet, Rfl: 0    aspirin (ECOTRIN) 325 mg EC tablet, Take 1 tablet (325 mg total) by mouth daily, Disp: 28 tablet, Rfl: 0    buPROPion (WELLBUTRIN SR) 100 mg 12 hr tablet, Take 100 mg by mouth daily, Disp: , Rfl:     buPROPion (WELLBUTRIN XL) 150 mg 24 hr tablet, Take 1 tablet by mouth once daily, Disp: 30 tablet, Rfl: 0    escitalopram (LEXAPRO) 20 mg tablet, Take 1 tablet (20 mg total) by mouth daily, Disp: 90 tablet, Rfl: 1    Euthyrox 25 MCG tablet, Take 1 tablet by mouth once daily, Disp: 30 tablet, Rfl: 0    hyoscyamine (LEVSIN/SL) 0 125 mg SL tablet, Take 1 tablet (0 125 mg total) by mouth every 4 (four) hours as needed for cramping, Disp: 60 tablet, Rfl: 3    lisinopril (ZESTRIL) 10 mg tablet, Take 1 tablet (10 mg total) by mouth daily, Disp: 90 tablet, Rfl: 3    naloxone (NARCAN) 1 mg/mL intranasal, 2 mL (2 mg total) into each nostril once for 1 dose, Disp: 2 mL, Rfl: 0    oxyCODONE (ROXICODONE) 5 mg immediate release tablet, Take 1 tablet (5 mg total) by mouth every 6 (six) hours as needed for moderate painMax Daily Amount: 20 mg, Disp: 30 tablet, Rfl: 0    pantoprazole (PROTONIX) 40 mg tablet, Take 1 tablet (40 mg total) by mouth daily, Disp: 30 tablet, Rfl: 5    zolpidem (AMBIEN) 10 mg tablet, Take 1 tablet (10 mg total) by mouth daily at bedtime, Disp: 30 tablet, Rfl: 0    No Known Allergies    Objective:  Vitals:    08/27/20 1313   BP: 121/79   Pulse: 64   Temp: 98 2 °F (36 8 °C)       There is no height or weight on file to calculate BMI  Left Ankle Exam     Tenderness   The patient is experiencing no tenderness  Swelling: mild    Range of Motion   Dorsiflexion: abnormal   Plantar flexion: abnormal     Other   Erythema: absent  Scars: present  Sensation: normal  Pulse: present    Comments:  Lateral longitudinal incision remains C/D/I  No signs of infection  Small area of scabbing proximal incision, appears without erythema, warmth, or drainage  Mild left ankle edema  Calf nontender negative Homans  Grossly distally neurovascularly unchanged          Observations   Left Ankle/Foot   Positive for adhesive scar  Physical Exam  Vitals signs and nursing note reviewed  Constitutional:       Appearance: She is well-developed  Comments:  There is no height or weight on file to calculate BMI  HENT:      Head: Normocephalic and atraumatic  Neck:      Musculoskeletal: Normal range of motion  Cardiovascular:      Rate and Rhythm: Normal rate  Pulses: Normal pulses  Pulmonary:      Effort: Pulmonary effort is normal    Musculoskeletal:      Comments: See ortho exam   Skin:     General: Skin is warm and dry  Neurological:      Mental Status: She is alert and oriented to person, place, and time  Psychiatric:         Behavior: Behavior normal          Thought Content: Thought content normal          Judgment: Judgment normal          I have personally reviewed pertinent films in PACS the x-rays taken today of her left ankle compared them to previous films  The hardware and distal fibula fracture have not changed in alignment  There is evidence of early callus development  Mortise appears congruent    No other interval changes are appreciated

## 2020-09-12 DIAGNOSIS — G47.00 INSOMNIA, UNSPECIFIED TYPE: ICD-10-CM

## 2020-09-12 DIAGNOSIS — F41.9 ANXIETY: ICD-10-CM

## 2020-09-14 RX ORDER — ALPRAZOLAM 0.5 MG/1
TABLET ORAL
Qty: 30 TABLET | Refills: 0 | Status: SHIPPED | OUTPATIENT
Start: 2020-09-14 | End: 2020-11-09

## 2020-09-14 RX ORDER — ZOLPIDEM TARTRATE 10 MG/1
TABLET ORAL
Qty: 30 TABLET | Refills: 0 | Status: SHIPPED | OUTPATIENT
Start: 2020-09-14 | End: 2020-11-09

## 2020-09-24 ENCOUNTER — OFFICE VISIT (OUTPATIENT)
Dept: OBGYN CLINIC | Facility: CLINIC | Age: 70
End: 2020-09-24

## 2020-09-24 ENCOUNTER — APPOINTMENT (OUTPATIENT)
Dept: RADIOLOGY | Facility: CLINIC | Age: 70
End: 2020-09-24
Payer: MEDICARE

## 2020-09-24 VITALS — SYSTOLIC BLOOD PRESSURE: 114 MMHG | HEART RATE: 55 BPM | DIASTOLIC BLOOD PRESSURE: 70 MMHG | TEMPERATURE: 98.3 F

## 2020-09-24 DIAGNOSIS — Z87.81 S/P ORIF (OPEN REDUCTION INTERNAL FIXATION) FRACTURE: ICD-10-CM

## 2020-09-24 DIAGNOSIS — G89.29 CHRONIC PAIN OF RIGHT KNEE: ICD-10-CM

## 2020-09-24 DIAGNOSIS — M25.561 CHRONIC PAIN OF RIGHT KNEE: ICD-10-CM

## 2020-09-24 DIAGNOSIS — Z87.81 S/P ORIF (OPEN REDUCTION INTERNAL FIXATION) FRACTURE: Primary | ICD-10-CM

## 2020-09-24 DIAGNOSIS — Z98.890 S/P ORIF (OPEN REDUCTION INTERNAL FIXATION) FRACTURE: ICD-10-CM

## 2020-09-24 DIAGNOSIS — Z98.890 S/P ORIF (OPEN REDUCTION INTERNAL FIXATION) FRACTURE: Primary | ICD-10-CM

## 2020-09-24 PROCEDURE — 73600 X-RAY EXAM OF ANKLE: CPT

## 2020-09-24 PROCEDURE — 99024 POSTOP FOLLOW-UP VISIT: CPT | Performed by: ORTHOPAEDIC SURGERY

## 2020-09-24 NOTE — PROGRESS NOTES
Assessment/Plan:  1  S/P ORIF (open reduction internal fixation) fracture  XR ankle 2 vw left    Ambulatory referral to Physical Therapy   2  Chronic pain of right knee  Ambulatory referral to Physical Therapy     Hernandez Lagunas is a very pleasant 72-year-old female presenting now 10 weeks after undergoing an open reduction internal fixation of a left distal fibula fracture  She is doing well at this time  Does have some keloid the scar, but no sign of infection  The images demonstrate callus development at the fracture site and she does not have any significant discomfort with range of motion exercises or 20% weight-bearing  She does complain of some right knee pain since she is favoring that side  We have now referred her to physical therapy to address her right knee and left ankle  She may now advance to weight-bearing as tolerated on the left lower extremity with the guidance of physical therapy  We have recommended that she remain out of work for the next 4-6 weeks while she filled her insurance  Would like to see her back in 6 weeks with an x-ray on arrival of her left ankle  She expressed understanding all of her questions were addressed today    Subjective: Left ankle follow up    Patient ID: Anahi Garzon is a 72 y o  female  Hernandez Lagunas is a pleasant 72-year-old female presenting today for evaluation 10 weeks after undergoing an ORIF of a left distal fibula fracture  She has been compliant with the 20% weight-bearing restrictions on her left lower extremity  She does not have significant pain in the ankle and feels that the swelling has significantly reduced  She denies any paresthesias  She does complain of some pain in the right knee since she has been favoring that side  She denies any previous history of injury or surgery  Review of Systems   Constitutional: Negative  HENT: Negative  Eyes: Negative  Respiratory: Negative  Cardiovascular: Negative  Gastrointestinal: Negative  Endocrine: Negative  Genitourinary: Negative  Musculoskeletal: Positive for arthralgias, joint swelling and myalgias  Skin: Negative  Allergic/Immunologic: Negative  Neurological: Negative  Hematological: Negative  Psychiatric/Behavioral: Negative  Past Medical History:   Diagnosis Date    Anxiety     Congenital absence of one kidney     Depression     GERD (gastroesophageal reflux disease)     History of transfusion     Hypertension     Insomnia     Menopause     Other specified hypothyroidism 1/28/2019    Vitamin B12 deficiency        Past Surgical History:   Procedure Laterality Date    COLONOSCOPY      [place] COLONOSCOPY FLX DX W/COLLJ SPEC WHEN PFRMD N/A 5/30/2017    Procedure: EGD AND COLONOSCOPY;  Surgeon: Jerome Matos MD;  [facility];   Service: Gastroenterology    [place] OPEN TX DISTAL FIBULAR FRACTURE LAT MALLEOLUS Left 7/13/2020    Procedure: OPEN REDUCTION W/ INTERNAL FIXATION (ORIF) ANKLE;  Surgeon: Yana Arias DO;  Location: [facility];  Service: Orthopedics    REDUCTION MAMMAPLASTY Bilateral 06/06/2011    REDUCTION MAMMAPLASTY      SHOULDER SURGERY      TONSILLECTOMY         Family History   Problem Relation Age of Onset    Lung cancer Mother 79    Heart disease Father     Lung cancer Brother 48       Social History     Occupational History    Not on file   Tobacco Use    Smoking status: Never Smoker    Smokeless tobacco: Never Used   Substance and Sexual Activity    Alcohol use: Not Currently     Alcohol/week: 7 0 standard drinks     Types: 7 Glasses of wine per week     Comment: 1 glass of wine nightly     Drug use: No    Sexual activity: Not on file         Current Outpatient Medications:     ALPRAZolam (XANAX) 0 5 mg tablet, TAKE 1 TABLET BY MOUTH ONCE DAILY AS NEEDED FOR ANXIETY, Disp: 30 tablet, Rfl: 0    aspirin (ECOTRIN) 325 mg EC tablet, Take 1 tablet (325 mg total) by mouth daily, Disp: 28 tablet, Rfl: 0    buPROPion [facility] SR) 100 mg 12 hr tablet, Take 100 mg by mouth daily, Disp: , Rfl:     buPROPion (WELLBUTRIN XL) 150 mg 24 hr tablet, Take 1 tablet by mouth once daily, Disp: 30 tablet, Rfl: 0    escitalopram (LEXAPRO) 20 mg tablet, Take 1 tablet (20 mg total) by mouth daily, Disp: 90 tablet, Rfl: 1    Euthyrox 25 MCG tablet, Take 1 tablet by mouth once daily, Disp: 30 tablet, Rfl: 0    hyoscyamine (LEVSIN/SL) 0 125 mg SL tablet, Take 1 tablet (0 125 mg total) by mouth every 4 (four) hours as needed for cramping, Disp: 60 tablet, Rfl: 3    lisinopril (ZESTRIL) 10 mg tablet, Take 1 tablet (10 mg total) by mouth daily, Disp: 90 tablet, Rfl: 3    naloxone (NARCAN) 1 mg/mL intranasal, 2 mL (2 mg total) into each nostril once for 1 dose, Disp: 2 mL, Rfl: 0    oxyCODONE (ROXICODONE) 5 mg immediate release tablet, Take 1 tablet (5 mg total) by mouth every 6 (six) hours as needed for moderate painMax Daily Amount: 20 mg, Disp: 30 tablet, Rfl: 0    pantoprazole (PROTONIX) 40 mg tablet, Take 1 tablet (40 mg total) by mouth daily, Disp: 30 tablet, Rfl: 5    zolpidem (AMBIEN) 10 mg tablet, TAKE 1 TABLET BY MOUTH ONCE DAILY AT BEDTIME, Disp: 30 tablet, Rfl: 0    No Known Allergies    Objective:  Vitals:    09/24/20 1307   BP: 114/70   Pulse: 55   Temp: 98 3 °F (36 8 °C)       There is no height or weight on file to calculate BMI  Left Ankle Exam     Tenderness   The patient is experiencing no tenderness     Swelling: none    Range of Motion   Dorsiflexion: normal   Plantar flexion: normal   Eversion: normal   Inversion: normal     Tests   Varus tilt: negative    Other   Erythema: absent  Scars: present  Sensation: normal  Pulse: present    Comments:  Lateral longitudinal incision evidence of early keloid  Mild left ankle edema now completely resolved  Calf nontender   Grossly distally neurovascularly unchanged  Excellent range of motion for this stage postoperatively          Observations   Left Ankle/Foot   Positive for adhesive scar        Physical Exam  Vitals signs and nursing note reviewed  Constitutional:       Appearance: She is well-developed  Comments: There is no height or weight on file to calculate BMI  HENT:      Head: Normocephalic and atraumatic  Neck:      Musculoskeletal: Normal range of motion  Pulmonary:      Effort: Pulmonary effort is normal    Musculoskeletal:      Comments: See ortho exam   Skin:     General: Skin is warm and dry  Neurological:      Mental Status: She is alert and oriented to person, place, and time  Psychiatric:         Behavior: Behavior normal          Thought Content: Thought content normal          Judgment: Judgment normal          I have personally reviewed pertinent films in PACS of the x-rays taken today of her left ankle compared them to previous films  There has been no change in alignment of the distal fibula fracture or the associated hardware  The mortise continues to appear congruent    There has been significant bony callus development at the fracture site

## 2020-09-28 ENCOUNTER — OFFICE VISIT (OUTPATIENT)
Dept: FAMILY MEDICINE CLINIC | Facility: CLINIC | Age: 70
End: 2020-09-28
Payer: MEDICARE

## 2020-09-28 VITALS
DIASTOLIC BLOOD PRESSURE: 78 MMHG | HEART RATE: 64 BPM | BODY MASS INDEX: 29.32 KG/M2 | HEIGHT: 65 IN | TEMPERATURE: 97 F | WEIGHT: 176 LBS | RESPIRATION RATE: 16 BRPM | SYSTOLIC BLOOD PRESSURE: 140 MMHG

## 2020-09-28 DIAGNOSIS — Z00.00 MEDICARE ANNUAL WELLNESS VISIT, SUBSEQUENT: ICD-10-CM

## 2020-09-28 DIAGNOSIS — K21.9 GASTROESOPHAGEAL REFLUX DISEASE, ESOPHAGITIS PRESENCE NOT SPECIFIED: ICD-10-CM

## 2020-09-28 DIAGNOSIS — Z12.39 SCREENING FOR BREAST CANCER: ICD-10-CM

## 2020-09-28 DIAGNOSIS — K21.9 GASTROESOPHAGEAL REFLUX DISEASE, ESOPHAGITIS PRESENCE NOT SPECIFIED: Primary | ICD-10-CM

## 2020-09-28 DIAGNOSIS — R10.13 EPIGASTRIC PAIN: ICD-10-CM

## 2020-09-28 DIAGNOSIS — Z12.31 ENCOUNTER FOR SCREENING MAMMOGRAM FOR MALIGNANT NEOPLASM OF BREAST: ICD-10-CM

## 2020-09-28 PROCEDURE — 99213 OFFICE O/P EST LOW 20 MIN: CPT | Performed by: FAMILY MEDICINE

## 2020-09-28 PROCEDURE — G0439 PPPS, SUBSEQ VISIT: HCPCS | Performed by: FAMILY MEDICINE

## 2020-09-28 RX ORDER — PANTOPRAZOLE SODIUM 40 MG/1
40 TABLET, DELAYED RELEASE ORAL DAILY
Qty: 30 TABLET | Refills: 5 | Status: SHIPPED | OUTPATIENT
Start: 2020-09-28 | End: 2021-05-20

## 2020-09-28 NOTE — TELEPHONE ENCOUNTER
GI Physician: Dr Sara Gracia for Medication: pantoprazole    Dose: 40 mg    Quantity: 30 tablet    Pharmacy and Location: [facility]

## 2020-09-28 NOTE — PROGRESS NOTES
Assessment/Plan:       Diagnoses and all orders for this visit:    Gastroesophageal reflux disease, esophagitis presence not specified  Epigastric pain      Pt has worsening reflux symptoms and epigastric abdominal pain  Recommend that she follow up with her gastroenterologist  In the mean time, increase dose of pantoprazole to 40mg BID temporarily  Pt states that Zantac used to work for her in the past  Let her know that an alternative to pantoprazole     Subjective:      Patient ID: Jeannie Arndt is a 72 y o  female  HPI  Stacy Kiran presents today for c/o epigastric abdominal discomfort, worsening acid reflux and indigestion  Associated with nausea, vomiting  Symptoms started a few weeks ago and have been worsening  Has tried pantoprazole, hyoscyamine, and OTC medications such as pepto bismol with no relief  Answers for HPI/ROS submitted by the patient on 9/28/2020   Abdominal pain  Chronicity: recurrent  Onset: 1 to 4 weeks ago  Onset quality: sudden  Frequency: constantly  Episode duration: 2 weeks  Progression since onset: waxing and waning  Pain location: epigastric region  Pain - numeric: 8/10  Pain quality: aching, burning  Radiates to: does not radiate  anorexia: Yes  arthralgias: No  belching: Yes  constipation: No  diarrhea: No  dysuria: No  fever: No  flatus: No  frequency: No  headaches: No  hematochezia: No  hematuria: No  melena: No  myalgias: No  nausea: Yes  weight loss: No  vomiting: Yes  Aggravated by: eating  Relieved by: nothing  Diagnostic workup: GI consult      The following portions of the patient's history were reviewed and updated as appropriate: allergies, current medications, past family history, past medical history, past social history, past surgical history and problem list     Review of Systems   Constitutional: Negative for activity change, appetite change, chills, diaphoresis, fatigue and fever  HENT: Negative      Respiratory: Negative for cough, choking, chest tightness, shortness of breath and wheezing  Cardiovascular: Negative for chest pain, palpitations and leg swelling  Gastrointestinal: Positive for abdominal pain, nausea and vomiting  Negative for abdominal distention, constipation and diarrhea  Genitourinary: Negative for difficulty urinating, dyspareunia, dysuria, enuresis, flank pain, frequency, hematuria, menstrual problem, pelvic pain and urgency  Musculoskeletal: Negative for arthralgias, back pain, gait problem, joint swelling, myalgias, neck pain and neck stiffness  Skin: Negative  Neurological: Negative for dizziness, tremors, syncope, facial asymmetry, weakness, light-headedness, numbness and headaches  Psychiatric/Behavioral: Negative  Objective:      /78   Pulse 64   Temp (!) 97 °F (36 1 °C)   Resp 16   Ht 5' 5" (1 651 m)   Wt 79 8 kg (176 lb)   LMP  (LMP Unknown)   BMI 29 29 kg/m²          Physical Exam  Constitutional:       General: She is not in acute distress  Appearance: She is well-developed  She is not diaphoretic  HENT:      Head: Normocephalic and atraumatic  Neck:      Musculoskeletal: Normal range of motion and neck supple  Cardiovascular:      Rate and Rhythm: Normal rate and regular rhythm  Heart sounds: Normal heart sounds  No murmur  No friction rub  No gallop  Pulmonary:      Effort: Pulmonary effort is normal  No respiratory distress  Breath sounds: Normal breath sounds  No wheezing or rales  Chest:      Chest wall: No tenderness  Abdominal:      General: Bowel sounds are normal  There is no distension  Palpations: Abdomen is soft  There is no mass  Tenderness: There is no abdominal tenderness  There is no guarding or rebound  Musculoskeletal: Normal range of motion  General: No deformity  Skin:     General: Skin is warm and dry  Neurological:      Mental Status: She is alert and oriented to person, place, and time     Psychiatric:         Behavior: Behavior normal          Thought Content:  Thought content normal          Judgment: Judgment normal

## 2020-09-28 NOTE — PATIENT INSTRUCTIONS
Medicare Preventive Visit Patient Instructions  Thank you for completing your Welcome to Medicare Visit or Medicare Annual Wellness Visit today  Your next wellness visit will be due in one year (9/28/2021)  The screening/preventive services that you may require over the next 5-10 years are detailed below  Some tests may not apply to you based off risk factors and/or age  Screening tests ordered at today's visit but not completed yet may show as past due  Also, please note that scanned in results may not display below  Preventive Screenings:  Service Recommendations Previous Testing/Comments   Colorectal Cancer Screening  * Colonoscopy    * Fecal Occult Blood Test (FOBT)/Fecal Immunochemical Test (FIT)  * Fecal DNA/Cologuard Test  * Flexible Sigmoidoscopy Age: 54-65 years old   Colonoscopy: every 10 years (may be performed more frequently if at higher risk)  OR  FOBT/FIT: every 1 year  OR  Cologuard: every 3 years  OR  Sigmoidoscopy: every 5 years  Screening may be recommended earlier than age 48 if at higher risk for colorectal cancer  Also, an individualized decision between you and your healthcare provider will decide whether screening between the ages of 74-80 would be appropriate  Colonoscopy: 05/30/2017  FOBT/FIT: Not on file  Cologuard: Not on file  Sigmoidoscopy: Not on file    Screening Current     Breast Cancer Screening Age: 36 years old  Frequency: every 1-2 years  Not required if history of left and right mastectomy Mammogram: 05/20/2019    Screening Current   Cervical Cancer Screening Between the ages of 21-29, pap smear recommended once every 3 years  Between the ages of 33-67, can perform pap smear with HPV co-testing every 5 years     Recommendations may differ for women with a history of total hysterectomy, cervical cancer, or abnormal pap smears in past  Pap Smear: Not on file    Screening Not Indicated   Hepatitis C Screening Once for adults born between 1945 and 1965  More frequently in patients at high risk for Hepatitis C Hep C Antibody: 09/19/2019    Screening Current   Diabetes Screening 1-2 times per year if you're at risk for diabetes or have pre-diabetes Fasting glucose: 96 mg/dL   A1C: No results in last 5 years    Screening Current   Cholesterol Screening Once every 5 years if you don't have a lipid disorder  May order more often based on risk factors  Lipid panel: 09/19/2019    Screening Current     Other Preventive Screenings Covered by Medicare:  1  Abdominal Aortic Aneurysm (AAA) Screening: covered once if your at risk  You're considered to be at risk if you have a family history of AAA  2  Lung Cancer Screening: covers low dose CT scan once per year if you meet all of the following conditions: (1) Age 50-69; (2) No signs or symptoms of lung cancer; (3) Current smoker or have quit smoking within the last 15 years; (4) You have a tobacco smoking history of at least 30 pack years (packs per day multiplied by number of years you smoked); (5) You get a written order from a healthcare provider  3  Glaucoma Screening: covered annually if you're considered high risk: (1) You have diabetes OR (2) Family history of glaucoma OR (3)  aged 48 and older OR (3)  American aged 72 and older  3  Osteoporosis Screening: covered every 2 years if you meet one of the following conditions: (1) You're estrogen deficient and at risk for osteoporosis based off medical history and other findings; (2) Have a vertebral abnormality; (3) On glucocorticoid therapy for more than 3 months; (4) Have primary hyperparathyroidism; (5) On osteoporosis medications and need to assess response to drug therapy  · Last bone density test (DXA Scan): Not on file  5  HIV Screening: covered annually if you're between the age of 12-76  Also covered annually if you are younger than 13 and older than 72 with risk factors for HIV infection   For pregnant patients, it is covered up to 3 times per pregnancy  Immunizations:  Immunization Recommendations   Influenza Vaccine Annual influenza vaccination during flu season is recommended for all persons aged >= 6 months who do not have contraindications   Pneumococcal Vaccine (Prevnar and Pneumovax)  * Prevnar = PCV13  * Pneumovax = PPSV23   Adults 25-60 years old: 1-3 doses may be recommended based on certain risk factors  Adults 72 years old: Prevnar (PCV13) vaccine recommended followed by Pneumovax (PPSV23) vaccine  If already received PPSV23 since turning 65, then PCV13 recommended at least one year after PPSV23 dose  Hepatitis B Vaccine 3 dose series if at intermediate or high risk (ex: diabetes, end stage renal disease, liver disease)   Tetanus (Td) Vaccine - COST NOT COVERED BY MEDICARE PART B Following completion of primary series, a booster dose should be given every 10 years to maintain immunity against tetanus  Td may also be given as tetanus wound prophylaxis  Tdap Vaccine - COST NOT COVERED BY MEDICARE PART B Recommended at least once for all adults  For pregnant patients, recommended with each pregnancy  Shingles Vaccine (Shingrix) - COST NOT COVERED BY MEDICARE PART B  2 shot series recommended in those aged 48 and above     Health Maintenance Due:      Topic Date Due    MAMMOGRAM  05/20/2020    Hepatitis C Screening  Completed     Immunizations Due:      Topic Date Due    DTaP,Tdap,and Td Vaccines (1 - Tdap) 03/[birth year]/1972    Pneumococcal Vaccine: 65+ Years (1 of 1 - PPSV23) 03/09/2016    Influenza Vaccine  07/01/2020     Advance Directives   What are advance directives? Advance directives are legal documents that state your wishes and plans for medical care  These plans are made ahead of time in case you lose your ability to make decisions for yourself  Advance directives can apply to any medical decision, such as the treatments you want, and if you want to donate organs  What are the types of advance directives?   There are many types of advance directives, and each state has rules about how to use them  You may choose a combination of any of the following:  · Living will: This is a written record of the treatment you want  You can also choose which treatments you do not want, which to limit, and which to stop at a certain time  This includes surgery, medicine, IV fluid, and tube feedings  · Durable power of  for healthcare [facility]): This is a written record that states who you want to make healthcare choices for you when you are unable to make them for yourself  This person, called a proxy, is usually a family member or a friend  You may choose more than 1 proxy  · Do not resuscitate (DNR) order:  A DNR order is used in case your heart stops beating or you stop breathing  It is a request not to have certain forms of treatment, such as CPR  A DNR order may be included in other types of advance directives  · Medical directive: This covers the care that you want if you are in a coma, near death, or unable to make decisions for yourself  You can list the treatments you want for each condition  Treatment may include pain medicine, surgery, blood transfusions, dialysis, IV or tube feedings, and a ventilator (breathing machine)  · Values history: This document has questions about your views, beliefs, and how you feel and think about life  This information can help others choose the care that you would choose  Why are advance directives important? An advance directive helps you control your care  Although spoken wishes may be used, it is better to have your wishes written down  Spoken wishes can be misunderstood, or not followed  Treatments may be given even if you do not want them  An advance directive may make it easier for your family to make difficult choices about your care  Fall Prevention    Fall prevention  includes ways to make your home and other areas safer   It also includes ways you can move more carefully to prevent a fall  Health conditions that cause changes in your blood pressure, vision, or muscle strength and coordination may increase your risk for falls  Medicines may also increase your risk for falls if they make you dizzy, weak, or sleepy  Fall prevention tips:   · Stand or sit up slowly  · Use assistive devices as directed  · Wear shoes that fit well and have soles that   · Wear a personal alarm  · Stay active  · Manage your medical conditions  Home Safety Tips:  · Add items to prevent falls in the bathroom  · Keep paths clear  · Install bright lights in your home  · Keep items you use often on shelves within reach  · Paint or place reflective tape on the edges of your stairs  Weight Management   Why it is important to manage your weight:  Being overweight increases your risk of health conditions such as heart disease, high blood pressure, type 2 diabetes, and certain types of cancer  It can also increase your risk for osteoarthritis, sleep apnea, and other respiratory problems  Aim for a slow, steady weight loss  Even a small amount of weight loss can lower your risk of health problems  How to lose weight safely:  A safe and healthy way to lose weight is to eat fewer calories and get regular exercise  You can lose up about 1 pound a week by decreasing the number of calories you eat by 500 calories each day  Healthy meal plan for weight management:  A healthy meal plan includes a variety of foods, contains fewer calories, and helps you stay healthy  A healthy meal plan includes the following:  · Eat whole-grain foods more often  A healthy meal plan should contain fiber  Fiber is the part of grains, fruits, and vegetables that is not broken down by your body  Whole-grain foods are healthy and provide extra fiber in your diet  Some examples of whole-grain foods are whole-wheat breads and pastas, oatmeal, brown rice, and bulgur  · Eat a variety of vegetables every day  Include dark, leafy greens such as spinach, kale, evan greens, and mustard greens  Eat yellow and orange vegetables such as carrots, sweet potatoes, and winter squash  · Eat a variety of fruits every day  Choose fresh or canned fruit (canned in its own juice or light syrup) instead of juice  Fruit juice has very little or no fiber  · Eat low-fat dairy foods  Drink fat-free (skim) milk or 1% milk  Eat fat-free yogurt and low-fat cottage cheese  Try low-fat cheeses such as mozzarella and other reduced-fat cheeses  · Choose meat and other protein foods that are low in fat  Choose beans or other legumes such as split peas or lentils  Choose fish, skinless poultry (chicken or turkey), or lean cuts of red meat (beef or pork)  Before you cook meat or poultry, cut off any visible fat  · Use less fat and oil  Try baking foods instead of frying them  Add less fat, such as margarine, sour cream, regular salad dressing and mayonnaise to foods  Eat fewer high-fat foods  Some examples of high-fat foods include french fries, doughnuts, ice cream, and cakes  · Eat fewer sweets  Limit foods and drinks that are high in sugar  This includes candy, cookies, regular soda, and sweetened drinks  Exercise:  Exercise at least 30 minutes per day on most days of the week  Some examples of exercise include walking, biking, dancing, and swimming  You can also fit in more physical activity by taking the stairs instead of the elevator or parking farther away from stores  Ask your healthcare provider about the best exercise plan for you  © Copyright [employer] 2018 Information is for End User's use only and may not be sold, redistributed or otherwise used for commercial purposes   All illustrations and images included in CareNotes® are the copyrighted property of A D A M , Inc  or [address] [place] [address]

## 2020-10-01 ENCOUNTER — EVALUATION (OUTPATIENT)
Dept: PHYSICAL THERAPY | Facility: CLINIC | Age: 70
End: 2020-10-01
Payer: MEDICARE

## 2020-10-01 DIAGNOSIS — M25.561 CHRONIC PAIN OF RIGHT KNEE: ICD-10-CM

## 2020-10-01 DIAGNOSIS — Z98.890 S/P ORIF (OPEN REDUCTION INTERNAL FIXATION) FRACTURE: Primary | ICD-10-CM

## 2020-10-01 DIAGNOSIS — G89.29 CHRONIC PAIN OF RIGHT KNEE: ICD-10-CM

## 2020-10-01 DIAGNOSIS — Z87.81 S/P ORIF (OPEN REDUCTION INTERNAL FIXATION) FRACTURE: Primary | ICD-10-CM

## 2020-10-01 PROCEDURE — 97161 PT EVAL LOW COMPLEX 20 MIN: CPT | Performed by: PHYSICAL THERAPIST

## 2020-10-01 PROCEDURE — 97110 THERAPEUTIC EXERCISES: CPT | Performed by: PHYSICAL THERAPIST

## 2020-10-06 ENCOUNTER — OFFICE VISIT (OUTPATIENT)
Dept: PHYSICAL THERAPY | Facility: CLINIC | Age: 70
End: 2020-10-06
Payer: MEDICARE

## 2020-10-06 DIAGNOSIS — G89.29 CHRONIC PAIN OF RIGHT KNEE: ICD-10-CM

## 2020-10-06 DIAGNOSIS — Z87.81 S/P ORIF (OPEN REDUCTION INTERNAL FIXATION) FRACTURE: Primary | ICD-10-CM

## 2020-10-06 DIAGNOSIS — Z98.890 S/P ORIF (OPEN REDUCTION INTERNAL FIXATION) FRACTURE: Primary | ICD-10-CM

## 2020-10-06 DIAGNOSIS — M25.561 CHRONIC PAIN OF RIGHT KNEE: ICD-10-CM

## 2020-10-06 PROCEDURE — 97110 THERAPEUTIC EXERCISES: CPT | Performed by: PHYSICAL THERAPIST

## 2020-10-06 PROCEDURE — 97112 NEUROMUSCULAR REEDUCATION: CPT | Performed by: PHYSICAL THERAPIST

## 2020-10-06 PROCEDURE — 97530 THERAPEUTIC ACTIVITIES: CPT | Performed by: PHYSICAL THERAPIST

## 2020-10-09 ENCOUNTER — APPOINTMENT (OUTPATIENT)
Dept: PHYSICAL THERAPY | Facility: CLINIC | Age: 70
End: 2020-10-09
Payer: MEDICARE

## 2020-10-11 DIAGNOSIS — E03.8 OTHER SPECIFIED HYPOTHYROIDISM: ICD-10-CM

## 2020-10-11 DIAGNOSIS — F32.A DEPRESSION, UNSPECIFIED DEPRESSION TYPE: ICD-10-CM

## 2020-10-12 RX ORDER — LEVOTHYROXINE SODIUM 25 UG/1
TABLET ORAL
Qty: 30 TABLET | Refills: 0 | Status: SHIPPED | OUTPATIENT
Start: 2020-10-12 | End: 2020-11-30

## 2020-10-12 RX ORDER — BUPROPION HYDROCHLORIDE 150 MG/1
TABLET ORAL
Qty: 30 TABLET | Refills: 0 | Status: SHIPPED | OUTPATIENT
Start: 2020-10-12 | End: 2020-11-30

## 2020-10-13 ENCOUNTER — OFFICE VISIT (OUTPATIENT)
Dept: PHYSICAL THERAPY | Facility: CLINIC | Age: 70
End: 2020-10-13
Payer: MEDICARE

## 2020-10-13 DIAGNOSIS — Z98.890 S/P ORIF (OPEN REDUCTION INTERNAL FIXATION) FRACTURE: Primary | ICD-10-CM

## 2020-10-13 DIAGNOSIS — Z87.81 S/P ORIF (OPEN REDUCTION INTERNAL FIXATION) FRACTURE: Primary | ICD-10-CM

## 2020-10-13 DIAGNOSIS — M25.561 CHRONIC PAIN OF RIGHT KNEE: ICD-10-CM

## 2020-10-13 DIAGNOSIS — G89.29 CHRONIC PAIN OF RIGHT KNEE: ICD-10-CM

## 2020-10-13 PROCEDURE — 97140 MANUAL THERAPY 1/> REGIONS: CPT | Performed by: PHYSICAL THERAPIST

## 2020-10-13 PROCEDURE — 97530 THERAPEUTIC ACTIVITIES: CPT | Performed by: PHYSICAL THERAPIST

## 2020-10-13 PROCEDURE — 97110 THERAPEUTIC EXERCISES: CPT | Performed by: PHYSICAL THERAPIST

## 2020-10-16 ENCOUNTER — APPOINTMENT (OUTPATIENT)
Dept: PHYSICAL THERAPY | Facility: CLINIC | Age: 70
End: 2020-10-16
Payer: MEDICARE

## 2020-10-20 ENCOUNTER — APPOINTMENT (OUTPATIENT)
Dept: PHYSICAL THERAPY | Facility: CLINIC | Age: 70
End: 2020-10-20
Payer: MEDICARE

## 2020-10-23 ENCOUNTER — APPOINTMENT (OUTPATIENT)
Dept: PHYSICAL THERAPY | Facility: CLINIC | Age: 70
End: 2020-10-23
Payer: MEDICARE

## 2020-10-27 ENCOUNTER — APPOINTMENT (OUTPATIENT)
Dept: PHYSICAL THERAPY | Facility: CLINIC | Age: 70
End: 2020-10-27
Payer: MEDICARE

## 2020-10-30 ENCOUNTER — APPOINTMENT (OUTPATIENT)
Dept: PHYSICAL THERAPY | Facility: CLINIC | Age: 70
End: 2020-10-30
Payer: MEDICARE

## 2020-11-06 DIAGNOSIS — F41.9 ANXIETY: ICD-10-CM

## 2020-11-06 DIAGNOSIS — G47.00 INSOMNIA, UNSPECIFIED TYPE: ICD-10-CM

## 2020-11-06 DIAGNOSIS — F32.A DEPRESSION, UNSPECIFIED DEPRESSION TYPE: ICD-10-CM

## 2020-11-06 RX ORDER — ESCITALOPRAM OXALATE 20 MG/1
TABLET ORAL
Qty: 90 TABLET | Refills: 0 | Status: SHIPPED | OUTPATIENT
Start: 2020-11-06 | End: 2021-04-12

## 2020-11-09 RX ORDER — ALPRAZOLAM 0.5 MG/1
TABLET ORAL
Qty: 30 TABLET | Refills: 0 | Status: SHIPPED | OUTPATIENT
Start: 2020-11-09 | End: 2020-12-14

## 2020-11-09 RX ORDER — ZOLPIDEM TARTRATE 10 MG/1
TABLET ORAL
Qty: 30 TABLET | Refills: 0 | Status: SHIPPED | OUTPATIENT
Start: 2020-11-09 | End: 2020-12-14

## 2020-11-13 ENCOUNTER — APPOINTMENT (OUTPATIENT)
Dept: RADIOLOGY | Facility: CLINIC | Age: 70
End: 2020-11-13
Payer: MEDICARE

## 2020-11-13 ENCOUNTER — OFFICE VISIT (OUTPATIENT)
Dept: OBGYN CLINIC | Facility: CLINIC | Age: 70
End: 2020-11-13
Payer: MEDICARE

## 2020-11-13 VITALS
WEIGHT: 176 LBS | DIASTOLIC BLOOD PRESSURE: 82 MMHG | HEART RATE: 64 BPM | BODY MASS INDEX: 29.32 KG/M2 | SYSTOLIC BLOOD PRESSURE: 120 MMHG | HEIGHT: 65 IN

## 2020-11-13 DIAGNOSIS — Z98.890 STATUS POST ORIF OF FRACTURE OF ANKLE: Primary | ICD-10-CM

## 2020-11-13 DIAGNOSIS — Z87.81 STATUS POST ORIF OF FRACTURE OF ANKLE: Primary | ICD-10-CM

## 2020-11-13 DIAGNOSIS — Z98.890 S/P ORIF (OPEN REDUCTION INTERNAL FIXATION) FRACTURE: ICD-10-CM

## 2020-11-13 DIAGNOSIS — Z87.81 S/P ORIF (OPEN REDUCTION INTERNAL FIXATION) FRACTURE: ICD-10-CM

## 2020-11-13 PROCEDURE — 99214 OFFICE O/P EST MOD 30 MIN: CPT | Performed by: ORTHOPAEDIC SURGERY

## 2020-11-13 PROCEDURE — 73610 X-RAY EXAM OF ANKLE: CPT

## 2020-11-30 DIAGNOSIS — F32.A DEPRESSION, UNSPECIFIED DEPRESSION TYPE: ICD-10-CM

## 2020-11-30 DIAGNOSIS — E03.8 OTHER SPECIFIED HYPOTHYROIDISM: ICD-10-CM

## 2020-11-30 RX ORDER — LEVOTHYROXINE SODIUM 25 UG/1
TABLET ORAL
Qty: 30 TABLET | Refills: 3 | Status: SHIPPED | OUTPATIENT
Start: 2020-11-30 | End: 2021-03-15

## 2020-11-30 RX ORDER — BUPROPION HYDROCHLORIDE 150 MG/1
TABLET ORAL
Qty: 30 TABLET | Refills: 3 | Status: SHIPPED | OUTPATIENT
Start: 2020-11-30 | End: 2021-04-26

## 2020-12-11 DIAGNOSIS — G47.00 INSOMNIA, UNSPECIFIED TYPE: ICD-10-CM

## 2020-12-11 DIAGNOSIS — F41.9 ANXIETY: ICD-10-CM

## 2020-12-14 RX ORDER — ALPRAZOLAM 0.5 MG/1
TABLET ORAL
Qty: 30 TABLET | Refills: 0 | Status: SHIPPED | OUTPATIENT
Start: 2020-12-14 | End: 2021-01-09

## 2020-12-14 RX ORDER — ZOLPIDEM TARTRATE 10 MG/1
TABLET ORAL
Qty: 30 TABLET | Refills: 0 | Status: SHIPPED | OUTPATIENT
Start: 2020-12-14 | End: 2021-01-09

## 2021-01-09 DIAGNOSIS — G47.00 INSOMNIA, UNSPECIFIED TYPE: ICD-10-CM

## 2021-01-09 DIAGNOSIS — F41.9 ANXIETY: ICD-10-CM

## 2021-01-09 RX ORDER — ALPRAZOLAM 0.5 MG/1
TABLET ORAL
Qty: 30 TABLET | Refills: 0 | Status: SHIPPED | OUTPATIENT
Start: 2021-01-09 | End: 2021-02-09

## 2021-01-09 RX ORDER — ZOLPIDEM TARTRATE 10 MG/1
TABLET ORAL
Qty: 30 TABLET | Refills: 0 | Status: SHIPPED | OUTPATIENT
Start: 2021-01-09 | End: 2021-02-09

## 2021-01-21 ENCOUNTER — TELEPHONE (OUTPATIENT)
Dept: OTHER | Facility: OTHER | Age: 71
End: 2021-01-21

## 2021-02-09 DIAGNOSIS — G47.00 INSOMNIA, UNSPECIFIED TYPE: ICD-10-CM

## 2021-02-09 DIAGNOSIS — F41.9 ANXIETY: ICD-10-CM

## 2021-02-09 RX ORDER — ALPRAZOLAM 0.5 MG/1
TABLET ORAL
Qty: 30 TABLET | Refills: 0 | Status: SHIPPED | OUTPATIENT
Start: 2021-02-09 | End: 2021-03-08

## 2021-02-09 RX ORDER — ZOLPIDEM TARTRATE 10 MG/1
TABLET ORAL
Qty: 30 TABLET | Refills: 0 | Status: SHIPPED | OUTPATIENT
Start: 2021-02-09 | End: 2021-03-08

## 2021-02-13 DIAGNOSIS — Z23 ENCOUNTER FOR IMMUNIZATION: ICD-10-CM

## 2021-02-19 ENCOUNTER — IMMUNIZATIONS (OUTPATIENT)
Dept: FAMILY MEDICINE CLINIC | Facility: HOSPITAL | Age: 71
End: 2021-02-19

## 2021-02-19 DIAGNOSIS — Z23 ENCOUNTER FOR IMMUNIZATION: Primary | ICD-10-CM

## 2021-02-19 PROCEDURE — 91300 SARS-COV-2 / COVID-19 MRNA VACCINE (PFIZER-BIONTECH) 30 MCG: CPT

## 2021-02-19 PROCEDURE — 0001A SARS-COV-2 / COVID-19 MRNA VACCINE (PFIZER-BIONTECH) 30 MCG: CPT

## 2021-03-05 DIAGNOSIS — F41.9 ANXIETY: ICD-10-CM

## 2021-03-05 DIAGNOSIS — G47.00 INSOMNIA, UNSPECIFIED TYPE: ICD-10-CM

## 2021-03-08 RX ORDER — ZOLPIDEM TARTRATE 10 MG/1
TABLET ORAL
Qty: 30 TABLET | Refills: 0 | Status: SHIPPED | OUTPATIENT
Start: 2021-03-08 | End: 2021-04-19

## 2021-03-08 RX ORDER — ALPRAZOLAM 0.5 MG/1
TABLET ORAL
Qty: 30 TABLET | Refills: 0 | Status: SHIPPED | OUTPATIENT
Start: 2021-03-08 | End: 2021-04-19

## 2021-03-10 ENCOUNTER — IMMUNIZATIONS (OUTPATIENT)
Dept: FAMILY MEDICINE CLINIC | Facility: HOSPITAL | Age: 71
End: 2021-03-10

## 2021-03-10 DIAGNOSIS — Z23 ENCOUNTER FOR IMMUNIZATION: Primary | ICD-10-CM

## 2021-03-10 PROCEDURE — 91300 SARS-COV-2 / COVID-19 MRNA VACCINE (PFIZER-BIONTECH) 30 MCG: CPT

## 2021-03-10 PROCEDURE — 0002A SARS-COV-2 / COVID-19 MRNA VACCINE (PFIZER-BIONTECH) 30 MCG: CPT

## 2021-03-14 DIAGNOSIS — E03.8 OTHER SPECIFIED HYPOTHYROIDISM: ICD-10-CM

## 2021-03-15 RX ORDER — LEVOTHYROXINE SODIUM 25 UG/1
TABLET ORAL
Qty: 30 TABLET | Refills: 0 | Status: SHIPPED | OUTPATIENT
Start: 2021-03-15 | End: 2021-06-12

## 2021-04-09 DIAGNOSIS — F32.A DEPRESSION, UNSPECIFIED DEPRESSION TYPE: ICD-10-CM

## 2021-04-12 ENCOUNTER — TRANSCRIBE ORDERS (OUTPATIENT)
Dept: LAB | Facility: CLINIC | Age: 71
End: 2021-04-12

## 2021-04-12 ENCOUNTER — APPOINTMENT (OUTPATIENT)
Dept: LAB | Facility: CLINIC | Age: 71
End: 2021-04-12
Payer: MEDICARE

## 2021-04-12 DIAGNOSIS — I10 ESSENTIAL HYPERTENSION: ICD-10-CM

## 2021-04-12 DIAGNOSIS — E03.8 OTHER SPECIFIED HYPOTHYROIDISM: ICD-10-CM

## 2021-04-12 LAB
ALBUMIN SERPL BCP-MCNC: 3.4 G/DL (ref 3.5–5)
ALP SERPL-CCNC: 78 U/L (ref 46–116)
ALT SERPL W P-5'-P-CCNC: 14 U/L (ref 12–78)
ANION GAP SERPL CALCULATED.3IONS-SCNC: 4 MMOL/L (ref 4–13)
AST SERPL W P-5'-P-CCNC: 8 U/L (ref 5–45)
BASOPHILS # BLD AUTO: 0.04 THOUSANDS/ΜL (ref 0–0.1)
BASOPHILS NFR BLD AUTO: 1 % (ref 0–1)
BILIRUB SERPL-MCNC: 0.34 MG/DL (ref 0.2–1)
BUN SERPL-MCNC: 14 MG/DL (ref 5–25)
CALCIUM ALBUM COR SERPL-MCNC: 8.9 MG/DL (ref 8.3–10.1)
CALCIUM SERPL-MCNC: 8.4 MG/DL (ref 8.3–10.1)
CHLORIDE SERPL-SCNC: 109 MMOL/L (ref 100–108)
CHOLEST SERPL-MCNC: 214 MG/DL (ref 50–200)
CO2 SERPL-SCNC: 27 MMOL/L (ref 21–32)
CREAT SERPL-MCNC: 0.92 MG/DL (ref 0.6–1.3)
EOSINOPHIL # BLD AUTO: 0.34 THOUSAND/ΜL (ref 0–0.61)
EOSINOPHIL NFR BLD AUTO: 5 % (ref 0–6)
ERYTHROCYTE [DISTWIDTH] IN BLOOD BY AUTOMATED COUNT: 12.3 % (ref 11.6–15.1)
GFR SERPL CREATININE-BSD FRML MDRD: 63 ML/MIN/1.73SQ M
GLUCOSE P FAST SERPL-MCNC: 90 MG/DL (ref 65–99)
HCT VFR BLD AUTO: 43.8 % (ref 34.8–46.1)
HDLC SERPL-MCNC: 84 MG/DL
HGB BLD-MCNC: 13.9 G/DL (ref 11.5–15.4)
IMM GRANULOCYTES # BLD AUTO: 0.03 THOUSAND/UL (ref 0–0.2)
IMM GRANULOCYTES NFR BLD AUTO: 1 % (ref 0–2)
LDLC SERPL CALC-MCNC: 116 MG/DL (ref 0–100)
LYMPHOCYTES # BLD AUTO: 2.18 THOUSANDS/ΜL (ref 0.6–4.47)
LYMPHOCYTES NFR BLD AUTO: 33 % (ref 14–44)
MCH RBC QN AUTO: 32.3 PG (ref 26.8–34.3)
MCHC RBC AUTO-ENTMCNC: 31.7 G/DL (ref 31.4–37.4)
MCV RBC AUTO: 102 FL (ref 82–98)
MONOCYTES # BLD AUTO: 0.65 THOUSAND/ΜL (ref 0.17–1.22)
MONOCYTES NFR BLD AUTO: 10 % (ref 4–12)
NEUTROPHILS # BLD AUTO: 3.36 THOUSANDS/ΜL (ref 1.85–7.62)
NEUTS SEG NFR BLD AUTO: 50 % (ref 43–75)
NONHDLC SERPL-MCNC: 130 MG/DL
NRBC BLD AUTO-RTO: 0 /100 WBCS
PLATELET # BLD AUTO: 236 THOUSANDS/UL (ref 149–390)
PMV BLD AUTO: 11.6 FL (ref 8.9–12.7)
POTASSIUM SERPL-SCNC: 4.3 MMOL/L (ref 3.5–5.3)
PROT SERPL-MCNC: 6.7 G/DL (ref 6.4–8.2)
RBC # BLD AUTO: 4.3 MILLION/UL (ref 3.81–5.12)
SODIUM SERPL-SCNC: 140 MMOL/L (ref 136–145)
TRIGL SERPL-MCNC: 69 MG/DL
TSH SERPL DL<=0.05 MIU/L-ACNC: 3.35 UIU/ML (ref 0.36–3.74)
WBC # BLD AUTO: 6.6 THOUSAND/UL (ref 4.31–10.16)

## 2021-04-12 PROCEDURE — 36415 COLL VENOUS BLD VENIPUNCTURE: CPT

## 2021-04-12 PROCEDURE — 80061 LIPID PANEL: CPT

## 2021-04-12 PROCEDURE — 85025 COMPLETE CBC W/AUTO DIFF WBC: CPT

## 2021-04-12 PROCEDURE — 80053 COMPREHEN METABOLIC PANEL: CPT

## 2021-04-12 PROCEDURE — 84443 ASSAY THYROID STIM HORMONE: CPT

## 2021-04-12 RX ORDER — ESCITALOPRAM OXALATE 20 MG/1
TABLET ORAL
Qty: 90 TABLET | Refills: 1 | Status: SHIPPED | OUTPATIENT
Start: 2021-04-12 | End: 2021-12-13

## 2021-04-16 ENCOUNTER — OFFICE VISIT (OUTPATIENT)
Dept: GASTROENTEROLOGY | Facility: CLINIC | Age: 71
End: 2021-04-16
Payer: MEDICARE

## 2021-04-16 VITALS
HEIGHT: 65 IN | SYSTOLIC BLOOD PRESSURE: 124 MMHG | TEMPERATURE: 98 F | BODY MASS INDEX: 29.29 KG/M2 | HEART RATE: 70 BPM | DIASTOLIC BLOOD PRESSURE: 78 MMHG

## 2021-04-16 DIAGNOSIS — R10.13 EPIGASTRIC PAIN: Primary | ICD-10-CM

## 2021-04-16 PROCEDURE — 99213 OFFICE O/P EST LOW 20 MIN: CPT | Performed by: INTERNAL MEDICINE

## 2021-04-16 RX ORDER — AMITRIPTYLINE HYDROCHLORIDE 25 MG/1
25 TABLET, FILM COATED ORAL
Qty: 30 TABLET | Refills: 2 | Status: SHIPPED | OUTPATIENT
Start: 2021-04-16 | End: 2021-08-02

## 2021-04-16 NOTE — PROGRESS NOTES
[facility]  Jayden Diaz 80 y o  female MRN: [id number]            Assessment & Plan:     78-year-old female with a history of congenital intestinal malrotation, longstanding intermittent bouts of epigastric pain of unclear etiology for greater than 20 years with negative workups in the past       1  Intermittent  Epigastric pain:  -suspect underlying musculoskeletal issues versus unlikely GI issues from history of malrotation  -at this point we will check laboratory studies, will check a CT scan of her abdomen pelvis, last examination was 4 years ago   - discussed with her that she may want to call us in the midst of 1 of these attacks to do an evaluation that will be more real time   - will tried the patient on amitriptyline 25 mg HS see if this may help reduce the frequency or severity of her attacks  -generally her symptoms appear to be more musculoskeletal in nature  - we will see the patient back in 3 months time to further evaluate              _____________________________________________________________        CC:  Abdominal pain     HPI:  Jayden Diaz is a 80 y  o female who is here for  Abdominal pain  This is a 78-year-old female with a history of congenital malrotation this had intermittent abdominal pain over the past 24 to 5 years  She has had extensive evaluation at outside institutions, she had upper endoscopy and colonoscopy in 2017 which were relatively unremarkable  We have not seen her several years since then, she was recently seen in the year half ago  At that time she had a biliary workup including HIDA scan which was normal   She reports she recently had an episode on Radha and then 1 earlier in March which she describes intense epigastric pain which is nonradiating, not associated with any change in bowel movements, no diarrhea, constipation, melena, increasing heartburn    She does have some occasional nausea vomiting the pain is very severe but it tends to be debilitating, typically lasts about a week but can last approximately 2 weeks and the patient is then normal thereafter next several months with no precipitating factors  Her weight is been stable through all this  She has been on PPI therapy  She has tried Carafate for this, Levsin for this all unsuccessful  She has not been able to identify any exacerbating factors which precipitate these attacks  ROS:  The remainder of the ROS was negative except for the pertinent positives mentioned in HPI  Allergies: Patient has no known allergies      Medications:   Current Outpatient Medications:     ALPRAZolam (XANAX) 0 5 mg tablet, TAKE 1 TABLET BY MOUTH ONCE DAILY AS NEEDED FOR ANXIETY, Disp: 30 tablet, Rfl: 0    buPROPion (WELLBUTRIN XL) 150 mg 24 hr tablet, Take 1 tablet by mouth once daily, Disp: 30 tablet, Rfl: 3    escitalopram (LEXAPRO) 20 mg tablet, Take 1 tablet by mouth once daily, Disp: 90 tablet, Rfl: 1    Euthyrox 25 MCG tablet, Take 1 tablet by mouth once daily, Disp: 30 tablet, Rfl: 0    lisinopril (ZESTRIL) 10 mg tablet, Take 1 tablet (10 mg total) by mouth daily, Disp: 90 tablet, Rfl: 3    pantoprazole (PROTONIX) 40 mg tablet, Take 1 tablet (40 mg total) by mouth daily, Disp: 30 tablet, Rfl: 5    zolpidem (AMBIEN) 10 mg tablet, TAKE 1 TABLET BY MOUTH ONCE DAILY AT BEDTIME, Disp: 30 tablet, Rfl: 0    amitriptyline (ELAVIL) 25 mg tablet, Take 1 tablet (25 mg total) by mouth daily at bedtime, Disp: 30 tablet, Rfl: 2    aspirin (ECOTRIN) 325 mg EC tablet, Take 1 tablet (325 mg total) by mouth daily (Patient not taking: Reported on 9/28/2020), Disp: 28 tablet, Rfl: 0    buPROPion (WELLBUTRIN SR) 100 mg 12 hr tablet, Take 100 mg by mouth daily, Disp: , Rfl:     hyoscyamine (LEVSIN/SL) 0 125 mg SL tablet, Take 1 tablet (0 125 mg total) by mouth every 4 (four) hours as needed for cramping (Patient not taking: Reported on 4/16/2021), Disp: 60 tablet, Rfl: 3    naloxone (NARCAN) 1 mg/mL intranasal, 2 mL (2 mg total) into each nostril once for 1 dose (Patient not taking: Reported on 9/28/2020), Disp: 2 mL, Rfl: 0    oxyCODONE (ROXICODONE) 5 mg immediate release tablet, Take 1 tablet (5 mg total) by mouth every 6 (six) hours as needed for moderate painMax Daily Amount: 20 mg (Patient not taking: Reported on 9/28/2020), Disp: 30 tablet, Rfl: 0    Past Medical History:   Diagnosis Date    Anxiety     Congenital absence of one kidney     Depression     GERD (gastroesophageal reflux disease)     History of transfusion     Hypertension     Insomnia     Menopause     Other specified hypothyroidism 1/28/2019    Vitamin B12 deficiency        Past Surgical History:   Procedure Laterality Date    COLONOSCOPY      PA COLONOSCOPY FLX DX W/COLLJ SPEC WHEN PFRMD N/A 5/30/2017    Procedure: EGD AND COLONOSCOPY;  Surgeon: Radha Watson MD;  [facility]; Service: Gastroenterology    PA OPEN [place] LAT MALLEOLUS Left 7/13/2020    Procedure: OPEN REDUCTION W/ INTERNAL FIXATION (ORIF) ANKLE;  Surgeon: Kacey Bill DO;  Location: [facility];  Service: Orthopedics    REDUCTION MAMMAPLASTY Bilateral 06/06/2011    REDUCTION MAMMAPLASTY      SHOULDER SURGERY      TONSILLECTOMY         Family History   Problem Relation Age of Onset    Lung cancer Mother 79    Heart disease Father     Lung cancer Brother 48        reports that she has never smoked  She has never used smokeless tobacco  She reports current alcohol use of about 7 0 standard drinks of alcohol per week  She reports that she does not use drugs        Physical Exam:    /78 (BP Location: Right arm, Patient Position: Sitting, Cuff Size: Standard)   Pulse 70   Temp 98 °F (36 7 °C)   Ht 5' 5" (1 651 m)   LMP  (LMP Unknown)   BMI 29 29 kg/m²     Gen: wn/wd, NAD , healthy-appearing female  HEENT: anicteric, MMM, no cervical LAD  CVS: RRR, no m/r/g  CHEST: CTA b/l  ABD: +BS, soft, NT,ND, no hepatosplenomegaly  EXT: no c/c/e  NEURO: aaox3  SKIN: NO rashes

## 2021-04-16 NOTE — LETTER
April 16, 2021     Pedro Funze MD  [address]  [address] [facility] [address]    Patient: Rc Nobles   YOB: 1950   Date of Visit: 4/16/2021       Dear Dr Ora Morin:    Thank you for referring Rc Nobles to me for evaluation  Below are my notes for this consultation  If you have questions, please do not hesitate to call me  I look forward to following your patient along with you  Sincerely,        Darwin Bach MD        CC: No Recipients  Darwin Bach MD  4/16/2021 12:35 PM  Sign when Signing Visit  [address] [facility]  Rc Nobles 80 y o  female MRN: [id number]            Assessment & Plan:     45-year-old female with a history of congenital intestinal malrotation, longstanding intermittent bouts of epigastric pain of unclear etiology for greater than 20 years with negative workups in the past       1  Intermittent  Epigastric pain:  -suspect underlying musculoskeletal issues versus unlikely GI issues from history of malrotation  -at this point we will check laboratory studies, will check a CT scan of her abdomen pelvis, last examination was 4 years ago   - discussed with her that she may want to call us in the midst of 1 of these attacks to do an evaluation that will be more real time   - will tried the patient on amitriptyline 25 mg HS see if this may help reduce the frequency or severity of her attacks  -generally her symptoms appear to be more musculoskeletal in nature  - we will see the patient back in 3 months time to further evaluate              _____________________________________________________________        CC:  Abdominal pain     HPI:  Rc Nobles is a 80 y  o female who is here for  Abdominal pain  This is a 45-year-old female with a history of congenital malrotation this had intermittent abdominal pain over the past 24 to 5 years    She has had extensive evaluation at outside institutions, she had upper endoscopy and colonoscopy in 2017 which were relatively unremarkable  We have not seen her several years since then, she was recently seen in the year half ago  At that time she had a biliary workup including HIDA scan which was normal   She reports she recently had an episode on Radha and then 1 earlier in March which she describes intense epigastric pain which is nonradiating, not associated with any change in bowel movements, no diarrhea, constipation, melena, increasing heartburn  She does have some occasional nausea vomiting the pain is very severe but it tends to be debilitating, typically lasts about a week but can last approximately 2 weeks and the patient is then normal thereafter next several months with no precipitating factors  Her weight is been stable through all this  She has been on PPI therapy  She has tried Carafate for this, Levsin for this all unsuccessful  She has not been able to identify any exacerbating factors which precipitate these attacks  ROS:  The remainder of the ROS was negative except for the pertinent positives mentioned in HPI  Allergies: Patient has no known allergies      Medications:   Current Outpatient Medications:     ALPRAZolam (XANAX) 0 5 mg tablet, TAKE 1 TABLET BY MOUTH ONCE DAILY AS NEEDED FOR ANXIETY, Disp: 30 tablet, Rfl: 0    buPROPion (WELLBUTRIN XL) 150 mg 24 hr tablet, Take 1 tablet by mouth once daily, Disp: 30 tablet, Rfl: 3    escitalopram (LEXAPRO) 20 mg tablet, Take 1 tablet by mouth once daily, Disp: 90 tablet, Rfl: 1    Euthyrox 25 MCG tablet, Take 1 tablet by mouth once daily, Disp: 30 tablet, Rfl: 0    lisinopril (ZESTRIL) 10 mg tablet, Take 1 tablet (10 mg total) by mouth daily, Disp: 90 tablet, Rfl: 3    pantoprazole (PROTONIX) 40 mg tablet, Take 1 tablet (40 mg total) by mouth daily, Disp: 30 tablet, Rfl: 5    zolpidem (AMBIEN) 10 mg tablet, TAKE 1 TABLET BY MOUTH ONCE DAILY AT BEDTIME, Disp: 30 tablet, Rfl: 0    amitriptyline (ELAVIL) 25 mg tablet, Take 1 tablet (25 mg total) by mouth daily at bedtime, Disp: 30 tablet, Rfl: 2    aspirin (ECOTRIN) 325 mg EC tablet, Take 1 tablet (325 mg total) by mouth daily (Patient not taking: Reported on 9/28/2020), Disp: 28 tablet, Rfl: 0    buPROPion (WELLBUTRIN SR) 100 mg 12 hr tablet, Take 100 mg by mouth daily, Disp: , Rfl:     hyoscyamine (LEVSIN/SL) 0 125 mg SL tablet, Take 1 tablet (0 125 mg total) by mouth every 4 (four) hours as needed for cramping (Patient not taking: Reported on 4/16/2021), Disp: 60 tablet, Rfl: 3    naloxone (NARCAN) 1 mg/mL intranasal, 2 mL (2 mg total) into each nostril once for 1 dose (Patient not taking: Reported on 9/28/2020), Disp: 2 mL, Rfl: 0    oxyCODONE (ROXICODONE) 5 mg immediate release tablet, Take 1 tablet (5 mg total) by mouth every 6 (six) hours as needed for moderate painMax Daily Amount: 20 mg (Patient not taking: Reported on 9/28/2020), Disp: 30 tablet, Rfl: 0    Past Medical History:   Diagnosis Date    Anxiety     Congenital absence of one kidney     Depression     GERD (gastroesophageal reflux disease)     History of transfusion     Hypertension     Insomnia     Menopause     Other specified hypothyroidism 1/28/2019    Vitamin B12 deficiency        Past Surgical History:   Procedure Laterality Date    COLONOSCOPY      [place] COLONOSCOPY FLX DX W/COLLJ SPEC WHEN PFRMD N/A 5/30/2017    Procedure: EGD AND COLONOSCOPY;  Surgeon: Carmel Rincon MD;  [facility];   Service: Gastroenterology    VT OPEN [place] LAT MALLEOLUS Left 7/13/2020    Procedure: OPEN REDUCTION W/ INTERNAL FIXATION (ORIF) ANKLE;  Surgeon: Kitty Crowell DO;  [facility];  Service: Orthopedics    REDUCTION MAMMAPLASTY Bilateral 06/06/2011    REDUCTION MAMMAPLASTY      SHOULDER SURGERY      TONSILLECTOMY         Family History   Problem Relation Age of Onset    Lung cancer Mother 79    Heart disease Father     Lung cancer Brother 48        reports that she has never smoked  She has never used smokeless tobacco  She reports current alcohol use of about 7 0 standard drinks of alcohol per week  She reports that she does not use drugs        Physical Exam:    /78 (BP Location: Right arm, Patient Position: Sitting, Cuff Size: Standard)   Pulse 70   Temp 98 °F (36 7 °C)   Ht 5' 5" (1 651 m)   LMP  (LMP Unknown)   BMI 29 29 kg/m²     Gen: wn/wd, NAD , healthy-appearing female  HEENT: anicteric, MMM, no cervical LAD  CVS: RRR, no m/r/g  CHEST: CTA b/l  ABD: +BS, soft, NT,ND, no hepatosplenomegaly  EXT: no c/c/e  NEURO: aaox3  SKIN: NO rashes

## 2021-04-18 DIAGNOSIS — G47.00 INSOMNIA, UNSPECIFIED TYPE: ICD-10-CM

## 2021-04-18 DIAGNOSIS — F41.9 ANXIETY: ICD-10-CM

## 2021-04-19 RX ORDER — ALPRAZOLAM 0.5 MG/1
TABLET ORAL
Qty: 30 TABLET | Refills: 0 | Status: SHIPPED | OUTPATIENT
Start: 2021-04-19 | End: 2021-05-19

## 2021-04-19 RX ORDER — ZOLPIDEM TARTRATE 10 MG/1
TABLET ORAL
Qty: 30 TABLET | Refills: 0 | Status: SHIPPED | OUTPATIENT
Start: 2021-04-19 | End: 2021-05-19

## 2021-04-21 NOTE — PROGRESS NOTES
Assessment/Plan:    1  Mild episode of recurrent major depressive disorder [facility])  Assessment & Plan:  She does not feel her medications help her  She will see psychiatry and a referral was given  She will try to wean off her bupropion  Asked patient to call sooner than next scheduled appointment for any complaints or issues  Orders:  -     buPROPion (WELLBUTRIN SR) 100 mg 12 hr tablet; Take 1 tablet (100 mg total) by mouth daily  -     Ambulatory referral to Psychiatry; Future          There are no Patient Instructions on file for this visit  Return in about 4 weeks (around 5/24/2021) for virtual is okay  Subjective:      Patient ID: Susi Greenberg is a 80 y o  female  Chief Complaint   Patient presents with    Fatigue     x few months ss,lpn        She has been continuing to have epigastric pain  Her workup has been negative  Dr Jeaneth Tellez put her on amitriptylene and she is feeling now as though she is taking too many depression medications and would like to try to get off of them  She does not really see that they are helping her much  She always feels a bit depressed no matter what she is taking  She denies SI/HI  She is not seeing a psychiatrist but would be willing  She has already cut back her bupropion from 150 mg to 100 mg and does not notice any worsening in her symptoms  She continues to have sleep disturbances but is trying to wean off the zolpidem as well because she is tired of taking medications  The following portions of the patient's history were reviewed and updated as appropriate: allergies, current medications, past family history, past medical history, past social history, past surgical history and problem list     Review of Systems   Constitutional: Negative  Respiratory: Negative  Cardiovascular: Negative  Psychiatric/Behavioral: Positive for dysphoric mood and sleep disturbance  Negative for self-injury and suicidal ideas   The patient is not nervous/anxious  Current Outpatient Medications   Medication Sig Dispense Refill    ALPRAZolam (XANAX) 0 5 mg tablet TAKE 1 TABLET BY MOUTH ONCE DAILY AS NEEDED FOR ANXIETY 30 tablet 0    amitriptyline (ELAVIL) 25 mg tablet Take 1 tablet (25 mg total) by mouth daily at bedtime 30 tablet 2    buPROPion (WELLBUTRIN SR) 100 mg 12 hr tablet Take 1 tablet (100 mg total) by mouth daily      escitalopram (LEXAPRO) 20 mg tablet Take 1 tablet by mouth once daily 90 tablet 1    Euthyrox 25 MCG tablet Take 1 tablet by mouth once daily 30 tablet 0    lisinopril (ZESTRIL) 10 mg tablet Take 1 tablet (10 mg total) by mouth daily 90 tablet 3    pantoprazole (PROTONIX) 40 mg tablet Take 1 tablet (40 mg total) by mouth daily 30 tablet 5    zolpidem (AMBIEN) 10 mg tablet TAKE 1 TABLET BY MOUTH ONCE DAILY AT BEDTIME 30 tablet 0     No current facility-administered medications for this visit  Objective:    /80   Pulse 68   Temp 98 7 °F (37 1 °C)   Resp 16   Ht 5' 5" (1 651 m)   LMP  (LMP Unknown)   BMI 29 29 kg/m²        Physical Exam  Constitutional:       Appearance: She is well-developed  Eyes:      Conjunctiva/sclera: Conjunctivae normal    Neck:      Musculoskeletal: Neck supple  Thyroid: No thyromegaly  Vascular: No JVD  Cardiovascular:      Rate and Rhythm: Normal rate and regular rhythm  Heart sounds: Normal heart sounds  No murmur  No friction rub  No gallop  Pulmonary:      Effort: Pulmonary effort is normal       Breath sounds: Normal breath sounds  No wheezing or rales  Abdominal:      General: Bowel sounds are normal  There is no distension  Palpations: Abdomen is soft  Tenderness: There is no abdominal tenderness  Psychiatric:         Attention and Perception: Attention and perception normal          Mood and Affect: Mood is depressed  Behavior: Behavior normal          Thought Content:  Thought content normal  Thought content does not include homicidal or suicidal ideation  Thought content does not include homicidal or suicidal plan           Judgment: Judgment normal                 Moises Giron MD

## 2021-04-26 ENCOUNTER — OFFICE VISIT (OUTPATIENT)
Dept: FAMILY MEDICINE CLINIC | Facility: CLINIC | Age: 71
End: 2021-04-26
Payer: MEDICARE

## 2021-04-26 VITALS
TEMPERATURE: 98.7 F | BODY MASS INDEX: 29.29 KG/M2 | SYSTOLIC BLOOD PRESSURE: 140 MMHG | DIASTOLIC BLOOD PRESSURE: 80 MMHG | HEART RATE: 68 BPM | HEIGHT: 65 IN | RESPIRATION RATE: 16 BRPM

## 2021-04-26 DIAGNOSIS — F33.0 MILD EPISODE OF RECURRENT MAJOR DEPRESSIVE DISORDER (HCC): Primary | ICD-10-CM

## 2021-04-26 PROCEDURE — 99213 OFFICE O/P EST LOW 20 MIN: CPT | Performed by: INTERNAL MEDICINE

## 2021-04-26 RX ORDER — BUPROPION HYDROCHLORIDE 100 MG/1
100 TABLET, EXTENDED RELEASE ORAL DAILY
Start: 2021-04-26 | End: 2021-12-08

## 2021-04-26 NOTE — ASSESSMENT & PLAN NOTE
She does not feel her medications help her  She will see psychiatry and a referral was given  She will try to wean off her bupropion  Asked patient to call sooner than next scheduled appointment for any complaints or issues

## 2021-04-29 ENCOUNTER — HOSPITAL ENCOUNTER (OUTPATIENT)
Dept: CT IMAGING | Facility: HOSPITAL | Age: 71
Discharge: HOME | End: 2021-04-29
Attending: INTERNAL MEDICINE
Payer: MEDICARE

## 2021-04-29 DIAGNOSIS — R10.13 EPIGASTRIC PAIN: ICD-10-CM

## 2021-04-29 PROCEDURE — 74177 CT ABD & PELVIS W/CONTRAST: CPT

## 2021-04-29 RX ADMIN — IOHEXOL 100 ML: 350 INJECTION, SOLUTION INTRAVENOUS at 14:50

## 2021-05-09 DIAGNOSIS — F32.A DEPRESSION, UNSPECIFIED DEPRESSION TYPE: ICD-10-CM

## 2021-05-10 RX ORDER — BUPROPION HYDROCHLORIDE 150 MG/1
TABLET ORAL
Qty: 30 TABLET | Refills: 5 | Status: SHIPPED | OUTPATIENT
Start: 2021-05-10 | End: 2021-12-23 | Stop reason: SDUPTHER

## 2021-05-14 DIAGNOSIS — I10 ESSENTIAL HYPERTENSION: ICD-10-CM

## 2021-05-16 RX ORDER — LISINOPRIL 10 MG/1
10 TABLET ORAL DAILY
Qty: 90 TABLET | Refills: 3 | Status: SHIPPED | OUTPATIENT
Start: 2021-05-16 | End: 2022-05-02

## 2021-05-19 DIAGNOSIS — K21.9 GASTROESOPHAGEAL REFLUX DISEASE: ICD-10-CM

## 2021-05-19 DIAGNOSIS — F41.9 ANXIETY: ICD-10-CM

## 2021-05-19 DIAGNOSIS — G47.00 INSOMNIA, UNSPECIFIED TYPE: ICD-10-CM

## 2021-05-19 RX ORDER — ZOLPIDEM TARTRATE 10 MG/1
TABLET ORAL
Qty: 30 TABLET | Refills: 0 | Status: SHIPPED | OUTPATIENT
Start: 2021-05-19 | End: 2021-06-17

## 2021-05-19 RX ORDER — ALPRAZOLAM 0.5 MG/1
TABLET ORAL
Qty: 30 TABLET | Refills: 0 | Status: SHIPPED | OUTPATIENT
Start: 2021-05-19 | End: 2021-06-17

## 2021-05-19 NOTE — TELEPHONE ENCOUNTER
Requested medication(s) are due for refill today: Yes  Patient has already received a courtesy refill: No  Other reason request has been forwarded to provider:  Failed Protocol

## 2021-05-20 RX ORDER — PANTOPRAZOLE SODIUM 40 MG/1
TABLET, DELAYED RELEASE ORAL
Qty: 30 TABLET | Refills: 5 | Status: SHIPPED | OUTPATIENT
Start: 2021-05-20 | End: 2021-12-09

## 2021-06-11 DIAGNOSIS — E03.8 OTHER SPECIFIED HYPOTHYROIDISM: ICD-10-CM

## 2021-06-12 RX ORDER — LEVOTHYROXINE SODIUM 25 UG/1
TABLET ORAL
Qty: 30 TABLET | Refills: 0 | Status: SHIPPED | OUTPATIENT
Start: 2021-06-12 | End: 2021-07-29

## 2021-06-16 DIAGNOSIS — F41.9 ANXIETY: ICD-10-CM

## 2021-06-16 DIAGNOSIS — G47.00 INSOMNIA, UNSPECIFIED TYPE: ICD-10-CM

## 2021-06-17 RX ORDER — ZOLPIDEM TARTRATE 10 MG/1
TABLET ORAL
Qty: 30 TABLET | Refills: 0 | Status: SHIPPED | OUTPATIENT
Start: 2021-06-17 | End: 2021-07-20

## 2021-06-17 RX ORDER — ALPRAZOLAM 0.5 MG/1
TABLET ORAL
Qty: 30 TABLET | Refills: 0 | Status: SHIPPED | OUTPATIENT
Start: 2021-06-17 | End: 2021-07-20

## 2021-07-19 DIAGNOSIS — G47.00 INSOMNIA, UNSPECIFIED TYPE: ICD-10-CM

## 2021-07-19 DIAGNOSIS — F41.9 ANXIETY: ICD-10-CM

## 2021-07-20 RX ORDER — ZOLPIDEM TARTRATE 10 MG/1
TABLET ORAL
Qty: 30 TABLET | Refills: 0 | Status: SHIPPED | OUTPATIENT
Start: 2021-07-20 | End: 2021-09-08

## 2021-07-20 RX ORDER — ALPRAZOLAM 0.5 MG/1
TABLET ORAL
Qty: 30 TABLET | Refills: 0 | Status: SHIPPED | OUTPATIENT
Start: 2021-07-20 | End: 2021-08-25

## 2021-07-29 DIAGNOSIS — E03.8 OTHER SPECIFIED HYPOTHYROIDISM: ICD-10-CM

## 2021-07-29 RX ORDER — LEVOTHYROXINE SODIUM 25 UG/1
TABLET ORAL
Qty: 30 TABLET | Refills: 0 | Status: SHIPPED | OUTPATIENT
Start: 2021-07-29 | End: 2021-09-02

## 2021-07-31 DIAGNOSIS — R10.13 EPIGASTRIC PAIN: ICD-10-CM

## 2021-08-02 RX ORDER — AMITRIPTYLINE HYDROCHLORIDE 25 MG/1
TABLET, FILM COATED ORAL
Qty: 30 TABLET | Refills: 0 | Status: SHIPPED | OUTPATIENT
Start: 2021-08-02 | End: 2021-08-29

## 2021-08-09 ENCOUNTER — TELEPHONE (OUTPATIENT)
Dept: PSYCHIATRY | Facility: CLINIC | Age: 71
End: 2021-08-09

## 2021-08-17 ENCOUNTER — OFFICE VISIT (OUTPATIENT)
Dept: GASTROENTEROLOGY | Facility: CLINIC | Age: 71
End: 2021-08-17
Payer: MEDICARE

## 2021-08-17 VITALS
TEMPERATURE: 97.9 F | DIASTOLIC BLOOD PRESSURE: 92 MMHG | HEIGHT: 65 IN | BODY MASS INDEX: 29.29 KG/M2 | SYSTOLIC BLOOD PRESSURE: 144 MMHG | HEART RATE: 101 BPM

## 2021-08-17 DIAGNOSIS — R10.13 EPIGASTRIC PAIN: Primary | ICD-10-CM

## 2021-08-17 PROCEDURE — 1124F ACP DISCUSS-NO DSCNMKR DOCD: CPT | Performed by: INTERNAL MEDICINE

## 2021-08-17 PROCEDURE — 99213 OFFICE O/P EST LOW 20 MIN: CPT | Performed by: INTERNAL MEDICINE

## 2021-08-17 NOTE — LETTER
August 17, 2021     Saji Moise MD  [address]  [place] [address]    Patient: Erika Alexis   YOB: 1950   Date of Visit: 8/17/2021       Dear Dr Anel Diaz:    Thank you for referring Erika Alexis to me for evaluation  Below are my notes for this consultation  If you have questions, please do not hesitate to call me  I look forward to following your patient along with you  Sincerely,        Jayna Pichardo MD        CC: No Recipients  Jayna Pichardo MD  8/17/2021  1:23 PM  Sign when Signing Visit  [address] [facility]  Erika Alexis 80 y o  female MRN: [id number]            Assessment & Plan:     pleasant 71-year-old female with a history of intestinal malrotation, single kidney, seen for bouts of epigastric pain his had extensive evaluation  1  Epigastric pain:  Appears to have been musculoskeletal versus neuropathic pain, she has had clinical significant response to amitriptyline  CT scan was were unremarkable  -patient was instructed since it has been 4 months since she has had any pain, gently taper off of amitriptyline  -recommend that she take 25 mg every other night for 1 month if symptoms are under control at that point she can stop it altogether   -call us for refills  - if she needs to continue taking amitriptyline it is safe to continue long-term as well  - next screening colonoscopy to be in 2027      Nelle Fleischer was seen today for medication refill  Diagnoses and all orders for this visit:    Epigastric pain            _____________________________________________________________        CC: Follow-up    HPI:  Erika Alexis is a 80 y  o female who is here for  Follow-up  Patient reports she is doing very well, at her last visit she had complaints of persistent epigastric discomfort which was constant, associated with occasional nausea and vomiting, overall dyspeptic symptoms but frequently worsened with positional changes, bra line    We had started her on amitriptyline for presumed neuropathic pain, she reports that her pain had almost completely resolved  The last 4 months she has had not any attacks of pain  She reports some mild dry mouth and constipation with amitriptyline but otherwise has been doing well with no significant side effects or complications  Appetite is good, weight has been stable  Denies any nausea vomiting  ROS:  The remainder of the ROS was negative except for the pertinent positives mentioned in HPI  Allergies: Patient has no known allergies      Medications:   Current Outpatient Medications:     ALPRAZolam (XANAX) 0 5 mg tablet, TAKE 1 TABLET BY MOUTH ONCE DAILY AS NEEDED FOR ANXIETY, Disp: 30 tablet, Rfl: 0    amitriptyline (ELAVIL) 25 mg tablet, TAKE 1 TABLET BY MOUTH EVERY DAY AT BEDTIME, Disp: 30 tablet, Rfl: 0    buPROPion (WELLBUTRIN XL) 150 mg 24 hr tablet, Take 1 tablet by mouth once daily, Disp: 30 tablet, Rfl: 5    escitalopram (LEXAPRO) 20 mg tablet, Take 1 tablet by mouth once daily, Disp: 90 tablet, Rfl: 1    Euthyrox 25 MCG tablet, Take 1 tablet by mouth once daily, Disp: 30 tablet, Rfl: 0    lisinopril (ZESTRIL) 10 mg tablet, Take 1 tablet (10 mg total) by mouth daily, Disp: 90 tablet, Rfl: 3    pantoprazole (PROTONIX) 40 mg tablet, Take 1 tablet by mouth once daily, Disp: 30 tablet, Rfl: 5    zolpidem (AMBIEN) 10 mg tablet, TAKE 1 TABLET BY MOUTH ONCE DAILY AT BEDTIME, Disp: 30 tablet, Rfl: 0    buPROPion (WELLBUTRIN SR) 100 mg 12 hr tablet, Take 1 tablet (100 mg total) by mouth daily (Patient not taking: Reported on 8/17/2021), Disp:  , Rfl:     Past Medical History:   Diagnosis Date    Anxiety     Congenital absence of one kidney     Depression     GERD (gastroesophageal reflux disease)     History of transfusion     Hypertension     Insomnia     Menopause     Other specified hypothyroidism 1/28/2019    Vitamin B12 deficiency        Past Surgical History:   Procedure Laterality Date    COLONOSCOPY      MD COLONOSCOPY FLX DX W/COLLJ SPEC WHEN PFRMD N/A 5/30/2017    Procedure: EGD AND COLONOSCOPY;  Surgeon: Juan Carlos Altamirano MD;  [facility]; Service: Gastroenterology    MD OPEN [place] LAT MALLEOLUS Left 7/13/2020    Procedure: OPEN REDUCTION W/ INTERNAL FIXATION (ORIF) ANKLE;  Surgeon: Harriet Quach DO;  Location: [facility];  Service: Orthopedics    REDUCTION MAMMAPLASTY Bilateral 06/06/2011    REDUCTION MAMMAPLASTY      SHOULDER SURGERY      TONSILLECTOMY         Family History   Problem Relation Age of Onset    Lung cancer Mother 79    Heart disease Father     Lung cancer Brother 48        reports that she has never smoked  She has never used smokeless tobacco  She reports current alcohol use of about 7 0 standard drinks of alcohol per week  She reports that she does not use drugs        Physical Exam:    /92 (BP Location: Right arm, Patient Position: Sitting, Cuff Size: Standard)   Pulse 101   Temp 97 9 °F (36 6 °C)   Ht 5' 5" (1 651 m)   LMP  (LMP Unknown)   BMI 29 29 kg/m²     Gen: wn/wd, NAD , healthy-appearing female  HEENT: anicteric, MMM, no cervical LAD  CVS: RRR, no m/r/g  CHEST: CTA b/l  ABD: +BS, soft, NT,ND, no hepatosplenomegaly mild epigastric discomfort with deep palpation, no rebound or guarding  EXT: no c/c/e  NEURO: aaox3  SKIN: NO rashes

## 2021-08-17 NOTE — PROGRESS NOTES
[facility]  Erika Alexis 80 y o  female MRN: [id number]            Assessment & Plan:     pleasant 78-year-old female with a history of intestinal malrotation, single kidney, seen for bouts of epigastric pain his had extensive evaluation  1  Epigastric pain:  Appears to have been musculoskeletal versus neuropathic pain, she has had clinical significant response to amitriptyline  CT scan was were unremarkable  -patient was instructed since it has been 4 months since she has had any pain, gently taper off of amitriptyline  -recommend that she take 25 mg every other night for 1 month if symptoms are under control at that point she can stop it altogether   -call us for refills  - if she needs to continue taking amitriptyline it is safe to continue long-term as well  - next screening colonoscopy to be in 2027      Nelle Fleischer was seen today for medication refill  Diagnoses and all orders for this visit:    Epigastric pain            _____________________________________________________________        CC: Follow-up    HPI:  Erika Alexis is a 80 y  o female who is here for  Follow-up  Patient reports she is doing very well, at her last visit she had complaints of persistent epigastric discomfort which was constant, associated with occasional nausea and vomiting, overall dyspeptic symptoms but frequently worsened with positional changes, bra line  We had started her on amitriptyline for presumed neuropathic pain, she reports that her pain had almost completely resolved  The last 4 months she has had not any attacks of pain  She reports some mild dry mouth and constipation with amitriptyline but otherwise has been doing well with no significant side effects or complications  Appetite is good, weight has been stable  Denies any nausea vomiting  ROS:  The remainder of the ROS was negative except for the pertinent positives mentioned in HPI        Allergies: Patient has no known allergies  Medications:   Current Outpatient Medications:     ALPRAZolam (XANAX) 0 5 mg tablet, TAKE 1 TABLET BY MOUTH ONCE DAILY AS NEEDED FOR ANXIETY, Disp: 30 tablet, Rfl: 0    amitriptyline (ELAVIL) 25 mg tablet, TAKE 1 TABLET BY MOUTH EVERY DAY AT BEDTIME, Disp: 30 tablet, Rfl: 0    buPROPion (WELLBUTRIN XL) 150 mg 24 hr tablet, Take 1 tablet by mouth once daily, Disp: 30 tablet, Rfl: 5    escitalopram (LEXAPRO) 20 mg tablet, Take 1 tablet by mouth once daily, Disp: 90 tablet, Rfl: 1    Euthyrox 25 MCG tablet, Take 1 tablet by mouth once daily, Disp: 30 tablet, Rfl: 0    lisinopril (ZESTRIL) 10 mg tablet, Take 1 tablet (10 mg total) by mouth daily, Disp: 90 tablet, Rfl: 3    pantoprazole (PROTONIX) 40 mg tablet, Take 1 tablet by mouth once daily, Disp: 30 tablet, Rfl: 5    zolpidem (AMBIEN) 10 mg tablet, TAKE 1 TABLET BY MOUTH ONCE DAILY AT BEDTIME, Disp: 30 tablet, Rfl: 0    buPROPion (WELLBUTRIN SR) 100 mg 12 hr tablet, Take 1 tablet (100 mg total) by mouth daily (Patient not taking: Reported on 8/17/2021), Disp:  , Rfl:     Past Medical History:   Diagnosis Date    Anxiety     Congenital absence of one kidney     Depression     GERD (gastroesophageal reflux disease)     History of transfusion     Hypertension     Insomnia     Menopause     Other specified hypothyroidism 1/28/2019    Vitamin B12 deficiency        Past Surgical History:   Procedure Laterality Date    COLONOSCOPY      [place] COLONOSCOPY FLX DX W/COLLJ SPEC WHEN PFRMD N/A 5/30/2017    Procedure: EGD AND COLONOSCOPY;  Surgeon: Mariaelena Sanchez MD;  [facility];   Service: Gastroenterology    [place] OPEN [place] LAT MALLEOLUS Left 7/13/2020    Procedure: OPEN REDUCTION W/ INTERNAL FIXATION (ORIF) ANKLE;  Surgeon: Paul Simmons DO;  Location: [address] [place] [address];  Service: Orthopedics    REDUCTION MAMMAPLASTY Bilateral 06/06/2011    REDUCTION MAMMAPLASTY      SHOULDER SURGERY      TONSILLECTOMY         Family History   Problem Relation Age of Onset    Lung cancer Mother 79    Heart disease Father     Lung cancer Brother 48        reports that she has never smoked  She has never used smokeless tobacco  She reports current alcohol use of about 7 0 standard drinks of alcohol per week  She reports that she does not use drugs        Physical Exam:    /92 (BP Location: Right arm, Patient Position: Sitting, Cuff Size: Standard)   Pulse 101   Temp 97 9 °F (36 6 °C)   Ht 5' 5" (1 651 m)   LMP  (LMP Unknown)   BMI 29 29 kg/m²     Gen: wn/wd, NAD , healthy-appearing female  HEENT: anicteric, MMM, no cervical LAD  CVS: RRR, no m/r/g  CHEST: CTA b/l  ABD: +BS, soft, NT,ND, no hepatosplenomegaly mild epigastric discomfort with deep palpation, no rebound or guarding  EXT: no c/c/e  NEURO: aaox3  SKIN: NO rashes

## 2021-08-24 DIAGNOSIS — F41.9 ANXIETY: ICD-10-CM

## 2021-08-25 RX ORDER — ALPRAZOLAM 0.5 MG/1
TABLET ORAL
Qty: 30 TABLET | Refills: 0 | Status: SHIPPED | OUTPATIENT
Start: 2021-08-25 | End: 2021-09-27

## 2021-08-27 DIAGNOSIS — R10.13 EPIGASTRIC PAIN: ICD-10-CM

## 2021-08-29 RX ORDER — AMITRIPTYLINE HYDROCHLORIDE 25 MG/1
TABLET, FILM COATED ORAL
Qty: 30 TABLET | Refills: 0 | Status: SHIPPED | OUTPATIENT
Start: 2021-08-29 | End: 2021-10-06

## 2021-09-01 DIAGNOSIS — E03.8 OTHER SPECIFIED HYPOTHYROIDISM: ICD-10-CM

## 2021-09-02 RX ORDER — LEVOTHYROXINE SODIUM 25 UG/1
TABLET ORAL
Qty: 30 TABLET | Refills: 5 | Status: SHIPPED | OUTPATIENT
Start: 2021-09-02 | End: 2021-12-23 | Stop reason: SDUPTHER

## 2021-09-08 DIAGNOSIS — G47.00 INSOMNIA, UNSPECIFIED TYPE: ICD-10-CM

## 2021-09-08 RX ORDER — ZOLPIDEM TARTRATE 10 MG/1
TABLET ORAL
Qty: 30 TABLET | Refills: 0 | Status: SHIPPED | OUTPATIENT
Start: 2021-09-08 | End: 2021-10-13

## 2021-09-26 DIAGNOSIS — F41.9 ANXIETY: ICD-10-CM

## 2021-09-27 RX ORDER — ALPRAZOLAM 0.5 MG/1
TABLET ORAL
Qty: 30 TABLET | Refills: 0 | Status: SHIPPED | OUTPATIENT
Start: 2021-09-27 | End: 2021-09-29

## 2021-09-29 DIAGNOSIS — F41.9 ANXIETY: ICD-10-CM

## 2021-09-29 RX ORDER — ALPRAZOLAM 0.5 MG/1
TABLET ORAL
Qty: 30 TABLET | Refills: 0 | Status: SHIPPED | OUTPATIENT
Start: 2021-09-29 | End: 2021-12-09

## 2021-10-06 DIAGNOSIS — R10.13 EPIGASTRIC PAIN: ICD-10-CM

## 2021-10-06 RX ORDER — AMITRIPTYLINE HYDROCHLORIDE 25 MG/1
TABLET, FILM COATED ORAL
Qty: 30 TABLET | Refills: 0 | Status: SHIPPED | OUTPATIENT
Start: 2021-10-06 | End: 2021-11-10

## 2021-10-12 DIAGNOSIS — G47.00 INSOMNIA, UNSPECIFIED TYPE: ICD-10-CM

## 2021-10-13 RX ORDER — ZOLPIDEM TARTRATE 10 MG/1
TABLET ORAL
Qty: 30 TABLET | Refills: 0 | Status: SHIPPED | OUTPATIENT
Start: 2021-10-13 | End: 2021-11-29

## 2021-10-16 ENCOUNTER — IMMUNIZATIONS (OUTPATIENT)
Dept: FAMILY MEDICINE CLINIC | Facility: HOSPITAL | Age: 71
End: 2021-10-16

## 2021-10-16 DIAGNOSIS — Z23 ENCOUNTER FOR IMMUNIZATION: Primary | ICD-10-CM

## 2021-10-16 PROCEDURE — 0001A SARS-COV-2 / COVID-19 MRNA VACCINE (PFIZER-BIONTECH) 30 MCG: CPT

## 2021-10-16 PROCEDURE — 91300 SARS-COV-2 / COVID-19 MRNA VACCINE (PFIZER-BIONTECH) 30 MCG: CPT

## 2021-11-10 DIAGNOSIS — R10.13 EPIGASTRIC PAIN: ICD-10-CM

## 2021-11-10 RX ORDER — AMITRIPTYLINE HYDROCHLORIDE 25 MG/1
TABLET, FILM COATED ORAL
Qty: 30 TABLET | Refills: 0 | Status: SHIPPED | OUTPATIENT
Start: 2021-11-10 | End: 2022-02-01

## 2021-11-17 ENCOUNTER — TELEPHONE (OUTPATIENT)
Dept: FAMILY MEDICINE CLINIC | Facility: CLINIC | Age: 71
End: 2021-11-17

## 2021-11-17 DIAGNOSIS — E53.8 VITAMIN B12 DEFICIENCY: ICD-10-CM

## 2021-11-17 DIAGNOSIS — I10 ESSENTIAL HYPERTENSION: ICD-10-CM

## 2021-11-17 DIAGNOSIS — E03.8 OTHER SPECIFIED HYPOTHYROIDISM: Primary | ICD-10-CM

## 2021-11-24 DIAGNOSIS — G47.00 INSOMNIA, UNSPECIFIED TYPE: ICD-10-CM

## 2021-11-29 RX ORDER — ZOLPIDEM TARTRATE 10 MG/1
TABLET ORAL
Qty: 30 TABLET | Refills: 0 | Status: SHIPPED | OUTPATIENT
Start: 2021-11-29 | End: 2021-12-23 | Stop reason: SDUPTHER

## 2021-12-08 ENCOUNTER — OFFICE VISIT (OUTPATIENT)
Dept: FAMILY MEDICINE CLINIC | Facility: CLINIC | Age: 71
End: 2021-12-08
Payer: MEDICARE

## 2021-12-08 VITALS
HEART RATE: 76 BPM | DIASTOLIC BLOOD PRESSURE: 70 MMHG | HEIGHT: 65 IN | TEMPERATURE: 96.8 F | SYSTOLIC BLOOD PRESSURE: 140 MMHG | BODY MASS INDEX: 29.29 KG/M2 | RESPIRATION RATE: 16 BRPM

## 2021-12-08 DIAGNOSIS — I10 ESSENTIAL HYPERTENSION: ICD-10-CM

## 2021-12-08 DIAGNOSIS — Z00.00 MEDICARE ANNUAL WELLNESS VISIT, SUBSEQUENT: Primary | ICD-10-CM

## 2021-12-08 DIAGNOSIS — F33.0 MILD EPISODE OF RECURRENT MAJOR DEPRESSIVE DISORDER (HCC): ICD-10-CM

## 2021-12-08 DIAGNOSIS — E03.8 OTHER SPECIFIED HYPOTHYROIDISM: ICD-10-CM

## 2021-12-08 DIAGNOSIS — F41.9 ANXIETY: ICD-10-CM

## 2021-12-08 DIAGNOSIS — M48.46XA STRESS FRACTURE OF LUMBAR VERTEBRA, INITIAL ENCOUNTER: ICD-10-CM

## 2021-12-08 PROCEDURE — 99214 OFFICE O/P EST MOD 30 MIN: CPT | Performed by: INTERNAL MEDICINE

## 2021-12-08 PROCEDURE — G0439 PPPS, SUBSEQ VISIT: HCPCS | Performed by: INTERNAL MEDICINE

## 2021-12-09 RX ORDER — ALPRAZOLAM 0.5 MG/1
TABLET ORAL
Qty: 30 TABLET | Refills: 0 | Status: SHIPPED | OUTPATIENT
Start: 2021-12-09 | End: 2022-01-03 | Stop reason: SDUPTHER

## 2021-12-12 DIAGNOSIS — F32.A DEPRESSION, UNSPECIFIED DEPRESSION TYPE: ICD-10-CM

## 2021-12-13 RX ORDER — ESCITALOPRAM OXALATE 20 MG/1
TABLET ORAL
Qty: 90 TABLET | Refills: 1 | Status: ON HOLD | OUTPATIENT
Start: 2021-12-13 | End: 2022-04-17 | Stop reason: SDUPTHER

## 2021-12-23 DIAGNOSIS — F41.9 ANXIETY: ICD-10-CM

## 2021-12-23 DIAGNOSIS — F32.A DEPRESSION, UNSPECIFIED DEPRESSION TYPE: ICD-10-CM

## 2021-12-23 DIAGNOSIS — G47.00 INSOMNIA, UNSPECIFIED TYPE: ICD-10-CM

## 2021-12-23 DIAGNOSIS — E03.8 OTHER SPECIFIED HYPOTHYROIDISM: ICD-10-CM

## 2021-12-23 RX ORDER — LEVOTHYROXINE SODIUM 0.03 MG/1
25 TABLET ORAL DAILY
Qty: 90 TABLET | Refills: 1 | Status: SHIPPED | OUTPATIENT
Start: 2021-12-23 | End: 2022-02-07 | Stop reason: SDUPTHER

## 2021-12-23 RX ORDER — BUPROPION HYDROCHLORIDE 150 MG/1
150 TABLET ORAL DAILY
Qty: 30 TABLET | Refills: 5 | Status: SHIPPED | OUTPATIENT
Start: 2021-12-23 | End: 2022-06-20

## 2021-12-23 RX ORDER — ALPRAZOLAM 0.5 MG/1
TABLET ORAL
Qty: 30 TABLET | Refills: 0 | Status: CANCELLED | OUTPATIENT
Start: 2021-12-23

## 2021-12-23 RX ORDER — ZOLPIDEM TARTRATE 10 MG/1
10 TABLET ORAL
Qty: 30 TABLET | Refills: 0 | Status: SHIPPED | OUTPATIENT
Start: 2021-12-23 | End: 2022-02-21

## 2022-01-03 DIAGNOSIS — F41.9 ANXIETY: ICD-10-CM

## 2022-01-05 RX ORDER — ALPRAZOLAM 0.5 MG/1
0.5 TABLET ORAL DAILY PRN
Qty: 30 TABLET | Refills: 0 | Status: SHIPPED | OUTPATIENT
Start: 2022-01-05 | End: 2022-02-21

## 2022-01-21 ENCOUNTER — TELEPHONE (OUTPATIENT)
Dept: GASTROENTEROLOGY | Facility: CLINIC | Age: 72
End: 2022-01-21

## 2022-01-21 NOTE — TELEPHONE ENCOUNTER
Patients GI provider:  Dr Gladis Leonardo    Number to return call: [phone]    Reason for call: Angela Rios from [place] [address] called requesting to have a new script for the amitriptyline faxed over to [facility] at [phone]  Above is her number       Scheduled procedure/appointment date if applicable: Apt/procedure NA

## 2022-01-21 NOTE — TELEPHONE ENCOUNTER
Patient of Dr Agustín Chow, last seen 8/17/21    Last office visit note indicated patient should be weaning off of amitriptyline if possible  I see she has not gotten this refilled recently so I called patient to verify if she wants us to send to [address] [place] [address]    Patient's only phone number on file invalid

## 2022-02-01 DIAGNOSIS — R10.13 EPIGASTRIC PAIN: ICD-10-CM

## 2022-02-01 RX ORDER — AMITRIPTYLINE HYDROCHLORIDE 25 MG/1
TABLET, FILM COATED ORAL
Qty: 30 TABLET | Refills: 0 | Status: SHIPPED | OUTPATIENT
Start: 2022-02-01 | End: 2022-03-17

## 2022-02-04 ENCOUNTER — APPOINTMENT (OUTPATIENT)
Dept: LAB | Facility: CLINIC | Age: 72
End: 2022-02-04
Payer: MEDICARE

## 2022-02-04 DIAGNOSIS — I10 ESSENTIAL HYPERTENSION: ICD-10-CM

## 2022-02-04 DIAGNOSIS — E53.8 VITAMIN B12 DEFICIENCY: ICD-10-CM

## 2022-02-04 DIAGNOSIS — E03.8 OTHER SPECIFIED HYPOTHYROIDISM: ICD-10-CM

## 2022-02-04 LAB
ALBUMIN SERPL BCP-MCNC: 3.6 G/DL (ref 3.5–5)
ALP SERPL-CCNC: 68 U/L (ref 46–116)
ALT SERPL W P-5'-P-CCNC: 19 U/L (ref 12–78)
ANION GAP SERPL CALCULATED.3IONS-SCNC: 2 MMOL/L (ref 4–13)
AST SERPL W P-5'-P-CCNC: 10 U/L (ref 5–45)
BASOPHILS # BLD AUTO: 0.06 THOUSANDS/ΜL (ref 0–0.1)
BASOPHILS NFR BLD AUTO: 1 % (ref 0–1)
BILIRUB SERPL-MCNC: 0.37 MG/DL (ref 0.2–1)
BUN SERPL-MCNC: 11 MG/DL (ref 5–25)
CALCIUM SERPL-MCNC: 9.1 MG/DL (ref 8.3–10.1)
CHLORIDE SERPL-SCNC: 108 MMOL/L (ref 100–108)
CHOLEST SERPL-MCNC: 238 MG/DL
CO2 SERPL-SCNC: 30 MMOL/L (ref 21–32)
CREAT SERPL-MCNC: 0.95 MG/DL (ref 0.6–1.3)
EOSINOPHIL # BLD AUTO: 0.26 THOUSAND/ΜL (ref 0–0.61)
EOSINOPHIL NFR BLD AUTO: 4 % (ref 0–6)
ERYTHROCYTE [DISTWIDTH] IN BLOOD BY AUTOMATED COUNT: 12.8 % (ref 11.6–15.1)
GFR SERPL CREATININE-BSD FRML MDRD: 60 ML/MIN/1.73SQ M
GLUCOSE P FAST SERPL-MCNC: 98 MG/DL (ref 65–99)
HCT VFR BLD AUTO: 43.7 % (ref 34.8–46.1)
HDLC SERPL-MCNC: 73 MG/DL
HGB BLD-MCNC: 14.1 G/DL (ref 11.5–15.4)
IMM GRANULOCYTES # BLD AUTO: 0.04 THOUSAND/UL (ref 0–0.2)
IMM GRANULOCYTES NFR BLD AUTO: 1 % (ref 0–2)
LDLC SERPL CALC-MCNC: 143 MG/DL (ref 0–100)
LYMPHOCYTES # BLD AUTO: 2.61 THOUSANDS/ΜL (ref 0.6–4.47)
LYMPHOCYTES NFR BLD AUTO: 37 % (ref 14–44)
MCH RBC QN AUTO: 32.3 PG (ref 26.8–34.3)
MCHC RBC AUTO-ENTMCNC: 32.3 G/DL (ref 31.4–37.4)
MCV RBC AUTO: 100 FL (ref 82–98)
MONOCYTES # BLD AUTO: 0.59 THOUSAND/ΜL (ref 0.17–1.22)
MONOCYTES NFR BLD AUTO: 8 % (ref 4–12)
NEUTROPHILS # BLD AUTO: 3.5 THOUSANDS/ΜL (ref 1.85–7.62)
NEUTS SEG NFR BLD AUTO: 49 % (ref 43–75)
NONHDLC SERPL-MCNC: 165 MG/DL
NRBC BLD AUTO-RTO: 0 /100 WBCS
PLATELET # BLD AUTO: 266 THOUSANDS/UL (ref 149–390)
PMV BLD AUTO: 10.9 FL (ref 8.9–12.7)
POTASSIUM SERPL-SCNC: 5 MMOL/L (ref 3.5–5.3)
PROT SERPL-MCNC: 7.3 G/DL (ref 6.4–8.2)
RBC # BLD AUTO: 4.36 MILLION/UL (ref 3.81–5.12)
SODIUM SERPL-SCNC: 140 MMOL/L (ref 136–145)
T4 FREE SERPL-MCNC: 0.88 NG/DL (ref 0.76–1.46)
TRIGL SERPL-MCNC: 110 MG/DL
TSH SERPL DL<=0.05 MIU/L-ACNC: 3.83 UIU/ML (ref 0.36–3.74)
VIT B12 SERPL-MCNC: 252 PG/ML (ref 100–900)
WBC # BLD AUTO: 7.06 THOUSAND/UL (ref 4.31–10.16)

## 2022-02-04 PROCEDURE — 82607 VITAMIN B-12: CPT

## 2022-02-04 PROCEDURE — 36415 COLL VENOUS BLD VENIPUNCTURE: CPT

## 2022-02-04 PROCEDURE — 80053 COMPREHEN METABOLIC PANEL: CPT

## 2022-02-04 PROCEDURE — 84439 ASSAY OF FREE THYROXINE: CPT

## 2022-02-04 PROCEDURE — 80061 LIPID PANEL: CPT

## 2022-02-04 PROCEDURE — 85025 COMPLETE CBC W/AUTO DIFF WBC: CPT

## 2022-02-04 PROCEDURE — 84443 ASSAY THYROID STIM HORMONE: CPT

## 2022-02-09 DIAGNOSIS — K21.9 GASTROESOPHAGEAL REFLUX DISEASE: ICD-10-CM

## 2022-02-09 RX ORDER — PANTOPRAZOLE SODIUM 40 MG/1
TABLET, DELAYED RELEASE ORAL
Qty: 90 TABLET | Refills: 0 | Status: SHIPPED | OUTPATIENT
Start: 2022-02-09 | End: 2022-05-18

## 2022-02-18 DIAGNOSIS — F41.9 ANXIETY: ICD-10-CM

## 2022-02-18 DIAGNOSIS — G47.00 INSOMNIA, UNSPECIFIED TYPE: ICD-10-CM

## 2022-02-21 RX ORDER — ALPRAZOLAM 0.5 MG/1
TABLET ORAL
Qty: 30 TABLET | Refills: 0 | Status: SHIPPED | OUTPATIENT
Start: 2022-02-21 | End: 2022-03-23

## 2022-02-21 RX ORDER — ZOLPIDEM TARTRATE 10 MG/1
TABLET ORAL
Qty: 30 TABLET | Refills: 0 | Status: SHIPPED | OUTPATIENT
Start: 2022-02-21 | End: 2022-04-04

## 2022-03-17 DIAGNOSIS — R10.13 EPIGASTRIC PAIN: ICD-10-CM

## 2022-03-17 RX ORDER — AMITRIPTYLINE HYDROCHLORIDE 25 MG/1
TABLET, FILM COATED ORAL
Qty: 30 TABLET | Refills: 0 | Status: SHIPPED | OUTPATIENT
Start: 2022-03-17 | End: 2022-05-08

## 2022-03-21 DIAGNOSIS — F41.9 ANXIETY: ICD-10-CM

## 2022-03-23 RX ORDER — ALPRAZOLAM 0.5 MG/1
TABLET ORAL
Qty: 30 TABLET | Refills: 0 | Status: SHIPPED | OUTPATIENT
Start: 2022-03-23 | End: 2022-04-17 | Stop reason: HOSPADM

## 2022-04-02 ENCOUNTER — HOSPITAL ENCOUNTER (OUTPATIENT)
Dept: MAMMOGRAPHY | Facility: HOSPITAL | Age: 72
Discharge: HOME | End: 2022-04-02
Attending: FAMILY MEDICINE
Payer: MEDICARE

## 2022-04-02 DIAGNOSIS — Z12.31 ENCOUNTER FOR SCREENING MAMMOGRAM FOR MALIGNANT NEOPLASM OF BREAST: ICD-10-CM

## 2022-04-02 DIAGNOSIS — Z12.39 SCREENING FOR BREAST CANCER: ICD-10-CM

## 2022-04-02 PROCEDURE — 77063 BREAST TOMOSYNTHESIS BI: CPT

## 2022-04-02 PROCEDURE — 77067 SCR MAMMO BI INCL CAD: CPT

## 2022-04-03 DIAGNOSIS — G47.00 INSOMNIA, UNSPECIFIED TYPE: ICD-10-CM

## 2022-04-04 RX ORDER — ZOLPIDEM TARTRATE 10 MG/1
TABLET ORAL
Qty: 30 TABLET | Refills: 0 | Status: SHIPPED | OUTPATIENT
Start: 2022-04-04 | End: 2022-04-17 | Stop reason: HOSPADM

## 2022-04-11 ENCOUNTER — APPOINTMENT (EMERGENCY)
Dept: CT IMAGING | Facility: HOSPITAL | Age: 72
DRG: 041 | End: 2022-04-11
Payer: MEDICARE

## 2022-04-11 ENCOUNTER — APPOINTMENT (EMERGENCY)
Dept: RADIOLOGY | Facility: HOSPITAL | Age: 72
DRG: 041 | End: 2022-04-11
Payer: MEDICARE

## 2022-04-11 ENCOUNTER — TELEPHONE (OUTPATIENT)
Dept: NEUROSURGERY | Facility: CLINIC | Age: 72
End: 2022-04-11

## 2022-04-11 ENCOUNTER — HOSPITAL ENCOUNTER (INPATIENT)
Facility: HOSPITAL | Age: 72
LOS: 6 days | Discharge: HOME | DRG: 041 | End: 2022-04-17
Attending: EMERGENCY MEDICINE | Admitting: SURGERY
Payer: MEDICARE

## 2022-04-11 DIAGNOSIS — F32.9 MAJOR DEPRESSIVE DISORDER, REMISSION STATUS UNSPECIFIED, UNSPECIFIED WHETHER RECURRENT: Chronic | ICD-10-CM

## 2022-04-11 DIAGNOSIS — I48.91 ATRIAL FIBRILLATION WITH RAPID VENTRICULAR RESPONSE (HCC): ICD-10-CM

## 2022-04-11 DIAGNOSIS — I49.5 TACHY-BRADY SYNDROME (HCC): ICD-10-CM

## 2022-04-11 DIAGNOSIS — I48.91 ATRIAL FIBRILLATION, UNSPECIFIED TYPE (HCC): ICD-10-CM

## 2022-04-11 DIAGNOSIS — I48.91 ATRIAL FIBRILLATION WITH RVR (HCC): ICD-10-CM

## 2022-04-11 DIAGNOSIS — I62.00 SUBDURAL HEMORRHAGE (HCC): Primary | ICD-10-CM

## 2022-04-11 DIAGNOSIS — R26.2 AMBULATORY DYSFUNCTION: ICD-10-CM

## 2022-04-11 DIAGNOSIS — F32.A DEPRESSION, UNSPECIFIED DEPRESSION TYPE: ICD-10-CM

## 2022-04-11 PROBLEM — R53.1 WEAKNESS GENERALIZED: Status: ACTIVE | Noted: 2022-04-11

## 2022-04-11 PROBLEM — J10.1 INFLUENZA A: Status: ACTIVE | Noted: 2022-04-11

## 2022-04-11 PROBLEM — R74.8 ELEVATED CK: Status: ACTIVE | Noted: 2022-04-11

## 2022-04-11 LAB
2HR DELTA HS TROPONIN: 4 NG/L
ALBUMIN SERPL BCP-MCNC: 3.3 G/DL (ref 3.5–5)
ALP SERPL-CCNC: 65 U/L (ref 46–116)
ALT SERPL W P-5'-P-CCNC: 19 U/L (ref 12–78)
ANION GAP SERPL CALCULATED.3IONS-SCNC: 10 MMOL/L (ref 4–13)
APTT PPP: 21 SECONDS (ref 23–37)
AST SERPL W P-5'-P-CCNC: 30 U/L (ref 5–45)
ATRIAL RATE: 312 BPM
BASOPHILS # BLD AUTO: 0.02 THOUSANDS/ΜL (ref 0–0.1)
BASOPHILS NFR BLD AUTO: 0 % (ref 0–1)
BILIRUB SERPL-MCNC: 0.56 MG/DL (ref 0.2–1)
BUN SERPL-MCNC: 11 MG/DL (ref 5–25)
CALCIUM ALBUM COR SERPL-MCNC: 8.9 MG/DL (ref 8.3–10.1)
CALCIUM SERPL-MCNC: 8.3 MG/DL (ref 8.3–10.1)
CARDIAC TROPONIN I PNL SERPL HS: 15 NG/L
CARDIAC TROPONIN I PNL SERPL HS: 19 NG/L
CHLORIDE SERPL-SCNC: 103 MMOL/L (ref 100–108)
CK MB SERPL-MCNC: 1 % (ref 0–2.5)
CK MB SERPL-MCNC: 3.3 NG/ML (ref 0–5)
CK SERPL-CCNC: 331 U/L (ref 26–192)
CO2 SERPL-SCNC: 25 MMOL/L (ref 21–32)
CREAT SERPL-MCNC: 1 MG/DL (ref 0.6–1.3)
EOSINOPHIL # BLD AUTO: 0.01 THOUSAND/ΜL (ref 0–0.61)
EOSINOPHIL NFR BLD AUTO: 0 % (ref 0–6)
ERYTHROCYTE [DISTWIDTH] IN BLOOD BY AUTOMATED COUNT: 13.1 % (ref 11.6–15.1)
ETHANOL SERPL-MCNC: <3 MG/DL (ref 0–3)
FLUAV RNA RESP QL NAA+PROBE: POSITIVE
FLUBV RNA RESP QL NAA+PROBE: NEGATIVE
GFR SERPL CREATININE-BSD FRML MDRD: 56 ML/MIN/1.73SQ M
GLUCOSE SERPL-MCNC: 92 MG/DL (ref 65–140)
HCT VFR BLD AUTO: 42 % (ref 34.8–46.1)
HGB BLD-MCNC: 13.5 G/DL (ref 11.5–15.4)
IMM GRANULOCYTES # BLD AUTO: 0.03 THOUSAND/UL (ref 0–0.2)
IMM GRANULOCYTES NFR BLD AUTO: 1 % (ref 0–2)
INR PPP: 1 (ref 0.84–1.19)
LYMPHOCYTES # BLD AUTO: 0.51 THOUSANDS/ΜL (ref 0.6–4.47)
LYMPHOCYTES NFR BLD AUTO: 9 % (ref 14–44)
MCH RBC QN AUTO: 32.1 PG (ref 26.8–34.3)
MCHC RBC AUTO-ENTMCNC: 32.1 G/DL (ref 31.4–37.4)
MCV RBC AUTO: 100 FL (ref 82–98)
MONOCYTES # BLD AUTO: 0.73 THOUSAND/ΜL (ref 0.17–1.22)
MONOCYTES NFR BLD AUTO: 13 % (ref 4–12)
NEUTROPHILS # BLD AUTO: 4.32 THOUSANDS/ΜL (ref 1.85–7.62)
NEUTS SEG NFR BLD AUTO: 77 % (ref 43–75)
NRBC BLD AUTO-RTO: 0 /100 WBCS
P AXIS: 123 DEGREES
PLATELET # BLD AUTO: 176 THOUSANDS/UL (ref 149–390)
PMV BLD AUTO: 11.7 FL (ref 8.9–12.7)
POTASSIUM SERPL-SCNC: 4 MMOL/L (ref 3.5–5.3)
PROT SERPL-MCNC: 7.2 G/DL (ref 6.4–8.2)
PROTHROMBIN TIME: 13.2 SECONDS (ref 11.6–14.5)
QRS AXIS: 61 DEGREES
QRSD INTERVAL: 66 MS
QT INTERVAL: 256 MS
QTC INTERVAL: 407 MS
RBC # BLD AUTO: 4.21 MILLION/UL (ref 3.81–5.12)
RSV RNA RESP QL NAA+PROBE: NEGATIVE
SARS-COV-2 RNA RESP QL NAA+PROBE: NEGATIVE
SODIUM SERPL-SCNC: 138 MMOL/L (ref 136–145)
T WAVE AXIS: 235 DEGREES
TSH SERPL DL<=0.05 MIU/L-ACNC: 0.87 UIU/ML (ref 0.45–4.5)
VENTRICULAR RATE: 152 BPM
WBC # BLD AUTO: 5.62 THOUSAND/UL (ref 4.31–10.16)

## 2022-04-11 PROCEDURE — 71046 X-RAY EXAM CHEST 2 VIEWS: CPT

## 2022-04-11 PROCEDURE — 99223 1ST HOSP IP/OBS HIGH 75: CPT | Performed by: INTERNAL MEDICINE

## 2022-04-11 PROCEDURE — 93005 ELECTROCARDIOGRAM TRACING: CPT

## 2022-04-11 PROCEDURE — 85610 PROTHROMBIN TIME: CPT | Performed by: PHYSICIAN ASSISTANT

## 2022-04-11 PROCEDURE — 0241U HB NFCT DS VIR RESP RNA 4 TRGT: CPT | Performed by: PHYSICIAN ASSISTANT

## 2022-04-11 PROCEDURE — 99221 1ST HOSP IP/OBS SF/LOW 40: CPT | Performed by: NEUROLOGICAL SURGERY

## 2022-04-11 PROCEDURE — 85730 THROMBOPLASTIN TIME PARTIAL: CPT | Performed by: PHYSICIAN ASSISTANT

## 2022-04-11 PROCEDURE — 70450 CT HEAD/BRAIN W/O DYE: CPT

## 2022-04-11 PROCEDURE — 99285 EMERGENCY DEPT VISIT HI MDM: CPT | Performed by: EMERGENCY MEDICINE

## 2022-04-11 PROCEDURE — 85025 COMPLETE CBC W/AUTO DIFF WBC: CPT | Performed by: PHYSICIAN ASSISTANT

## 2022-04-11 PROCEDURE — 82553 CREATINE MB FRACTION: CPT | Performed by: PHYSICIAN ASSISTANT

## 2022-04-11 PROCEDURE — 84443 ASSAY THYROID STIM HORMONE: CPT | Performed by: PHYSICIAN ASSISTANT

## 2022-04-11 PROCEDURE — 87040 BLOOD CULTURE FOR BACTERIA: CPT | Performed by: PHYSICIAN ASSISTANT

## 2022-04-11 PROCEDURE — 99285 EMERGENCY DEPT VISIT HI MDM: CPT

## 2022-04-11 PROCEDURE — 99222 1ST HOSP IP/OBS MODERATE 55: CPT | Performed by: HOSPITALIST

## 2022-04-11 PROCEDURE — 82077 ASSAY SPEC XCP UR&BREATH IA: CPT | Performed by: PHYSICIAN ASSISTANT

## 2022-04-11 PROCEDURE — 82550 ASSAY OF CK (CPK): CPT | Performed by: PHYSICIAN ASSISTANT

## 2022-04-11 PROCEDURE — 99223 1ST HOSP IP/OBS HIGH 75: CPT | Performed by: SURGERY

## 2022-04-11 PROCEDURE — 36415 COLL VENOUS BLD VENIPUNCTURE: CPT | Performed by: PHYSICIAN ASSISTANT

## 2022-04-11 PROCEDURE — 80053 COMPREHEN METABOLIC PANEL: CPT | Performed by: PHYSICIAN ASSISTANT

## 2022-04-11 PROCEDURE — 84484 ASSAY OF TROPONIN QUANT: CPT | Performed by: PHYSICIAN ASSISTANT

## 2022-04-11 RX ORDER — SODIUM CHLORIDE, SODIUM GLUCONATE, SODIUM ACETATE, POTASSIUM CHLORIDE, MAGNESIUM CHLORIDE, SODIUM PHOSPHATE, DIBASIC, AND POTASSIUM PHOSPHATE .53; .5; .37; .037; .03; .012; .00082 G/100ML; G/100ML; G/100ML; G/100ML; G/100ML; G/100ML; G/100ML
50 INJECTION, SOLUTION INTRAVENOUS CONTINUOUS
Status: DISCONTINUED | OUTPATIENT
Start: 2022-04-11 | End: 2022-04-12

## 2022-04-11 RX ORDER — LISINOPRIL 10 MG/1
10 TABLET ORAL DAILY
Status: DISCONTINUED | OUTPATIENT
Start: 2022-04-12 | End: 2022-04-11

## 2022-04-11 RX ORDER — DILTIAZEM HYDROCHLORIDE 5 MG/ML
20 INJECTION INTRAVENOUS ONCE
Status: COMPLETED | OUTPATIENT
Start: 2022-04-11 | End: 2022-04-11

## 2022-04-11 RX ORDER — DILTIAZEM HYDROCHLORIDE 120 MG/1
120 CAPSULE, COATED, EXTENDED RELEASE ORAL DAILY
Status: DISCONTINUED | OUTPATIENT
Start: 2022-04-11 | End: 2022-04-12

## 2022-04-11 RX ORDER — AMIODARONE HYDROCHLORIDE 200 MG/1
200 TABLET ORAL
Status: DISCONTINUED | OUTPATIENT
Start: 2022-04-11 | End: 2022-04-13

## 2022-04-11 RX ORDER — BUPROPION HYDROCHLORIDE 150 MG/1
150 TABLET ORAL DAILY
Status: DISCONTINUED | OUTPATIENT
Start: 2022-04-11 | End: 2022-04-17 | Stop reason: HOSPADM

## 2022-04-11 RX ORDER — ACETAMINOPHEN 325 MG/1
650 TABLET ORAL EVERY 6 HOURS SCHEDULED
Status: DISCONTINUED | OUTPATIENT
Start: 2022-04-11 | End: 2022-04-11

## 2022-04-11 RX ORDER — LEVETIRACETAM 500 MG/1
500 TABLET ORAL EVERY 12 HOURS
Status: DISCONTINUED | OUTPATIENT
Start: 2022-04-11 | End: 2022-04-17 | Stop reason: HOSPADM

## 2022-04-11 RX ORDER — ACETAMINOPHEN 325 MG/1
975 TABLET ORAL EVERY 6 HOURS SCHEDULED
Status: DISCONTINUED | OUTPATIENT
Start: 2022-04-12 | End: 2022-04-17 | Stop reason: HOSPADM

## 2022-04-11 RX ORDER — LEVOTHYROXINE SODIUM 0.05 MG/1
50 TABLET ORAL
Status: DISCONTINUED | OUTPATIENT
Start: 2022-04-12 | End: 2022-04-17 | Stop reason: HOSPADM

## 2022-04-11 RX ORDER — PANTOPRAZOLE SODIUM 40 MG/1
40 TABLET, DELAYED RELEASE ORAL DAILY
Status: DISCONTINUED | OUTPATIENT
Start: 2022-04-11 | End: 2022-04-17 | Stop reason: HOSPADM

## 2022-04-11 RX ORDER — ESCITALOPRAM OXALATE 20 MG/1
20 TABLET ORAL DAILY
Status: DISCONTINUED | OUTPATIENT
Start: 2022-04-11 | End: 2022-04-13

## 2022-04-11 RX ORDER — AMITRIPTYLINE HYDROCHLORIDE 25 MG/1
25 TABLET, FILM COATED ORAL
Status: DISCONTINUED | OUTPATIENT
Start: 2022-04-11 | End: 2022-04-17 | Stop reason: HOSPADM

## 2022-04-11 RX ADMIN — PANTOPRAZOLE SODIUM 40 MG: 40 TABLET, DELAYED RELEASE ORAL at 21:37

## 2022-04-11 RX ADMIN — BUPROPION HYDROCHLORIDE 150 MG: 150 TABLET, FILM COATED, EXTENDED RELEASE ORAL at 21:37

## 2022-04-11 RX ADMIN — AMITRIPTYLINE HYDROCHLORIDE 25 MG: 25 TABLET, FILM COATED ORAL at 21:36

## 2022-04-11 RX ADMIN — ACETAMINOPHEN 650 MG: 325 TABLET ORAL at 12:28

## 2022-04-11 RX ADMIN — ACETAMINOPHEN 650 MG: 325 TABLET ORAL at 18:01

## 2022-04-11 RX ADMIN — LEVETIRACETAM 500 MG: 500 TABLET, FILM COATED ORAL at 23:12

## 2022-04-11 RX ADMIN — LEVETIRACETAM 500 MG: 500 TABLET, FILM COATED ORAL at 12:28

## 2022-04-11 RX ADMIN — DILTIAZEM HYDROCHLORIDE 120 MG: 120 CAPSULE, COATED, EXTENDED RELEASE ORAL at 20:21

## 2022-04-11 RX ADMIN — AMIODARONE HYDROCHLORIDE 200 MG: 200 TABLET ORAL at 18:01

## 2022-04-11 RX ADMIN — SODIUM CHLORIDE, SODIUM GLUCONATE, SODIUM ACETATE, POTASSIUM CHLORIDE, MAGNESIUM CHLORIDE, SODIUM PHOSPHATE, DIBASIC, AND POTASSIUM PHOSPHATE 50 ML/HR: .53; .5; .37; .037; .03; .012; .00082 INJECTION, SOLUTION INTRAVENOUS at 12:27

## 2022-04-11 RX ADMIN — DILTIAZEM HYDROCHLORIDE 20 MG: 5 INJECTION, SOLUTION INTRAVENOUS at 11:39

## 2022-04-11 RX ADMIN — ACETAMINOPHEN 325MG 975 MG: 325 TABLET ORAL at 23:12

## 2022-04-11 NOTE — CONSULTS
Consultation - Geriatric Medicine   [facility] 71 y o  female MRN: [id number]  Unit/Bed#: S -01 Encounter: [id number]        Inpatient consult to Gerontology for Nga Colon performed by: Evelin Ott MD  Consult ordered by: OLIVIA SHIPLEY        Assessment/Plan   1 -subdural hemorrhage -this was secondary to trauma appears to be stable at present neurosurgery is following    2 -atrial fibrillation with rapid ventricular response -not anticoagulated because of her subdural hemorrhage  Patient placed on Cardizem orally  She briefly did go into a normal sinus rhythm after initial Cardizem bolus  Thyroid function test within normal range at 0 87  Patient with a chads 2 Vasc score of 3     3 -influenza a -did not receive treatment because her onset of symptoms was over 48 hours  4 -major depressive disorder -patient on triple therapy with Lexapro, Wellbutrin amitriptyline  Might want to consider discontinuing amitriptyline; however, patient states the amitriptyline is the only medication that has really helped her  Patient also on Xanax on a p r n  Basis  5 -generalized weakness -most likely secondary to patient's influenza  Patient should be placed on fall precautions per geriatric protocol  6 -hypertension  -patient had been on lisinopril 10 mg orally daily now receiving also Cardizem blood pressure systolic 346 would hold off the lisinopril for now  7 -hypothyroidism continue levothyroxine 50 mcg orally daily TSH in the therapeutic range      Recommendations    1 -would recommend discontinuing Lexapro as patient is already on triple therapy for her depression with Wellbutrin and Elavil  Ideally, would like to stop TCA but patient states this is the only antidepressant that has helped her  2  -echocardiogram to evaluate her pump function  3  -TSH as you have done  4 - patient encouraged to get immunizations up to date           History of Present Illness   Physician Requesting Consult: Kulwant Soareser, DO  Reason for Consult / Principal Problem:  Falls, medication review  Hx and PE limited by:  No limitations  Additional history obtained from:  History obtained from patient  HPI: Katlyn Tyson is a 71y o  year old female who presents to the emergency room with increased weakness apparently she was getting out of bed and she felt that her legs gave out she fell on her buttock striking the back of her head on the wall  There was no loss of consciousness and the patient had not been using any anti-platelet or anticoagulation medication  Patient had been feeling well the past few days prior to admission she reported a cough  She did not feel any shortness of breath or chest pain or had any symptoms of dysuria  The patient did feel chills fatigue and had low-grade fever at home  In the ER she was noted to have a subdural hemorrhage  She had an eccentric right anterior parafalcine subdural hemorrhage that measured 3 mm in thickness with no evidence of mass effect  Her neurological exam was nonfocal   Patient was given 7 days of Keppra for seizure prophylaxis  During her stay in the ER she was in normal sinus rhythm then patient went into a rapid atrial fib, patient was given Cardizem bolus and placed on telemetry with Cardiology and Medicine consult  It was noted that the patient did have elevated CK since admission with a normal MCV and troponin level  Patient was noted also to have positivity for influenza a she was saturating over 94% after oxygen was added to her treatment protocol  Review of Systems   Constitutional: Negative for chills and fever  HENT: Negative for ear pain and sore throat  Eyes: Negative for pain and visual disturbance  Respiratory: Negative for cough and shortness of breath  Cardiovascular: Positive for chest pain (Secondary to recent cough)  Negative for palpitations     Gastrointestinal: Positive for constipation (Secondary to IBS) and diarrhea (Secondary to IBS)  Negative for abdominal pain and vomiting  Genitourinary: Negative for dysuria and hematuria  Musculoskeletal: Negative for arthralgias and back pain  Skin: Negative for color change and rash  Neurological: Negative for seizures, syncope and headaches  Psychiatric/Behavioral: Positive for dysphoric mood (depression)  All other systems reviewed and are negative  Memory/Cognitive screening: No memory issues  Mobility: Patient states prior to this hospitalization, she is able to ambulate without limitations  Falls:  History of falls in the past   Assistive Devices: None  Fraility: None  Nutrition/weight loss/grocery shopping/meal preparation: Patient states her appetite waxes and wanes  Vision impairment: Patient sees an ophthalmologist   Hearing impairment: None per patient  Incontinence: None  Delirium: No history of delirium  Polypharmacy:  No current facility-administered medications on file prior to encounter  Current Outpatient Medications on File Prior to Encounter   Medication Sig Dispense Refill    ALPRAZolam (XANAX) 0 5 mg tablet TAKE 1 TABLET BY MOUTH ONCE DAILY AS NEEDED FOR ANXIETY 30 tablet 0    amitriptyline (ELAVIL) 25 mg tablet TAKE 1 TABLET BY MOUTH ONCE DAILY AT BEDTIME 30 tablet 0    buPROPion (WELLBUTRIN XL) 150 mg 24 hr tablet Take 1 tablet (150 mg total) by mouth daily 30 tablet 5    escitalopram (LEXAPRO) 20 mg tablet Take 1 tablet by mouth once daily 90 tablet 1    levothyroxine (Euthyrox) 50 mcg tablet Take 1 tablet (50 mcg total) by mouth daily 30 tablet 5    lisinopril (ZESTRIL) 10 mg tablet Take 1 tablet (10 mg total) by mouth daily 90 tablet 3    pantoprazole (PROTONIX) 40 mg tablet TAKE 1 TABLET EVERY DAY 90 tablet 0    zolpidem (AMBIEN) 10 mg tablet TAKE 1 TABLET BY MOUTH ONCE DAILY AT BEDTIME 30 tablet 0     Patients primary residence: Home, 1-level  Patient's best friend lives across the street   Lives with: Self  iADL's:  Patient enjoys all of her iADLs  ADL's:  Patient enjoys all of her ADLs  Historical Information   Past medical history:   Past Medical History:   Diagnosis Date    Anxiety     Congenital absence of one kidney     Depression     GERD (gastroesophageal reflux disease)     History of transfusion     Hypertension     Insomnia     Menopause     Other specified hypothyroidism 1/28/2019    Vitamin B12 deficiency      Past surgical history:   Past Surgical History:   Procedure Laterality Date    COLONOSCOPY      SD COLONOSCOPY FLX DX W/COLLJ SPEC WHEN PFRMD N/A 5/30/2017    Procedure: EGD AND COLONOSCOPY;  Surgeon: Jing Live MD;  [facility]; Service: Gastroenterology    SD OPEN [place] LAT MALLEOLUS Left 7/13/2020    Procedure: OPEN REDUCTION W/ INTERNAL FIXATION (ORIF) ANKLE;  Surgeon: Edwina Colmenares DO;  Location: [facility];  Service: Orthopedics    REDUCTION MAMMAPLASTY Bilateral 06/06/2011    REDUCTION MAMMAPLASTY      SHOULDER SURGERY      TONSILLECTOMY       Social history:  Social History     Socioeconomic History    Marital status:      Spouse name: Not on file    Number of children: Not on file    Years of education: Not on file    Highest education level: Not on file   Occupational History    Not on file   Tobacco Use    Smoking status: Never Smoker    Smokeless tobacco: Never Used   Vaping Use    Vaping Use: Never used   Substance and Sexual Activity    Alcohol use:  Yes     Alcohol/week: 7 0 standard drinks     Types: 7 Glasses of wine per week     Comment: 1 glass of wine nightly     Drug use: No    Sexual activity: Not on file   Other Topics Concern    Not on file   Social History Narrative    Feels safe at home     Social Determinants of Health     Financial Resource Strain: Not on file   Food Insecurity: Not on file   Transportation Needs: Not on file   Physical Activity: Not on file   Stress: Not on file   Social Connections: Not on file   Intimate Partner Violence: Not on file   Housing Stability: Not on file     Family history:   Family History   Problem Relation Age of Onset    Lung cancer Mother 79    Heart disease Father     Lung cancer Brother 48    No Known Problems Maternal Grandmother     No Known Problems Maternal Grandfather     No Known Problems Paternal Grandmother     No Known Problems Paternal Grandfather        Meds/Allergies   All current active meds have been reviewed  No Known Allergies    Objective   Vitals:    04/11/22 1409   BP: 103/65   Pulse: 74   Resp: 18   Temp: (!) 102 8 °F (39 3 °C)   SpO2: 93%     No intake or output data in the 24 hours ending 04/11/22 1420  Invasive Devices  Report    Peripheral Intravenous Line            Peripheral IV 04/11/22 Right Antecubital <1 day                Physical Exam  Vitals and nursing note reviewed  Constitutional:       General: She is not in acute distress  Appearance: She is well-developed  HENT:      Head: Normocephalic and atraumatic  Right Ear: Tympanic membrane, ear canal and external ear normal  There is no impacted cerumen  Left Ear: Tympanic membrane, ear canal and external ear normal  There is no impacted cerumen  Nose: Nose normal    Eyes:      Conjunctiva/sclera: Conjunctivae normal    Cardiovascular:      Rate and Rhythm: Normal rate  Rhythm irregular  Heart sounds: Normal heart sounds  No murmur heard  Pulmonary:      Effort: Pulmonary effort is normal  No respiratory distress  Breath sounds: Wheezing (end-expiratory wheezing) present  Abdominal:      Palpations: Abdomen is soft  Tenderness: There is no abdominal tenderness  Musculoskeletal:         General: Normal range of motion  Cervical back: Neck supple  Skin:     General: Skin is warm and dry  Neurological:      General: No focal deficit present  Mental Status: She is alert        Comments: Oriented x2--patient not oriented to the date (thought it was September)   Psychiatric:         Behavior: Behavior normal          Thought Content: Thought content normal          Lab Results:   I have personally reviewed pertinent lab and imaging results  VTE Prophylaxis:  Sequential compression stockings    Code Status: Level 3 - DNAR and DNI  Advance Directive and Living Will:      Power of :    POLST:      Family and Social Support: Patient has support from her neighbor who lives across the [facility]  Living Arrangements: Lives [address] [place] [address] Drive: Friends/neighbors  Assistance Needed: None    Type of Current Residence: Private residence  Current Home Care Services: No

## 2022-04-11 NOTE — ED ATTENDING ATTESTATION
4/11/2022  IRoberth DO, saw and evaluated the patient  I have discussed the patient with the resident/non-physician practitioner and agree with the resident's/non-physician practitioner's findings, Plan of Care, and MDM as documented in the resident's/non-physician practitioner's note, except where noted  All available labs and Radiology studies were reviewed  I was present for key portions of any procedure(s) performed by the resident/non-physician practitioner and I was immediately available to provide assistance  At this point I agree with the current assessment done in the Emergency Department  I have conducted an independent evaluation of this patient a history and physical is as follows:    Patient is a 60-year-old female who presents with increased ataxia and a fall this morning  Patient describes unsteadiness on her feet for greater than 24 hours  NIHSS = 0  CT head noncontrast reveals a subdural hematoma  For all these reasons, patient is not a tPA candidate  However I am suspicious for possible subacute to chronic CVA resulting in ataxia  Patient did have an episode of atrial fibrillation with RVR in the emergency department  It resolved spontaneously  Patient will be admitted to Trauma Service for management of her subdural hematoma with further workup if of possible stroke and underlying atrial fibrillation  Patient is not on anticoagulation  There is no indication for reversal agents      ED Course         Critical Care Time  Procedures

## 2022-04-11 NOTE — CONSULTS
[facility] 1950, 71 y o  female MRN: [id number]  Unit/Bed#: S -01 Encounter: [id number]  Primary Care Provider: Anahi Soares MD   Date and time admitted to hospital: 4/11/2022  7:45 AM    Inpatient consult to Internal Medicine  Consult performed by: Selena Brown MD  Consult ordered by: Amy Sheppard PA-C        * Influenza A  Assessment & Plan  · POA, with several days of myalgias and fatigue   · Found to be flu A positive; no clear sick contacts  · Given symptom onset >48hours, would not recommend starting tamiflu   Atrial fibrillation with rapid ventricular response (HCC)  Assessment & Plan  · Pt noted to be afib with RVR while in the ED;   · Received cardizem bolus with sinus rhythm   · Cont telemetry    · Cardiology has been consulted  · Recommending TTE  · Oral cardizem +/- amio   · No AC for now given problem 1     Subdural hemorrhage (HCC)  Assessment & Plan  · POA with ataxia; found to have small anterior SDH  · Trauma as primary   · Neurosurgery consulted     MDD (major depressive disorder)  Assessment & Plan  · Would continue escitalopram, wellbutrin, amitriptyline   · Xanax prn     Essential hypertension  Assessment & Plan  · Cont lisinopril  · PO cardizem started     Weakness generalized  Assessment & Plan  · Likely d/t influenza   · PT/OT recommended         VTE Prophylaxis: Reason for no pharmacologic prophylaxis SDH     Recommendations for Discharge:  · To be determined based on clinical course  No anticipated changes to     Counseling / Coordination of Care Time: 30 minutes  Greater than 50% of total time spent on patient counseling and coordination of care  Collaboration of Care: Were Recommendations Directly Discussed with Primary Treatment Team? - No     History of Present Illness:    Xiomara Donald is a 71 y o  female who is originally admitted to the trauma service due to fall with SDH   We are consulted for medical management  Patient has been complaining of generalized weakness for a few days associated with myalgias and malaise  Yesterday patient reportedly fell when attempting to get out of bed  Did strike the back of her head on the wall  No loss of consciousness  Patient is not on any antiplatelets or anticoagulation  Patient was febrile night prior to admission and given her ongoing weakness she presented to the ED  In the ED patient noted to be tachycardic with EKG showing AFib with RVR  Patient did receive Cardizem and patient converted back to normal sinus rhythm  At time of my assessment patient is feeling tired but no new acute complaints  She denies palpitations or chest pain  Is still complaining of malaise and generalized weakness  Review of Systems:    Review of Systems   Constitutional: Negative  Negative for chills and fatigue  Respiratory: Negative  Negative for cough and shortness of breath  Cardiovascular: Positive for palpitations  Negative for chest pain  Gastrointestinal: Negative  Neurological: Positive for weakness and headaches  All other systems reviewed and are negative  Past Medical and Surgical History:     Past Medical History:   Diagnosis Date    Anxiety     Congenital absence of one kidney     Depression     GERD (gastroesophageal reflux disease)     History of transfusion     Hypertension     Insomnia     Menopause     Other specified hypothyroidism 1/28/2019    Vitamin B12 deficiency        Past Surgical History:   Procedure Laterality Date    COLONOSCOPY      ID COLONOSCOPY FLX DX W/COLLJ SPEC WHEN PFRMD N/A 5/30/2017    Procedure: EGD AND COLONOSCOPY;  Surgeon: Zi Owusu MD;  [facility];   Service: Gastroenterology    ID OPEN [place] LAT MALLEOLUS Left 7/13/2020    Procedure: OPEN REDUCTION W/ INTERNAL FIXATION (ORIF) ANKLE;  Surgeon: Ritta Merlin, DO;  Location: [address] [place] [address];  Service: Orthopedics    REDUCTION MAMMAPLASTY Bilateral 06/06/2011    REDUCTION MAMMAPLASTY      SHOULDER SURGERY      TONSILLECTOMY         Meds/Allergies:    PTA meds:   Prior to Admission Medications   Prescriptions Last Dose Informant Patient Reported? Taking? ALPRAZolam (XANAX) 0 5 mg tablet   No No   Sig: TAKE 1 TABLET BY MOUTH ONCE DAILY AS NEEDED FOR ANXIETY   amitriptyline (ELAVIL) 25 mg tablet   No No   Sig: TAKE 1 TABLET BY MOUTH ONCE DAILY AT BEDTIME   buPROPion (WELLBUTRIN XL) 150 mg 24 hr tablet   No No   Sig: Take 1 tablet (150 mg total) by mouth daily   escitalopram (LEXAPRO) 20 mg tablet   No No   Sig: Take 1 tablet by mouth once daily   levothyroxine (Euthyrox) 50 mcg tablet   No No   Sig: Take 1 tablet (50 mcg total) by mouth daily   lisinopril (ZESTRIL) 10 mg tablet  Self No No   Sig: Take 1 tablet (10 mg total) by mouth daily   pantoprazole (PROTONIX) 40 mg tablet   No No   Sig: TAKE 1 TABLET EVERY DAY   zolpidem (AMBIEN) 10 mg tablet   No No   Sig: TAKE 1 TABLET BY MOUTH ONCE DAILY AT BEDTIME      Facility-Administered Medications: None       Allergies: No Known Allergies    Social History:     Marital Status:      Substance Use History:   Social History     Substance and Sexual Activity   Alcohol Use Yes    Alcohol/week: 7 0 standard drinks    Types: 7 Glasses of wine per week    Comment: 1 glass of wine nightly      Social History     Tobacco Use   Smoking Status Never Smoker   Smokeless Tobacco Never Used     Social History     Substance and Sexual Activity   Drug Use No       Family History:    Family History   Problem Relation Age of Onset    Lung cancer Mother 79    Heart disease Father     Lung cancer Brother 48    No Known Problems Maternal Grandmother     No Known Problems Maternal Grandfather     No Known Problems Paternal Grandmother     No Known Problems Paternal Grandfather        Physical Exam:     Vitals:   Blood Pressure: 129/68 (04/11/22 1207)  Pulse: 69 (04/11/22 1207)  Temperature: 99 3 °F (37 4 °C) (04/11/22 1207)  Temp Source: Oral (04/11/22 0749)  Respirations: 18 (04/11/22 1207)  Height: 5' 5" (165 1 cm) (04/11/22 1157)  Weight - Scale: 81 6 kg (180 lb) (04/11/22 1157)  SpO2: 95 % (04/11/22 1207)    Physical Exam  Vitals and nursing note reviewed  Constitutional:       Appearance: Normal appearance  She is ill-appearing  She is not toxic-appearing  HENT:      Head: Normocephalic and atraumatic  Cardiovascular:      Rate and Rhythm: Normal rate and regular rhythm  Pulses: Normal pulses  Pulmonary:      Effort: Pulmonary effort is normal  No respiratory distress  Breath sounds: Normal breath sounds  Abdominal:      General: Abdomen is flat  There is no distension  Palpations: Abdomen is soft  Tenderness: There is no abdominal tenderness  Neurological:      General: No focal deficit present  Mental Status: She is alert and oriented to person, place, and time  Motor: Weakness present  Psychiatric:         Mood and Affect: Mood normal          Behavior: Behavior normal              Additional Data:     Lab Results: I have personally reviewed pertinent reports  Results from last 7 days   Lab Units 04/11/22  0834   WBC Thousand/uL 5 62   HEMOGLOBIN g/dL 13 5   HEMATOCRIT % 42 0   PLATELETS Thousands/uL 176   NEUTROS PCT % 77*   LYMPHS PCT % 9*   MONOS PCT % 13*   EOS PCT % 0     Results from last 7 days   Lab Units 04/11/22  0834   SODIUM mmol/L 138   POTASSIUM mmol/L 4 0   CHLORIDE mmol/L 103   CO2 mmol/L 25   BUN mg/dL 11   CREATININE mg/dL 1 00   ANION GAP mmol/L 10   CALCIUM mg/dL 8 3   ALBUMIN g/dL 3 3*   TOTAL BILIRUBIN mg/dL 0 56   ALK PHOS U/L 65   ALT U/L 19   AST U/L 30   GLUCOSE RANDOM mg/dL 92     Results from last 7 days   Lab Units 04/11/22  0834   INR  1 00         No results found for: HGBA1C            Imaging: I have personally reviewed pertinent reports        CT head without contrast   Final Result by Heath Burleson MD (04/11 1021) Eccentric right anterior parafalcine subdural hemorrhage measuring 3 mm in thickness  No significant mass effect  No concomitant subarachnoid or parenchymal hemorrhage  Correlate for history of trauma or anticoagulation  No acute findings of the brain parenchyma, itself  I personally discussed this study with NOE MUSA on 4/11/2022 at 10:20 AM   She reports the patient has nebulous history of several recent falls  Workstation performed: [id number]         XR chest 2 views   ED Interpretation by Marcelino Elizabeth PA-C (20/50 1308)   Cardiomegaly  No focal consolidation  Final Result by Heath Burleson MD (04/11 1010)      Basilar streaky opacities likely represent atelectasis from hypoventilation unless there is compelling clinical evidence for pneumonia  No other acute cardiopulmonary findings  Workstation performed: [id number]         CT head wo contrast    (Results Pending)       EKG, Pathology, and Other Studies Reviewed on Admission:   · EKG: atrial fibrillation with RVR      ** Please Note: This note has been constructed using a voice recognition system   **

## 2022-04-11 NOTE — H&P
[facility]  H&P- Erasmo Giordanol 1950, 71 y o  female MRN: [id number]  Unit/Bed#: S -01 Encounter: [id number]  Primary Care Provider: Dali Melendrez MD   Date and time admitted to hospital: 4/11/2022  7:45 AM    Subdural hemorrhage [facility])  Assessment & Plan  - 4/11 CTH: Eccentric right anterior parafalcine subdural hemorrhage measuring 3 mm in thickness  No significant mass effect  No concomitant subarachnoid or parenchymal hemorrhage  Correlate for history of trauma or anticoagulation  No acute findings of the brain parenchyma, itself  - Neuro exam: GCS 15, non-focal  - Continue neurologic checks: Every 1 hours  - Reversal agent administered: none  - Repeat CT head 4/12  - Appreciate Neurosurgery evaluation and recommendations  - Complete 7 day course of Keppra for seizure prophylaxis  - Chemical DVT prophylaxis: Not cleared for chemical prophylaxis by neurosurgery at this time  Continue SCDs bilaterally  - Hold all anticoagulants and anti platelet medications for 2 weeks and/or until cleared by Neurosurgery to resume   - PT and OT (including cognitive evaluation) evaluation and treatment as indicated  Atrial fibrillation with rapid ventricular response (HCC)  Assessment & Plan  - Initial EKG in ED was normal sinus rhythm  - Upon my trauma evaluation, patients HR was 130 bpm and higher, EKG indicated that patient was experiencing atrial fibrillation with RVR  - Asymptomatic, pt denies chest pain, palpitations, shortness of breath  - No known history of atrial fibrillation, not on BB outpatient  - ED providers at bedside  - Cardizem bolus and infusion initiated  - Telemetry x 48 hrs  - Internal medicine consult pending    Elevated CK  Assessment & Plan  - Elevated CK on admission, 331   Normal CKMB and troponin levels  - Secondary to acute infection with influenza A, has been unable to ambulate due to generalized weakness  - Continue IV fluid resuscitation  - Continue to monitor  - Internal medicine consult pending    Weakness generalized  Assessment & Plan  - Pt reports generalized weakness throughout all extremities for the past few days, and this is why she fell  - She reports her legs gave out on her and she fell backwards onto her buttocks, striking her head on a wall  - New onset atrial fib, new diagnosis of Influenza A could be potential causes  - Internal medicine consult pending  - Injuries as listed    MDD (major depressive disorder)  Assessment & Plan  - Home medications: Lexapro, Wellbutrin, Elavil  PRN Xanax and Ambien QHS PRN for insomnia  - Hold Xanax and Ambien at this time  - Geriatric medicine consult pending    Essential hypertension  Assessment & Plan  - Home medication: Lisinopril  Hold at this time  - Internal Medicine Consult pending    * Influenza A  Assessment & Plan  - 4/11 pt positive for influenza A  Reports she had a fever overnight  - maintain contact and droplet precautions  - Currently on room air, apply supplemental O2 as needed to maintain O2 > 94%  - Supportive care with IV fluids, Tylenol  - Internal medicine consult pending    Disposition: Admit to step down 2/ HOT protocol with consults pending from Neurosurgery, Internal medicine, Cardiology, Geriatric medicine  Disposition pending medical stabilization and evaluation with PT and OT  H&P - Trauma   Alise Strickland 71 y o  female MRN: [id number]  Unit/Bed#: S -01 Encounter: [id number]    Trauma Alert: Evaluation; trauma team notified at 21 [phone] via phone   Model of Arrival: Ambulance    Trauma Team: Attending Clarke Berger and [address] [place] [address]  Consultants:     Neurosurgery: routine consult; Epic consult order placed and consultant notified (via phone/text @ time 1100);  Internal medicine (Spoke with Dr Germain Sutton at [phone]), Cardiology, Geriatric medicine      History of Present Illness     Chief Complaint: Weakness  Mechanism:Fall     HPI:    Alise Strickland is a 71 y o  female who presents with generalized weakness  Pt reports that yesterday she was unable to ambulate when getting out of bed, felt like her legs gave out becaue of weakness and fell onto her buttocks, striking the back of her head on the wall  She denies loss of consciousness or use of antiplatelet/ anticoagulation medications  Patient reports she had a fever last night and came into the ED today because of weakness  She reports a cough, denies headaches, dizziness, neck pain, back pain, shortness of breath, chest pain, abdominal pain, numbness or tingling  Review of Systems   Constitutional: Positive for activity change, chills, fatigue and fever  HENT: Positive for congestion  Negative for facial swelling  Eyes: Negative for photophobia  Respiratory: Positive for cough  Negative for shortness of breath  Cardiovascular: Negative for chest pain  Gastrointestinal: Negative for abdominal pain and nausea  Musculoskeletal: Negative for arthralgias, back pain, myalgias, neck pain and neck stiffness  Skin: Negative for wound  Neurological: Positive for weakness  Negative for dizziness, syncope, light-headedness, numbness and headaches  Psychiatric/Behavioral: Negative for confusion  All other systems reviewed and are negative  12-point, complete review of systems was reviewed and negative except as stated above  Historical Information     Past Medical History:   Diagnosis Date    Anxiety     Congenital absence of one kidney     Depression     GERD (gastroesophageal reflux disease)     History of transfusion     Hypertension     Insomnia     Menopause     Other specified hypothyroidism 1/28/2019    Vitamin B12 deficiency      Past Surgical History:   Procedure Laterality Date    COLONOSCOPY      MT COLONOSCOPY FLX DX W/COLLJ SPEC WHEN PFRMD N/A 5/30/2017    Procedure: EGD AND COLONOSCOPY;  Surgeon: Ravi Daniels MD;  Location: [facility];   Service: Gastroenterology    MT OPEN TX DISTAL FIBULAR FRACTURE LAT MALLEOLUS Left 7/13/2020    Procedure: OPEN REDUCTION W/ INTERNAL FIXATION (ORIF) ANKLE;  Surgeon: Lisa Scott DO;  Location: [facility];  Service: Orthopedics    REDUCTION MAMMAPLASTY Bilateral 06/06/2011    REDUCTION MAMMAPLASTY      SHOULDER SURGERY      TONSILLECTOMY          Social History     Tobacco Use    Smoking status: Never Smoker    Smokeless tobacco: Never Used   Vaping Use    Vaping Use: Never used   Substance Use Topics    Alcohol use: Yes     Alcohol/week: 7 0 standard drinks     Types: 7 Glasses of wine per week     Comment: 1 glass of wine nightly     Drug use: No     Immunization History   Administered Date(s) Administered    COVID-19 PFIZER VACCINE 0 3 ML IM 02/19/2021, 03/10/2021, 10/16/2021     Last Tetanus: unknown  Family History: Non-contributory    1  Before the illness or injury that brought you to the Emergency, did you need someone to help you on a regular basis? 0=No   2  Since the illness or injury that brought you to the Emergency, have you needed more help than usual to take care of yourself? 1=Yes   3  Have you been hospitalized for one or more nights during the past 6 months (excluding a stay in the Emergency Department)? 0=No   4  In general, do you see well? 1=No   5  In general, do you have serious problems with your memory? 0=No   6  Do you take more than three different medications everyday?  1=Yes   TOTAL   3     Did you order a geriatric consult if the score was 2 or greater?: yes     Meds/Allergies   all current active meds have been reviewed   No Known Allergies    Objective   Initial Vitals:   Temperature: 100 3 °F (37 9 °C) (04/11/22 0749)  Pulse: 78 (04/11/22 0749)  Respirations: 20 (04/11/22 0749)  Blood Pressure: 151/68 (04/11/22 0749)    Primary Survey:   Airway:        Status: patent;        Pre-hospital Interventions: none        Hospital Interventions: none  Breathing:        Pre-hospital Interventions: none       Effort: normal       Right breath sounds: normal       Left breath sounds: normal  Circulation:        Rhythm: regular       Rate: regular   Right Pulses Left Pulses    R radial: 2+  R femoral: 2+  R pedal: 2+  R carotid: 2+  R popliteal: 2+ L radial: 2+  L femoral: 2+  L pedal: 2+  L carotid: 2+  L popliteal: 2+   Disability:        GCS: Eye: 4; Verbal: 5 Motor: 6 Total: 15       Right Pupil: round;  reactive         Left Pupil:  round;  reactive      R Motor Strength L Motor Strength    R : 5/5  R dorsiflex: 5/5  R plantarflex: 5/5 L : 5/5  L dorsiflex: 5/5  L plantarflex: 5/5        Sensory:  No sensory deficit  Exposure:       Completed: Yes      Secondary Survey:  Physical Exam  Vitals reviewed  Constitutional:       Appearance: She is ill-appearing  HENT:      Head: Normocephalic and atraumatic  Right Ear: External ear normal       Left Ear: External ear normal       Nose: Nose normal       Mouth/Throat:      Mouth: Mucous membranes are moist       Pharynx: Oropharynx is clear  Eyes:      Extraocular Movements: Extraocular movements intact  Conjunctiva/sclera: Conjunctivae normal       Pupils: Pupils are equal, round, and reactive to light  Cardiovascular:      Rate and Rhythm: Tachycardia present  Rhythm irregular  Pulses: Normal pulses  Heart sounds: Normal heart sounds  Pulmonary:      Effort: Pulmonary effort is normal  No respiratory distress  Breath sounds: Normal breath sounds  Chest:      Chest wall: No tenderness  Abdominal:      General: Abdomen is flat  Bowel sounds are normal  There is no distension  Palpations: Abdomen is soft  There is no mass  Tenderness: There is no abdominal tenderness  There is no guarding or rebound  Hernia: No hernia is present  Musculoskeletal:         General: No swelling, tenderness, deformity or signs of injury  Normal range of motion  Cervical back: Normal range of motion and neck supple  No rigidity or tenderness     Skin: General: Skin is warm and dry  Capillary Refill: Capillary refill takes less than 2 seconds  Findings: No bruising or lesion  Comments: Right elbow ecchymosis, ROM intact without pain   Neurological:      General: No focal deficit present  Mental Status: She is alert and oriented to person, place, and time  Mental status is at baseline  Sensory: No sensory deficit  Motor: No weakness  Psychiatric:         Mood and Affect: Mood normal          Behavior: Behavior normal          Invasive Devices  Report    Peripheral Intravenous Line            Peripheral IV 04/11/22 Right Antecubital <1 day              Lab Results:   Results: I have personally reviewed all pertinent laboratory/tests results, BMP/CMP:   Lab Results   Component Value Date    SODIUM 138 04/11/2022    K 4 0 04/11/2022     04/11/2022    CO2 25 04/11/2022    BUN 11 04/11/2022    CREATININE 1 00 04/11/2022    CALCIUM 8 3 04/11/2022    AST 30 04/11/2022    ALT 19 04/11/2022    ALKPHOS 65 04/11/2022    EGFR 56 04/11/2022    and CBC:   Lab Results   Component Value Date    WBC 5 62 04/11/2022    HGB 13 5 04/11/2022    HCT 42 0 04/11/2022     (H) 04/11/2022     04/11/2022    MCH 32 1 04/11/2022    MCHC 32 1 04/11/2022    RDW 13 1 04/11/2022    MPV 11 7 04/11/2022    NRBC 0 04/11/2022       Imaging Results: I have personally reviewed pertinent reports  Chest Xray(s): see below   FAST exam(s): N/A   CT Scan(s): positive for acute findings: see below   Additional Xray(s): N/A     Other Studies:   CT head without contrast   Final Result by Smiley Win MD (04/11 1021)      Eccentric right anterior parafalcine subdural hemorrhage measuring 3 mm in thickness  No significant mass effect  No concomitant subarachnoid or parenchymal hemorrhage  Correlate for history of trauma or anticoagulation  No acute findings of the brain parenchyma, itself               I personally discussed this study with Ramiro Escalera DIMPLE on 4/11/2022 at 10:20 AM   She reports the patient has nebulous history of several recent falls  Workstation performed: [id number]         XR chest 2 views   ED Interpretation by Kellee Price PA-C (09/65 [id number])   Cardiomegaly  No focal consolidation  Final Result by Mariluz Pina MD (04/11 1010)      Basilar streaky opacities likely represent atelectasis from hypoventilation unless there is compelling clinical evidence for pneumonia  No other acute cardiopulmonary findings  Workstation performed: [id number]         CT head wo contrast    (Results Pending)         Code Status: Level 3 - DNAR and DNI  Advance Directive and Living Will:      Power of :    POLST:    I have spent 30 minutes with Patient  today in which greater than 50% of this time was spent in counseling/coordination of care regarding Diagnostic results and Risks and benefits of tx options

## 2022-04-11 NOTE — TELEPHONE ENCOUNTER
4/18/22:   DISCHARGED HOME  SPOKE TO PATIENT -CONFIRMED OV - TRANSFERRED TO  TO SCHEDULE CT     415/22: INPATIENT    4/14/22: INPATIENT    4/13/22: INPATIENT    4/12/22: INPATIENT    4/11/22: INPATIENT    2 R Rowdy Lorenzo 1 FOLLOW UP  W/ AP   4/27/22 / 10:45 / Chris Smart    IMAGING:  CT HEAD - NO AUTH REQUIRED      OLIVIA Scales MA  2 week follow up with CT head   AP appointment

## 2022-04-11 NOTE — ED NOTES
Pt back in afib after adjusting herself in bed  cardizem bolus given  HR immediately back to 82, sinus  Transporting to floor with float SHABNAM Jesus RN  04/11/22 Candice Gonzales RN  04/11/22 1148

## 2022-04-11 NOTE — ED NOTES
Pt repositioned self in bed, HR dropped from 140s afib to 80s NSR with PACs       Malka Apley, RN  04/11/22 [address], RN  04/11/22 9747

## 2022-04-11 NOTE — CONSULTS
[address] [facility] 1950, 71 y o  female MRN: [id number]  Unit/Bed#: S -01 Encounter: [id number]  Primary Care Provider: Jefferson Sánchez MD   Date and time admitted to hospital: 4/11/2022  7:45 AM    Inpatient consult to Neurosurgery  Consult performed by: Sukhwinder Gregory PA-C  Consult ordered by: Daya Santos PA-C          * Subdural hemorrhage [facility])  Assessment & Plan   Patient presented s/p fall out of bed at home with a head strike   Patient reports not being able to get up right away with some confusion    No AC/AP medication and no LOC     Imaging:    CT head 4/11/2022:  3 mm anterior falx subdural hematoma centered to the right side  Plan:   · ongoing frequent neurological checks  · Recommend STAT CT head for decline in GCS >2 points in 1 hour  · Recommend repeat CT head for DVT prophylaxis tomorrow morning  · Keppra 500mg BID for seizure ppx per trauma team   · DVT ppx: SCD's only  Recommend stable CT head prior to initiation of pharm DVT ppx  · Hold all AC/AP medication at this time  Patient is not cleared to start [facility] therapy given presence of SDH  · PT/OT evaluation  · Ongoing medical management and pain control per primary team    · CM following for dispo planning  · Neurosurgery will sign off and follow up in 2 weeks with repeat CT head at that time  If improved patient would be cleared to initiate at the discretion of PCP/cardiology  Call with questions or concerns  Atrial fibrillation with rapid ventricular response (HCC)  Assessment & Plan  · No prior history  · Noted while in ED   · On telemetry   · Cardiology consulted  · Recommend ongoing rate control at this time   · No AC medication at this time given SDH    History of Present Illness   HPI: Benedicto Wilson is a 71 y o  female with PMH including anxiety, GERD, hypertension, vitamin B12 deficiency who presents after a fall at home    Patient states she slid off side of the bed landing on the floor striking the back of her head on the ground  She states immediately after that she was dazed and confused  She was complaining of weakness resulting in an inability to get immediately up after a fall  She was walking around with some slight confusion  She called her neighbor who assisted her with calling an ambulance and presenting to the hospital   Currently at this time she has no complaints or concerns  She denies any headaches, change in vision, trouble speech, numbness, tingling, weakness in the arms or legs  She denies any anticoagulation or anti-platelet medication at home  She denies any history of traumatic head bleed  Patient denied any heart palpitations or fluttering surrounding her fall  She was noted in the ER to be in atrial fibrillation with a rapid ventricular rate  She was placed on Cardizem for rate control  Review of Systems   Constitutional: Negative for activity change, appetite change, fatigue and fever  Eyes: Negative for visual disturbance  Respiratory: Negative for shortness of breath  Gastrointestinal: Negative for abdominal pain  Genitourinary: Negative for difficulty urinating  Musculoskeletal: Negative for back pain, neck pain and neck stiffness  Skin: Negative for rash and wound  Neurological: Positive for weakness (generalized following fall )  Negative for dizziness, tremors, light-headedness, numbness and headaches  Psychiatric/Behavioral: Positive for confusion (following fall  resolved at this time )  Negative for agitation and behavioral problems         Historical Information   Past Medical History:   Diagnosis Date    Anxiety     Congenital absence of one kidney     Depression     GERD (gastroesophageal reflux disease)     History of transfusion     Hypertension     Insomnia     Menopause     Other specified hypothyroidism 1/28/2019    Vitamin B12 deficiency      Past Surgical History:   Procedure Laterality Date    COLONOSCOPY      TX COLONOSCOPY FLX DX W/COLLJ SPEC WHEN PFRMD N/A 5/30/2017    Procedure: EGD AND COLONOSCOPY;  Surgeon: Lemuel Soria MD;  [facility]; Service: Gastroenterology    TX OPEN TX DISTAL FIBULAR FRACTURE LAT MALLEOLUS Left 7/13/2020    Procedure: OPEN REDUCTION W/ INTERNAL FIXATION (ORIF) ANKLE;  Surgeon: Mario Alberto Vogt DO;  Location: [facility];  Service: Orthopedics    REDUCTION MAMMAPLASTY Bilateral 06/06/2011    REDUCTION MAMMAPLASTY      SHOULDER SURGERY      TONSILLECTOMY       Social History     Substance and Sexual Activity   Alcohol Use Yes    Alcohol/week: 7 0 standard drinks    Types: 7 Glasses of wine per week    Comment: 1 glass of wine nightly      Social History     Substance and Sexual Activity   Drug Use No     Social History     Tobacco Use   Smoking Status Never Smoker   Smokeless Tobacco Never Used     Family History   Problem Relation Age of Onset    Lung cancer Mother 79    Heart disease Father     Lung cancer Brother 48    No Known Problems Maternal Grandmother     No Known Problems Maternal Grandfather     No Known Problems Paternal Grandmother     No Known Problems Paternal Grandfather        Meds/Allergies   all current active meds have been reviewed, current meds:   Current Facility-Administered Medications   Medication Dose Route Frequency    acetaminophen (TYLENOL) tablet 650 mg  650 mg Oral Q6H [address]    diltiazem (CARDIZEM) 125 mg in sodium chloride 0 9 % 125 mL infusion  1-15 mg/hr Intravenous Once    levETIRAcetam (KEPPRA) tablet 500 mg  500 mg Oral Q12H    multi-electrolyte (PLASMALYTE-A/ISOLYTE-S PH 7 4) IV solution  50 mL/hr Intravenous Continuous    and PTA meds:   Prior to Admission Medications   Prescriptions Last Dose Informant Patient Reported? Taking?    ALPRAZolam (XANAX) 0 5 mg tablet   No No   Sig: TAKE 1 TABLET BY MOUTH ONCE DAILY AS NEEDED FOR ANXIETY   amitriptyline (ELAVIL) 25 mg tablet   No No   Sig: TAKE 1 TABLET BY MOUTH ONCE DAILY AT BEDTIME   buPROPion (WELLBUTRIN XL) 150 mg 24 hr tablet   No No   Sig: Take 1 tablet (150 mg total) by mouth daily   escitalopram (LEXAPRO) 20 mg tablet   No No   Sig: Take 1 tablet by mouth once daily   levothyroxine (Euthyrox) 50 mcg tablet   No No   Sig: Take 1 tablet (50 mcg total) by mouth daily   lisinopril (ZESTRIL) 10 mg tablet  Self No No   Sig: Take 1 tablet (10 mg total) by mouth daily   pantoprazole (PROTONIX) 40 mg tablet   No No   Sig: TAKE 1 TABLET EVERY DAY   zolpidem (AMBIEN) 10 mg tablet   No No   Sig: TAKE 1 TABLET BY MOUTH ONCE DAILY AT BEDTIME      Facility-Administered Medications: None     No Known Allergies    Objective   I/O     None          Physical Exam  Constitutional:       Appearance: Normal appearance  She is well-developed  HENT:      Head: Normocephalic and atraumatic  Eyes:      Extraocular Movements: Extraocular movements intact and EOM normal       Conjunctiva/sclera: Conjunctivae normal       Pupils: Pupils are equal, round, and reactive to light  Neck:      Vascular: No JVD  Trachea: No tracheal deviation  Cardiovascular:      Rate and Rhythm: Normal rate  Pulmonary:      Effort: Pulmonary effort is normal  No respiratory distress  Musculoskeletal:         General: No deformity  Normal range of motion  Cervical back: Normal range of motion and neck supple  No tenderness  Skin:     General: Skin is warm and dry  Neurological:      Mental Status: She is alert and oriented to person, place, and time  Cranial Nerves: No cranial nerve deficit  Sensory: No sensory deficit  Motor: No weakness  Deep Tendon Reflexes: Reflexes are normal and symmetric  Psychiatric:         Speech: Speech normal          Behavior: Behavior normal          Thought Content: Thought content normal        Neurologic Exam     Mental Status   Oriented to person, place, and time     Attention: normal    Speech: speech is normal   Level of consciousness: alert  Knowledge: good  Normal comprehension  Cranial Nerves     CN III, IV, VI   Pupils are equal, round, and reactive to light  Extraocular motions are normal    Upgaze: normal  Downgaze: normal    CN V   Facial sensation intact  CN VII   Facial expression full, symmetric  CN VIII   CN VIII normal    Hearing: intact    CN XI   CN XI normal    Right trapezius strength: normal  Left trapezius strength: normal    CN XII   CN XII normal    Tongue: not atrophic  Tongue deviation: none    Motor Exam   Muscle bulk: normal  Right arm tone: normal  Left arm tone: normal  Right leg tone: normal  Left leg tone: normal    Strength   Right deltoid: 5/5  Left deltoid: 5/5  Right biceps: 5/5  Left biceps: 5/5  Right triceps: 5/5  Left triceps: 5/5  Right wrist flexion: 5/5  Left wrist flexion: 5/5  Right wrist extension: 5/5  Left wrist extension: 5/5  Right iliopsoas: 5/5  Left iliopsoas: 5/5  Right quadriceps: 5/5  Left quadriceps: 5/5  Right hamstrin/5  Left hamstrin/5  Right anterior tibial: 5/5  Left anterior tibial: 5/5  Right gastroc: 5/5  Left gastroc: 5/5No focal or appreciable weakness on exam     Sensory Exam   Light touch normal      Gait, Coordination, and Reflexes     Tremor   Resting tremor: absent  Action tremor: absent      Vitals:Blood pressure 129/68, pulse 69, temperature 99 3 °F (37 4 °C), resp  rate 18, height 5' 5" (1 651 m), weight 81 6 kg (180 lb), SpO2 95 %  ,Body mass index is 29 95 kg/m²       Lab Results:   Results from last 7 days   Lab Units 22  0834   WBC Thousand/uL 5 62   HEMOGLOBIN g/dL 13 5   HEMATOCRIT % 42 0   PLATELETS Thousands/uL 176   NEUTROS PCT % 77*   MONOS PCT % 13*     Results from last 7 days   Lab Units 22  0834   POTASSIUM mmol/L 4 0   CHLORIDE mmol/L 103   CO2 mmol/L 25   BUN mg/dL 11   CREATININE mg/dL 1 00   CALCIUM mg/dL 8 3   ALK PHOS U/L 65   ALT U/L 19   AST U/L 30             Results from last 7 days   Lab Units 04/11/22  0834   INR  1 00   PTT seconds 21*     No results found for: TROPONINT  ABG:No results found for: PHART, NET2BMW, PO2ART, OGR0ZGJ, R2CIDVLS, BEART, SOURCE    Imaging Studies: I have personally reviewed pertinent reports  and I have personally reviewed pertinent films in PACS    XR chest 2 views    Result Date: 4/11/2022  Impression: Basilar streaky opacities likely represent atelectasis from hypoventilation unless there is compelling clinical evidence for pneumonia  No other acute cardiopulmonary findings  Workstation performed: [id number]     CT head without contrast    Result Date: 4/11/2022  Impression: Eccentric right anterior parafalcine subdural hemorrhage measuring 3 mm in thickness  No significant mass effect  No concomitant subarachnoid or parenchymal hemorrhage  Correlate for history of trauma or anticoagulation  No acute findings of the brain parenchyma, itself  I personally discussed this study with NOE MUSA on 4/11/2022 at 10:20 AM   She reports the patient has nebulous history of several recent falls  Workstation performed: [id number]       EKG, Pathology, and Other Studies: I have personally reviewed pertinent reports  VTE Prophylaxis: Sequential compression device (Venodyne)  and Reason for no pharmacologic prophylaxis SDH    Code Status: Level 3 - DNAR and DNI  Advance Directive and Living Will:      Power of :    POLST:      Counseling / Coordination of Care  I spent 20 minutes with the patient

## 2022-04-11 NOTE — CONSULTS
Cardiology   Ludmila Pries 71 y o  female MRN: [id number]  Unit/Bed#: JAIME Encounter: [id number]      Reason for Consult / Principal Problem:  Atrial fibrillation with RVR    Physician Requesting Consult:  Kim Shah DO    Outpatient Cardiologist:  None    Assessment  1  New onset atrial fibrillation with RVR  -Asymptomatic - w/o c/o CP, palpitations, or SOB  -ECG on admission NSR, w/ subsequent ECG demonstrating atrial fibrillation w/ RVR, rate 152 bpm  -In the ED received x1 dose of IV Cardizem 20 mg - had reverted back into NSR for a short period of time but then went back into atrial fibrillation with RVR   -On telemetry currently NSR w/ PACs; HR's in the 70's to 80's  -[id number] score = 3 (age, sex, HTN)  -Electrolytes stable on BMP   -TSH level 0 87  2  Fall - appears to be mechanical in nature, likely stemming from generalized weakness  3  Traumatic subdural hemorrhage  -Management per the neurosurgery service  -CT of the head without contrast - right anterior parafalcine subdural hemorrhage measuring 3 mm in thickness  No significant mass effect  No concomitant subarachnoid or parenchymal hemorrhage   -Recommending holding of all anti-platelet/anticoagulant medications for at least 2 weeks and/or until cleared by the Neurosurgery Service  4  Influenza A +   -On contact/droplet precautions   -Subjective chills/fevers  Low-grade low-grade fever of 100 3 on admission   -Stable from a respiratory standpoint  Maintaining O2 saturations in the mid 90s on RA  5  Essential hypertension  -BP last recorded 129/68  -Outpatient BP regimen; lisinopril 10 mg daily    Plan  -Obtain TTE  -Agree w/IVFs for hydration   -Start oral Cardizem 120 mg daily, consider short course of oral amiodarone for further afib suppression while off anticoagulation -- cannot anticoagulate at this time d/t # 3, will need neuro surgery clearance hopefully within the 2 weeks to determine if anticoagulation can be initiated  -Monitor renal function and electrolytes closely - replete to maintain K+level at 4, and mag at 2  -Monitor on telemetry    HPI: Shelley Henning 71y o  year old female with a medical history of essential hypertension, anxiety/depression, and GERD  No prior history of CAD, HF, or any known cardiac arrhythmias  She is a lifelong nonsmoker, does admit to occasional alcohol use, but denies recreational drug use  She did not previously follow with a cardiologist   She [facility] on [facility]11/2022 with complaints of generalized weakness /muscle aches and subjective chills/fevers over the past few days as well as a mechanical fall which had occurred at her home residence early this morning  Further workup in the ED  Hemodynamics on admission  -Temp 100 3° F, HR 78, RR 20, /68, sat 97% on RA  Laboratory data on admission  -NA + 138, K +4 0, chloride 103, CO2 25, anion gap 10, BUN 11, creatinine 1 0, glucose 92, calcium 8 3, normal LFTs, albumin 3 3, WBC 5 6, HGB 13 5 and platelet count 914  Imaging  -Chest x-ray PA and lateral - basilar streaky opacities likely represent atelectasis from hypoventilation  No other acute cardiopulmonary findings  -CT of the head without contrast - right anterior parafalcine subdural hemorrhage measuring 3 mm in thickness  No significant mass effect  No concomitant subarachnoid or parenchymal hemorrhage  She was evaluated by both the trauma and neurosurgery service given her acute traumatic fall now with evidence of an acute subdural hemorrhage  Initial 12 lead ECG demonstrated NSR, with subsequent ECG demonstrating atrial fibrillation with RVR, rate 152 bpm   In the ED she had received x1 dose of IV Cardizem 20 mg - she had then converted back into NSR for a short period of time but then had reverted back into atrial fibrillation with RVR  She was then placed on a IV Cardizem GTT    Cardiology was consult for further treatment recommendations/management  Family History:   Family History   Problem Relation Age of Onset    Lung cancer Mother 79    Heart disease Father     Lung cancer Brother 48    No Known Problems Maternal Grandmother     No Known Problems Maternal Grandfather     No Known Problems Paternal Grandmother     No Known Problems Paternal Grandfather      Historical Information   Past Medical History:   Diagnosis Date    Anxiety     Congenital absence of one kidney     Depression     GERD (gastroesophageal reflux disease)     History of transfusion     Hypertension     Insomnia     Menopause     Other specified hypothyroidism 1/28/2019    Vitamin B12 deficiency      Past Surgical History:   Procedure Laterality Date    COLONOSCOPY      MI COLONOSCOPY FLX DX W/COLLJ SPEC WHEN PFRMD N/A 5/30/2017    Procedure: EGD AND COLONOSCOPY;  Surgeon: Viet Adrian MD;  [facility];   Service: Gastroenterology    MI OPEN TX DISTAL FIBULAR FRACTURE LAT MALLEOLUS Left 7/13/2020    Procedure: OPEN REDUCTION W/ INTERNAL FIXATION (ORIF) ANKLE;  Surgeon: Barbara Newman DO;  [facility];  Service: Orthopedics    REDUCTION MAMMAPLASTY Bilateral 06/06/2011    REDUCTION MAMMAPLASTY      SHOULDER SURGERY      TONSILLECTOMY       Social History   Social History     Substance and Sexual Activity   Alcohol Use Yes    Alcohol/week: 7 0 standard drinks    Types: 7 Glasses of wine per week    Comment: 1 glass of wine nightly      Social History     Substance and Sexual Activity   Drug Use No     Social History     Tobacco Use   Smoking Status Never Smoker   Smokeless Tobacco Never Used     Family History:   Family History   Problem Relation Age of Onset    Lung cancer Mother 79    Heart disease Father     Lung cancer Brother 48    No Known Problems Maternal Grandmother     No Known Problems Maternal Grandfather     No Known Problems Paternal Grandmother     No Known Problems Paternal Grandfather Review of Systems:  Review of Systems   Constitutional: Negative for chills, fatigue and fever  Eyes: Negative for visual disturbance  Respiratory: Negative for cough, chest tightness and shortness of breath  Cardiovascular: Negative for chest pain, palpitations and leg swelling  Gastrointestinal: Negative for abdominal pain  Neurological: Negative for dizziness, light-headedness and headaches  Scheduled Meds:  Current Facility-Administered Medications   Medication Dose Route Frequency Provider Last Rate    acetaminophen  650 mg Oral Q6H [facility] Bear Keller PA-C      diltiazem  1-15 mg/hr Intravenous Once Citlaly Harrington PA-C Stopped (04/11/22 1106)    levETIRAcetam  500 mg Oral Q12H Bear Keller PA-C       Continuous Infusions:   PRN Meds:   all current active meds have been reviewed and current meds:   Current Facility-Administered Medications   Medication Dose Route Frequency    acetaminophen (TYLENOL) tablet 650 mg  650 mg Oral Q6H [facility]    diltiazem (CARDIZEM) 125 mg in sodium chloride 0 9 % 125 mL infusion  1-15 mg/hr Intravenous Once    levETIRAcetam (KEPPRA) tablet 500 mg  500 mg Oral Q12H    multi-electrolyte (PLASMALYTE-A/ISOLYTE-S PH 7 4) IV solution  50 mL/hr Intravenous Continuous       No Known Allergies    Objective   Vitals: Blood pressure (!) 163/102, pulse (!) 165, temperature 100 3 °F (37 9 °C), temperature source Oral, resp  rate 18, SpO2 96 %  , There is no height or weight on file to calculate BMI ,   Orthostatic Blood Pressures      Most Recent Value   Blood Pressure 163/102 filed at 04/11/2022 1130   Patient Position - Orthostatic VS Lying filed at 04/11/2022 1045          No intake or output data in the 24 hours ending 04/11/22 1153    Invasive Devices  Report    Peripheral Intravenous Line            Peripheral IV 04/11/22 Right Antecubital <1 day              Physical Exam:  Physical Exam  Vitals and nursing note reviewed     Constitutional: General: She is not in acute distress  Appearance: She is not diaphoretic  HENT:      Head: Normocephalic and atraumatic  Mouth/Throat:      Mouth: Mucous membranes are moist    Eyes:      General: No scleral icterus  Cardiovascular:      Rate and Rhythm: Tachycardia present  Rhythm irregular  Pulses: Normal pulses  Heart sounds: Normal heart sounds  Pulmonary:      Effort: Pulmonary effort is normal       Breath sounds: Normal breath sounds  No wheezing or rales  Abdominal:      Palpations: Abdomen is soft  Musculoskeletal:      Cervical back: Neck supple  Right lower leg: No edema  Left lower leg: No edema  Skin:     General: Skin is warm and dry  Capillary Refill: Capillary refill takes less than 2 seconds  Neurological:      General: No focal deficit present  Mental Status: She is alert and oriented to person, place, and time     Psychiatric:         Mood and Affect: Mood normal          Lab Results:   Recent Results (from the past 24 hour(s))   CBC and differential    Collection Time: 04/11/22  8:34 AM   Result Value Ref Range    WBC 5 62 4 31 - 10 16 Thousand/uL    RBC 4 21 3 81 - 5 12 Million/uL    Hemoglobin 13 5 11 5 - 15 4 g/dL    Hematocrit 42 0 34 8 - 46 1 %     (H) 82 - 98 fL    MCH 32 1 26 8 - 34 3 pg    MCHC 32 1 31 4 - 37 4 g/dL    RDW 13 1 11 6 - 15 1 %    MPV 11 7 8 9 - 12 7 fL    Platelets 570 359 - 519 Thousands/uL    nRBC 0 /100 WBCs    Neutrophils Relative 77 (H) 43 - 75 %    Immat GRANS % 1 0 - 2 %    Lymphocytes Relative 9 (L) 14 - 44 %    Monocytes Relative 13 (H) 4 - 12 %    Eosinophils Relative 0 0 - 6 %    Basophils Relative 0 0 - 1 %    Neutrophils Absolute 4 32 1 85 - 7 62 Thousands/µL    Immature Grans Absolute 0 03 0 00 - 0 20 Thousand/uL    Lymphocytes Absolute 0 51 (L) 0 60 - 4 47 Thousands/µL    Monocytes Absolute 0 73 0 17 - 1 22 Thousand/µL    Eosinophils Absolute 0 01 0 00 - 0 61 Thousand/µL    Basophils Absolute 0 02 0 00 - 0 10 Thousands/µL   Protime-INR    Collection Time: 04/11/22  8:34 AM   Result Value Ref Range    Protime 13 2 11 6 - 14 5 seconds    INR 1 00 0 84 - 1 19   APTT    Collection Time: 04/11/22  8:34 AM   Result Value Ref Range    PTT 21 (L) 23 - 37 seconds   Comprehensive metabolic panel    Collection Time: 04/11/22  8:34 AM   Result Value Ref Range    Sodium 138 136 - 145 mmol/L    Potassium 4 0 3 5 - 5 3 mmol/L    Chloride 103 100 - 108 mmol/L    CO2 25 21 - 32 mmol/L    ANION GAP 10 4 - 13 mmol/L    BUN 11 5 - 25 mg/dL    Creatinine 1 00 0 60 - 1 30 mg/dL    Glucose 92 65 - 140 mg/dL    Calcium 8 3 8 3 - 10 1 mg/dL    Corrected Calcium 8 9 8 3 - 10 1 mg/dL    AST 30 5 - 45 U/L    ALT 19 12 - 78 U/L    Alkaline Phosphatase 65 46 - 116 U/L    Total Protein 7 2 6 4 - 8 2 g/dL    Albumin 3 3 (L) 3 5 - 5 0 g/dL    Total Bilirubin 0 56 0 20 - 1 00 mg/dL    eGFR 56 ml/min/1 73sq m   TSH    Collection Time: 04/11/22  8:34 AM   Result Value Ref Range    TSH 3RD GENERATON 0 873 0 450 - 4 500 uIU/mL   COVID/FLU/RSV - 2 hour TAT    Collection Time: 04/11/22  8:34 AM    Specimen: Nose; Nares   Result Value Ref Range    SARS-CoV-2 Negative Negative    INFLUENZA A PCR Positive (A) Negative    INFLUENZA B PCR Negative Negative    RSV PCR Negative Negative   HS Troponin 0hr (reflex protocol)    Collection Time: 04/11/22  8:34 AM   Result Value Ref Range    hs TnI 0hr 15 "Refer to ACS Flowchart"- see link ng/L   Ethanol    Collection Time: 04/11/22  8:34 AM   Result Value Ref Range    Ethanol Lvl <3 0 - 3 mg/dL   CK Total with Reflex CKMB    Collection Time: 04/11/22  8:34 AM   Result Value Ref Range    Total  (H) 26 - 192 U/L   CKMB    Collection Time: 04/11/22  8:34 AM   Result Value Ref Range    CK-MB Index 1 0 0 0 - 2 5 %    CK-MB 3 3 0 0 - 5 0 ng/mL   ECG 12 lead    Collection Time: 04/11/22 10:40 AM   Result Value Ref Range    Ventricular Rate 152 BPM    Atrial Rate 312 BPM    MS Interval  ms    QRSD Interval 66 ms    QT Interval 256 ms    QTC Interval 407 ms    P Mcclellan 123 degrees    QRS Axis 61 degrees    T Wave Axis 235 degrees     Imaging: I have personally reviewed pertinent reports  and I have personally reviewed pertinent films in PACS    Code Status:  Level 1 full code    [facility] Everywhere [facility] reviewed:  Yes    * Please Note: Fluency DirectDictation voice to text software may have been used in the creation of this document   **

## 2022-04-11 NOTE — ED PROVIDER NOTES
History  Chief Complaint   Patient presents with    Weakness - Generalized     pt c/o flu-like symptoms that started on Friday (fever, cough, weakness, sinus pressure)  pt also states she has fallen a couple times r/t weakness, + headstrike, - LOC, - thinners  pt took 2 at home covid tests, both neg      71-year-old female presents to the emergency department with complaints of some cold symptoms that started 3-4 days ago  States she has had a fever with cough and congestion  No known sick contacts  Additionally notes that she is fallen several times over the past few days  States she did hit her head, but denies LOC  Patient notes that she has been having difficulty with ambulation and that she finds it difficult to get up when she falls  Initially tells me that she fell earlier this morning and that she thinks she was on the floor for approximately 90 minutes prior to being able to call EMS for help  She denies any use of anticoagulants or antiplatelet agents  History provided by:  Patient   used: No        Prior to Admission Medications   Prescriptions Last Dose Informant Patient Reported? Taking?    ALPRAZolam (XANAX) 0 5 mg tablet   No No   Sig: TAKE 1 TABLET BY MOUTH ONCE DAILY AS NEEDED FOR ANXIETY   amitriptyline (ELAVIL) 25 mg tablet   No No   Sig: TAKE 1 TABLET BY MOUTH ONCE DAILY AT BEDTIME   buPROPion (WELLBUTRIN XL) 150 mg 24 hr tablet   No No   Sig: Take 1 tablet (150 mg total) by mouth daily   escitalopram (LEXAPRO) 20 mg tablet   No No   Sig: Take 1 tablet by mouth once daily   levothyroxine (Euthyrox) 50 mcg tablet   No No   Sig: Take 1 tablet (50 mcg total) by mouth daily   lisinopril (ZESTRIL) 10 mg tablet  Self No No   Sig: Take 1 tablet (10 mg total) by mouth daily   pantoprazole (PROTONIX) 40 mg tablet   No No   Sig: TAKE 1 TABLET EVERY DAY   zolpidem (AMBIEN) 10 mg tablet   No No   Sig: TAKE 1 TABLET BY MOUTH ONCE DAILY AT BEDTIME      Facility-Administered Medications: None       Past Medical History:   Diagnosis Date    Anxiety     Congenital absence of one kidney     Depression     GERD (gastroesophageal reflux disease)     History of transfusion     Hypertension     Insomnia     Menopause     Other specified hypothyroidism 1/28/2019    Vitamin B12 deficiency        Past Surgical History:   Procedure Laterality Date    COLONOSCOPY      OH COLONOSCOPY FLX DX W/COLLJ SPEC WHEN PFRMD N/A 5/30/2017    Procedure: EGD AND COLONOSCOPY;  Surgeon: Clare Joy MD;  [facility]: [facility]; Service: Gastroenterology    OH OPEN TX DISTAL FIBULAR FRACTURE LAT MALLEOLUS Left 7/13/2020    Procedure: OPEN REDUCTION W/ INTERNAL FIXATION (ORIF) ANKLE;  Surgeon: Camelia Vanessa DO;  Location: [facility];  Service: Orthopedics    REDUCTION MAMMAPLASTY Bilateral 06/06/2011    REDUCTION MAMMAPLASTY      SHOULDER SURGERY      TONSILLECTOMY         Family History   Problem Relation Age of Onset    Lung cancer Mother 79    Heart disease Father     Lung cancer Brother 48    No Known Problems Maternal Grandmother     No Known Problems Maternal Grandfather     No Known Problems Paternal Grandmother     No Known Problems Paternal Grandfather      I have reviewed and agree with the history as documented  E-Cigarette/Vaping    E-Cigarette Use Never User      E-Cigarette/Vaping Substances    Nicotine No     THC No     CBD No     Flavoring No     Other No     Unknown No      Social History     Tobacco Use    Smoking status: Never Smoker    Smokeless tobacco: Never Used   Vaping Use    Vaping Use: Never used   Substance Use Topics    Alcohol use: Yes     Alcohol/week: 7 0 standard drinks     Types: 7 Glasses of wine per week     Comment: 1 glass of wine nightly     Drug use: No       Review of Systems   Constitutional: Negative for activity change, appetite change, chills and fever     HENT: Negative for congestion, dental problem, drooling, ear discharge, ear pain, mouth sores, nosebleeds, rhinorrhea, sore throat and trouble swallowing  Eyes: Negative for pain, discharge and itching  Respiratory: Negative for cough, chest tightness, shortness of breath and wheezing  Cardiovascular: Negative for chest pain and palpitations  Gastrointestinal: Negative for abdominal pain, blood in stool, constipation, diarrhea, nausea and vomiting  Endocrine: Negative for cold intolerance and heat intolerance  Genitourinary: Negative for difficulty urinating, dysuria, flank pain, frequency and urgency  Musculoskeletal: Positive for gait problem  Skin: Negative for rash and wound  Allergic/Immunologic: Negative for food allergies and immunocompromised state  Neurological: Positive for dizziness, weakness and light-headedness  Negative for seizures, syncope, numbness and headaches  Psychiatric/Behavioral: Negative for agitation, behavioral problems and confusion  Physical Exam  Physical Exam  Vitals and nursing note reviewed  Constitutional:       General: She is not in acute distress  Appearance: She is not diaphoretic  HENT:      Head: Normocephalic and atraumatic  Right Ear: Tympanic membrane, ear canal and external ear normal       Left Ear: Tympanic membrane, ear canal and external ear normal       Mouth/Throat:      Pharynx: No oropharyngeal exudate  Eyes:      Extraocular Movements: Extraocular movements intact  Conjunctiva/sclera: Conjunctivae normal       Pupils: Pupils are equal, round, and reactive to light  Neck:      Vascular: No JVD  Trachea: No tracheal deviation  Cardiovascular:      Rate and Rhythm: Normal rate and regular rhythm  Heart sounds: Normal heart sounds  No murmur heard  No friction rub  No gallop  Pulmonary:      Effort: Pulmonary effort is normal  No respiratory distress  Breath sounds: Normal breath sounds  No wheezing or rales  Chest:      Chest wall: No tenderness     Abdominal: General: Bowel sounds are normal  There is no distension  Palpations: Abdomen is soft  Tenderness: There is no abdominal tenderness  There is no guarding  Musculoskeletal:         General: No tenderness or deformity  Normal range of motion  Lymphadenopathy:      Cervical: No cervical adenopathy  Skin:     General: Skin is warm and dry  Findings: No erythema or rash  Neurological:      Mental Status: She is alert  GCS: GCS eye subscore is 4  GCS verbal subscore is 4  GCS motor subscore is 6  Sensory: No sensory deficit  Motor: No weakness        Coordination: Coordination abnormal  Finger-Nose-Finger Test abnormal  Heel to Shin Test normal    Psychiatric:         Mood and Affect: Mood normal          Vital Signs  ED Triage Vitals   Temperature Pulse Respirations Blood Pressure SpO2   04/11/22 0749 04/11/22 0749 04/11/22 0749 04/11/22 0749 04/11/22 0749   100 3 °F (37 9 °C) 78 20 151/68 97 %      Temp Source Heart Rate Source Patient Position - Orthostatic VS BP Location FiO2 (%)   04/11/22 0749 04/11/22 0749 04/11/22 0749 04/11/22 0749 --   Oral Monitor Lying Right arm       Pain Score       04/11/22 1157       6           Vitals:    04/11/22 0749 04/11/22 1045 04/11/22 1130 04/11/22 1207   BP: 151/68 156/99 (!) 163/102 129/68   Pulse: 78 (!) 140 (!) 165 69   Patient Position - Orthostatic VS: Lying Lying           Visual Acuity      ED Medications  Medications   levETIRAcetam (KEPPRA) tablet 500 mg (500 mg Oral Given 4/11/22 1228)   acetaminophen (TYLENOL) tablet 650 mg (650 mg Oral Given 4/11/22 1228)   multi-electrolyte (PLASMALYTE-A/ISOLYTE-S PH 7 4) IV solution (50 mL/hr Intravenous New Bag 4/11/22 1227)   diltiazem (CARDIZEM CD) 24 hr capsule 120 mg (has no administration in time range)   diltiazem (CARDIZEM) injection 20 mg (20 mg Intravenous Given 4/11/22 1139)       Diagnostic Studies  Results Reviewed     Procedure Component Value Units Date/Time    Blood culture #1 [[id number]] Collected: 04/11/22 0846    Lab Status: Preliminary result Specimen: Blood from Arm, Left Updated: 04/11/22 1301     Blood Culture Received in Microbiology Lab  Culture in Progress  Blood culture #2 [[id number]] Collected: 04/11/22 0838    Lab Status: Preliminary result Specimen: Blood from Arm, Right Updated: 04/11/22 1301     Blood Culture Received in Microbiology Lab  Culture in Progress  HS Troponin I 2hr [[id number]]  (Normal) Collected: 04/11/22 1057    Lab Status: Final result Specimen: Blood from Arm, Right Updated: 04/11/22 1204     hs TnI 2hr 19 ng/L      Delta 2hr hsTnI 4 ng/L     HS Troponin I 4hr [[id number]]     Lab Status: No result Specimen: Blood     CKMB [[id number]]  (Normal) Collected: 04/11/22 0834    Lab Status: Final result Specimen: Blood from Arm, Right Updated: 04/11/22 1005     CK-MB Index 1 0 %      CK-MB 3 3 ng/mL     CK Total with Reflex CKMB [[id number]]  (Abnormal) Collected: 04/11/22 0834    Lab Status: Final result Specimen: Blood from Arm, Right Updated: 04/11/22 1004     Total  U/L     COVID/FLU/RSV - 2 hour TAT [[id number]]  (Abnormal) Collected: 04/11/22 0834    Lab Status: Final result Specimen: Nares from Nose Updated: 04/11/22 0931     SARS-CoV-2 Negative     INFLUENZA A PCR Positive     INFLUENZA B PCR Negative     RSV PCR Negative    Narrative:      FOR PEDIATRIC PATIENTS - copy/paste COVID Guidelines URL to browser: [email or web address]    SARS-CoV-2 assay is a Nucleic Acid Amplification assay intended for the  qualitative detection of nucleic acid from SARS-CoV-2 in nasopharyngeal  swabs  Results are for the presumptive identification of SARS-CoV-2 RNA  Positive results are indicative of infection with SARS-CoV-2, the virus  causing COVID-19, but do not rule out bacterial infection or co-infection  with other viruses   Laboratories within the [place] and its  territories are required to report all positive results to the appropriate  public health authorities  Negative results do not preclude SARS-CoV-2  infection and should not be used as the sole basis for treatment or other  patient management decisions  Negative results must be combined with  clinical observations, patient history, and epidemiological information  This test has not been FDA cleared or approved  This test has been authorized by FDA under an Emergency Use Authorization  (EUA)  This test is only authorized for the duration of time the  declaration that circumstances exist justifying the authorization of the  emergency use of an in vitro diagnostic tests for detection of SARS-CoV-2  virus and/or diagnosis of COVID-19 infection under section 564(b)(1) of  the Act, 21 U  S C  906NQB-7(G)(1), unless the authorization is terminated  or revoked sooner  The test has been validated but independent review by FDA  and CLIA is pending  Test performed using [employer] Gene[employer]pert: This RT-PCR assay targets N2,  a region unique to SARS-CoV-2  A conserved region in the E-gene was chosen  for pan-Sarbecovirus detection which includes SARS-CoV-2      Comprehensive metabolic panel [[id number]]  (Abnormal) Collected: 04/11/22 0834    Lab Status: Final result Specimen: Blood from Arm, Right Updated: 04/11/22 0924     Sodium 138 mmol/L      Potassium 4 0 mmol/L      Chloride 103 mmol/L      CO2 25 mmol/L      ANION GAP 10 mmol/L      BUN 11 mg/dL      Creatinine 1 00 mg/dL      Glucose 92 mg/dL      Calcium 8 3 mg/dL      Corrected Calcium 8 9 mg/dL      AST 30 U/L      ALT 19 U/L      Alkaline Phosphatase 65 U/L      Total Protein 7 2 g/dL      Albumin 3 3 g/dL      Total Bilirubin 0 56 mg/dL      eGFR 56 ml/min/1 73sq m     Narrative:      [place] guidelines for Chronic Kidney Disease (CKD):     Stage 1 with normal or high GFR (GFR > 90 mL/min/1 73 square meters)    Stage 2 Mild CKD (GFR = 60-89 mL/min/1 73 square meters)   Stage 3A Moderate CKD (GFR = 45-59 mL/min/1 73 square meters)    Stage 3B Moderate CKD (GFR = 30-44 mL/min/1 73 square meters)    Stage 4 Severe CKD (GFR = 15-29 mL/min/1 73 square meters)    Stage 5 End Stage CKD (GFR <15 mL/min/1 73 square meters)  Note: GFR calculation is accurate only with a steady state creatinine    TSH [[id number]]  (Normal) Collected: 04/11/22 0834    Lab Status: Final result Specimen: Blood from Arm, Right Updated: 04/11/22 0924     TSH 3RD GENERATON 0 873 uIU/mL     Narrative:      Patients undergoing fluorescein dye angiography may retain small amounts of fluorescein in the body for 48-72 hours post procedure  Samples containing fluorescein can produce falsely depressed TSH values  If the patient had this procedure,a specimen should be resubmitted post fluorescein clearance        Ethanol [[id number]]  (Normal) Collected: 04/11/22 0834    Lab Status: Final result Specimen: Blood from Arm, Right Updated: 04/11/22 0922     Ethanol Lvl <3 mg/dL     HS Troponin 0hr (reflex protocol) [[id number]]  (Normal) Collected: 04/11/22 0834    Lab Status: Final result Specimen: Blood from Arm, Right Updated: 04/11/22 0922     hs TnI 0hr 15 ng/L     Protime-INR [[id number]]  (Normal) Collected: 04/11/22 0834    Lab Status: Final result Specimen: Blood from Arm, Right Updated: 04/11/22 0920     Protime 13 2 seconds      INR 1 00    APTT [[id number]]  (Abnormal) Collected: 04/11/22 0834    Lab Status: Final result Specimen: Blood from Arm, Right Updated: 04/11/22 0920     PTT 21 seconds     CBC and differential [[id number]]  (Abnormal) Collected: 04/11/22 0834    Lab Status: Final result Specimen: Blood from Arm, Right Updated: 04/11/22 0908     WBC 5 62 Thousand/uL      RBC 4 21 Million/uL      Hemoglobin 13 5 g/dL      Hematocrit 42 0 %       fL      MCH 32 1 pg      MCHC 32 1 g/dL      RDW 13 1 %      MPV 11 7 fL      Platelets 970 Thousands/uL      nRBC 0 /100 WBCs      Neutrophils Relative 77 % Immat GRANS % 1 %      Lymphocytes Relative 9 %      Monocytes Relative 13 %      Eosinophils Relative 0 %      Basophils Relative 0 %      Neutrophils Absolute 4 32 Thousands/µL      Immature Grans Absolute 0 03 Thousand/uL      Lymphocytes Absolute 0 51 Thousands/µL      Monocytes Absolute 0 73 Thousand/µL      Eosinophils Absolute 0 01 Thousand/µL      Basophils Absolute 0 02 Thousands/µL                  CT head without contrast   Final Result by Nick Wilson MD (04/11 1021)      Eccentric right anterior parafalcine subdural hemorrhage measuring 3 mm in thickness  No significant mass effect  No concomitant subarachnoid or parenchymal hemorrhage  Correlate for history of trauma or anticoagulation  No acute findings of the brain parenchyma, itself  I personally discussed this study with NOE MUSA on 4/11/2022 at 10:20 AM   She reports the patient has nebulous history of several recent falls  Workstation performed: [id number]         XR chest 2 views   ED Interpretation by Valentin Rosario PA-C ([id number]/65 [id number])   Cardiomegaly  No focal consolidation  Final Result by Nick Wilson MD (04/11 1010)      Basilar streaky opacities likely represent atelectasis from hypoventilation unless there is compelling clinical evidence for pneumonia  No other acute cardiopulmonary findings                    Workstation performed: [id number]                    Procedures  ECG 12 Lead Documentation Only    Date/Time: 4/11/2022 8:42 AM  Performed by: Valentin Rosario PA-C  Authorized by: Valentin Rosario PA-C     Indications / Diagnosis:  Weakness/frequent falls  ECG reviewed by me, the ED Provider: yes    Patient location:  ED  Interpretation:     Interpretation: non-specific    Rate:     ECG rate:  74    ECG rate assessment: normal    Rhythm:     Rhythm comment:  Sinus arrhythmia  Ectopy:     Ectopy: none    Conduction:     Conduction: normal    ST segments:     ST segments: Normal  T waves:     T waves: non-specific    ECG 12 Lead Documentation Only    Date/Time: 4/11/2022 10:42 AM  Performed by: Rosa M Hurley PA-C  Authorized by: Rosa M Hurley PA-C     Indications / Diagnosis:  Tachycardia  ECG reviewed by me, the ED Provider: yes    Patient location:  ED  Previous ECG:     Previous ECG:  Compared to current    Comparison ECG info:  4/11/22    Similarity:  Changes noted  Interpretation:     Interpretation: abnormal    Rate:     ECG rate:  152    ECG rate assessment: tachycardic    Rhythm:     Rhythm: atrial fibrillation    Ectopy:     Ectopy: none    Conduction:     Conduction: abnormal    ST segments:     ST segments:  Normal             ED Course  ED Course as of 04/11/22 1338   Mon Apr 11, 2022   1028 Spoke with trauma regarding CT scan  Will evaulate  [phone] Patient now in atrial fibrillation with RVR  No history of this  Will start on [facility]  Number of Diagnoses or Management Options  Ambulatory dysfunction  Atrial fibrillation with RVR (HCC)  Subdural hemorrhage (HCC)  Diagnosis management comments: Differential diagnosis includes but not limited to:   TIA, CVA, intracranial hemorrhage, electrolyte abnormality, dysrhythmia, infectious etiology         Amount and/or Complexity of Data Reviewed  Clinical lab tests: ordered and reviewed  Tests in the radiology section of CPT®: ordered and reviewed  Review and summarize past medical records: yes  Discuss the patient with other providers: yes  Independent visualization of images, tracings, or specimens: yes        Disposition  Final diagnoses:   Subdural hemorrhage ([address] )   Ambulatory dysfunction   Atrial fibrillation with RVR ([facility] [address] ) - new onset     Time reflects when diagnosis was documented in both MDM as applicable and the Disposition within this note     Time User Action Codes Description Comment    4/11/2022 10:55 AM Jerry Keller Add [I62 00] Subdural hemorrhage ([address] ) 4/11/2022 10:55 AM Shelly Keller Add [I48 91] Atrial fibrillation with rapid ventricular response ([address] )     4/11/2022 10:59 AM Schuett, Thurl Press Modify [I62 00] Subdural hemorrhage ([address] )     4/11/2022 10:59 AM Schuett, Thurl Press Add [R26 2] Ambulatory dysfunction     4/11/2022 10:59 AM Schuett, Thurl Press Add [I48 91] Atrial fibrillation with RVR ([address] )     4/11/2022 10:59 AM Schuett, Thurl Press Modify [I48 91] Atrial fibrillation with RVR ([facility] [address] ) new onset    4/11/2022 11:48 AM Antonella Keller Add [F32 9] Major depressive disorder, remission status unspecified, unspecified whether recurrent     4/11/2022 11:52 AM Trav Mendez Mt Add [I48 91] Atrial fibrillation, unspecified type [facility])       ED Disposition     ED Disposition Condition Date/Time Comment    Admit Stable Mon Apr 11, 2022 10:59 AM Case was discussed with Trauma and the patient's admission status was agreed to be Admission Status: inpatient status to the service of Dr Karin Lopez           Follow-up Information    None         Current Discharge Medication List      CONTINUE these medications which have NOT CHANGED    Details   ALPRAZolam (XANAX) 0 5 mg tablet TAKE 1 TABLET BY MOUTH ONCE DAILY AS NEEDED FOR ANXIETY  Qty: 30 tablet, Refills: 0    Associated Diagnoses: Anxiety      amitriptyline (ELAVIL) 25 mg tablet TAKE 1 TABLET BY MOUTH ONCE DAILY AT BEDTIME  Qty: 30 tablet, Refills: 0    Associated Diagnoses: Epigastric pain      buPROPion (WELLBUTRIN XL) 150 mg 24 hr tablet Take 1 tablet (150 mg total) by mouth daily  Qty: 30 tablet, Refills: 5    Associated Diagnoses: Depression, unspecified depression type      escitalopram (LEXAPRO) 20 mg tablet Take 1 tablet by mouth once daily  Qty: 90 tablet, Refills: 1    Associated Diagnoses: Depression, unspecified depression type      levothyroxine (Euthyrox) 50 mcg tablet Take 1 tablet (50 mcg total) by mouth daily  Qty: 30 tablet, Refills: 5    Associated Diagnoses: Other specified hypothyroidism lisinopril (ZESTRIL) 10 mg tablet Take 1 tablet (10 mg total) by mouth daily  Qty: 90 tablet, Refills: 3    Associated Diagnoses: Essential hypertension      pantoprazole (PROTONIX) 40 mg tablet TAKE 1 TABLET EVERY DAY  Qty: 90 tablet, Refills: 0    Associated Diagnoses: Gastroesophageal reflux disease      zolpidem (AMBIEN) 10 mg tablet TAKE 1 TABLET BY MOUTH ONCE DAILY AT BEDTIME  Qty: 30 tablet, Refills: 0    Associated Diagnoses: Insomnia, unspecified type             No discharge procedures on file      PDMP Review       Value Time User    PDMP Reviewed  Yes 4/4/2022 12:15 PM Ida Ann MD          ED Provider  Electronically Signed by           Padmaja Price PA-C  04/11/22 8009

## 2022-04-12 ENCOUNTER — APPOINTMENT (INPATIENT)
Dept: CT IMAGING | Facility: HOSPITAL | Age: 72
DRG: 041 | End: 2022-04-12
Payer: MEDICARE

## 2022-04-12 ENCOUNTER — APPOINTMENT (INPATIENT)
Dept: NON INVASIVE DIAGNOSTICS | Facility: HOSPITAL | Age: 72
DRG: 041 | End: 2022-04-12
Payer: MEDICARE

## 2022-04-12 LAB
ANION GAP SERPL CALCULATED.3IONS-SCNC: 13 MMOL/L (ref 4–13)
AORTIC ROOT: 3.1 CM
APICAL FOUR CHAMBER EJECTION FRACTION: 64 %
ASCENDING AORTA: 3 CM (ref 2.01–3.01)
BASOPHILS # BLD AUTO: 0.03 THOUSANDS/ΜL (ref 0–0.1)
BASOPHILS NFR BLD AUTO: 1 % (ref 0–1)
BUN SERPL-MCNC: 12 MG/DL (ref 5–25)
CALCIUM SERPL-MCNC: 7.8 MG/DL (ref 8.3–10.1)
CHLORIDE SERPL-SCNC: 105 MMOL/L (ref 100–108)
CK MB SERPL-MCNC: 3.6 NG/ML (ref 0–5)
CK MB SERPL-MCNC: <1 % (ref 0–2.5)
CK SERPL-CCNC: 779 U/L (ref 26–192)
CO2 SERPL-SCNC: 24 MMOL/L (ref 21–32)
CREAT SERPL-MCNC: 0.99 MG/DL (ref 0.6–1.3)
EOSINOPHIL # BLD AUTO: 0.02 THOUSAND/ΜL (ref 0–0.61)
EOSINOPHIL NFR BLD AUTO: 1 % (ref 0–6)
ERYTHROCYTE [DISTWIDTH] IN BLOOD BY AUTOMATED COUNT: 13.1 % (ref 11.6–15.1)
FRACTIONAL SHORTENING: 36 % (ref 28–44)
GFR SERPL CREATININE-BSD FRML MDRD: 57 ML/MIN/1.73SQ M
GLUCOSE SERPL-MCNC: 76 MG/DL (ref 65–140)
HCT VFR BLD AUTO: 39 % (ref 34.8–46.1)
HGB BLD-MCNC: 12.8 G/DL (ref 11.5–15.4)
IMM GRANULOCYTES # BLD AUTO: 0.02 THOUSAND/UL (ref 0–0.2)
IMM GRANULOCYTES NFR BLD AUTO: 1 % (ref 0–2)
INTERVENTRICULAR SEPTUM IN DIASTOLE (PARASTERNAL SHORT AXIS VIEW): 1 CM
INTERVENTRICULAR SEPTUM: 1 CM (ref 0.53–0.99)
LAAS-AP2: 14.5 CM2
LAAS-AP4: 15.5 CM2
LEFT ATRIUM SIZE: 3.3 CM
LEFT INTERNAL DIMENSION IN SYSTOLE: 2.8 CM (ref 2.8–4.24)
LEFT VENTRICULAR INTERNAL DIMENSION IN DIASTOLE: 4.4 CM (ref 4.6–6.85)
LEFT VENTRICULAR POSTERIOR WALL IN END DIASTOLE: 0.9 CM (ref 0.52–0.98)
LEFT VENTRICULAR STROKE VOLUME: 58 ML
LVSV (TEICH): 58 ML
LYMPHOCYTES # BLD AUTO: 1.32 THOUSANDS/ΜL (ref 0.6–4.47)
LYMPHOCYTES NFR BLD AUTO: 31 % (ref 14–44)
MAGNESIUM SERPL-MCNC: 2 MG/DL (ref 1.6–2.6)
MCH RBC QN AUTO: 32.2 PG (ref 26.8–34.3)
MCHC RBC AUTO-ENTMCNC: 32.8 G/DL (ref 31.4–37.4)
MCV RBC AUTO: 98 FL (ref 82–98)
MONOCYTES # BLD AUTO: 0.68 THOUSAND/ΜL (ref 0.17–1.22)
MONOCYTES NFR BLD AUTO: 16 % (ref 4–12)
MV E'TISSUE VEL-SEP: 10 CM/S
NEUTROPHILS # BLD AUTO: 2.16 THOUSANDS/ΜL (ref 1.85–7.62)
NEUTS SEG NFR BLD AUTO: 50 % (ref 43–75)
NRBC BLD AUTO-RTO: 0 /100 WBCS
PLATELET # BLD AUTO: 173 THOUSANDS/UL (ref 149–390)
PMV BLD AUTO: 10.3 FL (ref 8.9–12.7)
POTASSIUM SERPL-SCNC: 3.4 MMOL/L (ref 3.5–5.3)
RBC # BLD AUTO: 3.97 MILLION/UL (ref 3.81–5.12)
RIGHT ATRIAL 2D VOLUME: 39 ML
RIGHT ATRIUM AREA SYSTOLE A4C: 16 CM2
RIGHT VENTRICLE ID DIMENSION: 3.7 CM
SL CV LEFT ATRIUM LENGTH A2C: 4.8 CM
SL CV LV EF: 65
SL CV PED ECHO LEFT VENTRICLE DIASTOLIC VOLUME (MOD BIPLANE) 2D: 86 ML
SL CV PED ECHO LEFT VENTRICLE SYSTOLIC VOLUME (MOD BIPLANE) 2D: 28 ML
SODIUM SERPL-SCNC: 142 MMOL/L (ref 136–145)
WBC # BLD AUTO: 4.23 THOUSAND/UL (ref 4.31–10.16)
Z-SCORE OF ASCENDING AORTA: 1.96
Z-SCORE OF INTERVENTRICULAR SEPTUM IN END DIASTOLE: 2.04
Z-SCORE OF LEFT VENTRICULAR DIMENSION IN END DIASTOLE: -2.43
Z-SCORE OF LEFT VENTRICULAR DIMENSION IN END SYSTOLE: -1.65
Z-SCORE OF LEFT VENTRICULAR POSTERIOR WALL IN END DIASTOLE: 1.31

## 2022-04-12 PROCEDURE — 70450 CT HEAD/BRAIN W/O DYE: CPT

## 2022-04-12 PROCEDURE — 99233 SBSQ HOSP IP/OBS HIGH 50: CPT | Performed by: INTERNAL MEDICINE

## 2022-04-12 PROCEDURE — 97166 OT EVAL MOD COMPLEX 45 MIN: CPT

## 2022-04-12 PROCEDURE — 80048 BASIC METABOLIC PNL TOTAL CA: CPT | Performed by: SURGERY

## 2022-04-12 PROCEDURE — 82553 CREATINE MB FRACTION: CPT | Performed by: SURGERY

## 2022-04-12 PROCEDURE — 99223 1ST HOSP IP/OBS HIGH 75: CPT | Performed by: NURSE PRACTITIONER

## 2022-04-12 PROCEDURE — 99232 SBSQ HOSP IP/OBS MODERATE 35: CPT | Performed by: SURGERY

## 2022-04-12 PROCEDURE — 93306 TTE W/DOPPLER COMPLETE: CPT

## 2022-04-12 PROCEDURE — 97163 PT EVAL HIGH COMPLEX 45 MIN: CPT

## 2022-04-12 PROCEDURE — 85025 COMPLETE CBC W/AUTO DIFF WBC: CPT | Performed by: SURGERY

## 2022-04-12 PROCEDURE — 82550 ASSAY OF CK (CPK): CPT | Performed by: SURGERY

## 2022-04-12 PROCEDURE — 99232 SBSQ HOSP IP/OBS MODERATE 35: CPT | Performed by: INTERNAL MEDICINE

## 2022-04-12 PROCEDURE — 83735 ASSAY OF MAGNESIUM: CPT | Performed by: NURSE PRACTITIONER

## 2022-04-12 PROCEDURE — 93306 TTE W/DOPPLER COMPLETE: CPT | Performed by: INTERNAL MEDICINE

## 2022-04-12 PROCEDURE — G1004 CDSM NDSC: HCPCS

## 2022-04-12 RX ORDER — DILTIAZEM HYDROCHLORIDE 240 MG/1
240 CAPSULE, COATED, EXTENDED RELEASE ORAL DAILY
Status: DISCONTINUED | OUTPATIENT
Start: 2022-04-13 | End: 2022-04-14

## 2022-04-12 RX ORDER — DILTIAZEM HYDROCHLORIDE 120 MG/1
120 CAPSULE, COATED, EXTENDED RELEASE ORAL DAILY
Status: DISCONTINUED | OUTPATIENT
Start: 2022-04-12 | End: 2022-04-12

## 2022-04-12 RX ORDER — HEPARIN SODIUM 5000 [USP'U]/ML
5000 INJECTION, SOLUTION INTRAVENOUS; SUBCUTANEOUS EVERY 8 HOURS SCHEDULED
Status: DISCONTINUED | OUTPATIENT
Start: 2022-04-12 | End: 2022-04-17 | Stop reason: HOSPADM

## 2022-04-12 RX ORDER — POTASSIUM CHLORIDE 20 MEQ/1
40 TABLET, EXTENDED RELEASE ORAL ONCE
Status: COMPLETED | OUTPATIENT
Start: 2022-04-12 | End: 2022-04-12

## 2022-04-12 RX ORDER — DILTIAZEM HYDROCHLORIDE 120 MG/1
120 CAPSULE, COATED, EXTENDED RELEASE ORAL ONCE
Status: COMPLETED | OUTPATIENT
Start: 2022-04-12 | End: 2022-04-12

## 2022-04-12 RX ADMIN — AMITRIPTYLINE HYDROCHLORIDE 25 MG: 25 TABLET, FILM COATED ORAL at 21:18

## 2022-04-12 RX ADMIN — LEVOTHYROXINE SODIUM 50 MCG: 50 TABLET ORAL at 05:52

## 2022-04-12 RX ADMIN — ACETAMINOPHEN 325MG 975 MG: 325 TABLET ORAL at 12:32

## 2022-04-12 RX ADMIN — BUPROPION HYDROCHLORIDE 150 MG: 150 TABLET, FILM COATED, EXTENDED RELEASE ORAL at 21:18

## 2022-04-12 RX ADMIN — HEPARIN SODIUM 5000 UNITS: 5000 INJECTION INTRAVENOUS; SUBCUTANEOUS at 14:13

## 2022-04-12 RX ADMIN — DILTIAZEM HYDROCHLORIDE 120 MG: 120 CAPSULE, COATED, EXTENDED RELEASE ORAL at 11:18

## 2022-04-12 RX ADMIN — ESCITALOPRAM OXALATE 20 MG: 20 TABLET ORAL at 08:23

## 2022-04-12 RX ADMIN — AMIODARONE HYDROCHLORIDE 200 MG: 200 TABLET ORAL at 15:54

## 2022-04-12 RX ADMIN — ACETAMINOPHEN 325MG 975 MG: 325 TABLET ORAL at 05:52

## 2022-04-12 RX ADMIN — HEPARIN SODIUM 5000 UNITS: 5000 INJECTION INTRAVENOUS; SUBCUTANEOUS at 21:19

## 2022-04-12 RX ADMIN — SODIUM CHLORIDE, SODIUM GLUCONATE, SODIUM ACETATE, POTASSIUM CHLORIDE, MAGNESIUM CHLORIDE, SODIUM PHOSPHATE, DIBASIC, AND POTASSIUM PHOSPHATE 50 ML/HR: .53; .5; .37; .037; .03; .012; .00082 INJECTION, SOLUTION INTRAVENOUS at 11:22

## 2022-04-12 RX ADMIN — AMIODARONE HYDROCHLORIDE 200 MG: 200 TABLET ORAL at 11:18

## 2022-04-12 RX ADMIN — AMIODARONE HYDROCHLORIDE 200 MG: 200 TABLET ORAL at 08:24

## 2022-04-12 RX ADMIN — POTASSIUM CHLORIDE 40 MEQ: 1500 TABLET, EXTENDED RELEASE ORAL at 11:18

## 2022-04-12 RX ADMIN — ACETAMINOPHEN 325MG 975 MG: 325 TABLET ORAL at 17:23

## 2022-04-12 RX ADMIN — PANTOPRAZOLE SODIUM 40 MG: 40 TABLET, DELAYED RELEASE ORAL at 21:18

## 2022-04-12 RX ADMIN — LEVETIRACETAM 500 MG: 500 TABLET, FILM COATED ORAL at 11:18

## 2022-04-12 NOTE — CASE MANAGEMENT
Case Management Assessment & Discharge Planning Note    Patient name Jesse Alicia  Location S /S -01 MRN [id number]  : 1950 Date 2022       Current Admission Date: 2022  Current Admission Adela[address] Fay [address] A   Patient Active Problem List    Diagnosis Date Noted    Ambulatory dysfunction     Subdural hemorrhage ([facility] ) 2022    Atrial fibrillation with RVR ([facility] ) 2022    MDD (major depressive disorder) 2022    Weakness generalized 2022    Influenza A 2022    Elevated CK 2022    Closed fracture of distal end of left fibula with routine healing 2020    Epigastric pain 2020    Gastritis without bleeding 2020    Other specified hypothyroidism 2019    Insomnia 10/27/2016    Vitamin B12 deficiency 2015    [facility] of [facility] 2015    Esophageal reflux 2015    Essential hypertension 2015      LOS (days): 1  Geometric Mean LOS (GMLOS) (days): 3 30  Days to GMLOS:2 2     OBJECTIVE:    Risk of Unplanned Readmission Score: 9         Current admission status: Inpatient       Preferred Pharmacy:   [facility] [address] [facility] [facility]  [address] [address]  Phone: [phone] Fax: [phone]    Primary Care Provider: Cherise Conway MD    Primary Insurance: MEDICARE  Secondary Insurance: [facility]    ASSESSMENT:  Active Health Care Proxies    There are no active Health Care Proxies on file  Readmission Root Cause  30 Day Readmission: No    Patient Information  Admitted from[de-identified] Home  Mental Status: Alert  During Assessment patient was accompanied by: Not accompanied during assessment  Assessment information provided by[de-identified] Patient  Support Systems: Adelaida Nicolas 62 of Residence: [address] [place] [address] do you live in?: [place] entry access options   Select all that apply : No steps to enter home  Type of Current Residence: [place]  In the last 12 months, was there a time when you were not able to pay the mortgage or rent on time?: No  In the last 12 months, was there a time when you did not have a steady place to sleep or slept in a shelter (including now)?: No  Homeless/housing insecurity resource given?: N/A  Living Arrangements: Lives Alone  Is patient a ?: No    Activities of Daily Living Prior to Admission  Functional Status: Independent  Completes ADLs independently?: Yes  Ambulates independently?: Yes  Does patient use assisted devices?: Yes  Does patient currently own DME?: No  Does patient have a history of Outpatient Therapy (PT/OT)?: No  Does the patient have a history of Short-Term Rehab?: No  Does patient have a history of HHC?: No  Does patient currently have [facility]?: No         Patient Information Continued  Income Source: Pension/residential  Does patient have prescription coverage?: Yes  Within the past 12 months, you worried that your food would run out before you got the money to buy more : Never true  Within the past 12 months, the food you bought just didnt last and you didnt have money to get more : Never true  Food insecurity resource given?: N/A  Does patient receive dialysis treatments?: No  Does patient have a history of Mental Health Diagnosis?: No         Means of Transportation  Means of Transport to Appts[de-identified] Drives Self  In the past 12 months, has lack of transportation kept you from medical appointments or from getting medications?: No  In the past 12 months, has lack of transportation kept you from meetings, work, or from getting things needed for daily living?: No  Was application for public transport provided?: N/A        DISCHARGE DETAILS:    Discharge planning discussed with[de-identified] Patient  Freedom of Choice: Yes  Comments - Freedom of Choice: Discussed discharge needs and freedom of choice with patient   At this time therapy is recommending discharge to home and patient is aware and in agreement    CM contacted family/caregiver?: No- see comments  Were Treatment Team discharge recommendations reviewed with patient/caregiver?: Yes  Did patient/caregiver verbalize understanding of patient care needs?: Yes  Were patient/caregiver advised of the risks associated with not following Treatment Team discharge recommendations?: Yes    Contacts  Patient Contacts: Racquel Nick  Relationship to Patient[de-identified] Friend  Contact Method: Phone  Phone Number: [phone]  Reason/Outcome: Emergency [address]         Is the patient interested in [facility] at discharge?: No    DME Referral Provided  Referral made for DME?: No    Other Referral/Resources/Interventions Provided:  Interventions: None Indicated         Treatment Team Recommendation: Home  Discharge Destination Plan[de-identified] Home

## 2022-04-12 NOTE — QUICK NOTE
Spoke with Cardiology regarding heart rate, and recommended a Cardizem drip  Capsule discontinued and Cardizem drip initiated  Florentino Jimenez

## 2022-04-12 NOTE — PLAN OF CARE
Problem: PAIN - ADULT  Goal: Verbalizes/displays adequate comfort level or baseline comfort level  Description: Interventions:  - Encourage patient to monitor pain and request assistance  - Assess pain using appropriate pain scale  - Administer analgesics based on type and severity of pain and evaluate response  - Implement non-pharmacological measures as appropriate and evaluate response  - Consider cultural and social influences on pain and pain management  - Notify physician/advanced practitioner if interventions unsuccessful or patient reports new pain  Outcome: Progressing     Problem: INFECTION - ADULT  Goal: Absence or prevention of progression during hospitalization  Description: INTERVENTIONS:  - Assess and monitor for signs and symptoms of infection  - Monitor lab/diagnostic results  - Monitor all insertion sites, i e  indwelling lines, tubes, and drains  - Monitor endotracheal if appropriate and nasal secretions for changes in amount and color  - [place] appropriate cooling/warming therapies per order  - Administer medications as ordered  - Instruct and encourage patient and family to use good hand hygiene technique  - Identify and instruct in appropriate isolation precautions for identified infection/condition  Outcome: Progressing     Problem: SAFETY ADULT  Goal: Maintain or return to baseline ADL function  Description: INTERVENTIONS:  -  Assess patient's ability to carry out ADLs; assess patient's baseline for ADL function and identify physical deficits which impact ability to perform ADLs (bathing, care of mouth/teeth, toileting, grooming, dressing, etc )  - Assess/evaluate cause of self-care deficits   - Assess range of motion  - Assess patient's mobility; develop plan if impaired  - Assess patient's need for assistive devices and provide as appropriate  - Encourage maximum independence but intervene and supervise when necessary  - Involve family in performance of ADLs  - Assess for home care needs following discharge   - Consider OT consult to assist with ADL evaluation and planning for discharge  - Provide patient education as appropriate  Outcome: Progressing  Goal: Maintains/Returns to pre admission functional level  Description: INTERVENTIONS:  - Perform BMAT or MOVE assessment daily    - Set and communicate daily mobility goal to care team and patient/family/caregiver  - Collaborate with rehabilitation services on mobility goals if consulted  - Ambulate patient 3 times a day  - Out of bed for meals 3 times a day  - Out of bed for toileting  - Record patient progress and toleration of activity level   Outcome: Progressing     Problem: NEUROSENSORY - ADULT  Goal: Achieves stable or improved neurological status  Description: INTERVENTIONS  - Monitor and report changes in neurological status  - Monitor vital signs such as temperature, blood pressure, glucose, and any other labs ordered   - Initiate measures to prevent increased intracranial pressure  - Monitor for seizure activity and implement precautions if appropriate      Outcome: Progressing  Goal: Achieves maximal functionality and self care  Description: INTERVENTIONS  - Monitor swallowing and airway patency with patient fatigue and changes in neurological status  - Encourage and assist patient to increase activity and self care     - Encourage visually impaired, hearing impaired and aphasic patients to use assistive/communication devices  Outcome: Progressing     Problem: RESPIRATORY - ADULT  Goal: Achieves optimal ventilation and oxygenation  Description: INTERVENTIONS:  - Assess for changes in respiratory status  - Assess for changes in mentation and behavior  - Position to facilitate oxygenation and minimize respiratory effort  - Oxygen administered by appropriate delivery if ordered  - Initiate smoking cessation education as indicated  - Encourage broncho-pulmonary hygiene including cough, deep breathe, Incentive Spirometry  - Assess the need for suctioning and aspirate as needed  - Assess and instruct to report SOB or any respiratory difficulty  - Respiratory Therapy support as indicated  Outcome: Progressing     Problem: Potential for Falls  Goal: Patient will remain free of falls  Description: INTERVENTIONS:  - Educate patient/family on patient safety including physical limitations  - Instruct patient to call for assistance with activity   - Consult OT/PT to assist with strengthening/mobility   - Keep Call bell within reach  - Keep bed low and locked with side rails adjusted as appropriate  - Keep care items and personal belongings within reach  - Initiate and maintain comfort rounds  - Make Fall Risk Sign visible to staff  - Initiate/Maintain bed/chair alarm  - Obtain necessary fall risk management equipment  - Apply yellow socks and bracelet for high fall risk patients  - Consider moving patient to room near nurses station  Outcome: Progressing     Problem: MOBILITY - ADULT  Goal: Maintain or return to baseline ADL function  Description: INTERVENTIONS:  -  Assess patient's ability to carry out ADLs; assess patient's baseline for ADL function and identify physical deficits which impact ability to perform ADLs (bathing, care of mouth/teeth, toileting, grooming, dressing, etc )  - Assess/evaluate cause of self-care deficits   - Assess range of motion  - Assess patient's mobility; develop plan if impaired  - Assess patient's need for assistive devices and provide as appropriate  - Encourage maximum independence but intervene and supervise when necessary  - Involve family in performance of ADLs  - Assess for home care needs following discharge   - Consider OT consult to assist with ADL evaluation and planning for discharge  - Provide patient education as appropriate  Outcome: Progressing  Goal: Maintains/Returns to pre admission functional level  Description: INTERVENTIONS:  - Perform BMAT or MOVE assessment daily    - Set and communicate daily mobility goal to care team and patient/family/caregiver     - Collaborate with rehabilitation services on mobility goals if consulted  - Ambulate patient 3 times a day  - Out of bed for meals 3 times a day  - Out of bed for toileting  - Record patient progress and toleration of activity level   Outcome: Progressing     Problem: Prexisting or High Potential for Compromised Skin Integrity  Goal: Skin integrity is maintained or improved  Description: INTERVENTIONS:  - Identify patients at risk for skin breakdown  - Assess and monitor skin integrity  - Assess and monitor nutrition and hydration status  - Monitor labs   - Assess for incontinence   - Turn and reposition patient  - Assist with mobility/ambulation  - Relieve pressure over bony prominences  - Avoid friction and shearing  - Provide appropriate hygiene as needed including keeping skin clean and dry  - Evaluate need for skin moisturizer/barrier cream  - Collaborate with interdisciplinary team   - Patient/family teaching  - Consider wound care consult   Outcome: Progressing

## 2022-04-12 NOTE — PROGRESS NOTES
General Cardiology   Progress Note -  Team One   Benedicto Wilson 71 y o  female MRN: [id number]    Unit/Bed#: S -01 Encounter: [id number]    Assessment  1  New onset atrial fibrillation with RVR - possibly provoked in the setting of # 3 and # 4   -Asymptomatic - w/o c/o CP, palpitations, or SOB  -ECG on admission NSR, w/ subsequent ECG demonstrating atrial fibrillation w/ RVR, rate 152 bpm  -24 hour telemetry review- predominant NSR with rates in 60s to 70s - intermittent episodes of atrial fibrillation with RVR with rates in the 130-140 range   -Started on Cardizem  mg daily and oral amiodarone 200 mg t i d  Yesterday  -[id number] score = 3 (age, sex, HTN) - unable to utilize systemic anticoagulation secondary to # 3  -Electrolytes on BMP this a m  - K +level 3 4   -TSH level 0 87  2  Fall - appears to be mechanical in nature, likely stemming from generalized weakness  3  Traumatic subdural hemorrhage  -Management per the neurosurgery service  -CT of the head without contrast - right anterior parafalcine subdural hemorrhage measuring 3 mm in thickness   No significant mass effect   No concomitant subarachnoid or parenchymal hemorrhage   -Recommending holding of all anti-platelet/anticoagulant medications for at least 2 weeks and/or until cleared by the Neurosurgery Service  4  Influenza A +   -On contact/droplet precautions   -Subjective chills/fevers  Afebrile overnight with T-max a 102 8°   -Stable from a respiratory standpoint  Maintaining O2 saturations in the mid 90s on RA  5  Essential hypertension  -BP last recorded 129/68  -Outpatient BP regimen; lisinopril 10 mg daily     Plan  -Follow-up TTE imaging results to rule out any significant structural or valvular heart disease   -Increase Cardizem CD to 240 mg daily, will give an extra dose of  mg this a m  [facility]   -Continue oral amiodarone load , will likely utilize for a short course in an attempt for further afib suppression while off anticoagulation -- cannot anticoagulate at this time d/t # 3, will need neuro surgery clearance hopefully within the 2 weeks to determine if anticoagulation can be initiated  -Monitor renal function and electrolytes closely - replete to maintain K+level at 4, and mag at 2   -Monitor on telemetry    Subjective  Review of Systems   Constitutional: Positive for malaise/fatigue  Negative for chills and fever  Eyes: Negative for visual disturbance  Cardiovascular: Negative for chest pain, dyspnea on exertion, leg swelling, orthopnea and palpitations  Respiratory: Positive for cough  Negative for sputum production  Gastrointestinal: Negative for abdominal pain  Neurological: Negative for dizziness, headaches and light-headedness  Objective:   Physical Exam  Vitals and nursing note reviewed  Constitutional:       General: She is not in acute distress  Appearance: She is obese  She is not diaphoretic  HENT:      Head: Normocephalic and atraumatic  Mouth/Throat:      Mouth: Mucous membranes are moist    Eyes:      General: No scleral icterus  Cardiovascular:      Rate and Rhythm: Normal rate and regular rhythm  Pulses: Normal pulses  Heart sounds: Normal heart sounds  No murmur heard  Comments: NSR with intermittent episodes of AFib with RVR  Pulmonary:      Effort: Pulmonary effort is normal       Breath sounds: Normal breath sounds  No wheezing or rales  Abdominal:      Palpations: Abdomen is soft  Musculoskeletal:      Cervical back: Neck supple  Right lower leg: No edema  Left lower leg: No edema  Skin:     General: Skin is warm and dry  Capillary Refill: Capillary refill takes less than 2 seconds  Neurological:      General: No focal deficit present  Mental Status: She is alert and oriented to person, place, and time     Psychiatric:         Mood and Affect: Mood normal          Vitals: Blood pressure 91/54, pulse 73, temperature 98 1 °F (36 7 °C), resp  rate 16, height 5' 5" (1 651 m), weight 81 6 kg (180 lb), SpO2 90 %  ,     Body mass index is 29 95 kg/m²  ,   Systolic (10OQU), KRZ:361 , Min:91 , QIT:607     Diastolic (30EII), PFM:97, Min:54, Max:102    No intake or output data in the 24 hours ending 04/12/22 1042  Weight (last 2 days)     Date/Time Weight    04/11/22 1157 81 6 (180)          LABORATORY RESULTS  Results from last 7 days   Lab Units 04/12/22  0529 04/11/22  0834   CK TOTAL U/L 779* 331*   CK MB INDEX % <1 0 1 0     CBC with diff:   Results from last 7 days   Lab Units 04/12/22  0533 04/11/22  0834   WBC Thousand/uL 4 23* 5 62   HEMOGLOBIN g/dL 12 8 13 5   HEMATOCRIT % 39 0 42 0   MCV fL 98 100*   PLATELETS Thousands/uL 173 176   MCH pg 32 2 32 1   MCHC g/dL 32 8 32 1   RDW % 13 1 13 1   MPV fL 10 3 11 7   NRBC AUTO /100 WBCs 0 0       CMP:  Results from last 7 days   Lab Units 04/12/22  0529 04/11/22  0834   POTASSIUM mmol/L 3 4* 4 0   CHLORIDE mmol/L 105 103   CO2 mmol/L 24 25   BUN mg/dL 12 11   CREATININE mg/dL 0 99 1 00   CALCIUM mg/dL 7 8* 8 3   AST U/L  --  30   ALT U/L  --  19   ALK PHOS U/L  --  65   EGFR ml/min/1 73sq m 57 56       BMP:  Results from last 7 days   Lab Units 04/12/22  0529 04/11/22  0834   POTASSIUM mmol/L 3 4* 4 0   CHLORIDE mmol/L 105 103   CO2 mmol/L 24 25   BUN mg/dL 12 11   CREATININE mg/dL 0 99 1 00   CALCIUM mg/dL 7 8* 8 3       No results found for: NTBNP                 Results from last 7 days   Lab Units 04/11/22  0834   TSH 3RD GENERATON uIU/mL 0 873       Results from last 7 days   Lab Units 04/11/22  0834   INR  1 00       Lipid Profile:   Lab Results   Component Value Date    CHOL 242 (H) 02/10/2017     Lab Results   Component Value Date    HDL 73 02/04/2022    HDL 84 04/12/2021    HDL 75 09/19/2019     Lab Results   Component Value Date    LDLCALC 143 (H) 02/04/2022    LDLCALC 116 (H) 04/12/2021    LDLCALC 141 (H) 09/19/2019     Lab Results   Component Value Date    TRIG 110 02/04/2022    TRIG 69 04/12/2021    TRIG 99 09/19/2019       Cardiac testing:   No results found for this or any previous visit  No results found for this or any previous visit  No results found for this or any previous visit  No valid procedures specified  No results found for this or any previous visit  Meds/Allergies   all current active meds have been reviewed and current meds:   Current Facility-Administered Medications   Medication Dose Route Frequency    acetaminophen (TYLENOL) tablet 975 mg  975 mg Oral Q6H [facility]    amiodarone tablet 200 mg  200 mg Oral TID With Meals    amitriptyline (ELAVIL) tablet 25 mg  25 mg Oral HS    buPROPion (WELLBUTRIN XL) 24 hr tablet 150 mg  150 mg Oral Daily    diltiazem (CARDIZEM CD) 24 hr capsule 120 mg  120 mg Oral Once    [START ON 4/13/2022] diltiazem (CARDIZEM CD) 24 hr capsule 240 mg  240 mg Oral Daily    escitalopram (LEXAPRO) tablet 20 mg  20 mg Oral Daily    levETIRAcetam (KEPPRA) tablet 500 mg  500 mg Oral Q12H    levothyroxine tablet 50 mcg  50 mcg Oral Early Morning    multi-electrolyte (PLASMALYTE-A/ISOLYTE-S PH 7 4) IV solution  50 mL/hr Intravenous Continuous    pantoprazole (PROTONIX) EC tablet 40 mg  40 mg Oral Daily    potassium chloride (K-DUR,KLOR-CON) CR tablet 40 mEq  40 mEq Oral Once     multi-electrolyte, 50 mL/hr, Last Rate: 50 mL/hr (04/11/22 1227)      EKG personally reviewed by Goble Dandy, CRNP    Assessment:  Principal Problem:    Influenza A  Active Problems:    Essential hypertension    Subdural hemorrhage (HCC)    Atrial fibrillation with RVR (HCC)    MDD (major depressive disorder)    Weakness generalized    Ambulatory dysfunction      Counseling / Coordination of Care  Total floor / unit time spent today 20 minutes  Greater than 50% of total time was spent with the patient and / or family counseling and / or coordination of care        ** Please Note: Dragon 360 Dictation voice to text software may have been used in the creation of this document   **

## 2022-04-12 NOTE — OCCUPATIONAL THERAPY NOTE
Occupational Therapy Cancellation     Patient Name: Merced Smith  LEQYB'C Date: 4/12/2022  Problem List  Principal Problem:    Influenza A  Active Problems:    Essential hypertension    Subdural hemorrhage (HCC)    Atrial fibrillation with rapid ventricular response (HCC)    MDD (major depressive disorder)    Weakness generalized    Past Medical History  Past Medical History:   Diagnosis Date    Anxiety     Congenital absence of one kidney     Depression     GERD (gastroesophageal reflux disease)     History of transfusion     Hypertension     Insomnia     Menopause     Other specified hypothyroidism 1/28/2019    Vitamin B12 deficiency      Past Surgical History  Past Surgical History:   Procedure Laterality Date    COLONOSCOPY      AK COLONOSCOPY FLX DX W/COLLJ SPEC WHEN PFRMD N/A 5/30/2017    Procedure: EGD AND COLONOSCOPY;  Surgeon: Desirae Berry MD;  Location: [facility]; Service: Gastroenterology    AK OPEN [place] LAT MALLEOLUS Left 7/13/2020    Procedure: OPEN REDUCTION W/ INTERNAL FIXATION (ORIF) ANKLE;  Surgeon: Luis Kaba DO;  Location: [address] [place] [address];  Service: Orthopedics    REDUCTION MAMMAPLASTY Bilateral 06/06/2011    REDUCTION MAMMAPLASTY      SHOULDER SURGERY      TONSILLECTOMY             04/12/22 0946   OT Last Visit   OT Visit Date 04/12/22  (Tuesday)   Note Type   Note type Evaluation; Cancelled Session   Cancel Reasons Patient off floor/test   Additional Comments OT orders received and chart review completed  Pt presently leaving floor w/ Kari HAQUE for CT  Will continue to follow as appropriate and schedule allows following      Maritza Sheehan, OTR/L

## 2022-04-12 NOTE — PHYSICAL THERAPY NOTE
PHYSICAL THERAPY EVLAUATION    NAME:  Erasmo Clements  DATE: 04/12/22    AGE:   71 y o  Mrn:   [id number]  Length Of Stay: 1    ADMIT DX:  Subdural hemorrhage ([facility] [address] ) [I62 00]  Cough [R05 9]  Atrial fibrillation with rapid ventricular response (HCC) [I48 91]  Atrial fibrillation with RVR ([address] ) [I48 91]  Ambulatory dysfunction [R26 2]  Atrial fibrillation, unspecified type ([address] ) [I48 91]  Major depressive disorder, remission status unspecified, unspecified whether recurrent [F32 9]    Past Medical History:   Diagnosis Date    Anxiety     Congenital absence of one kidney     Depression     GERD (gastroesophageal reflux disease)     History of transfusion     Hypertension     Insomnia     Menopause     Other specified hypothyroidism 1/28/2019    Vitamin B12 deficiency      Past Surgical History:   Procedure Laterality Date    COLONOSCOPY      WA COLONOSCOPY FLX DX W/COLLJ SPEC WHEN PFRMD N/A 5/30/2017    Procedure: EGD AND COLONOSCOPY;  Surgeon: Janice Gonzalez MD;  [facility]; Service: Gastroenterology    WA OPEN [place] LAT MALLEOLUS Left 7/13/2020    Procedure: OPEN REDUCTION W/ INTERNAL FIXATION (ORIF) ANKLE;  Surgeon: Angela Cherry DO;  Location: [address] [place] [address];  Service: Orthopedics    REDUCTION MAMMAPLASTY Bilateral 06/06/2011    REDUCTION MAMMAPLASTY      SHOULDER SURGERY      TONSILLECTOMY         Performed at least 2 patient identifiers during session: Name, Nakul Szymanski and ID bracelet        04/12/22 2868   PT Last Visit   PT Visit Date 04/12/22   Note Type   Note type Evaluation   Pain Assessment   Pain Assessment Tool 0-10   Pain Score No Pain   Effect of Pain on Daily Activities n/a   Patient's Stated Pain Goal No pain   Hospital Pain Intervention(s) Ambulation/increased activity;Repositioned   Multiple Pain Sites No   Restrictions/Precautions   Weight Bearing Precautions Per Order No   Other Precautions Droplet precautions; Fall Risk;Multiple lines  (+influenza A) Home Living   Type of Home House  (55+ community)   Home Layout One level;Performs ADLs on one level; Able to live on main level with bedroom/bathroom  (0 MAGALI, FFSU (storage in attic but does not need to access))   Bathroom Shower/Tub Walk-in shower   Bathroom Toilet Raised   Bathroom Equipment Other (Comment)  (has a Teak shower chair but does not use)   [address] Other (Comment)  (none - pt reports prev borrowing from friends)   Additional Comments Has 2 pet dogs to care for  Prior Function   Level of [place] Independent with ADLs and functional mobility   Lives With Alone   Receives Help From Rosalie Wilde  (currently watching dogs)   ADL Assistance Independent   IADLs Independent   Falls in the last 6 months 1 to 4   Vocational Retired   Comments Pt reports that PTA, she is fully independent with all ADLs and IADLs, ambulates household and community distances with no AD  +drives  General   Additional Pertinent History Pt admitted 4/11/22 with SDH s/p fall, cough, +influenza A     Family/Caregiver Present No   Cognition   Overall Cognitive Status WFL   Arousal/Participation Cooperative   Attention Within functional limits   Orientation Level Oriented X4   Memory Within functional limits   Following Commands Follows multistep commands without difficulty   Subjective   Subjective "I know I need to be more active, but my middle name should be lazy "   RUE Assessment   RUE Assessment WFL   RUE Strength   RUE Overall Strength Within Functional Limits - able to perform ADL tasks with strength   LUE Assessment   LUE Assessment WFL   LUE Strength   LUE Overall Strength Within Functional Limits - able to perform ADL tasks with strength   RLE Assessment   RLE Assessment WFL   LLE Assessment   LLE Assessment WFL   Vision-Basic Assessment   Current Vision Wears glasses only for reading   Coordination   Movements are Fluid and Coordinated 1   Sensation [facility]   Light Touch   RLE Light Touch Grossly intact   LLE Light Touch Grossly intact   Proprioception   RLE Proprioception Grossly intact   LLE Proprioception Grossly Intact   Bed Mobility   Supine to Sit 6  Modified independent   Additional items HOB elevated   Sit to Supine 6  Modified independent   Additional items HOB elevated   Transfers   Sit to Stand 7  Independent   Stand to Sit 7  Independent   Ambulation/Elevation   Gait pattern WNL; Decreased foot clearance; Short stride  (mild pathway deviation)   Gait Assistance 5  Supervision   Additional items Assist x 1;Verbal cues   Assistive Device None   Distance 200ft x1 with change in direction [facility]   Stair Management Assistance Not tested  (pt has no steps at home to negotiate)   Balance   Static Sitting Normal   Dynamic Sitting Good   Static Standing Good   Dynamic Standing Fair +   Ambulatory Fair +   Activity Tolerance   Activity Tolerance Patient tolerated treatment well   Medical Staff Made Aware Spoke with OT   Nurse Made Aware Spoke with SHABNAM Painetr pre/post session   Assessment   Prognosis Good   Problem List Decreased safety awareness;Decreased mobility; Impaired balance   Assessment Pt seen for PT evaluation, cleared by SHABNAM Painter  PT consult received for mobility assessment & discharge needs  Pt admitted 4/11/2022 w/ SDH s/p fall, cough, dx Influenza A  Comorbidities affecting pt's fnxl performance include: falls, HTN, Afib with RVR, ambulatory dysfunction  Personal factors affecting pt include: limited home support, positive fall history and limited insight into impairments  PTA, pt was independent with functional mobility without assistive device, independent with ADLS, independent with IADLS and living alone in 1 story home with 0 steps to enter  AM-PAC objective tool in combination w/ Barthel Index outcome tool were used to assist in determining pt safety w/ mobility/ADLs & appropriate d/c recommendations, see above for scores   Pt is at risk of falls d/t multiple comorbidities, h/o falls, impaired balance, varying levels of pain , acuity of medical illness and ongoing medical treatment of primary dx  Pt's clinical presentation is currently unstable/unpredictable seen in pt's presentation of changing level of pain, increased fall risk and new onset of impairment of functional mobility  PT IE requires high complexity clinical decision making, based on multiple factors which affect pt's performance w/ evaluation & therapist judgement for d/c recommendations  Pt will benefit from continued PT treatment in order to address impairments, decrease risk of falls, maximize independence w/ fnxl mobility, & ensure safety w/ mobility for transition to next level of care  Based on pt presentation & impairments, pt would most appropriately benefit from outpatient PT services  Barriers to Discharge None   Goals   Patient Goals "to get home and start walking for exercise again "   [facility] Expiration Date 04/22/22   Short Term Goal #1 Patient PT goals established in order to address patient self reported goal of "to get home and start walking for exercise again " Pt will: ambulate >300ft with LRAD at indep/emily level in order to increase safety with household and community functional mobility; negotiate 3-5 stairs with HR assist at emily level in order to facilitate safety with community mobility; demonstrate understanding and independence with LE strengthening HEP; improve ambulatory balance to >/= good grade with LRAD in order to promote safety and increased independence with mobility; improve AM-PAC score to >/= 24/24 in order to increase independence with mobility and decrease burden of care; improve Barthel Index score to >/= 95/100 in order to increase independence and decrease risk of falls  PT Treatment Day 0   Plan   Treatment/Interventions Therapeutic exercise;LE strengthening/ROM; Gait training;Spoke to nursing   PT Frequency 1-2x/wk   Recommendation   PT Discharge Recommendation Home with outpatient rehabilitation   Additional Comments Encourage frequent bouts of supervised ambulation in hallway during remainder of her stay  AM-PAC Basic Mobility Inpatient   Turning in Bed Without Bedrails 4   Lying on Back to Sitting on Edge of Flat Bed 4   Moving Bed to Chair 4   Standing Up From Chair 4   Walk in Room 3   Climb 3-5 Stairs 3   Basic Mobility Inpatient Raw Score 22   Basic Mobility Standardized Score 47 4   Highest Level Of Mobility   JH-HLM Goal 7: Walk 25 feet or more   JH-HLM Highest Level of Mobility 7: Walk 25 feet or more   JH-HLM Goal Achieved Yes   Modified Hardwick Scale   Modified Agnieszka Scale 2   Barthel Index   Feeding 10   Bathing 0   Grooming Score 5   Dressing Score 10   Bladder Score 10   Bowels Score 10   Toilet Use Score 10   Transfers (Bed/Chair) Score 15   Mobility (Level Surface) Score 10   Stairs Score 5   Barthel Index Score 85   End of Consult   Patient Position at End of Consult Supine; All needs within reach   End of Consult Comments Based on patient's [facility] Level of Mobility scores today, patient currently has a goal of JH-HLM Levels: 7: WALK 25 FEET OR MORE, to be completed with RN staffing each shift, in order to improve overall activity tolerance and mobility, combat hospital related deconditioning, and maximize outcomes for d/c from the acute care setting  The patient's AM-PAC Basic Mobility Inpatient Short Form Raw Score is 22  A Raw score of greater than 16 suggests the patient may benefit from discharge to home  Please also refer to the recommendation of the Physical Therapist for safe discharge planning          Maxi Aviles PT, DPT   Available via [employer]  [facility] # [id number]  [place] License - [id number]  [id number]

## 2022-04-12 NOTE — CONSULTS
[address] [facility] 1950, 71 y o  female MRN: [id number]  Unit/Bed#: S -01 Encounter: [id number]  Primary Care Provider: Cherise Conway MD   Date and time admitted to hospital: 4/11/2022  7:45 AM    Consults    Subdural hemorrhage ([address] )  Assessment & Plan  · POA with ataxia; found to have small anterior SDH  · Trauma as primary, keppra started as proph  · Neurosurgery consulted, recommended repeat CT for DVT proph    * Influenza A  Assessment & Plan  · POA, with several days of myalgias and fatigue   · Found to be flu A positive; no clear sick contacts  Tm 102 8, WBC 4   · Given symptom onset >48hours, would not recommend starting tamiflu   Weakness generalized  Assessment & Plan  · Likely d/t influenza   · PT/OT pending    MDD (major depressive disorder)  Assessment & Plan  · Would continue escitalopram, wellbutrin, amitriptyline   · Xanax prn     Atrial fibrillation with rapid ventricular response (HCC)  Assessment & Plan  · Pt noted to be afib with RVR while in the ED; Received cardizem bolus with sinus rhythm   · Cont telemetry    · Potassium replete this am  · Cardiology has been consulted  · Recommending TTE (pending today)  · Oral cardizem + amio continued   · No AC for now given problem 1     Essential hypertension  Assessment & Plan  · Lisinopril discontinued yesterday, BP stable this am  · PO cardizem started for atrial fibrillation      VTE Pharmacologic Prophylaxis: VTE Score: 4 Moderate Risk (Score 3-4) - Pharmacological DVT Prophylaxis Contraindicated  Sequential Compression Devices Ordered  Patient Centered Rounds: I evaluated the patient without nursing staff present due to patient was undergoing echocardiogram  Discussions with Specialists or Other Care Team Provider: Trauma and neurosurgery notes reviewed    Education and Discussions with Family / Patient: Patient declined call to   Time Spent for Care: 45 minutes  More than 50% of total time spent on counseling and coordination of care as described above  Current Length of Stay: 1 day(s)  Current Patient Status: Inpatient   Certification Statement: The patient will continue to require additional inpatient hospital stay due to monitoring of SDH  Discharge Plan: [facility] is following along as consultant  Code Status: Level 3 - DNAR and DNI    Subjective:   "I feel a bit better " she notes she has improvement in her sore throat  She feels more heavy taking deep breaths she attributes to her flu  We discuss her atrial fibrillation diagnosis and she wonders if her occasional light headedness at home may be this  We talk about the need for a medication to control her HR more and potential blood thinner in the future if she is confirmed to have atrial fibrillation not secondary to flu/SDH episode  She denies chest pain  She denies headache  She understands next steps including more CT monitoring, PT/OT and results of her Echo that was just performed  Objective:     Vitals:   Temp (24hrs), Av 2 °F (37 9 °C), Min:98 °F (36 7 °C), Max:102 8 °F (39 3 °C)    Temp:  [98 °F (36 7 °C)-102 8 °F (39 3 °C)] 98 1 °F (36 7 °C)  HR:  [] 73  Resp:  [16-20] 16  BP: ()/() 91/54  SpO2:  [90 %-96 %] 90 %  Body mass index is 29 95 kg/m²  Input and Output Summary (last 24 hours):   No intake or output data in the 24 hours ending 22 0829    Physical Exam:   Physical Exam  Vitals and nursing note reviewed  Constitutional:       Appearance: Normal appearance  She is not ill-appearing or diaphoretic  HENT:      Head: Normocephalic  Nose: Nose normal  No congestion  Mouth/Throat:      Mouth: Mucous membranes are moist       Pharynx: No oropharyngeal exudate  Eyes:      General: No scleral icterus  Conjunctiva/sclera: Conjunctivae normal    Cardiovascular:      Rate and Rhythm: Tachycardia present  Rhythm irregular     Pulmonary:      Effort: Pulmonary effort is normal       Breath sounds: Wheezing and rhonchi present  Comments: Scattered wheeze with bilateral rhonchi (mild)  Abdominal:      General: Bowel sounds are normal  There is no distension  Palpations: Abdomen is soft  Genitourinary:     Comments: No ngo  Musculoskeletal:      Cervical back: Normal range of motion and neck supple  No rigidity  Skin:     General: Skin is warm  Coloration: Skin is not jaundiced  Neurological:      Mental Status: She is alert and oriented to person, place, and time  Psychiatric:         Mood and Affect: Mood normal          Behavior: Behavior normal           Additional Data:     Labs:  Results from last 7 days   Lab Units 04/12/22  0533   WBC Thousand/uL 4 23*   HEMOGLOBIN g/dL 12 8   HEMATOCRIT % 39 0   PLATELETS Thousands/uL 173   NEUTROS PCT % 50   LYMPHS PCT % 31   MONOS PCT % 16*   EOS PCT % 1     Results from last 7 days   Lab Units 04/12/22  0529 04/11/22  0834 04/11/22  0834   SODIUM mmol/L 142   < > 138   POTASSIUM mmol/L 3 4*   < > 4 0   CHLORIDE mmol/L 105   < > 103   CO2 mmol/L 24   < > 25   BUN mg/dL 12   < > 11   CREATININE mg/dL 0 99   < > 1 00   ANION GAP mmol/L 13   < > 10   CALCIUM mg/dL 7 8*   < > 8 3   ALBUMIN g/dL  --   --  3 3*   TOTAL BILIRUBIN mg/dL  --   --  0 56   ALK PHOS U/L  --   --  65   ALT U/L  --   --  19   AST U/L  --   --  30   GLUCOSE RANDOM mg/dL 76   < > 92    < > = values in this interval not displayed       Results from last 7 days   Lab Units 04/11/22  0834   INR  1 00                   Lines/Drains:  Invasive Devices  Report    Peripheral Intravenous Line            Peripheral IV 04/11/22 Right Antecubital 1 day                  Telemetry:  Telemetry Orders (From admission, onward)             48 Hour Telemetry Monitoring  Continuous x 48 hours        References:    Telemetry Guidelines   Question:  Reason for 48 Hour Telemetry  Answer:  Arrhythmias Requiring Medical Therapy (eg  SVT, Vtach/fib, Bradycardia, Uncontrolled A-fib)                 Telemetry Reviewed: Atrial fibrillation  HR averaging   Indication for Continued Telemetry Use: Arrthymias requiring medical therapy             Imaging: Reviewed radiology reports from this admission including: CT head     IMPRESSION:     Eccentric right anterior parafalcine subdural hemorrhage measuring 3 mm in thickness  No significant mass effect  No concomitant subarachnoid or parenchymal hemorrhage  Correlate for history of trauma or anticoagulation      No acute findings of the brain parenchyma, itself  Recent Cultures (last 7 days):   Results from last 7 days   Lab Units 04/11/22  0846 04/11/22  0838   BLOOD CULTURE  Received in Microbiology Lab  Culture in Progress  Received in Microbiology Lab  Culture in Progress         Last 24 Hours Medication List:   Current Facility-Administered Medications   Medication Dose Route Frequency Provider Last Rate    acetaminophen  975 mg Oral Q6H [address] Bear Keller PA-C      amiodarone  200 mg Oral TID With Meals Manning Gaucher, CRNP      amitriptyline  25 mg Oral HS Heather Keller PA-C      buPROPion  150 mg Oral Daily Heather Keller PA-C      diltiazem  120 mg Oral Daily [facility]      escitalopram  20 mg Oral Daily Heather Keller PA-C      levETIRAcetam  500 mg Oral Q12H Heather Keller PA-C      levothyroxine  50 mcg Oral Early Morning Dhara Gomez PA-C      multi-electrolyte  50 mL/hr Intravenous Continuous SHERRY SchaeferC 50 mL/hr (04/11/22 1227)    pantoprazole  40 mg Oral Daily Dhara Gomez PA-C          Today, Patient Was Seen By: Abundio Ross MD

## 2022-04-12 NOTE — OCCUPATIONAL THERAPY NOTE
Occupational Therapy Evaluation     Patient Name: [facility]  [id number] Date: 4/12/2022  Problem List  Principal Problem:    Influenza A  Active Problems:    Essential hypertension    Subdural hemorrhage (HCC)    Atrial fibrillation with RVR (HCC)    MDD (major depressive disorder)    Weakness generalized    Ambulatory dysfunction    Past Medical History  Past Medical History:   Diagnosis Date    Anxiety     Congenital absence of one kidney     Depression     GERD (gastroesophageal reflux disease)     History of transfusion     Hypertension     Insomnia     Menopause     Other specified hypothyroidism 1/28/2019    Vitamin B12 deficiency      Past Surgical History  Past Surgical History:   Procedure Laterality Date    COLONOSCOPY      IL COLONOSCOPY FLX DX W/COLLJ SPEC WHEN PFRMD N/A 5/30/2017    Procedure: EGD AND COLONOSCOPY;  Surgeon: Trevor Foy MD;  [facility]; Service: Gastroenterology    IL OPEN [place] LAT MALLEOLUS Left 7/13/2020    Procedure: OPEN REDUCTION W/ INTERNAL FIXATION (ORIF) ANKLE;  Surgeon: Dion Saldana DO;  Location: [address] [place] [address];  Service: Orthopedics    REDUCTION MAMMAPLASTY Bilateral 06/06/2011    REDUCTION MAMMAPLASTY      SHOULDER SURGERY      TONSILLECTOMY          04/12/22 1003   OT Last Visit   OT Visit Date 04/12/22  (Tuesday)   Note Type   Note type Evaluation   Restrictions/Precautions   Weight Bearing Precautions Per Order No   Other Precautions Droplet precautions;Contact/isolation;Pain  ([place] on room air)   Pain Assessment   Pain Assessment Tool 0-10   Pain Score No Pain   Pain Location/Orientation   ("if I have pain it is my knees and back")   Home Living   Type of Home House; Other (Comment)  (0 MAGALI 55+ community)   Home Layout One level;Performs ADLs on one level; Able to live on main level with bedroom/bathroom  (Xmas decorations in the attic)   Bathroom Shower/Tub Walk-in shower   Bathroom Toilet Raised   Bathroom Accessibility Accessible   Home Equipment Walker;Cane;Other (Comment)  (not using PTA)   Additional Comments Pt reports living alone (w/ her 2 dogs) in 53+ community w/ 0 MAGALI   Prior Function   Level of [place] Independent with ADLs and functional mobility   Lives With Alone   Receives Help From   (supportiev neighbors are watching her dogs)   ADL Assistance Independent   IADLs Independent  (+ drive)   Falls in the last 6 months 1 to 4  (2)   Vocational Retired   Comments Pt reports I w/ ADL/ IADL PTA w/ out use of AD or DME  Pt reports having access to a walker / cane from her recent fibula fracture  Lifestyle   Autonomy Pt reports I w/ ADL/ IADL w/ out use of AD  Pt added that "she has becom lacy since COVID"   Reciprocal Relationships Pt reports living alone and has supportive, loca neighbors and friends   Service to Others Pt reports retired and worked for [employer], [employer] and recently as  at [employer]  Pt resigned / retired several months ago to take care of her sister      Intrinsic Gratification Pt reports enjoying her dogs and playing cards   Subjective   Subjective "I do not think that I need anything"   ADL   Where Assessed Edge of bed   Eating Assistance 7  Independent   Grooming Assistance 6  Modified Independent   Grooming Deficit Increased time to complete;Setup   UB Bathing Assistance Unable to assess   LB Bathing Assistance Unable to assess   UB Dressing Assistance 6  Modified independent   UB Dressing Deficit Setup   LB Dressing Assistance Unable to assess   Toileting Assistance  6  Modified independent   Toileting Deficit Increased time to complete   Additional Comments on room air O2 sats >90%   Bed Mobility   Supine to Sit 6  Modified independent   Additional items Increased time required   Sit to Supine 6  Modified independent   Additional items Increased time required   Additional Comments Pt supine in bed talking on her cell phone upon therapist Geni Samuel and supine in bed post eval w/ needs met, call bell in reach and bed alarm activated  Pt reported that she wanted to rest   Transfers   Sit to Stand 6  Modified independent   Additional items Increased time required   Stand to Sit 6  Modified independent   Additional items Increased time required   Functional Mobility   Functional Mobility 5  Supervision   Additional Comments engaged in functional mobility w/ S w/ out use of AD or LOB w/ in room   Additional items   (no AD)   Balance   Static Sitting Normal   Static Standing Fair +   Ambulatory Fair +   Activity Tolerance   Activity Tolerance Patient limited by fatigue;Patient tolerated treatment well   Medical Staff Made Aware spoke to PT, Mae and PTSJoan  Spoke to Pathagility, and Trauma PA, Corinne Conrad   Nurse Made Aware per RNOliver appropriate to see pt   RUE Assessment   RUE Assessment WFL   RUE Strength   RUE Overall Strength Within Functional Limits - able to perform ADL tasks with strength   LUE Assessment   LUE Assessment X  (able to complete ADL, premobid shoulder injury)   LUE Strength   LUE Overall Strength Within Functional Limits - able to perform ADL tasks with strength   Hand Function   Gross Motor Coordination Functional   Fine Motor Coordination Functional   Sensation   Light Touch Not tested  (responded to light touch)   Vision-Basic Assessment   Visual History   (glasses present on tray table (not wearing))   Cognition   Overall Cognitive Status [facility]   Arousal/Participation Alert; Cooperative   Attention Attends with cues to redirect   Orientation Level Oriented X4   Memory   (limited recall details fall leading to admit)   Following Commands Follows multistep commands without difficulty   Comments Identified pt by full name and birthdate  Pt agreeable to participate in OT eval  Able to participate in conversation and communicate wants / needs  Pt reports limited recall details surrounding fall but alert and oriented      Assessment   Limitation Decreased endurance;Decreased high-level ADLs   Assessment Pt is a 78yo female [facility]  Pt presented as trauma following a fall OOB onto her buttocks  Diagnosed w/ influenza A, subdural hemorrhage and a-fib w/ rapid ventricular response  Per neurosurgery, recommend neuro checks and PT/OT  Pt w/ active OT Orders and activity orders  Significant PMH Impacting her occupational performance includes anxiety / depression, recent ORIF L ankle, HTN, stress fracture lumbar vertebrae  Personal factors impacting performance includes living alone and difficulty managing steps  Pt reports living alone in 55+ community w/ 0 MAGALI  Pt reports I w/ ADL/ IADL w/ out use of AD or DME but has access to walker and cane  Upon eval, pt alert and oriented  Able to participate in conversation and communicate wants / needs appropriately  Pt demonstrated limited recall details surrounding fall  Pt completed bed mobility, sit <> stand w/ mod I for + time  Pt engaged in functional mobility w/ S w/ in room w/ out LOB or use of AD  Pt engaged in UBD w/ mod I and grooming w/ mod I  Pt completing ADL at / near baseline level of I  Recommend active participation in ADLs w/ RN staff if acute care and DC home w/ friend / neighbor support when medically stable for DC w/ A for community mobility until cleared by MD to resume driving  Recommend Life Alert as pt lives alone  No additional acute OT needs at this time  Please re consult if acute needs arise   DC OT   Goals   Patient Goals Pt stated that she would like to return home and begin walking again now that the weather is getting nice   Recommendation   OT Discharge Recommendation No rehabilitation needs   Additional Comments  Recommend assist w/ community mobility until cleared by MD to resume driving   AM-PAC Daily Activity Inpatient   Lower Body Dressing 4   Bathing 3   Toileting 4   Upper Body Dressing 4   Grooming 4   Eating 4   Daily Activity Raw Score 23   Daily Activity Standardized Score (Calc for Raw Score >=11) 51 12   -[facility] Applied Cognition Inpatient   Following a Speech/Presentation 4   Understanding Ordinary Conversation 4   Taking Medications 4   Remembering Where Things Are Placed or Put Away 4   Remembering List of 4-5 Errands 4   Taking Care of Complicated Tasks 4   Applied Cognition Raw Score 24   Applied Cognition Standardized Score 62 21   Barthel Index   Feeding 10   Bathing 0   Grooming Score 5   Dressing Score 10   Bladder Score 10   Bowels Score 10   Toilet Use Score 10   Transfers (Bed/Chair) Score 15   Mobility (Level Surface) Score 10   Stairs Score 0   Barthel Index Score 80   Modified Friant Scale   Modified Friant Scale 2      The patient's raw score on the AM-PAC Daily Activity inpatient short form is 23, standardized score is 51 12, greater than 39 4  Patients at this level are likely to benefit from discharge to home  Please refer to the recommendation of the Occupational Therapist for safe discharge planning       [facility], OTR/L

## 2022-04-12 NOTE — PROGRESS NOTES
[facility]  Progress Note - Idalmis Fontaine 1950, 71 y o  female MRN: [id number]  Unit/Bed#: S -01 Encounter: [id number]  Primary Care Provider: Manfred Anderson MD   Date and time admitted to hospital: 4/11/2022  7:45 AM    Weakness generalized  Assessment & Plan  - Pt reports generalized weakness throughout all extremities for the past few days, and this is why she fell  - She reports her legs gave out on her and she fell backwards onto her buttocks, striking her head on a wall  - New onset atrial fib, new diagnosis of Influenza A could be potential causes  - Internal medicine consult pending  - Injuries as listed   - only complains of feeling weak when attempting to get out of bed    MDD (major depressive disorder)  Assessment & Plan  - Home medications: Lexapro, Wellbutrin, Elavil  PRN Xanax and Ambien QHS PRN for insomnia  - Hold Xanax and Ambien at this time  - Geriatric medicine consult pending  Pleasant and talkative    Atrial fibrillation with rapid ventricular response (HCC)  Assessment & Plan  - Initial EKG in ED was normal sinus rhythm  - Upon my trauma evaluation, patients HR was 130 bpm and higher, EKG indicated that patient was experiencing atrial fibrillation with RVR  - Asymptomatic, pt denies chest pain, palpitations, shortness of breath  - No known history of atrial fibrillation, not on BB outpatient  - ED providers at bedside  - Cardizem bolus and infusion initiated  - Telemetry x 48 hrs  - Internal medicine consult appreciated as well as Cardiology  Heart rate to 120 and then down, per Cardiology daily cardizem started  This morning heart rate 56, no complaints of chest pain    Subdural hemorrhage [facility])  Assessment & Plan  - 4/11 CTH: Eccentric right anterior parafalcine subdural hemorrhage measuring 3 mm in thickness  No significant mass effect  No concomitant subarachnoid or parenchymal hemorrhage  Correlate for history of trauma or anticoagulation    No acute findings of the brain parenchyma, itself  - Neuro exam: GCS 15, non-focal  - Continue neurologic checks: Every 1 hours  - Reversal agent administered: none  - Repeat CT head 4/12  - Appreciate Neurosurgery evaluation and recommendations  - Complete 7 day course of Keppra for seizure prophylaxis  - Chemical DVT prophylaxis: Not cleared for chemical prophylaxis by neurosurgery at this time  Continue SCDs bilaterally  - Hold all anticoagulants and anti platelet medications for 2 weeks and/or until cleared by Neurosurgery to resume   - PT and OT (including cognitive evaluation) evaluation and treatment as indicated  Essential hypertension  Assessment & Plan  - Home medication: Lisinopril  Hold at this time  - Internal Medicine Consult reviewed, appreciates recs, po Cardizem started    * Influenza A  Assessment & Plan  - 4/11 pt positive for influenza A  Reports she had a fever overnight  - maintain contact and droplet precautions  - Currently on room air, apply supplemental O2 as needed to maintain O2 > 94%  - Supportive care with IV fluids, Tylenol  - Internal medicine following        TERTIARY TRAUMA SURVEY NOTE    Prophylaxis: Sequential compression device [facility] Arias)     Disposition: rehab    Code status:  Level 3 - DNAR and DNI    Consultants: Bruce Harvey, Neurosurgery      SUBJECTIVE:     Transfer from: site of injury  Mechanism of Injury:Fall    Chief Complaint: weakness    HPI/Last 24 hour events: Admitted to trauma, Neurosurgery consulted for a SDH, no AC/AP at this time    Positive for Influenza A, Hospitalist consulted, as well as A-fib consult to Cardiology    Active medications:           Current Facility-Administered Medications:     acetaminophen (TYLENOL) tablet 975 mg, 975 mg, Oral, Q6H [facility], 975 mg at 04/12/22 0552    amiodarone tablet 200 mg, 200 mg, Oral, TID With Meals, 200 mg at 04/11/22 1801    amitriptyline (ELAVIL) tablet 25 mg, 25 mg, Oral, HS, 25 mg at 04/11/22 7656   buPROPion (WELLBUTRIN XL) 24 hr tablet 150 mg, 150 mg, Oral, Daily, 150 mg at 04/11/22 2137    diltiazem (CARDIZEM CD) 24 hr capsule 120 mg, 120 mg, Oral, Daily    escitalopram (LEXAPRO) tablet 20 mg, 20 mg, Oral, Daily    levETIRAcetam (KEPPRA) tablet 500 mg, 500 mg, Oral, Q12H, 500 mg at 04/11/22 2312    levothyroxine tablet 50 mcg, 50 mcg, Oral, Early Morning, 50 mcg at 04/12/22 0552    multi-electrolyte (PLASMALYTE-A/ISOLYTE-S PH 7 4) IV solution, 50 mL/hr, Intravenous, Continuous, 50 mL/hr at 04/11/22 1227    pantoprazole (PROTONIX) EC tablet 40 mg, 40 mg, Oral, Daily, 40 mg at 04/11/22 2137      OBJECTIVE:     Vitals:   Vitals:    04/12/22 0224   BP: 118/76   Pulse: 82   Resp:    Temp: 98 °F (36 7 °C)   SpO2: 94%       Physical Exam:   GENERAL APPEARANCE: comfortable, slept through the night  NEURO: intact, GCS - 15  HEENT: EOm's intact  CV: RRR, no complaint of chest pain  LUNGS: CTA bilaterally, no shortness of breath  GI: tolerating a diet  : voiding  MSK: moving extremities  SKIN: warm and dry      1  Before the illness or injury that brought you to the Emergency, did you need someone to help you on a regular basis? 0=No   2  Since the illness or injury that brought you to the Emergency, have you needed more help than usual to take care of yourself? 1=Yes   3  Have you been hospitalized for one or more nights during the past 6 months (excluding a stay in the Emergency Department)? 0=No   4  In general, do you see well? 0=Yes   5  In general, do you have serious problems with your memory? 0=No   6  Do you take more than three different medications everyday? 1=Yes   TOTAL   2     Did you order a geriatric consult if the score was 2 or greater?: yes                I/O:   I/O     None          Invasive Devices:    Invasive Devices  Report    Peripheral Intravenous Line            Peripheral IV 04/11/22 Right Antecubital <1 day                  Imaging:   XR chest 2 views    Result Date: 4/11/2022  Impression: Basilar streaky opacities likely represent atelectasis from hypoventilation unless there is compelling clinical evidence for pneumonia  No other acute cardiopulmonary findings  Workstation performed: [id number]     CT head without contrast    Result Date: 4/11/2022  Impression: Eccentric right anterior parafalcine subdural hemorrhage measuring 3 mm in thickness  No significant mass effect  No concomitant subarachnoid or parenchymal hemorrhage  Correlate for history of trauma or anticoagulation  No acute findings of the brain parenchyma, itself  I personally discussed this study with NOE MUSA on 4/11/2022 at 10:20 AM   She reports the patient has nebulous history of several recent falls  Workstation performed: [id number]       Labs: Results for Keith Figueroaer (MRN [id number]) as of 4/12/2022 06:54   Ref   Range 4/12/2022 05:33   WBC Latest Ref Range: 4 31 - 10 16 Thousand/uL 4 23 (L)   Red Blood Cell Count Latest Ref Range: 3 81 - 5 12 Million/uL 3 97   Hemoglobin Latest Ref Range: 11 5 - 15 4 g/dL 12 8   HCT Latest Ref Range: 34 8 - 46 1 % 39 0   MCV Latest Ref Range: 82 - 98 fL 98   MCH Latest Ref Range: 26 8 - 34 3 pg 32 2   MCHC Latest Ref Range: 31 4 - 37 4 g/dL 32 8   RDW Latest Ref Range: 11 6 - 15 1 % 13 1   Platelet Count Latest Ref Range: 149 - 390 Thousands/uL 173   MPV Latest Ref Range: 8 9 - 12 7 fL 10 3   nRBC Latest Units: /100 WBCs 0   Neutrophils % Latest Ref Range: 43 - 75 % 50   Immat GRANS % Latest Ref Range: 0 - 2 % 1   Lymphocytes Relative Latest Ref Range: 14 - 44 % 31   Monocytes Relative Latest Ref Range: 4 - 12 % 16 (H)   Eosinophils Latest Ref Range: 0 - 6 % 1   Basophils Relative Latest Ref Range: 0 - 1 % 1   Immature Grans Absolute Latest Ref Range: 0 00 - 0 20 Thousand/uL 0 02   Absolute Neutrophils Latest Ref Range: 1 85 - 7 62 Thousands/µL 2 16   Lymphocytes Absolute Latest Ref Range: 0 60 - 4 47 Thousands/µL 1 32   Absolute Monocytes Latest Ref Range: 0 17 - 1 22 Thousand/µL 0 68   Absolute Eosinophils Latest Ref Range: 0 00 - 0 61 Thousand/µL 0 02   Basophils Absolute Latest Ref Range: 0 00 - 0 10 Thousands/µL 0 03

## 2022-04-12 NOTE — CONSULTS
Consultation - Geriatrics   Amada Bridegroom 71 y o  female MRN: [id number]  Unit/Bed#: S -01 Encounter: [id number]      Assessment/Plan  Ambulatory dysfunction  Fall precautions  PT/OT  Rehab post hospitalization    Acute pain due to fall  Geriatric pain protocol  Scheduled acetaminophen 975 mg po Q8  Oxycodone 2 5 mg po Q 4 prn moderate pain  Oxycodone 5 mg po Q 4 prn severe pain   Monitor for constipation  Lidoderm patch    Anxiety/ Depression  Pt reports taking wellbutrin, lexapro, elavil at home   She states she would like to stop taking so many medications  Was previously referred by PCP for psych/ counselling  Recommend follow up as outpatient    Cognitive screening  Denies memory issues  TSH 0 873 on 4/11/22  Vitamin B12 252 on 2/4/22, recommend Vitamin B12 supplementation goal level greater than 400  CT head done 4/12/22: unchanged 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, small acute subdural hematoma in right anterior parafalcine region  Keep physically, mentally and socially active    Frailty  Clinical Frail Scale: 4- Vulnerable  Not dependent for daily help, symptoms limit activity  Feeling slowed up, tired during the day  PT/OT  Join Silver Sneakers, increase physical exercise    Insomnia  Chronic  Has been trying to limit use of ambien, reports she sleep walks with mediation, and eats a night  Above medications not recommended in the older adult due to adverse side effects  Recommend follow up with CBT/ counseling/ psych  as outpatient    Abdominal pain  Reports doing well with elavil  She states she had abd pain for over 20 years that was at times debilitating, she would be in bed for up to 2 weeks at a time  States starting elavil significantly improved her life    Subdural Hematoma  Neurosurgery and trauma following  Keppra for ppx    Influenza A  Flu A positive  Myalgias and fatigue  Supportive care  Internal med following    AFib with RVR  Cardiology on consult  Started on cardize    Advance Care Planning  DNR/ DNI    Home medication review  [id number]    Reviewed with patient  alprazolam 0 5 mg po QHS prn  Amitriptyline 25 mg po daily  Lexapro 20 mg po daily  euthyrox 50 mcg po daily  Lisinopril 10 mg po daily  Pantoprozole 40 mg po daily  ambien 5-10 mg po QHS prn      04/04/2022  1   04/04/2022  Zolpidem Tartrate 10 MG Tablet    30 00  30 Ka Hot   [id number]   Wal ([id number])     [place]   03/29/2022  1   03/23/2022  Alprazolam 0 5 MG Tablet    30 00  30 Ka Hot   [id number]   Wal ([id number])     [place]   02/21/2022  1   02/21/2022  Alprazolam 0 5 MG Tablet    30 00  30 Ka Hot   [id number]   [facility] ([id number])     [place]       History of Present Illness   Physician Requesting Consult: Kulwant Cooler, DO  Reason for Consult / Principal Problem: SDH  Hx and PE limited by: NA  HPI: Katlyn Tyson is a 71y o  year old female who presents with generalized weakness  She reports she was unable to ambulate when getting out of bed and fell  She felt like her legs gave out due to weakness  She fell onto her buttocks, striking the back of her head  She had a fever overnight  She has anxiety, HTN, Insomnia, Hypothyroidism  Prior to arrival pt lives at home alone    She drives  She is independent with IADLs and ADLs  She ambulates independently  She has prior falls  She wears glasses  She denies memory issues  She describes herself as "lazy"  That she does not partipate in physical activity  Upon exam pt is lying in bed  She is alert and oriented x 3  She is calm and cooperative  Inpatient consult to Gerontology  Consult performed by: HOME Schwarz  Consult ordered by: HOME Strong          Review of Systems   Constitutional: Negative for unexpected weight change  HENT: Negative for hearing loss  Eyes: Negative for visual disturbance  Respiratory: Negative for cough  Cardiovascular: Negative for chest pain     Gastrointestinal: Negative for constipation  Genitourinary: Negative for difficulty urinating  Musculoskeletal: Positive for gait problem  Skin: Negative for color change  Neurological: Positive for weakness  Psychiatric/Behavioral: Negative for sleep disturbance  Historical Information   Past Medical History:   Diagnosis Date    Anxiety     Congenital absence of one kidney     Depression     GERD (gastroesophageal reflux disease)     History of transfusion     Hypertension     Insomnia     Menopause     Other specified hypothyroidism 1/28/2019    Vitamin B12 deficiency      Past Surgical History:   Procedure Laterality Date    COLONOSCOPY      [place] COLONOSCOPY FLX DX W/COLLJ SPEC WHEN PFRMD N/A 5/30/2017    Procedure: EGD AND COLONOSCOPY;  Surgeon: Suzette Colon MD;  [facility];   Service: Gastroenterology    NJ OPEN TX DISTAL FIBULAR FRACTURE LAT MALLEOLUS Left 7/13/2020    Procedure: OPEN REDUCTION W/ INTERNAL FIXATION (ORIF) ANKLE;  Surgeon: Samatnha Pacheco DO;  Location: [facility];  Service: Orthopedics    REDUCTION MAMMAPLASTY Bilateral 06/06/2011    REDUCTION MAMMAPLASTY      SHOULDER SURGERY      TONSILLECTOMY       Social History   Social History     Substance and Sexual Activity   Alcohol Use Yes    Alcohol/week: 7 0 standard drinks    Types: 7 Glasses of wine per week    Comment: 1 glass of wine nightly      Social History     Substance and Sexual Activity   Drug Use No     Social History     Tobacco Use   Smoking Status Never Smoker   Smokeless Tobacco Never Used         Family History:   Family History   Problem Relation Age of Onset    Lung cancer Mother 79    Heart disease Father     Lung cancer Brother 48    No Known Problems Maternal Grandmother     No Known Problems Maternal Grandfather     No Known Problems Paternal Grandmother     No Known Problems Paternal Grandfather        Meds/Allergies   Current meds:   Current Facility-Administered Medications   Medication Dose Route Frequency    acetaminophen (TYLENOL) tablet 975 mg  975 mg Oral Q6H [address]    amiodarone tablet 200 mg  200 mg Oral TID With Meals    amitriptyline (ELAVIL) tablet 25 mg  25 mg Oral HS    buPROPion (WELLBUTRIN XL) 24 hr tablet 150 mg  150 mg Oral Daily    [START ON 4/13/2022] diltiazem (CARDIZEM CD) 24 hr capsule 240 mg  240 mg Oral Daily    escitalopram (LEXAPRO) tablet 20 mg  20 mg Oral Daily    levETIRAcetam (KEPPRA) tablet 500 mg  500 mg Oral Q12H    levothyroxine tablet 50 mcg  50 mcg Oral Early Morning    multi-electrolyte (PLASMALYTE-A/ISOLYTE-S PH 7 4) IV solution  50 mL/hr Intravenous Continuous    pantoprazole (PROTONIX) EC tablet 40 mg  40 mg Oral Daily        No Known Allergies    Objective   Vitals: Blood pressure 123/87, pulse (!) 131, temperature 98 1 °F (36 7 °C), resp  rate 16, height 5' 5" (1 651 m), weight 81 6 kg (180 lb), SpO2 92 %  ,Body mass index is 29 95 kg/m²  Physical Exam  Vitals and nursing note reviewed  HENT:      Head: Normocephalic  Nose: No congestion  Mouth/Throat:      Mouth: Mucous membranes are moist    Eyes:      General:         Right eye: No discharge  Left eye: No discharge  Cardiovascular:      Rate and Rhythm: Normal rate  Pulses: Normal pulses  Pulmonary:      Effort: Pulmonary effort is normal       Breath sounds: Normal breath sounds  Abdominal:      General: Bowel sounds are normal       Palpations: Abdomen is soft  Musculoskeletal:         General: Normal range of motion  Cervical back: Normal range of motion  Skin:     General: Skin is warm and dry  Neurological:      Mental Status: She is alert and oriented to person, place, and time  Mental status is at baseline     Psychiatric:         Mood and Affect: Mood normal          Lab Results:   Results from last 7 days   Lab Units 04/12/22  0533   WBC Thousand/uL 4 23*   HEMOGLOBIN g/dL 12 8   HEMATOCRIT % 39 0   PLATELETS Thousands/uL 173        Results from last 7 days   Lab Units 04/12/22  0529 04/11/22  0834 04/11/22  0834   POTASSIUM mmol/L 3 4*   < > 4 0   CHLORIDE mmol/L 105   < > 103   CO2 mmol/L 24   < > 25   BUN mg/dL 12   < > 11   CREATININE mg/dL 0 99   < > 1 00   CALCIUM mg/dL 7 8*   < > 8 3   ALK PHOS U/L  --   --  65   ALT U/L  --   --  19   AST U/L  --   --  30    < > = values in this interval not displayed  Imaging Studies: I have personally reviewed pertinent reports  EKG, Pathology, and Other Studies: I have personally reviewed pertinent reports      VTE Prophylaxis: Sequential compression device (Venodyne)     Code Status: Level 3 - DNAR and DNI

## 2022-04-13 LAB
ANION GAP SERPL CALCULATED.3IONS-SCNC: 13 MMOL/L (ref 4–13)
BUN SERPL-MCNC: 11 MG/DL (ref 5–25)
CALCIUM SERPL-MCNC: 7.6 MG/DL (ref 8.3–10.1)
CHLORIDE SERPL-SCNC: 105 MMOL/L (ref 100–108)
CO2 SERPL-SCNC: 22 MMOL/L (ref 21–32)
CREAT SERPL-MCNC: 0.9 MG/DL (ref 0.6–1.3)
GFR SERPL CREATININE-BSD FRML MDRD: 64 ML/MIN/1.73SQ M
GLUCOSE SERPL-MCNC: 63 MG/DL (ref 65–140)
POTASSIUM SERPL-SCNC: 4.5 MMOL/L (ref 3.5–5.3)
SODIUM SERPL-SCNC: 140 MMOL/L (ref 136–145)

## 2022-04-13 PROCEDURE — 99232 SBSQ HOSP IP/OBS MODERATE 35: CPT | Performed by: SURGERY

## 2022-04-13 PROCEDURE — 93005 ELECTROCARDIOGRAM TRACING: CPT

## 2022-04-13 PROCEDURE — 99232 SBSQ HOSP IP/OBS MODERATE 35: CPT | Performed by: INTERNAL MEDICINE

## 2022-04-13 PROCEDURE — 80048 BASIC METABOLIC PNL TOTAL CA: CPT | Performed by: SURGERY

## 2022-04-13 RX ORDER — LISINOPRIL 10 MG/1
10 TABLET ORAL DAILY
Status: DISCONTINUED | OUTPATIENT
Start: 2022-04-13 | End: 2022-04-17 | Stop reason: HOSPADM

## 2022-04-13 RX ORDER — ESCITALOPRAM OXALATE 10 MG/1
10 TABLET ORAL DAILY
Status: DISCONTINUED | OUTPATIENT
Start: 2022-04-14 | End: 2022-04-14

## 2022-04-13 RX ORDER — HYDRALAZINE HYDROCHLORIDE 20 MG/ML
5 INJECTION INTRAMUSCULAR; INTRAVENOUS EVERY 6 HOURS PRN
Status: DISCONTINUED | OUTPATIENT
Start: 2022-04-13 | End: 2022-04-15

## 2022-04-13 RX ADMIN — LISINOPRIL 10 MG: 10 TABLET ORAL at 10:48

## 2022-04-13 RX ADMIN — ESCITALOPRAM OXALATE 20 MG: 20 TABLET ORAL at 09:14

## 2022-04-13 RX ADMIN — LEVOTHYROXINE SODIUM 50 MCG: 50 TABLET ORAL at 05:11

## 2022-04-13 RX ADMIN — METOPROLOL TARTRATE 25 MG: 25 TABLET, FILM COATED ORAL at 10:47

## 2022-04-13 RX ADMIN — ACETAMINOPHEN 325MG 975 MG: 325 TABLET ORAL at 13:13

## 2022-04-13 RX ADMIN — PANTOPRAZOLE SODIUM 40 MG: 40 TABLET, DELAYED RELEASE ORAL at 21:05

## 2022-04-13 RX ADMIN — DILTIAZEM HYDROCHLORIDE 240 MG: 240 CAPSULE, COATED, EXTENDED RELEASE ORAL at 09:14

## 2022-04-13 RX ADMIN — LEVETIRACETAM 500 MG: 500 TABLET, FILM COATED ORAL at 00:14

## 2022-04-13 RX ADMIN — AMIODARONE HYDROCHLORIDE 200 MG: 200 TABLET ORAL at 09:14

## 2022-04-13 RX ADMIN — HEPARIN SODIUM 5000 UNITS: 5000 INJECTION INTRAVENOUS; SUBCUTANEOUS at 05:11

## 2022-04-13 RX ADMIN — LEVETIRACETAM 500 MG: 500 TABLET, FILM COATED ORAL at 10:48

## 2022-04-13 RX ADMIN — ACETAMINOPHEN 325MG 975 MG: 325 TABLET ORAL at 00:14

## 2022-04-13 RX ADMIN — HEPARIN SODIUM 5000 UNITS: 5000 INJECTION INTRAVENOUS; SUBCUTANEOUS at 13:13

## 2022-04-13 RX ADMIN — BUPROPION HYDROCHLORIDE 150 MG: 150 TABLET, FILM COATED, EXTENDED RELEASE ORAL at 21:05

## 2022-04-13 RX ADMIN — AMITRIPTYLINE HYDROCHLORIDE 25 MG: 25 TABLET, FILM COATED ORAL at 21:05

## 2022-04-13 RX ADMIN — HEPARIN SODIUM 5000 UNITS: 5000 INJECTION INTRAVENOUS; SUBCUTANEOUS at 21:05

## 2022-04-13 RX ADMIN — AMIODARONE HYDROCHLORIDE 200 MG: 200 TABLET ORAL at 13:13

## 2022-04-13 NOTE — PROGRESS NOTES
Progress Note - Odilia Hunter 71 y o  female MRN: [id number]    Unit/Bed#: S -01 Encounter: [id number]      Assessment/Plan:  Ambulatory dysfunction  Fall precautions  PT/OT  Rehab post hospitalization     Acute pain due to fall  Geriatric pain protocol  Scheduled acetaminophen 975 mg po Q8  Oxycodone 2 5 mg po Q 4 prn moderate pain  Oxycodone 5 mg po Q 4 prn severe pain   Monitor for constipation  Lidoderm patch     Anxiety/ Depression  Pt reports taking wellbutrin, lexapro, elavil at home   She states she would like to stop taking so many medications  Was previously referred by PCP for psych/ counseling  Pt would like to try lower dose of lexapro, agree will lower dose to 10 mg po daily  Continue wellbutrin 150 mg po daily  Recommend follow up as outpatient     Cognitive screening  Denies memory issues  TSH 0 873 on 4/11/22  Vitamin B12 252 on 2/4/22, recommend Vitamin B12 supplementation goal level greater than 400  CT head done 4/12/22: unchanged 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, small acute subdural hematoma in right anterior parafalcine region  Keep physically, mentally and socially active     Frailty  Clinical Frail Scale: 4- Vulnerable  Not dependent for daily help, symptoms limit activity  Feeling slowed up, tired during the day  PT/OT  Join Silver Sneakers, increase physical exercise     Insomnia  Chronic  Has been trying to limit use of ambien, reports she sleep walks with mediation, and eats a night  Above medications not recommended in the older adult due to adverse side effects  Recommend follow up with CBT/ counseling/ psych  as outpatient  Ambulatory referral for sleep medicine     Abdominal pain  Reports doing well with elavil  She states she had abd pain for over 20 years that was at times debilitating, she would be in bed for up to 2 weeks at a time  States starting elavil significantly improved her life     Subdural Hematoma  Neurosurgery and trauma following  [facility] for ppx     Influenza A  Flu A positive  Myalgias and fatigue  Supportive care  Internal med following     AFib with RVR  Cardiology on consult  Started on cardizem       Subjective:   Upon exam pt is lying in bed  She states she did not sleep well last night  She reports she is an insomniac  She has done better in the past when she has been physically active  She does not want to go back on [place] which she was taking before  She states she was sleep walking and eating and it was not good for her  Objective:     Vitals: Blood pressure 165/85, pulse 60, temperature 100 1 °F (37 8 °C), resp  rate 16, height 5' 5" (1 651 m), weight 81 6 kg (180 lb), SpO2 96 %  ,Body mass index is 29 95 kg/m²  Intake/Output Summary (Last 24 hours) at 4/13/2022 1419  Last data filed at 4/13/2022 0736  Gross per 24 hour   Intake 300 ml   Output --   Net 300 ml       Physical Exam:   General : NAD  HEENT : MMM   Heart : Normal rate, no murmur rub or gallop  Lungs : CTA no wheezes, rales or rhonchi  Abdomen : Soft, NT/ND, BS auscultated in all 4 quads  Ext :  no edema  Skin : Pink, warm, dry, age appropriate turgor and mobility  Neuro : Nonfocal  Psych : Alert and O x 3       Invasive Devices  Report    Peripheral Intravenous Line            Peripheral IV 04/12/22 Dorsal (posterior); Left Forearm <1 day                Lab, Imaging and other studies: I have personally reviewed pertinent reports      VTE Pharmacologic Prophylaxis: Sequential compression device (Venodyne)   VTE Mechanical Prophylaxis: sequential compression device

## 2022-04-13 NOTE — PROGRESS NOTES
[facility]  Progress Note - Elvin Iyer 1950, 71 y o  female MRN: [id number]  Unit/Bed#: S -01 Encounter: [id number]  Primary Care Provider: Wilton Stokes MD   Date and time admitted to hospital: 4/11/2022  7:45 AM    Ambulatory dysfunction  Assessment & Plan  - Status post fall with the below noted injuries  - Fall precautions  - Geriatric Medicine consultation for evaluation, medication review and recommendations   - PT and OT evaluation and treatment as indicated  - Case Management consultation for disposition planning  Weakness generalized  Assessment & Plan  - Pt reports generalized weakness throughout all extremities for the past few days, and this is why she fell  - She reports her legs gave out on her and she fell backwards onto her buttocks, striking her head on a wall  - New onset atrial fib, new diagnosis of Influenza A could be potential causes  - Internal medicine consult, appreciate input  - Injuries as listed      MDD (major depressive disorder)  Assessment & Plan  - Home medications: Lexapro, Wellbutrin, Elavil   PRN Xanax and Ambien QHS PRN for insomnia  - Hold Xanax and Ambien at this time  - Geriatric medicine consult pending      Atrial fibrillation with RVR ([address] )  Assessment & Plan  - Initial EKG in ED was normal sinus rhythm  - Upon my trauma evaluation, patients HR was 130 bpm and higher, EKG indicated that patient was experiencing atrial fibrillation with RVR  - Asymptomatic, pt denies chest pain, palpitations, shortness of breath  - No known history of atrial fibrillation, not on BB outpatient  - ED providers at bedside  - Telemetry x 48 hrs  - Internal medicine consult appreciated as well as Cardiology  - will continue with Cardizem, metoprolol, amiodarone, lisinopril  - metoprolol and lisinopril initiated on 04/13/2022 by cardiology      Subdural hemorrhage [facility])  Assessment & Plan  - 4/11 CTH: Eccentric right anterior parafalcine subdural hemorrhage measuring 3 mm in thickness  No significant mass effect  No concomitant subarachnoid or parenchymal hemorrhage  Correlate for history of trauma or anticoagulation  No acute findings of the brain parenchyma, itself  - Neuro exam: GCS 15, non-focal  - Continue neurologic checks: Every 1 hours  - Reversal agent administered: none  - Repeat CT head 4/12  - Appreciate Neurosurgery evaluation and recommendations  - Complete 7 day course of Keppra for seizure prophylaxis  - Chemical DVT prophylaxis: Not cleared for chemical prophylaxis by neurosurgery at this time  Continue SCDs bilaterally  - Hold all anticoagulants and anti platelet medications for 2 weeks and/or until cleared by Neurosurgery to resume   - PT and OT (including cognitive evaluation) evaluation and treatment as indicated  Essential hypertension  Assessment & Plan  - Home medication: Lisinopril  Hold at this time  - Internal Medicine Consult reviewed, appreciates recs, po Cardizem started  - cardiology consult, appreciate input  - recommending lisinopril and beta-blocker to be initiated on 04/13/2022    * Influenza A  Assessment & Plan  - 4/11 pt positive for influenza A  Reports she had a fever overnight  - maintain contact and droplet precautions  - Currently on room air, apply supplemental O2 as needed to maintain O2 > 94%  - Internal medicine following    DVT prophylaxis: SCDs and SQH  PT and OT: eval and treat    Disposition:  Follow-up cardiology recommendations today on 04/13/2022  Initiate beta-blocker as well as lisinopril  Continue to monitor blood pressures at this time  Patient currently remains a GCS of 15  No further workup at this time  SUBJECTIVE:  Chief Complaint: "No new complaints "    Subjective:  Patient is offering no new complaints today on presentation  Currently resting in bed  States that she does have dizziness however it is no worse than it was yesterday  She is resting in bed comfortably    She is denying any chest pain or shortness of breath  No nausea or vomiting  No fevers, chills, sweats  OBJECTIVE:   Vitals:   Temp:  [99 5 °F (37 5 °C)-100 1 °F (37 8 °C)] 100 1 °F (37 8 °C)  HR:  [] 60  Resp:  [15-16] 16  BP: (123-190)/() 165/85    Intake/Output:  I/O       04/11 0701 04/12 0700 04/12 0701 04/13 0700 04/13 0701 04/14 0700    P  O    300    Total Intake(mL/kg)   300 (3 7)    Net   +300                Nutrition: Diet Regular; Regular House    Physical Exam:   GENERAL APPEARANCE:  No acute distress  NEURO:  GCS 15  HEENT:  Normocephalic  CV:  Irregularly irregular  LUNGS:  CTA bilaterally  GI:  Nontender, nondistended  :  No Wilhelm  MSK:  Moving all extremities, neurovascular intact  SKIN:  +2 pulses on extremities    Invasive Devices  Report    Peripheral Intravenous Line            Peripheral IV 04/12/22 Dorsal (posterior); Left Forearm <1 day                      Lab Results:   Results: I have personally reviewed all pertinent laboratory/tests results, BMP/CMP:   Lab Results   Component Value Date    SODIUM 140 04/13/2022    K 4 5 04/13/2022     04/13/2022    CO2 22 04/13/2022    BUN 11 04/13/2022    CREATININE 0 90 04/13/2022    CALCIUM 7 6 (L) 04/13/2022    EGFR 64 04/13/2022    and CBC: No results found for: WBC, HGB, HCT, MCV, PLT, ADJUSTEDWBC, MCH, MCHC, RDW, MPV, NRBC  Imaging/EKG Studies: I have personally reviewed pertinent reports       Other Studies:  No other studies

## 2022-04-13 NOTE — PLAN OF CARE
Problem: PAIN - ADULT  Goal: Verbalizes/displays adequate comfort level or baseline comfort level  Description: Interventions:  - Encourage patient to monitor pain and request assistance  - Assess pain using appropriate pain scale  - Administer analgesics based on type and severity of pain and evaluate response  - Implement non-pharmacological measures as appropriate and evaluate response  - Consider cultural and social influences on pain and pain management  - Notify physician/advanced practitioner if interventions unsuccessful or patient reports new pain  Outcome: Progressing     Problem: INFECTION - ADULT  Goal: Absence or prevention of progression during hospitalization  Description: INTERVENTIONS:  - Assess and monitor for signs and symptoms of infection  - Monitor lab/diagnostic results  - Monitor all insertion sites, i e  indwelling lines, tubes, and drains  - Monitor endotracheal if appropriate and nasal secretions for changes in amount and color  - Rochelle appropriate cooling/warming therapies per order  - Administer medications as ordered  - Instruct and encourage patient and family to use good hand hygiene technique  - Identify and instruct in appropriate isolation precautions for identified infection/condition  Outcome: Progressing     Problem: SAFETY ADULT  Goal: Maintain or return to baseline ADL function  Description: INTERVENTIONS:  -  Assess patient's ability to carry out ADLs; assess patient's baseline for ADL function and identify physical deficits which impact ability to perform ADLs (bathing, care of mouth/teeth, toileting, grooming, dressing, etc )  - Assess/evaluate cause of self-care deficits   - Assess range of motion  - Assess patient's mobility; develop plan if impaired  - Assess patient's need for assistive devices and provide as appropriate  - Encourage maximum independence but intervene and supervise when necessary  - Involve family in performance of ADLs  - Assess for home care needs following discharge   - Consider OT consult to assist with ADL evaluation and planning for discharge  - Provide patient education as appropriate  Outcome: Progressing  Goal: Maintains/Returns to pre admission functional level  Description: INTERVENTIONS:  - Perform BMAT or MOVE assessment daily    - Set and communicate daily mobility goal to care team and patient/family/caregiver  - Collaborate with rehabilitation services on mobility goals if consulted  - Perform Range of Motion 3 times a day  - Reposition patient every 2 hours  - Dangle patient 3 times a day  - Stand patient 3 times a day  - Ambulate patient 3 times a day  - Out of bed to chair 3 times a day   - Out of bed for meals 3 times a day  - Out of bed for toileting  - Record patient progress and toleration of activity level   Outcome: Progressing     Problem: NEUROSENSORY - ADULT  Goal: Achieves stable or improved neurological status  Description: INTERVENTIONS  - Monitor and report changes in neurological status  - Monitor vital signs such as temperature, blood pressure, glucose, and any other labs ordered   - Initiate measures to prevent increased intracranial pressure  - Monitor for seizure activity and implement precautions if appropriate      Outcome: Progressing  Goal: Achieves maximal functionality and self care  Description: INTERVENTIONS  - Monitor swallowing and airway patency with patient fatigue and changes in neurological status  - Encourage and assist patient to increase activity and self care     - Encourage visually impaired, hearing impaired and aphasic patients to use assistive/communication devices  Outcome: Progressing     Problem: RESPIRATORY - ADULT  Goal: Achieves optimal ventilation and oxygenation  Description: INTERVENTIONS:  - Assess for changes in respiratory status  - Assess for changes in mentation and behavior  - Position to facilitate oxygenation and minimize respiratory effort  - Oxygen administered by appropriate delivery if ordered  - Initiate smoking cessation education as indicated  - Encourage broncho-pulmonary hygiene including cough, deep breathe, Incentive Spirometry  - Assess the need for suctioning and aspirate as needed  - Assess and instruct to report SOB or any respiratory difficulty  - Respiratory Therapy support as indicated  Outcome: Progressing     Problem: Potential for Falls  Goal: Patient will remain free of falls  Description: INTERVENTIONS:  - Educate patient/family on patient safety including physical limitations  - Instruct patient to call for assistance with activity   - Consult OT/PT to assist with strengthening/mobility   - Keep Call bell within reach  - Keep bed low and locked with side rails adjusted as appropriate  - Keep care items and personal belongings within reach  - Initiate and maintain comfort rounds  - Make Fall Risk Sign visible to staff  - Offer Toileting every 2 Hours, in advance of need  - Initiate/Maintain alarm  - Obtain necessary fall risk management equipment:   - Apply yellow socks and bracelet for high fall risk patients  - Consider moving patient to room near nurses station  Outcome: Progressing     Problem: MOBILITY - ADULT  Goal: Maintain or return to baseline ADL function  Description: INTERVENTIONS:  -  Assess patient's ability to carry out ADLs; assess patient's baseline for ADL function and identify physical deficits which impact ability to perform ADLs (bathing, care of mouth/teeth, toileting, grooming, dressing, etc )  - Assess/evaluate cause of self-care deficits   - Assess range of motion  - Assess patient's mobility; develop plan if impaired  - Assess patient's need for assistive devices and provide as appropriate  - Encourage maximum independence but intervene and supervise when necessary  - Involve family in performance of ADLs  - Assess for home care needs following discharge   - Consider OT consult to assist with ADL evaluation and planning for discharge  - Provide patient education as appropriate  Outcome: Progressing  Goal: Maintains/Returns to pre admission functional level  Description: INTERVENTIONS:  - Perform BMAT or MOVE assessment daily    - Set and communicate daily mobility goal to care team and patient/family/caregiver  - Collaborate with rehabilitation services on mobility goals if consulted  - Perform Range of Motion 3 times a day  - Reposition patient every 2 hours  - Dangle patient 3 times a day  - Stand patient 3 times a day  - Ambulate patient 3 times a day  - Out of bed to chair 3 times a day   - Out of bed for meals 3 times a day  - Out of bed for toileting  - Record patient progress and toleration of activity level   Outcome: Progressing     Problem: Prexisting or High Potential for Compromised Skin Integrity  Goal: Skin integrity is maintained or improved  Description: INTERVENTIONS:  - Identify patients at risk for skin breakdown  - Assess and monitor skin integrity  - Assess and monitor nutrition and hydration status  - Monitor labs   - Assess for incontinence   - Turn and reposition patient  - Assist with mobility/ambulation  - Relieve pressure over bony prominences  - Avoid friction and shearing  - Provide appropriate hygiene as needed including keeping skin clean and dry  - Evaluate need for skin moisturizer/barrier cream  - Collaborate with interdisciplinary team   - Patient/family teaching  - Consider wound care consult   Outcome: Progressing     Problem: Nutrition/Hydration-ADULT  Goal: Nutrient/Hydration intake appropriate for improving, restoring or maintaining nutritional needs  Description: Monitor and assess patient's nutrition/hydration status for malnutrition  Collaborate with interdisciplinary team and initiate plan and interventions as ordered  Monitor patient's weight and dietary intake as ordered or per policy  Utilize nutrition screening tool and intervene as necessary   Determine patient's food preferences and provide high-protein, high-caloric foods as appropriate       INTERVENTIONS:  - Monitor oral intake, urinary output, labs, and treatment plans  - Assess nutrition and hydration status and recommend course of action  - Evaluate amount of meals eaten  - Assist patient with eating if necessary   - Allow adequate time for meals  - Recommend/ encourage appropriate diets, oral nutritional supplements, and vitamin/mineral supplements  - Order, calculate, and assess calorie counts as needed  - Recommend, monitor, and adjust tube feedings and TPN/PPN based on assessed needs  - Assess need for intravenous fluids  - Provide specific nutrition/hydration education as appropriate  - Include patient/family/caregiver in decisions related to nutrition  Outcome: Progressing

## 2022-04-13 NOTE — QUICK NOTE
Followed up with telemetry review this afternoon after adding metoprolol tartrate to medication regimen this morning after patient was noted to be in AFib with RVR and felt symptomatic during this event  She is currently in sinus but her heart rates are running in the low to mid 40 range  She is currently asymptomatic, and her blood pressure is stable  For now we will discontinue oral amiodarone, and metoprolol tartrate  If heart rates and blood pressure remained stable in the a m  we will continue Cardizem CD at the current dose  If she reverts back into atrial fibrillation with uncontrolled heart rates we will likely need to consult with our EP team for ppm consideration  Discussed plan with attending physician, Trauma PABERNADETTE, and primary RN Ayaka Goodrich

## 2022-04-13 NOTE — CONSULTS
[address] [facility] 1950, 71 y o  female MRN: [id number]  Unit/Bed#: S -01 Encounter: [id number]  Primary Care Provider: Harish Wan MD   Date and time admitted to hospital: 4/11/2022  7:45 AM    Consults    Subdural hemorrhage ([address] )  Assessment & Plan  · POA with ataxia; found to have small anterior SDH  · Per trauma/neurosurgery    * Influenza A  Assessment & Plan  · POA, with several days of myalgias and fatigue  Improving symptoms and temperature  · Found to be flu A positive; no clear sick contacts  · Given symptom onset >48hours, would not recommend starting tamiflu   Ambulatory dysfunction  Assessment & Plan  PT/OT recommend home    MDD (major depressive disorder)  Assessment & Plan  · Would continue escitalopram, wellbutrin, amitriptyline   · Xanax prn     Atrial fibrillation with RVR (HCC)  Assessment & Plan  · Pt noted to be afib with RVR while in the ED; Received cardizem bolus with sinus rhythm   · Cont telemetry    · Cardiology has been consulted  · Oral cardizem + amio continued   · No AC for now given problem 1     Echo:   Findings    Left Ventricle Left ventricular cavity size is normal  Wall thickness is normal  The left ventricular ejection fraction is 65%  Systolic function is normal   Although no diagnostic regional wall motion abnormality was identified, this possibility cannot be completely excluded on the basis of this study  Unable to assess diastolic function due to atrial fibrillation  Right Ventricle Right ventricular cavity size is normal  Systolic function is normal  Wall thickness is normal    Left Atrium The atrium is normal in size  Right Atrium The atrium is normal in size  Aortic Valve The aortic valve is trileaflet  The leaflets are not thickened  The leaflets are not calcified  The leaflets exhibit normal mobility  There is trace regurgitation  There is no evidence of stenosis     Mitral Valve The mitral valve has normal structure and function  There is trace regurgitation  There is no evidence of stenosis  Tricuspid Valve Tricuspid valve structure is normal  There is trace regurgitation  There is no evidence of stenosis  Pulmonic Valve Pulmonic valve structure is normal  There is no evidence of regurgitation  There is no evidence of stenosis  Ascending Aorta The aortic root is normal in size  IVC/SVC The inferior vena cava is normal in size  Pericardium There is no pericardial effusion  The pericardium is normal in appearance  Essential hypertension  Assessment & Plan  · elevated BP, cardiology started BB and ACEI today  · PO cardizem started for atrial fibrillation        VTE Pharmacologic Prophylaxis: VTE Score: 4 Moderate Risk (Score 3-4) - Pharmacological DVT Prophylaxis Ordered: heparin  Discussions with Specialists or Other Care Team Provider: none    Education and Discussions with Family / Patient: Patient declined call to   Time Spent for Care: 30 minutes  More than 50% of total time spent on counseling and coordination of care as described above  Current Length of Stay: 2 day(s)  Current Patient Status: Inpatient   Certification Statement: The patient will continue to require additional inpatient hospital stay due to SDH monitoring  Discharge Plan: [facility] is following this patient on consult  They are medically stable for discharge when deemed appropriate by primary service  Code Status: Level 3 - DNAR and DNI    Subjective:   "I feel much better " she notes improved myalgia, fever and sore throat  She denies cp/sob  She feels her lightheadedness has improved  Objective:     Vitals:   Temp (24hrs), Av 7 °F (37 6 °C), Min:99 5 °F (37 5 °C), Max:100 1 °F (37 8 °C)    Temp:  [99 5 °F (37 5 °C)-100 1 °F (37 8 °C)] 100 1 °F (37 8 °C)  HR:  [] 60  Resp:  [15-16] 16  BP: (127-190)/() 165/85  SpO2:  [93 %-97 %] 96 %  Body mass index is 29 95 kg/m²  Input and Output Summary (last 24 hours): Intake/Output Summary (Last 24 hours) at 4/13/2022 1405  Last data filed at 4/13/2022 0736  Gross per 24 hour   Intake 300 ml   Output --   Net 300 ml       Physical Exam:   Physical Exam  Vitals and nursing note reviewed  Constitutional:       General: She is not in acute distress  Appearance: Normal appearance  She is not ill-appearing or diaphoretic  HENT:      Mouth/Throat:      Mouth: Mucous membranes are moist       Pharynx: No oropharyngeal exudate  Eyes:      General: No scleral icterus  Conjunctiva/sclera: Conjunctivae normal    Cardiovascular:      Rate and Rhythm: Rhythm irregular  Pulmonary:      Effort: Pulmonary effort is normal  No respiratory distress  Breath sounds: No wheezing or rales  Abdominal:      General: Bowel sounds are normal  There is no distension  Palpations: Abdomen is soft  Musculoskeletal:      Cervical back: Normal range of motion and neck supple  No rigidity  Skin:     General: Skin is warm  Coloration: Skin is not jaundiced  Neurological:      Mental Status: She is alert and oriented to person, place, and time     Psychiatric:         Mood and Affect: Mood normal          Behavior: Behavior normal           Additional Data:     Labs:  Results from last 7 days   Lab Units 04/12/22  0533   WBC Thousand/uL 4 23*   HEMOGLOBIN g/dL 12 8   HEMATOCRIT % 39 0   PLATELETS Thousands/uL 173   NEUTROS PCT % 50   LYMPHS PCT % 31   MONOS PCT % 16*   EOS PCT % 1     Results from last 7 days   Lab Units 04/13/22  0459 04/12/22  0529 04/11/22  0834   SODIUM mmol/L 140   < > 138   POTASSIUM mmol/L 4 5   < > 4 0   CHLORIDE mmol/L 105   < > 103   CO2 mmol/L 22   < > 25   BUN mg/dL 11   < > 11   CREATININE mg/dL 0 90   < > 1 00   ANION GAP mmol/L 13   < > 10   CALCIUM mg/dL 7 6*   < > 8 3   ALBUMIN g/dL  --   --  3 3*   TOTAL BILIRUBIN mg/dL  --   --  0 56   ALK PHOS U/L  --   --  65   ALT U/L  --   --  19   AST U/L  --   --  30   GLUCOSE RANDOM mg/dL 63*   < > 92    < > = values in this interval not displayed  Results from last 7 days   Lab Units 22  0834   INR  1 00                   Lines/Drains:  Invasive Devices  Report    Peripheral Intravenous Line            Peripheral IV 22 Dorsal (posterior); Left Forearm <1 day                  Telemetry:  Telemetry Orders (From admission, onward)             48 Hour Telemetry Monitoring  Continuous x 48 hours           References:    Telemetry Guidelines   Question:  Reason for 48 Hour Telemetry  Answer:  Arrhythmias Requiring Medical Therapy (eg  SVT, Vtach/fib, Bradycardia, Uncontrolled A-fib)                 Telemetry Reviewed: Atrial fibrillation  HR averaging 100  Indication for Continued Telemetry Use: Arrthymias requiring medical therapy             Imaging: Personally reviewed the following imaging: ECHO  Findings    Left Ventricle Left ventricular cavity size is normal  Wall thickness is normal  The left ventricular ejection fraction is 65%  Systolic function is normal   Although no diagnostic regional wall motion abnormality was identified, this possibility cannot be completely excluded on the basis of this study  Unable to assess diastolic function due to atrial fibrillation  Right Ventricle Right ventricular cavity size is normal  Systolic function is normal  Wall thickness is normal    Left Atrium The atrium is normal in size  Right Atrium The atrium is normal in size  Aortic Valve The aortic valve is trileaflet  The leaflets are not thickened  The leaflets are not calcified  The leaflets exhibit normal mobility  There is trace regurgitation  There is no evidence of stenosis  Mitral Valve The mitral valve has normal structure and function  There is trace regurgitation  There is no evidence of stenosis  Tricuspid Valve Tricuspid valve structure is normal  There is trace regurgitation  There is no evidence of stenosis     Pulmonic Valve Pulmonic valve structure is normal  There is no evidence of regurgitation  There is no evidence of stenosis  Ascending Aorta The aortic root is normal in size  IVC/SVC The inferior vena cava is normal in size  Pericardium There is no pericardial effusion  The pericardium is normal in appearance  Recent Cultures (last 7 days):   Results from last 7 days   Lab Units 04/11/22  0846 04/11/22  0838   BLOOD CULTURE  No Growth at 48 hrs  No Growth at 48 hrs         Last 24 Hours Medication List:   Current Facility-Administered Medications   Medication Dose Route Frequency Provider Last Rate    acetaminophen  975 mg Oral Q6H [facility] Bear Keller PA-C      amiodarone  200 mg Oral TID With Meals Bryan Lung, CRNP      amitriptyline  25 mg Oral HS JYOTI Sen-STEPHEN      buPROPion  150 mg Oral Daily Valeda Reveal Darlenekievmat, PA-STEPHEN      diltiazem  240 mg Oral Daily Bryan Lung, CRNP      escitalopram  20 mg Oral Daily Valeda Reveal JYOTI Keller-C      heparin (porcine)  5,000 Units Subcutaneous Q8H [facility] Bear Keller PA-C      hydrALAZINE  5 mg Intravenous Q6H PRN Douglas Harmon PA-C      levETIRAcetam  500 mg Oral Q12H Valeda Reveal Marylinevmat, PA-C      levothyroxine  50 mcg Oral Early Morning Valeda Reveal Krishna, PA-STEPHEN      lisinopril  10 mg Oral Daily Erin Read Spironello, CRNP      metoprolol tartrate  25 mg Oral Q12H [facility] Erin Read Spironello, CRNP      pantoprazole  40 mg Oral Daily Eduardo Hall PA-C          Today, Patient Was Seen By: Damon Sin MD

## 2022-04-13 NOTE — PROGRESS NOTES
General Cardiology   Progress Note -  [facility]   Shelley Henning 71 y o  female MRN: [id number]    Unit/Bed#: S -01 Encounter: [id number]    Assessment  1  New onset atrial fibrillation with RVR - possibly provoked in the setting of # 3 and # 4   -Symtpomatic when in afib w/RVR - c/o palpitations, intermittent dizziness/lightheadedness  -ECG on admission NSR, w/ subsequent ECG demonstrating atrial fibrillation w/ RVR, rate 152 bpm  -24 hour telemetry review- Still going in and out of NSR and atrial fibrillation w/RVR, rates at times 120s to 140s  Did have a 3 5 sec pause this a m - was asymptomatic at the time of this event    -On Cardizem  mg daily and oral amiodarone 200 mg t i d   -[id number] score = 3 (age, sex, HTN) - unable to utilize systemic anticoagulation secondary to # 3  -Electrolytes stable on BMP  -TSH level 0 87  2  Preserved LV systolic function   -TTE 6/70; LVEF 65%, RV normal size/systolic function, no significant valve disease  3  Fall - appears to be mechanical in nature, likely stemming from generalized weakness  4  Traumatic subdural hemorrhage  -Management per the neurosurgery service    -CT of the head without contrast - right anterior parafalcine subdural hemorrhage measuring 3 mm in thickness   No significant mass effect   No concomitant subarachnoid or parenchymal hemorrhage   -Recommending holding of all anti-platelet/anticoagulant medications for at least 2 weeks and/or until cleared by the Neurosurgery Service  5  Influenza A +   -On contact/droplet precautions   -Still w/low grade fevers, feeling generally weak  -Stable from a respiratory standpoint   Maintaining O2 saturations in the mid 90s on RA    6  Essential hypertension  -Avg 155/95, last recorded 190/123,     -Outpatient BP regimen; lisinopril 10 mg daily (on hold)  -Inpatient BP regimen: Cardizem  mg daily       Plan  -Continue Cardizem  mg daily, add metoprolol tartrate 25 mg BID  -Continue oral amiodarone load , will likely utilize for a short course in an attempt for further afib suppression while off anticoagulation -- cannot anticoagulate at this time d/t # 3, will need neuro surgery clearance hopefully within the 2 weeks to determine if anticoagulation can be initiated  -Monitor renal function and electrolytes closely - replete to maintain K+level at 4, and mag at 2   -Continue to monitor on telemetry  Subjective  Review of Systems   Constitutional: Negative for chills, fever and malaise/fatigue  Eyes: Negative for visual disturbance  Cardiovascular: Negative for chest pain, dyspnea on exertion, leg swelling, orthopnea and palpitations  Respiratory: Negative for cough and shortness of breath  Gastrointestinal: Negative for abdominal pain, constipation, diarrhea, nausea and vomiting  Neurological: Negative for dizziness, headaches and light-headedness  Objective:   Physical Exam  Vitals and nursing note reviewed  Constitutional:       General: She is not in acute distress  Appearance: She is obese  She is not diaphoretic  HENT:      Head: Normocephalic and atraumatic  Mouth/Throat:      Mouth: Mucous membranes are moist    Eyes:      General: No scleral icterus  Cardiovascular:      Rate and Rhythm: Tachycardia present  Rhythm irregular  Pulses: Normal pulses  Heart sounds: Normal heart sounds  No murmur heard  Pulmonary:      Effort: Pulmonary effort is normal       Breath sounds: Normal breath sounds  No wheezing or rales  Abdominal:      Palpations: Abdomen is soft  Tenderness: There is no abdominal tenderness  Musculoskeletal:      Cervical back: Neck supple  Right lower leg: No edema  Left lower leg: No edema  Skin:     General: Skin is warm and dry  Capillary Refill: Capillary refill takes less than 2 seconds  Neurological:      General: No focal deficit present        Mental Status: She is alert and oriented to person, place, and time  Psychiatric:         Mood and Affect: Mood normal          Vitals: Blood pressure (!) 190/123, pulse (!) 111, temperature 100 1 °F (37 8 °C), resp  rate 16, height 5' 5" (1 651 m), weight 81 6 kg (180 lb), SpO2 96 %  ,     Body mass index is 29 95 kg/m²  ,   Systolic (77NCX), NYM:492 , Min:123 , YEY:222     Diastolic (41YXH), PRP:02, Min:70, Max:123      Intake/Output Summary (Last 24 hours) at 4/13/2022 1028  Last data filed at 4/13/2022 0736  Gross per 24 hour   Intake 300 ml   Output --   Net 300 ml     Weight (last 2 days)     Date/Time Weight    04/12/22 0900 81 6 (180)    04/11/22 1157 81 6 (180)          LABORATORY RESULTS  Results from last 7 days   Lab Units 04/12/22  0529 04/11/22  0834   CK TOTAL U/L 779* 331*   CK MB INDEX % <1 0 1 0     CBC with diff:   Results from last 7 days   Lab Units 04/12/22  0533 04/11/22  0834   WBC Thousand/uL 4 23* 5 62   HEMOGLOBIN g/dL 12 8 13 5   HEMATOCRIT % 39 0 42 0   MCV fL 98 100*   PLATELETS Thousands/uL 173 176   MCH pg 32 2 32 1   MCHC g/dL 32 8 32 1   RDW % 13 1 13 1   MPV fL 10 3 11 7   NRBC AUTO /100 WBCs 0 0       CMP:  Results from last 7 days   Lab Units 04/13/22  0459 04/12/22  0529 04/11/22  0834   POTASSIUM mmol/L 4 5 3 4* 4 0   CHLORIDE mmol/L 105 105 103   CO2 mmol/L 22 24 25   BUN mg/dL 11 12 11   CREATININE mg/dL 0 90 0 99 1 00   CALCIUM mg/dL 7 6* 7 8* 8 3   AST U/L  --   --  30   ALT U/L  --   --  19   ALK PHOS U/L  --   --  65   EGFR ml/min/1 73sq m 64 57 56       BMP:  Results from last 7 days   Lab Units 04/13/22  0459 04/12/22  0529 04/11/22  0834   POTASSIUM mmol/L 4 5 3 4* 4 0   CHLORIDE mmol/L 105 105 103   CO2 mmol/L 22 24 25   BUN mg/dL 11 12 11   CREATININE mg/dL 0 90 0 99 1 00   CALCIUM mg/dL 7 6* 7 8* 8 3       No results found for: NTBNP     Results from last 7 days   Lab Units 04/12/22  0529   MAGNESIUM mg/dL 2 0             Results from last 7 days   Lab Units 04/11/22  0834   TSH 3RD GENERATON uIU/mL 0 873 Results from last 7 days   Lab Units 04/11/22  0834   INR  1 00       Lipid Profile:   Lab Results   Component Value Date    CHOL 242 (H) 02/10/2017     Lab Results   Component Value Date    HDL 73 02/04/2022    HDL 84 04/12/2021    HDL 75 09/19/2019     Lab Results   Component Value Date    LDLCALC 143 (H) 02/04/2022    LDLCALC 116 (H) 04/12/2021    LDLCALC 141 (H) 09/19/2019     Lab Results   Component Value Date    TRIG 110 02/04/2022    TRIG 69 04/12/2021    TRIG 99 09/19/2019       Cardiac testing:   No results found for this or any previous visit  No results found for this or any previous visit  No results found for this or any previous visit  No valid procedures specified  No results found for this or any previous visit        Meds/Allergies   all current active meds have been reviewed and current meds:   Current Facility-Administered Medications   Medication Dose Route Frequency    acetaminophen (TYLENOL) tablet 975 mg  975 mg Oral Q6H [address]    amiodarone tablet 200 mg  200 mg Oral TID With Meals    amitriptyline (ELAVIL) tablet 25 mg  25 mg Oral HS    buPROPion (WELLBUTRIN XL) 24 hr tablet 150 mg  150 mg Oral Daily    diltiazem (CARDIZEM CD) 24 hr capsule 240 mg  240 mg Oral Daily    escitalopram (LEXAPRO) tablet 20 mg  20 mg Oral Daily    heparin (porcine) subcutaneous injection 5,000 Units  5,000 Units Subcutaneous Q8H [address]    hydrALAZINE (APRESOLINE) injection 5 mg  5 mg Intravenous Q6H PRN    levETIRAcetam (KEPPRA) tablet 500 mg  500 mg Oral Q12H    levothyroxine tablet 50 mcg  50 mcg Oral Early Morning    lisinopril (ZESTRIL) tablet 10 mg  10 mg Oral Daily    metoprolol tartrate (LOPRESSOR) tablet 25 mg  25 mg Oral Q12H ARNAV    pantoprazole (PROTONIX) EC tablet 40 mg  40 mg Oral Daily        EKG personally reviewed by HOME Harris    Assessment:  Principal Problem:    Influenza A  Active Problems:    Essential hypertension    Subdural hemorrhage (HCC)    Atrial fibrillation with RVR (HCC)    MDD (major depressive disorder)    Weakness generalized    Ambulatory dysfunction    Counseling / Coordination of Care  Total floor / unit time spent today 20 minutes  Greater than 50% of total time was spent with the patient and / or family counseling and / or coordination of care  ** Please Note: Dragon 360 Dictation voice to text software may have been used in the creation of this document   **

## 2022-04-14 PROBLEM — I49.5 TACHY-BRADY SYNDROME (HCC): Status: ACTIVE | Noted: 2022-04-11

## 2022-04-14 LAB
ANION GAP SERPL CALCULATED.3IONS-SCNC: 17 MMOL/L (ref 4–13)
ATRIAL RATE: 271 BPM
ATRIAL RATE: 74 BPM
BASOPHILS # BLD AUTO: 0.01 THOUSANDS/ΜL (ref 0–0.1)
BASOPHILS NFR BLD AUTO: 0 % (ref 0–1)
BUN SERPL-MCNC: 15 MG/DL (ref 5–25)
CALCIUM SERPL-MCNC: 7.7 MG/DL (ref 8.3–10.1)
CHLORIDE SERPL-SCNC: 104 MMOL/L (ref 100–108)
CO2 SERPL-SCNC: 18 MMOL/L (ref 21–32)
CREAT SERPL-MCNC: 1.19 MG/DL (ref 0.6–1.3)
EOSINOPHIL # BLD AUTO: 0.02 THOUSAND/ΜL (ref 0–0.61)
EOSINOPHIL NFR BLD AUTO: 1 % (ref 0–6)
ERYTHROCYTE [DISTWIDTH] IN BLOOD BY AUTOMATED COUNT: 12.9 % (ref 11.6–15.1)
GFR SERPL CREATININE-BSD FRML MDRD: 46 ML/MIN/1.73SQ M
GLUCOSE SERPL-MCNC: 74 MG/DL (ref 65–140)
HCT VFR BLD AUTO: 40 % (ref 34.8–46.1)
HGB BLD-MCNC: 12.9 G/DL (ref 11.5–15.4)
IMM GRANULOCYTES # BLD AUTO: 0.02 THOUSAND/UL (ref 0–0.2)
IMM GRANULOCYTES NFR BLD AUTO: 1 % (ref 0–2)
LYMPHOCYTES # BLD AUTO: 1.71 THOUSANDS/ΜL (ref 0.6–4.47)
LYMPHOCYTES NFR BLD AUTO: 44 % (ref 14–44)
MAGNESIUM SERPL-MCNC: 2 MG/DL (ref 1.6–2.6)
MCH RBC QN AUTO: 31.8 PG (ref 26.8–34.3)
MCHC RBC AUTO-ENTMCNC: 32.3 G/DL (ref 31.4–37.4)
MCV RBC AUTO: 99 FL (ref 82–98)
MONOCYTES # BLD AUTO: 0.65 THOUSAND/ΜL (ref 0.17–1.22)
MONOCYTES NFR BLD AUTO: 17 % (ref 4–12)
NEUTROPHILS # BLD AUTO: 1.4 THOUSANDS/ΜL (ref 1.85–7.62)
NEUTS SEG NFR BLD AUTO: 37 % (ref 43–75)
NRBC BLD AUTO-RTO: 0 /100 WBCS
P AXIS: 101 DEGREES
P AXIS: 95 DEGREES
PLATELET # BLD AUTO: 196 THOUSANDS/UL (ref 149–390)
PMV BLD AUTO: 10.5 FL (ref 8.9–12.7)
POTASSIUM SERPL-SCNC: 4.2 MMOL/L (ref 3.5–5.3)
QRS AXIS: 49 DEGREES
QRS AXIS: 64 DEGREES
QRSD INTERVAL: 68 MS
QRSD INTERVAL: 70 MS
QT INTERVAL: 216 MS
QT INTERVAL: 490 MS
QTC INTERVAL: 310 MS
QTC INTERVAL: 547 MS
RBC # BLD AUTO: 4.06 MILLION/UL (ref 3.81–5.12)
SODIUM SERPL-SCNC: 139 MMOL/L (ref 136–145)
T WAVE AXIS: 251 DEGREES
T WAVE AXIS: 92 DEGREES
VENTRICULAR RATE: 124 BPM
VENTRICULAR RATE: 75 BPM
WBC # BLD AUTO: 3.81 THOUSAND/UL (ref 4.31–10.16)

## 2022-04-14 PROCEDURE — 93010 ELECTROCARDIOGRAM REPORT: CPT | Performed by: INTERNAL MEDICINE

## 2022-04-14 PROCEDURE — 93005 ELECTROCARDIOGRAM TRACING: CPT

## 2022-04-14 PROCEDURE — 80048 BASIC METABOLIC PNL TOTAL CA: CPT | Performed by: PHYSICIAN ASSISTANT

## 2022-04-14 PROCEDURE — 83735 ASSAY OF MAGNESIUM: CPT | Performed by: PHYSICIAN ASSISTANT

## 2022-04-14 PROCEDURE — 99232 SBSQ HOSP IP/OBS MODERATE 35: CPT | Performed by: INTERNAL MEDICINE

## 2022-04-14 PROCEDURE — 99232 SBSQ HOSP IP/OBS MODERATE 35: CPT | Performed by: SURGERY

## 2022-04-14 PROCEDURE — 85025 COMPLETE CBC W/AUTO DIFF WBC: CPT | Performed by: PHYSICIAN ASSISTANT

## 2022-04-14 PROCEDURE — 99232 SBSQ HOSP IP/OBS MODERATE 35: CPT | Performed by: PHYSICIAN ASSISTANT

## 2022-04-14 RX ORDER — DOCUSATE SODIUM 100 MG/1
100 CAPSULE, LIQUID FILLED ORAL 2 TIMES DAILY
Status: DISCONTINUED | OUTPATIENT
Start: 2022-04-15 | End: 2022-04-17 | Stop reason: HOSPADM

## 2022-04-14 RX ORDER — ESCITALOPRAM OXALATE 10 MG/1
5 TABLET ORAL DAILY
Status: DISCONTINUED | OUTPATIENT
Start: 2022-04-15 | End: 2022-04-17 | Stop reason: HOSPADM

## 2022-04-14 RX ADMIN — HEPARIN SODIUM 5000 UNITS: 5000 INJECTION INTRAVENOUS; SUBCUTANEOUS at 13:44

## 2022-04-14 RX ADMIN — ESCITALOPRAM OXALATE 10 MG: 10 TABLET ORAL at 08:10

## 2022-04-14 RX ADMIN — AMITRIPTYLINE HYDROCHLORIDE 25 MG: 25 TABLET, FILM COATED ORAL at 21:00

## 2022-04-14 RX ADMIN — ACETAMINOPHEN 325MG 975 MG: 325 TABLET ORAL at 18:08

## 2022-04-14 RX ADMIN — BUPROPION HYDROCHLORIDE 150 MG: 150 TABLET, FILM COATED, EXTENDED RELEASE ORAL at 21:00

## 2022-04-14 RX ADMIN — LEVOTHYROXINE SODIUM 50 MCG: 50 TABLET ORAL at 05:15

## 2022-04-14 RX ADMIN — ACETAMINOPHEN 325MG 975 MG: 325 TABLET ORAL at 11:47

## 2022-04-14 RX ADMIN — ACETAMINOPHEN 325MG 975 MG: 325 TABLET ORAL at 05:15

## 2022-04-14 RX ADMIN — HEPARIN SODIUM 5000 UNITS: 5000 INJECTION INTRAVENOUS; SUBCUTANEOUS at 21:00

## 2022-04-14 RX ADMIN — LEVETIRACETAM 500 MG: 500 TABLET, FILM COATED ORAL at 11:46

## 2022-04-14 RX ADMIN — PANTOPRAZOLE SODIUM 40 MG: 40 TABLET, DELAYED RELEASE ORAL at 21:00

## 2022-04-14 RX ADMIN — HEPARIN SODIUM 5000 UNITS: 5000 INJECTION INTRAVENOUS; SUBCUTANEOUS at 05:15

## 2022-04-14 RX ADMIN — LEVETIRACETAM 500 MG: 500 TABLET, FILM COATED ORAL at 00:04

## 2022-04-14 RX ADMIN — LISINOPRIL 10 MG: 10 TABLET ORAL at 08:10

## 2022-04-14 RX ADMIN — ACETAMINOPHEN 325MG 975 MG: 325 TABLET ORAL at 00:04

## 2022-04-14 RX ADMIN — LEVETIRACETAM 500 MG: 500 TABLET, FILM COATED ORAL at 23:12

## 2022-04-14 NOTE — PROGRESS NOTES
[facility]  Progress Note - Yessenia Wilson 1950, 71 y o  female MRN: [id number]  Unit/Bed#: S -01 Encounter: [id number]  Primary Care Provider: Jessa Payne MD   Date and time admitted to hospital: 4/11/2022  7:45 AM    Subdural hemorrhage [facility])  Assessment & Plan  - 4/11 CTH: Eccentric right anterior parafalcine subdural hemorrhage measuring 3 mm in thickness  No significant mass effect  No concomitant subarachnoid or parenchymal hemorrhage  Correlate for history of trauma or anticoagulation  No acute findings of the brain parenchyma, itself  - Neuro exam: GCS 15, non-focal  - Continue neurologic checks: Every 4 hours  - Reversal agent administered: none  - Repeat CT head 4/12 stable  - Appreciate Neurosurgery evaluation and recommendations  - Complete 7 day course of Keppra for seizure prophylaxis  - Chemical DVT prophylaxis: SQH  - Hold all anticoagulants and anti platelet medications for 2 weeks and/or until cleared by Neurosurgery to resume   - PT and OT (including cognitive evaluation) evaluation and treatment as indicated    - Follow up with NSG in 2 weeks      Atrial fibrillation with RVR (HCC)  Assessment & Plan  - Initial EKG in ED was normal sinus rhythm  - Upon trauma evaluation, patients HR was 130 bpm and higher, EKG indicated that patient was experiencing atrial fibrillation with RVR  - Asymptomatic, pt denies chest pain, palpitations, shortness of breath  - No known history of atrial fibrillation, not on BB outpatient  - Continue Telemetry   - Internal medicine consult appreciated as well as Cardiology  - Cardiology recommending discontinue metoprolol tartrate and amiodarone due to bradycardia and monitor only on Cardizem CD     - If HR and BP controlled, maintain on Cardizem CD and follow up outpatient   - If pt goes into atrial fibrillation, consult EP   - Continue monitoring      Ambulatory dysfunction  Assessment & Plan  - Status post fall with the below noted injuries  - Fall precautions  - Geriatric Medicine consultation for evaluation, medication review and recommendations   - PT and OT evaluation and treatment as indicated  - Case Management consultation for disposition planning  Weakness generalized  Assessment & Plan  - Pt reports generalized weakness throughout all extremities for the past few days, and this is why she fell  - She reports her legs gave out on her and she fell backwards onto her buttocks, striking her head on a wall  - New onset atrial fib, new diagnosis of Influenza A could be potential causes  - Internal medicine consult, appreciate input  - Injuries as listed  - PT and OT indicate no rehab needs      MDD (major depressive disorder)  Assessment & Plan  - Home medications: Lexapro, Wellbutrin, Elavil  PRN Xanax and Ambien QHS PRN for insomnia  - Hold Xanax and Ambien at this time  - Geriatric medicine consult pending      Essential hypertension  Assessment & Plan  - Home medication: Lisinopril  - Internal Medicine Consult appreciated, started Cardizem for a fib  - cardiology consult, appreciate input  - PRN hydralazine      * Influenza A  Assessment & Plan  - 4/11 pt positive for influenza A   - maintain contact and droplet precautions  - Currently on room air, apply supplemental O2 as needed to maintain O2 > 94%  - Internal medicine following        Disposition: Continue med surg level of care  Disposition pending medical stabilization of atrial fibrillation  SUBJECTIVE:  Chief Complaint: "I feel good"    Subjective:  Patient reports that she feels good today, she reports that she feels that her fever broke and her symptoms for her flu have improved  She denies headaches, dizziness, vision changes, photophobia, chest pain, shortness of breath, palpitations, abdominal pain, numbness or tingling      OBJECTIVE:   Vitals:   Temp:  [97 5 °F (36 4 °C)-99 2 °F (37 3 °C)] 97 5 °F (36 4 °C)  HR:  [36-60] 36  Resp:  [16-18] 16  BP: (108-165)/(47-85) 115/55    Intake/Output:  I/O       04/12 0701  04/13 0700 04/13 0701  04/14 0700 04/14 0701  04/15 0700    P  O   300     Total Intake(mL/kg)  300 (3 7)     Net  +300            Unmeasured Urine Occurrence  1 x          Nutrition: Diet Regular; Regular House  GI Proph/Bowel Reg: protonix  VTE Prophylaxis:Sequential compression device (Venodyne)  and Heparin     Physical Exam:   GENERAL APPEARANCE: Patient in no acute distress  HEENT: NCAT; PERRL, EOMs intact; Mucous membranes moist  CV: Bradycardia and regular rhythm; no murmur/gallops/rubs appreciated  CHEST / LUNGS: Clear to auscultation; no wheezes/rales/rhonci  ABD: NABS; soft; non-distended; non-tender  : Voiding  EXT: +2 pulses bilaterally upper & lower extremities; no edema  NEURO: GCS 15; no focal neurologic deficits; neurovascularly intact  SKIN: Warm, dry and well perfused; no rash; no jaundice  Invasive Devices  Report    Peripheral Intravenous Line            Peripheral IV 04/12/22 Dorsal (posterior); Left Forearm 1 day                      Lab Results:   Results: I have personally reviewed all pertinent laboratory/tests results, BMP/CMP:   Lab Results   Component Value Date    SODIUM 139 04/14/2022    K 4 2 04/14/2022     04/14/2022    CO2 18 (L) 04/14/2022    BUN 15 04/14/2022    CREATININE 1 19 04/14/2022    CALCIUM 7 7 (L) 04/14/2022    EGFR 46 04/14/2022    and CBC:   Lab Results   Component Value Date    WBC 3 81 (L) 04/14/2022    HGB 12 9 04/14/2022    HCT 40 0 04/14/2022    MCV 99 (H) 04/14/2022     04/14/2022    MCH 31 8 04/14/2022    MCHC 32 3 04/14/2022    RDW 12 9 04/14/2022    MPV 10 5 04/14/2022    NRBC 0 04/14/2022     Imaging/EKG Studies: I have personally reviewed pertinent reports  see below  Other Studies:   CT head wo contrast   Final Result by Marzella Kawasaki, MD (04/12 1042)      Unchanged small acute subdural hematoma in right anterior parafalcine region         No new acute intracranial abnormality  Workstation performed: [id number]         CT head without contrast   Final Result by Jaky Jacques MD (04/11 1021)      Eccentric right anterior parafalcine subdural hemorrhage measuring 3 mm in thickness  No significant mass effect  No concomitant subarachnoid or parenchymal hemorrhage  Correlate for history of trauma or anticoagulation  No acute findings of the brain parenchyma, itself  I personally discussed this study with NOE MUSA on 4/11/2022 at 10:20 AM   She reports the patient has nebulous history of several recent falls  Workstation performed: [id number]         XR chest 2 views   ED Interpretation by Adelaide Guajardo PA-C ([id number])   Cardiomegaly  No focal consolidation  Final Result by Jaky Jacques MD (04/11 1010)      Basilar streaky opacities likely represent atelectasis from hypoventilation unless there is compelling clinical evidence for pneumonia  No other acute cardiopulmonary findings                    Workstation performed: [id number]         CT head wo contrast    (Results Pending)

## 2022-04-14 NOTE — PROGRESS NOTES
[facility]  Progress Note - Sophy Odalys 1950, 71 y o  female MRN: [id number]  Unit/Bed#: S -01 Encounter: [id number]  Primary Care Provider: Aure Santiago MD   Date and time admitted to hospital: 4/11/2022  7:45 AM    * Influenza A  Assessment & Plan  · POA on 4/11/22  · Presented with several days of myalgias and fatigue  · Symptoms vastly improved 4/14/2022  · No indication for Tamiflu given onset of symptoms greater than 48 hours prior to admission    Tachy-dunia syndrome [facility])  Assessment & Plan  · Patient noted to be in AFib with RVR while in the emergency department felt to be provoked by followed, subdural hemorrhage in influenza a  · Patient now appears to have tachy-dunia syndrome with pauses of 3 5 seconds and up to 5 seconds while sleeping  · Patient had been on oral amiodarone and Cardizem secondary to new AFib with RVR  · Continue to hold metoprolol, amiodarone  · Cardizem held this morning secondary to persistent bradycardia with heart rate 35-45  · Continue on telemetry  · Continue to hold AV rio blocking agents at this time secondary to persistent bradycardia  · Cardiology discussing with electrophysiology regarding permanent pacemaker - patient aware    Subdural hemorrhage [facility])  Assessment & Plan  · POA  · 4/11 CT head: Eccentric right anterior parafalcine subdural hemorrhage measuring 3 mm in thickness   No significant mass effect   No concomitant subarachnoid or parenchymal hemorrhage    · Repeat CT of the head on 04/12 stable  · Continue 7 day course of Keppra for seizure prophylaxis  · Holding all anticoagulant antiplatelet medications for 2 weeks and/or until cleared by Neurosurgery to resume  · As per primary service and Neurosurgery    Essential hypertension  Assessment & Plan  · Blood pressure currently 100 systolic  · Continue to hold beta-blocker and calcium channel blocker at this time  · Continue lisinopril    Ambulatory dysfunction  Assessment & Plan  · PT/OT recommending discharge to home    MDD (major depressive disorder)  Assessment & Plan  · Continue escitalopram, wellbutrin, amitriptyline       VTE Pharmacologic Prophylaxis: VTE Score: 4 Moderate Risk (Score 3-4) - Pharmacological DVT Prophylaxis Ordered: heparin  Patient Centered Rounds: discussed with [address] with Specialists or Other Care Team Provider: cardiology    Education and Discussions with Family / Patient: Patient declined call to   Time Spent for Care: 30 minutes  More than 50% of total time spent on counseling and coordination of care as described above  Current Length of Stay: 3 day(s)  Current Patient Status: Inpatient     Discharge Plan: [facility] is following this patient on consult  They are not yet medically stable for discharge secondary to bradycardia  Code Status: Level 3 - DNAR and DNI    Subjective:   Patient reports she is feeling much better today  She denies any nausea or vomiting  No chest pain or shortness of breath  Knows she may need a PPM     No dizziness or syncope  Objective:     Vitals:   Temp (24hrs), Av 3 °F (36 8 °C), Min:97 5 °F (36 4 °C), Max:99 2 °F (37 3 °C)    Temp:  [97 5 °F (36 4 °C)-99 2 °F (37 3 °C)] 98 1 °F (36 7 °C)  HR:  [36-49] 45  Resp:  [16-18] 16  BP: (108-123)/(47-61) 120/57  SpO2:  [93 %-97 %] 97 %  Body mass index is 29 95 kg/m²  Input and Output Summary (last 24 hours): Intake/Output Summary (Last 24 hours) at 2022 1226  Last data filed at 2022 0901  Gross per 24 hour   Intake 500 ml   Output --   Net 500 ml       Physical Exam:   Physical Exam  Vitals and nursing note reviewed  Constitutional:       General: She is not in acute distress  Appearance: Normal appearance  She is not diaphoretic  HENT:      Head: Normocephalic and atraumatic  Mouth/Throat:      Mouth: Mucous membranes are dry     Cardiovascular:      Rate and Rhythm: Regular rhythm  Bradycardia present  Pulmonary:      Effort: Pulmonary effort is normal       Breath sounds: Normal breath sounds  No stridor  No wheezing, rhonchi or rales  Comments: CTAB  RA  Abdominal:      General: Bowel sounds are normal       Palpations: Abdomen is soft  There is no mass  Tenderness: There is no abdominal tenderness  There is no guarding  Musculoskeletal:      Right lower leg: No edema  Left lower leg: No edema  Skin:     General: Skin is warm and dry  Neurological:      Mental Status: She is alert and oriented to person, place, and time  Psychiatric:         Mood and Affect: Mood normal          Behavior: Behavior normal           Additional Data:     Labs:  Results from last 7 days   Lab Units 04/14/22  0428   WBC Thousand/uL 3 81*   HEMOGLOBIN g/dL 12 9   HEMATOCRIT % 40 0   PLATELETS Thousands/uL 196   NEUTROS PCT % 37*   LYMPHS PCT % 44   MONOS PCT % 17*   EOS PCT % 1     Results from last 7 days   Lab Units 04/14/22  0428 04/12/22  0529 04/11/22  0834   SODIUM mmol/L 139   < > 138   POTASSIUM mmol/L 4 2   < > 4 0   CHLORIDE mmol/L 104   < > 103   CO2 mmol/L 18*   < > 25   BUN mg/dL 15   < > 11   CREATININE mg/dL 1 19   < > 1 00   ANION GAP mmol/L 17*   < > 10   CALCIUM mg/dL 7 7*   < > 8 3   ALBUMIN g/dL  --   --  3 3*   TOTAL BILIRUBIN mg/dL  --   --  0 56   ALK PHOS U/L  --   --  65   ALT U/L  --   --  19   AST U/L  --   --  30   GLUCOSE RANDOM mg/dL 74   < > 92    < > = values in this interval not displayed  Results from last 7 days   Lab Units 04/11/22  0834   INR  1 00                   Lines/Drains:  Invasive Devices  Report    Peripheral Intravenous Line            Peripheral IV 04/12/22 Dorsal (posterior); Left Forearm 1 day                  Telemetry:  Telemetry Orders (From admission, onward)             48 Hour Telemetry Monitoring  Continuous x 48 hours        References:    Telemetry Guidelines   Question:  Reason for 48 Hour Telemetry  Answer: Arrhythmias Requiring Medical Therapy (eg  SVT, Vtach/fib, Bradycardia, Uncontrolled A-fib)                 Telemetry Reviewed: Sinus Bradycardia and Pause(s)  Indication for Continued Telemetry Use: Arrthymias requiring medical therapy             Imaging: Reviewed radiology reports from this admission including: CT head    Recent Cultures (last 7 days):   Results from last 7 days   Lab Units 04/11/22  0846 04/11/22  0838   BLOOD CULTURE  No Growth at 72 hrs  No Growth at 72 hrs  Last 24 Hours Medication List:   Current Facility-Administered Medications   Medication Dose Route Frequency Provider Last Rate    acetaminophen  975 mg Oral Q6H [facility] Bear Keller PA-C      amitriptyline  25 mg Oral HS Heather Keller PA-C      buPROPion  150 mg Oral Daily Heather Keller PA-C      diltiazem  240 mg Oral Daily Manning Gaucher, CRNP      escitalopram  10 mg Oral Daily Tuesday M [facility] Medeiros      heparin (porcine)  5,000 Units Subcutaneous [facility] Heather Keller PA-C      hydrALAZINE  5 mg Intravenous Q6H PRN Yasmin Sequeira PA-C      levETIRAcetam  500 mg Oral Q12H Heather Keller PA-C      levothyroxine  50 mcg Oral Early Morning Heather Keller PA-C      lisinopril  10 mg Oral Daily Manning Gaucher, CRNP      pantoprazole  40 mg Oral Daily Dhara Gomez PA-C          Today, Patient Was Seen By: Carol Ann Ba PA-C    **Please Note: This note may have been constructed using a voice recognition system  **

## 2022-04-14 NOTE — PROGRESS NOTES
Progress Note - Geriatric Medicine   Odilia Hunter 71 y o  female MRN: [id number]  Unit/Bed#: S -01 Encounter: [id number]      Assessment/Plan:  1 -subdural hemorrhage -this was secondary to trauma appears to be stable at present neurosurgery is following     2 -atrial fibrillation with rapid ventricular response  -patient currently on heparin SQ and is on lisinopril   -Thyroid function test within normal range at 0 87  Patient with a chads 2 Vasc score of 3   -transthoracic echo revealed an EF of 65%     3 -influenza a (resolved)     4 -major depressive disorder -patient on triple therapy with Lexapro, Wellbutrin amitriptyline  Lexapro decreased to 5 mg today Patient states the amitriptyline is the only medication that has really helped her and it helps with her chronic epigastric pain        5 -hypertension  -patient is on lisinopril 10 mg PO daily      6 -hypothyroidism continue levothyroxine 50 mcg orally daily TSH in the therapeutic range    7  Tachy-dunia syndrome with pauses  -patient following with cardiology  -patient had pauses of 3 5-5 seconds as seen on telemetry          Recommendations  1  Decreased Lexapro from 10 mg to 5 mg PO daily  Subjective:   Patient states she is doing well and "over the hump" of the flu  Patient says she did not sleep well due to chronic insomnia but did take a nap  Patient denies depressive symptoms  Patient says she is going to get a pacemaker for the tachy-dunia syndrome with 3 5-5 second pauses  Review of Systems   Constitutional: Negative for chills and fever  HENT: Negative for ear pain, sore throat and trouble swallowing  Eyes: Negative for pain and visual disturbance  Respiratory: Positive for cough (dry cough residual from the influenza)  Negative for shortness of breath  Cardiovascular: Negative for chest pain and palpitations  Gastrointestinal: Positive for diarrhea (since Monday or Tuesday per patient, 1x/day)   Negative for abdominal pain, constipation, nausea and vomiting  Genitourinary: Negative for dysuria and hematuria  Musculoskeletal: Negative for arthralgias and back pain  Skin: Negative for color change and rash  Neurological: Negative for seizures, syncope and headaches  Psychiatric/Behavioral: Negative for dysphoric mood  All other systems reviewed and are negative  Objective:     Vitals: Blood pressure 120/57, pulse (!) 45, temperature 98 1 °F (36 7 °C), resp  rate 16, height 5' 5" (1 651 m), weight 81 6 kg (180 lb), SpO2 97 %  ,Body mass index is 29 95 kg/m²  Intake/Output Summary (Last 24 hours) at 4/14/2022 1343  Last data filed at 4/14/2022 0901  Gross per 24 hour   Intake 500 ml   Output --   Net 500 ml       Current Medications: Reviewed    Physical Exam:   Physical Exam  Vitals and nursing note reviewed  Constitutional:       General: She is not in acute distress  Appearance: She is well-developed  HENT:      Head: Normocephalic and atraumatic  Eyes:      Conjunctiva/sclera: Conjunctivae normal    Cardiovascular:      Rate and Rhythm: Regular rhythm  Bradycardia present  Heart sounds: No murmur heard  Comments: Bradycardic to 44 bpm  Pulmonary:      Effort: Pulmonary effort is normal  No respiratory distress  Breath sounds: Normal breath sounds  Abdominal:      Palpations: Abdomen is soft  Tenderness: There is no abdominal tenderness  Musculoskeletal:      Cervical back: Neck supple  Skin:     General: Skin is warm and dry  Neurological:      Mental Status: She is alert  Invasive Devices  Report    Peripheral Intravenous Line            Peripheral IV 04/12/22 Dorsal (posterior); Left Forearm 1 day                Lab, Imaging and other studies: I have personally reviewed pertinent reports  Note transcribed by NANCIE Barclay   Patient evaluated and note reviewed by Dr Alethea hBat MD

## 2022-04-14 NOTE — PLAN OF CARE
Problem: CARDIOVASCULAR - ADULT  Goal: Maintains optimal cardiac output and hemodynamic stability  Description: INTERVENTIONS:  - Monitor I/O, vital signs and rhythm  - Monitor for S/S and trends of decreased cardiac output  - Administer and titrate ordered vasoactive medications to optimize hemodynamic stability  - Assess quality of pulses, skin color and temperature  - Assess for signs of decreased coronary artery perfusion  - Instruct patient to report change in severity of symptoms  Outcome: Not Progressing     Problem: RESPIRATORY - ADULT  Goal: Achieves optimal ventilation and oxygenation  Description: INTERVENTIONS:  - Assess for changes in respiratory status  - Assess for changes in mentation and behavior  - Position to facilitate oxygenation and minimize respiratory effort  - Oxygen administered by appropriate delivery if ordered  - Initiate smoking cessation education as indicated  - Encourage broncho-pulmonary hygiene including cough, deep breathe, Incentive Spirometry  - Assess the need for suctioning and aspirate as needed  - Assess and instruct to report SOB or any respiratory difficulty  - Respiratory Therapy support as indicated  Outcome: Progressing     Problem: MOBILITY - ADULT  Goal: Maintain or return to baseline ADL function  Description: INTERVENTIONS:  -  Assess patient's ability to carry out ADLs; assess patient's baseline for ADL function and identify physical deficits which impact ability to perform ADLs (bathing, care of mouth/teeth, toileting, grooming, dressing, etc )  - Assess/evaluate cause of self-care deficits   - Assess range of motion  - Assess patient's mobility; develop plan if impaired  - Assess patient's need for assistive devices and provide as appropriate  - Encourage maximum independence but intervene and supervise when necessary  - Involve family in performance of ADLs  - Assess for home care needs following discharge   - Consider OT consult to assist with ADL evaluation and planning for discharge  - Provide patient education as appropriate  Outcome: Progressing     Problem: Nutrition/Hydration-ADULT  Goal: Nutrient/Hydration intake appropriate for improving, restoring or maintaining nutritional needs  Description: Monitor and assess patient's nutrition/hydration status for malnutrition  Collaborate with interdisciplinary team and initiate plan and interventions as ordered  Monitor patient's weight and dietary intake as ordered or per policy  Utilize nutrition screening tool and intervene as necessary  Determine patient's food preferences and provide high-protein, high-caloric foods as appropriate       INTERVENTIONS:  - Monitor oral intake, urinary output, labs, and treatment plans  - Assess nutrition and hydration status and recommend course of action  - Evaluate amount of meals eaten  - Assist patient with eating if necessary   - Allow adequate time for meals  - Recommend/ encourage appropriate diets, oral nutritional supplements, and vitamin/mineral supplements  - Order, calculate, and assess calorie counts as needed  - Recommend, monitor, and adjust tube feedings and TPN/PPN based on assessed needs  - Assess need for intravenous fluids  - Provide specific nutrition/hydration education as appropriate  - Include patient/family/caregiver in decisions related to nutrition  Outcome: Progressing     Problem: NEUROSENSORY - ADULT  Goal: Achieves stable or improved neurological status  Description: INTERVENTIONS  - Monitor and report changes in neurological status  - Monitor vital signs such as temperature, blood pressure, glucose, and any other labs ordered   - Initiate measures to prevent increased intracranial pressure  - Monitor for seizure activity and implement precautions if appropriate      Outcome: Progressing     Problem: SAFETY ADULT  Goal: Maintain or return to baseline ADL function  Description: INTERVENTIONS:  -  Assess patient's ability to carry out ADLs; assess patient's baseline for ADL function and identify physical deficits which impact ability to perform ADLs (bathing, care of mouth/teeth, toileting, grooming, dressing, etc )  - Assess/evaluate cause of self-care deficits   - Assess range of motion  - Assess patient's mobility; develop plan if impaired  - Assess patient's need for assistive devices and provide as appropriate  - Encourage maximum independence but intervene and supervise when necessary  - Involve family in performance of ADLs  - Assess for home care needs following discharge   - Consider OT consult to assist with ADL evaluation and planning for discharge  - Provide patient education as appropriate  Outcome: Progressing     Problem: PAIN - ADULT  Goal: Verbalizes/displays adequate comfort level or baseline comfort level  Description: Interventions:  - Encourage patient to monitor pain and request assistance  - Assess pain using appropriate pain scale  - Administer analgesics based on type and severity of pain and evaluate response  - Implement non-pharmacological measures as appropriate and evaluate response  - Consider cultural and social influences on pain and pain management  - Notify physician/advanced practitioner if interventions unsuccessful or patient reports new pain  Outcome: Progressing

## 2022-04-14 NOTE — PROGRESS NOTES
General Cardiology   Progress Note -  [facility]   Ryland Hitchcock 71 y o  female MRN: [id number]    Unit/Bed#: S -01 Encounter: [id number]    Assessment  1  New onset atrial fibrillation with RVR - possibly provoked in the setting of # 3-5  2  Tachy-dunia syndrome w/pauses (3 5 seconds yesterday morning while await, and up to 5 seconds while sleeping in the early a m hours --this was noted on telemetry review from 4/12 into 4/13)  -Symtpomatic when in afib w/RVR - c/o palpitations, intermittent dizziness/lightheadedness  -ECG on admission NSR, w/ subsequent ECG demonstrating atrial fibrillation w/ RVR, rate 152 bpm  -24 hour telemetry review- currently in sinus bradycardia with heart rates in the mid 40 to low 50 range  No recurrence of atrial fibrillation overnight/this a m  No further pauses noted on telemetry   -Had been started on Cardizem CD - dose had been up titrated to 240 mg on 4/12, and was also on oral amiodarone 200 mg t i d  in an attempt for further AFib suppression while off systemic anticoagulation  She had also received 1 dose of metoprolol tartrate yesterday morning after having been in atrial fibrillation with RVR  Further doses of metoprolol and amiodarone have been held given bradycardia/pauses noted on telemetry  She had been continued on Cardizem CD, however dose was held this a m  For persistent bradycardia  -[id number] score = 3 (age, sex, HTN) - unable to utilize systemic anticoagulation secondary to # 3  -Electrolytes stable on BMP  -TSH level 0 87    2  Preserved LV systolic function   -TTE 2/20; LVEF 65%, RV normal size/systolic function, no significant valve disease     3  Fall - appears to be mechanical in nature, likely stemming from generalized weakness  4  Traumatic subdural hemorrhage  -Management per the neurosurgery service    -CT of the head without contrast - right anterior parafalcine subdural hemorrhage measuring 3 mm in thickness   No significant mass effect   No concomitant subarachnoid or parenchymal hemorrhage   -Recommending holding of all anti-platelet/anticoagulant medications for at least 2 weeks and/or until cleared by the Neurosurgery Service  5  Influenza A +   -On contact/droplet precautions   -Remains afebrile   -Stable from a respiratory standpoint   Maintaining O2 saturations in the mid 90s on RA  6  Essential hypertension  -Avg 124/60 last recorded 115/55  -Outpatient BP regimen; lisinopril 10 mg daily   -Inpatient BP regimen:  lisinopril 10 mg daily      Plan  -Hold AV rio blocking agents at this time given persistent bradycardia  Will discuss case with the EP service to evaluate for ppm   -Unable to initiate systemic anticoagulation in the setting of traumatic subdural hemorrhage  -BP improved control over the past 24 hours - continue lisinopril 10 mg daily   -Continue to monitor electrolytes closely - replete to maintain K +level at 4 0, and magnesium at 2 0   -Continue to monitor closely on telemetry      Subjective  Review of Systems   Constitutional: Positive for malaise/fatigue  Negative for chills and fever  Eyes: Negative for visual disturbance  Cardiovascular: Negative for chest pain, dyspnea on exertion, leg swelling, orthopnea and palpitations  Respiratory: Negative for cough and shortness of breath  Gastrointestinal: Negative for abdominal pain  Neurological: Negative for dizziness, headaches, light-headedness and weakness  Objective:   Physical Exam  Vitals and nursing note reviewed  Constitutional:       General: She is not in acute distress  Appearance: She is obese  She is not diaphoretic  HENT:      Head: Normocephalic and atraumatic  Mouth/Throat:      Mouth: Mucous membranes are moist    Eyes:      General: No scleral icterus  Cardiovascular:      Rate and Rhythm: Regular rhythm  Bradycardia present  Pulses: Normal pulses  Heart sounds: Normal heart sounds  No murmur heard        Pulmonary: Effort: Pulmonary effort is normal       Breath sounds: Normal breath sounds  No wheezing or rales  Abdominal:      General: Bowel sounds are normal       Palpations: Abdomen is soft  Tenderness: There is no abdominal tenderness  Musculoskeletal:      Cervical back: Neck supple  Right lower leg: No edema  Left lower leg: No edema  Skin:     General: Skin is warm and dry  Capillary Refill: Capillary refill takes less than 2 seconds  Neurological:      General: No focal deficit present  Mental Status: She is alert and oriented to person, place, and time  Psychiatric:         Mood and Affect: Mood normal          Vitals: Blood pressure 115/55, pulse (!) 36, temperature 97 5 °F (36 4 °C), resp  rate 16, height 5' 5" (1 651 m), weight 81 6 kg (180 lb), SpO2 93 %  ,     Body mass index is 29 95 kg/m²  ,   Systolic (62DDN), HFZ:151 , Min:108 , TTV:128     Diastolic (20RQE), OC, Min:47, Max:85      Intake/Output Summary (Last 24 hours) at 2022 0947  Last data filed at 2022 0901  Gross per 24 hour   Intake 500 ml   Output --   Net 500 ml     Weight (last 2 days)     Date/Time Weight    22 0900 81 6 (180)          LABORATORY RESULTS  Results from last 7 days   Lab Units 22  0529 22  0834   CK TOTAL U/L 779* 331*   CK MB INDEX % <1 0 1 0     CBC with diff:   Results from last 7 days   Lab Units 22  0428 22  0533 22  0834   WBC Thousand/uL 3 81* 4 23* 5 62   HEMOGLOBIN g/dL 12 9 12 8 13 5   HEMATOCRIT % 40 0 39 0 42 0   MCV fL 99* 98 100*   PLATELETS Thousands/uL 196 173 176   MCH pg 31 8 32 2 32 1   MCHC g/dL 32 3 32 8 32 1   RDW % 12 9 13 1 13 1   MPV fL 10 5 10 3 11 7   NRBC AUTO /100 WBCs 0 0 0       CMP:  Results from last 7 days   Lab Units 22  0428 22  0459 22  0529 22  0834   POTASSIUM mmol/L 4 2 4 5 3 4* 4 0   CHLORIDE mmol/L 104 105 105 103   CO2 mmol/L 18* 22 24 25   BUN mg/dL 15 11 12 11   CREATININE mg/dL 1 19 0  90 0 99 1 00   CALCIUM mg/dL 7 7* 7 6* 7 8* 8 3   AST U/L  --   --   --  30   ALT U/L  --   --   --  19   ALK PHOS U/L  --   --   --  65   EGFR ml/min/1 73sq m 46 64 57 56       BMP:  Results from last 7 days   Lab Units 04/14/22  0428 04/13/22  0459 04/12/22  0529 04/11/22  0834   POTASSIUM mmol/L 4 2 4 5 3 4* 4 0   CHLORIDE mmol/L 104 105 105 103   CO2 mmol/L 18* 22 24 25   BUN mg/dL 15 11 12 11   CREATININE mg/dL 1 19 0 90 0 99 1 00   CALCIUM mg/dL 7 7* 7 6* 7 8* 8 3       No results found for: NTBNP     Results from last 7 days   Lab Units 04/14/22  0428 04/12/22  0529   MAGNESIUM mg/dL 2 0 2 0             Results from last 7 days   Lab Units 04/11/22  0834   TSH 3RD GENERATON uIU/mL 0 873       Results from last 7 days   Lab Units 04/11/22  0834   INR  1 00       Lipid Profile:   Lab Results   Component Value Date    CHOL 242 (H) 02/10/2017     Lab Results   Component Value Date    HDL 73 02/04/2022    HDL 84 04/12/2021    HDL 75 09/19/2019     Lab Results   Component Value Date    LDLCALC 143 (H) 02/04/2022    LDLCALC 116 (H) 04/12/2021    LDLCALC 141 (H) 09/19/2019     Lab Results   Component Value Date    TRIG 110 02/04/2022    TRIG 69 04/12/2021    TRIG 99 09/19/2019       Cardiac testing:   No results found for this or any previous visit  No results found for this or any previous visit  No results found for this or any previous visit  No valid procedures specified  No results found for this or any previous visit        Meds/Allergies   all current active meds have been reviewed and current meds:   Current Facility-Administered Medications   Medication Dose Route Frequency    acetaminophen (TYLENOL) tablet 975 mg  975 mg Oral Q6H [address]    amitriptyline (ELAVIL) tablet 25 mg  25 mg Oral HS    buPROPion (WELLBUTRIN XL) 24 hr tablet 150 mg  150 mg Oral Daily    diltiazem (CARDIZEM CD) 24 hr capsule 240 mg  240 mg Oral Daily    escitalopram (LEXAPRO) tablet 10 mg  10 mg Oral Daily    heparin (porcine) subcutaneous injection 5,000 Units  5,000 Units Subcutaneous Q8H [facility] longterm    hydrALAZINE (APRESOLINE) injection 5 mg  5 mg Intravenous Q6H PRN    levETIRAcetam (KEPPRA) tablet 500 mg  500 mg Oral Q12H    levothyroxine tablet 50 mcg  50 mcg Oral Early Morning    lisinopril (ZESTRIL) tablet 10 mg  10 mg Oral Daily    pantoprazole (PROTONIX) EC tablet 40 mg  40 mg Oral Daily          EKG personally reviewed by [facility]    Assessment:  Principal Problem:    Influenza A  Active Problems:    Essential hypertension    Subdural hemorrhage (HCC)    Atrial fibrillation with RVR (HCC)    MDD (major depressive disorder)    Weakness generalized    Ambulatory dysfunction    Counseling / Coordination of Care  Total floor / unit time spent today 20 minutes  Greater than 50% of total time was spent with the patient and / or family counseling and / or coordination of care  ** Please Note: Dragon 360 Dictation voice to text software may have been used in the creation of this document   **

## 2022-04-15 ENCOUNTER — ANESTHESIA EVENT (INPATIENT)
Dept: PERIOP | Facility: HOSPITAL | Age: 72
DRG: 041 | End: 2022-04-15
Payer: MEDICARE

## 2022-04-15 LAB
ANION GAP SERPL CALCULATED.3IONS-SCNC: 11 MMOL/L (ref 4–13)
BASOPHILS # BLD AUTO: 0.02 THOUSANDS/ΜL (ref 0–0.1)
BASOPHILS NFR BLD AUTO: 1 % (ref 0–1)
BUN SERPL-MCNC: 13 MG/DL (ref 5–25)
CALCIUM SERPL-MCNC: 8.3 MG/DL (ref 8.3–10.1)
CHLORIDE SERPL-SCNC: 104 MMOL/L (ref 100–108)
CO2 SERPL-SCNC: 24 MMOL/L (ref 21–32)
CREAT SERPL-MCNC: 0.91 MG/DL (ref 0.6–1.3)
EOSINOPHIL # BLD AUTO: 0.05 THOUSAND/ΜL (ref 0–0.61)
EOSINOPHIL NFR BLD AUTO: 2 % (ref 0–6)
ERYTHROCYTE [DISTWIDTH] IN BLOOD BY AUTOMATED COUNT: 12.9 % (ref 11.6–15.1)
GFR SERPL CREATININE-BSD FRML MDRD: 63 ML/MIN/1.73SQ M
GLUCOSE SERPL-MCNC: 82 MG/DL (ref 65–140)
HCT VFR BLD AUTO: 42.4 % (ref 34.8–46.1)
HGB BLD-MCNC: 14.3 G/DL (ref 11.5–15.4)
IMM GRANULOCYTES # BLD AUTO: 0.01 THOUSAND/UL (ref 0–0.2)
IMM GRANULOCYTES NFR BLD AUTO: 0 % (ref 0–2)
LYMPHOCYTES # BLD AUTO: 1.54 THOUSANDS/ΜL (ref 0.6–4.47)
LYMPHOCYTES NFR BLD AUTO: 46 % (ref 14–44)
MAGNESIUM SERPL-MCNC: 1.8 MG/DL (ref 1.6–2.6)
MCH RBC QN AUTO: 32.4 PG (ref 26.8–34.3)
MCHC RBC AUTO-ENTMCNC: 33.7 G/DL (ref 31.4–37.4)
MCV RBC AUTO: 96 FL (ref 82–98)
MONOCYTES # BLD AUTO: 0.37 THOUSAND/ΜL (ref 0.17–1.22)
MONOCYTES NFR BLD AUTO: 11 % (ref 4–12)
NEUTROPHILS # BLD AUTO: 1.3 THOUSANDS/ΜL (ref 1.85–7.62)
NEUTS SEG NFR BLD AUTO: 40 % (ref 43–75)
NRBC BLD AUTO-RTO: 0 /100 WBCS
PLATELET # BLD AUTO: 169 THOUSANDS/UL (ref 149–390)
PMV BLD AUTO: 10.1 FL (ref 8.9–12.7)
POTASSIUM SERPL-SCNC: 4.2 MMOL/L (ref 3.5–5.3)
RBC # BLD AUTO: 4.41 MILLION/UL (ref 3.81–5.12)
SODIUM SERPL-SCNC: 139 MMOL/L (ref 136–145)
WBC # BLD AUTO: 3.29 THOUSAND/UL (ref 4.31–10.16)

## 2022-04-15 PROCEDURE — 80048 BASIC METABOLIC PNL TOTAL CA: CPT | Performed by: SURGERY

## 2022-04-15 PROCEDURE — 85025 COMPLETE CBC W/AUTO DIFF WBC: CPT | Performed by: SURGERY

## 2022-04-15 PROCEDURE — 93005 ELECTROCARDIOGRAM TRACING: CPT

## 2022-04-15 PROCEDURE — 99232 SBSQ HOSP IP/OBS MODERATE 35: CPT | Performed by: INTERNAL MEDICINE

## 2022-04-15 PROCEDURE — 99232 SBSQ HOSP IP/OBS MODERATE 35: CPT | Performed by: SURGERY

## 2022-04-15 PROCEDURE — 83735 ASSAY OF MAGNESIUM: CPT | Performed by: PHYSICIAN ASSISTANT

## 2022-04-15 RX ORDER — MAGNESIUM SULFATE HEPTAHYDRATE 40 MG/ML
2 INJECTION, SOLUTION INTRAVENOUS ONCE
Status: COMPLETED | OUTPATIENT
Start: 2022-04-15 | End: 2022-04-15

## 2022-04-15 RX ORDER — METOPROLOL TARTRATE 5 MG/5ML
5 INJECTION INTRAVENOUS EVERY 6 HOURS PRN
Status: DISCONTINUED | OUTPATIENT
Start: 2022-04-15 | End: 2022-04-17 | Stop reason: HOSPADM

## 2022-04-15 RX ORDER — CEFAZOLIN SODIUM 2 G/50ML
2000 SOLUTION INTRAVENOUS ONCE
Status: COMPLETED | OUTPATIENT
Start: 2022-04-15 | End: 2022-04-16

## 2022-04-15 RX ORDER — ALBUTEROL SULFATE 90 UG/1
2 AEROSOL, METERED RESPIRATORY (INHALATION) EVERY 4 HOURS PRN
Status: DISCONTINUED | OUTPATIENT
Start: 2022-04-15 | End: 2022-04-17 | Stop reason: HOSPADM

## 2022-04-15 RX ORDER — METOPROLOL TARTRATE 5 MG/5ML
5 INJECTION INTRAVENOUS ONCE
Status: COMPLETED | OUTPATIENT
Start: 2022-04-15 | End: 2022-04-15

## 2022-04-15 RX ORDER — CHLORHEXIDINE GLUCONATE 0.12 MG/ML
15 RINSE ORAL ONCE
Status: DISCONTINUED | OUTPATIENT
Start: 2022-04-15 | End: 2022-04-17 | Stop reason: HOSPADM

## 2022-04-15 RX ADMIN — ACETAMINOPHEN 325MG 975 MG: 325 TABLET ORAL at 23:07

## 2022-04-15 RX ADMIN — BUPROPION HYDROCHLORIDE 150 MG: 150 TABLET, FILM COATED, EXTENDED RELEASE ORAL at 21:40

## 2022-04-15 RX ADMIN — PANTOPRAZOLE SODIUM 40 MG: 40 TABLET, DELAYED RELEASE ORAL at 21:44

## 2022-04-15 RX ADMIN — LEVOTHYROXINE SODIUM 50 MCG: 50 TABLET ORAL at 05:16

## 2022-04-15 RX ADMIN — LISINOPRIL 10 MG: 10 TABLET ORAL at 08:19

## 2022-04-15 RX ADMIN — AMITRIPTYLINE HYDROCHLORIDE 25 MG: 25 TABLET, FILM COATED ORAL at 21:40

## 2022-04-15 RX ADMIN — LEVETIRACETAM 500 MG: 500 TABLET, FILM COATED ORAL at 23:07

## 2022-04-15 RX ADMIN — ACETAMINOPHEN 325MG 975 MG: 325 TABLET ORAL at 05:16

## 2022-04-15 RX ADMIN — ESCITALOPRAM OXALATE 5 MG: 10 TABLET ORAL at 08:19

## 2022-04-15 RX ADMIN — LEVETIRACETAM 500 MG: 500 TABLET, FILM COATED ORAL at 09:58

## 2022-04-15 RX ADMIN — ALBUTEROL SULFATE 2 PUFF: 90 AEROSOL, METERED RESPIRATORY (INHALATION) at 08:19

## 2022-04-15 RX ADMIN — ACETAMINOPHEN 325MG 975 MG: 325 TABLET ORAL at 17:15

## 2022-04-15 RX ADMIN — ACETAMINOPHEN 325MG 975 MG: 325 TABLET ORAL at 12:12

## 2022-04-15 RX ADMIN — MAGNESIUM SULFATE IN WATER 2 G: 40 INJECTION, SOLUTION INTRAVENOUS at 09:54

## 2022-04-15 RX ADMIN — METOPROLOL TARTRATE 5 MG: 1 INJECTION, SOLUTION INTRAVENOUS at 09:53

## 2022-04-15 RX ADMIN — HEPARIN SODIUM 5000 UNITS: 5000 INJECTION INTRAVENOUS; SUBCUTANEOUS at 13:56

## 2022-04-15 NOTE — PROGRESS NOTES
[facility]  Progress Note - Chandra King 1950, 71 y o  female MRN: [id number]  Unit/Bed#: S -01 Encounter: [id number]  Primary Care Provider: Severa Comes, MD   Date and time admitted to hospital: 4/11/2022  7:45 AM    Influenza A  Assessment & Plan  · POA on 4/11/22  · Presented with several days of myalgias and fatigue  · Symptoms vastly improved 4/14/2022  · No indication for Tamiflu given onset of symptoms greater than 48 hours prior to admission    Subdural hemorrhage [facility])  Assessment & Plan  · POA  · 4/11 CT head: Eccentric right anterior parafalcine subdural hemorrhage measuring 3 mm in thickness   No significant mass effect   No concomitant subarachnoid or parenchymal hemorrhage  · Repeat CT of the head on 04/12 stable  · Continue 7 day course of Keppra for seizure prophylaxis  · Holding all anticoagulant antiplatelet medications for 2 weeks and/or until cleared by Neurosurgery to resume  · As per primary service and Neurosurgery    Ambulatory dysfunction  Assessment & Plan  · PT/OT recommending discharge to home    MDD (major depressive disorder)  Assessment & Plan  · Continue escitalopram, wellbutrin, amitriptyline     Essential hypertension  Assessment & Plan  · Blood pressure currently 174 systolic  · Continue to hold beta-blocker and calcium channel blocker at this time  · Continue lisinopril    * Tachy-dunia syndrome ([facility] [address] )  Assessment & Plan  · Patient noted to be in AFib with RVR while in the emergency department felt to be provoked by followed, subdural hemorrhage in influenza a  · Patient now appears to have tachy-dunia syndrome with pauses of 3 5 seconds and up to 5 seconds while sleeping  · Patient had been on oral amiodarone and Cardizem secondary to new AFib with RVR  · Continue to hold metoprolol, amiodarone  · Cardizem held 4/14 secondary to persistent bradycardia with heart rate 35-45    · Continue on telemetry  · Continue to hold AV rio blocking agents at this time secondary to persistent bradycardia  · Cardiology discussing with electrophysiology regarding permanent pacemaker - plan for placement on         VTE Pharmacologic Prophylaxis: VTE Score: 4 High Risk (Score >/= 5) - Pharmacological DVT Prophylaxis Ordered: heparin  Sequential Compression Devices Ordered  Discussions with Specialists or Other Care Team Provider: trauma    Education and Discussions with Family / Patient: Patient declined call to   Time Spent for Care: 30 minutes  More than 50% of total time spent on counseling and coordination of care as described above  Current Length of Stay: 4 day(s)  Current Patient Status: Inpatient   Certification Statement: The patient will continue to require additional inpatient hospital stay due to [facility] placement  Discharge Plan: [facility] is following this patient on consult  They are not yet medically stable for discharge secondary to tachy-dunia syndrome and evaluation for PPM     Code Status: Level 3 - DNAR and DNI    Subjective:   "I feel better" she notes very little flu symptoms  She is aware of the need for PPM, and that it may be done tomorrow  She denies any fever/chills, sob  She has no n/v     Objective:     Vitals:   Temp (24hrs), Av 1 °F (36 7 °C), Min:97 5 °F (36 4 °C), Max:98 4 °F (36 9 °C)    Temp:  [97 5 °F (36 4 °C)-98 4 °F (36 9 °C)] 98 4 °F (36 9 °C)  HR:  [] 106  Resp:  [15-17] 17  BP: (106-158)/() 158/107  SpO2:  [93 %-98 %] 96 %  Body mass index is 29 95 kg/m²  Input and Output Summary (last 24 hours): Intake/Output Summary (Last 24 hours) at 4/15/2022 1121  Last data filed at 4/15/2022 0954  Gross per 24 hour   Intake 0 ml   Output --   Net 0 ml       Physical Exam:   Physical Exam  Vitals and nursing note reviewed  Constitutional:       Appearance: Normal appearance  She is not ill-appearing or diaphoretic  HENT:      Head: Normocephalic and atraumatic        Mouth/Throat: Pharynx: Oropharynx is clear  No oropharyngeal exudate  Eyes:      General: No scleral icterus  Conjunctiva/sclera: Conjunctivae normal    Cardiovascular:      Rate and Rhythm: Tachycardia present  Pulmonary:      Effort: Pulmonary effort is normal  No respiratory distress  Breath sounds: No wheezing  Abdominal:      General: Bowel sounds are normal  There is no distension  Palpations: Abdomen is soft  Skin:     General: Skin is warm  Coloration: Skin is not jaundiced  Neurological:      General: No focal deficit present  Mental Status: She is alert and oriented to person, place, and time  Psychiatric:         Mood and Affect: Mood normal          Behavior: Behavior normal           Additional Data:     Labs:  Results from last 7 days   Lab Units 04/15/22  0538   WBC Thousand/uL 3 29*   HEMOGLOBIN g/dL 14 3   HEMATOCRIT % 42 4   PLATELETS Thousands/uL 169   NEUTROS PCT % 40*   LYMPHS PCT % 46*   MONOS PCT % 11   EOS PCT % 2     Results from last 7 days   Lab Units 04/15/22  0538 04/12/22  0529 04/11/22  0834   SODIUM mmol/L 139   < > 138   POTASSIUM mmol/L 4 2   < > 4 0   CHLORIDE mmol/L 104   < > 103   CO2 mmol/L 24   < > 25   BUN mg/dL 13   < > 11   CREATININE mg/dL 0 91   < > 1 00   ANION GAP mmol/L 11   < > 10   CALCIUM mg/dL 8 3   < > 8 3   ALBUMIN g/dL  --   --  3 3*   TOTAL BILIRUBIN mg/dL  --   --  0 56   ALK PHOS U/L  --   --  65   ALT U/L  --   --  19   AST U/L  --   --  30   GLUCOSE RANDOM mg/dL 82   < > 92    < > = values in this interval not displayed  Results from last 7 days   Lab Units 04/11/22  0834   INR  1 00                   Lines/Drains:  Invasive Devices  Report    Peripheral Intravenous Line            Peripheral IV 04/12/22 Dorsal (posterior); Left Forearm 2 days                  Telemetry:  Telemetry Orders (From admission, onward)             48 Hour Telemetry Monitoring  Continuous x 48 hours        Expiring   References:    Telemetry Guidelines Question:  Reason for 48 Hour Telemetry  Answer:  Arrhythmias Requiring Medical Therapy (eg  SVT, Vtach/fib, Bradycardia, Uncontrolled A-fib)                 Telemetry Reviewed: Atrial fibrillation  HR averaging 130s  Indication for Continued Telemetry Use: Arrthymias requiring medical therapy             Imaging: No pertinent imaging reviewed  Recent Cultures (last 7 days):   Results from last 7 days   Lab Units 04/11/22  0846 04/11/22  0838   BLOOD CULTURE  No Growth at 72 hrs  No Growth at 72 hrs         Last 24 Hours Medication List:   Current Facility-Administered Medications   Medication Dose Route Frequency Provider Last Rate    acetaminophen  975 mg Oral Q6H [address] Bear Keller PA-C      albuterol  2 puff Inhalation Q4H PRN Bolivar Paz PA-C      amitriptyline  25 mg Oral HS Kandace Keller PA-C      buPROPion  150 mg Oral Daily Kandace Keller PA-C      docusate sodium  100 mg Oral BID Daina Mckinnon PA-C      escitalopram  5 mg Oral Daily Cammie Son MD      heparin (porcine)  5,000 Units Subcutaneous Q8H [address] Bear Keller PA-C      hydrALAZINE  5 mg Intravenous Q6H PRN Katie Dee PA-C      levETIRAcetam  500 mg Oral Q12H Kandace Keller PA-C      levothyroxine  50 mcg Oral Early Morning Kandace Keller PA-C      lisinopril  10 mg Oral Daily Daphne Bowlesnellelias, CRNP      magnesium sulfate  2 g Intravenous Once Liddie Clipper, CRNP      pantoprazole  40 mg Oral Daily Tory Escudero PA-C          Today, Patient Was Seen By: Landry Esteves MD

## 2022-04-15 NOTE — PROGRESS NOTES
General Cardiology   Progress Note -  [facility] [employer]   [place] 71 y o  female MRN: [id number]    Unit/Bed#: S -01 Encounter: [id number]    Assessment  1  New onset atrial fibrillation with RVR - possibly provoked in the setting of # 3  -Symptomatic w/c/o palpitations  2  Tachy-dunia syndrome w/pauses (3 5 seconds yesterday morning while await, and up to 5 seconds while sleeping in the early a m hours --this was noted on telemetry review from 4/12 into 4/13)  -Symptomatic w/c/o brief/intermittent dizziness/lightheadness, no prior LOC/syncope  -ECG on admission NSR, w/ subsequent ECG demonstrating atrial fibrillation w/ RVR, rate 152 bpm  -24 hour telemetry review 4/14-4/15- SB last evening/overnight with rates in the mid 40s to low 50s, no high-grade AVB or pauses noted  Went into atrial fibrillation with RVR early this morning with rates in the 120-130 range  -AV rio blocking agents remain on hold  -[id number] score = 3 (age, sex, HTN) - unable to utilize systemic anticoagulation secondary to # 3  -K+/mag - 4 2/1 8  -TSH level 0 87  2  Preserved LV systolic function   -TTE 4/06; LVEF 65%, RV normal size/systolic function, no significant valve disease  3  Fall - appears to be mechanical in nature, likely stemming from generalized weakness  4  Traumatic subdural hemorrhage  -Management per the neurosurgery service    -CT of the head without contrast - right anterior parafalcine subdural hemorrhage measuring 3 mm in thickness   No significant mass effect   No concomitant subarachnoid or parenchymal hemorrhage   -Recommending holding of all anti-platelet/anticoagulant medications for at least 2 weeks and/or until cleared by the Neurosurgery Service  5  Influenza A +   -On contact/droplet precautions  -Afebrile, no significant leukocytosis  Stable from a respiratory standpoint    6  Essential hypertension  -Avg  131/67, last recorded 147/96  -Outpatient BP regimen; lisinopril 10 mg daily   -Inpatient BP regimen:  lisinopril 10 mg daily      Plan  -Discussed case with [facility] service yesterday -- recommending ppm implantation (timing - TBD, either today or tomorrow)  Maintain NPO status for now  -HR's in afb are currently uncontrolled/sustained in the 120-140 range  Will give a 1x dose of IV lopressor 5 mg now and utilize as needed    -Systemic anticoagulation unable to initiated secondary to # 4    -Continue lisinopril 10 mg daily   -Continue to monitor electrolytes closely - replete to maintain K +level at 4 0, and magnesium at 2 0   2 g of IV Mag sulfate ordered this a m -- for Mag level of 1 8   -Continue to monitor closely on telemetry      Subjective  Review of Systems   Constitutional: Negative for chills, fever and malaise/fatigue  Eyes: Negative for visual disturbance  Cardiovascular: Negative for chest pain, dyspnea on exertion, leg swelling, orthopnea and palpitations  Respiratory: Negative for cough and shortness of breath  Gastrointestinal: Negative for abdominal pain  Neurological: Negative for dizziness, headaches and light-headedness  Objective:   Physical Exam  Vitals and nursing note reviewed  Constitutional:       General: She is not in acute distress  Appearance: She is not diaphoretic  HENT:      Head: Normocephalic and atraumatic  Mouth/Throat:      Mouth: Mucous membranes are moist    Eyes:      General: No scleral icterus  Cardiovascular:      Rate and Rhythm: Tachycardia present  Rhythm irregular  Pulses: Normal pulses  Heart sounds: Normal heart sounds  No murmur heard  Pulmonary:      Effort: Pulmonary effort is normal       Breath sounds: Normal breath sounds  No wheezing or rales  Musculoskeletal:      Cervical back: Neck supple  Right lower leg: No edema  Left lower leg: No edema  Skin:     General: Skin is warm and dry  Capillary Refill: Capillary refill takes less than 2 seconds     Neurological:      General: No focal deficit present  Mental Status: She is alert and oriented to person, place, and time  Psychiatric:         Mood and Affect: Mood normal          Vitals: Blood pressure 147/96, pulse 82, temperature 98 °F (36 7 °C), resp  rate 15, height 5' 5" (1 651 m), weight 81 6 kg (180 lb), SpO2 93 %  ,     Body mass index is 29 95 kg/m²  ,   Systolic (11FOE), LJY:362 , Min:106 , CUV:128     Diastolic (73QKM), EGB:45, Min:57, Max:96      Intake/Output Summary (Last 24 hours) at 4/15/2022 0831  Last data filed at 4/14/2022 0901  Gross per 24 hour   Intake 500 ml   Output --   Net 500 ml     Weight (last 2 days)     None          LABORATORY RESULTS  Results from last 7 days   Lab Units 04/12/22  0529 04/11/22  0834   CK TOTAL U/L 779* 331*   CK MB INDEX % <1 0 1 0     CBC with diff:   Results from last 7 days   Lab Units 04/15/22  0538 04/14/22 0428 04/12/22  0533 04/11/22  0834   WBC Thousand/uL 3 29* 3 81* 4 23* 5 62   HEMOGLOBIN g/dL 14 3 12 9 12 8 13 5   HEMATOCRIT % 42 4 40 0 39 0 42 0   MCV fL 96 99* 98 100*   PLATELETS Thousands/uL 169 196 173 176   MCH pg 32 4 31 8 32 2 32 1   MCHC g/dL 33 7 32 3 32 8 32 1   RDW % 12 9 12 9 13 1 13 1   MPV fL 10 1 10 5 10 3 11 7   NRBC AUTO /100 WBCs 0 0 0 0       CMP:  Results from last 7 days   Lab Units 04/15/22  0538 04/14/22  0428 04/13/22  0459 04/12/22  0529 04/11/22  0834   POTASSIUM mmol/L 4 2 4 2 4 5 3 4* 4 0   CHLORIDE mmol/L 104 104 105 105 103   CO2 mmol/L 24 18* 22 24 25   BUN mg/dL 13 15 11 12 11   CREATININE mg/dL 0 91 1 19 0 90 0 99 1 00   CALCIUM mg/dL 8 3 7 7* 7 6* 7 8* 8 3   AST U/L  --   --   --   --  30   ALT U/L  --   --   --   --  19   ALK PHOS U/L  --   --   --   --  65   EGFR ml/min/1 73sq m 63 46 64 57 56       BMP:  Results from last 7 days   Lab Units 04/15/22  0538 04/14/22  0428 04/13/22  0459 04/12/22  0529 04/11/22  0834   POTASSIUM mmol/L 4 2 4 2 4 5 3 4* 4 0   CHLORIDE mmol/L 104 104 105 105 103   CO2 mmol/L 24 18* 22 24 25   BUN mg/dL 13 15 11 12 11   CREATININE mg/dL 0 91 1 19 0 90 0 99 1 00   CALCIUM mg/dL 8 3 7 7* 7 6* 7 8* 8 3       No results found for: NTBNP     Results from last 7 days   Lab Units 04/15/22  0538 04/14/22  0428 04/12/22  0529   MAGNESIUM mg/dL 1 8 2 0 2 0             Results from last 7 days   Lab Units 04/11/22  0834   TSH 3RD GENERATON uIU/mL 0 873       Results from last 7 days   Lab Units 04/11/22  0834   INR  1 00       Lipid Profile:   Lab Results   Component Value Date    CHOL 242 (H) 02/10/2017     Lab Results   Component Value Date    HDL 73 02/04/2022    HDL 84 04/12/2021    HDL 75 09/19/2019     Lab Results   Component Value Date    LDLCALC 143 (H) 02/04/2022    LDLCALC 116 (H) 04/12/2021    LDLCALC 141 (H) 09/19/2019     Lab Results   Component Value Date    TRIG 110 02/04/2022    TRIG 69 04/12/2021    TRIG 99 09/19/2019       Cardiac testing:   No results found for this or any previous visit  No results found for this or any previous visit  No results found for this or any previous visit  No valid procedures specified  No results found for this or any previous visit        Meds/Allergies   all current active meds have been reviewed and current meds:   Current Facility-Administered Medications   Medication Dose Route Frequency    acetaminophen (TYLENOL) tablet 975 mg  975 mg Oral Q6H [address]    albuterol (PROVENTIL HFA,VENTOLIN HFA) inhaler 2 puff  2 puff Inhalation Q4H PRN    amitriptyline (ELAVIL) tablet 25 mg  25 mg Oral HS    buPROPion (WELLBUTRIN XL) 24 hr tablet 150 mg  150 mg Oral Daily    docusate sodium (COLACE) capsule 100 mg  100 mg Oral BID    escitalopram (LEXAPRO) tablet 5 mg  5 mg Oral Daily    heparin (porcine) subcutaneous injection 5,000 Units  5,000 Units Subcutaneous Q8H [address]    hydrALAZINE (APRESOLINE) injection 5 mg  5 mg Intravenous Q6H PRN    levETIRAcetam (KEPPRA) tablet 500 mg  500 mg Oral Q12H    levothyroxine tablet 50 mcg  50 mcg Oral Early Morning    lisinopril (ZESTRIL) tablet 10 mg  10 mg Oral Daily    pantoprazole (PROTONIX) EC tablet 40 mg  40 mg Oral Daily          EKG personally reviewed by HOME Harris    Assessment:  Principal Problem:    Tachy-dunia syndrome ([address] )  Active Problems:    Essential hypertension    Subdural hemorrhage (HCC)    MDD (major depressive disorder)    Weakness generalized    Influenza A    Ambulatory dysfunction    Counseling / Coordination of Care  Total floor / unit time spent today 20 minutes  Greater than 50% of total time was spent with the patient and / or family counseling and / or coordination of care  ** Please Note: Dragon 360 Dictation voice to text software may have been used in the creation of this document   **

## 2022-04-15 NOTE — QUICK NOTE
Contacted by RN: patient back in a fib, HRs 90s-130s  Patient asymptomatic  All AV rio blocking agents on hold since 4/14 AM 2/2 persistent bradycardia  Note plans for PPM, tentatively for Saturday 4/16  Discussed acute management options w/ cardiology on call  At this time will continue to monitor on telemetry and avoid AV rio blocking agents  If HR sustain >130s, patient would need temporary trans-venous pacing to safely admin AV rio blocking agents

## 2022-04-15 NOTE — PLAN OF CARE
Problem: CARDIOVASCULAR - ADULT  Goal: Maintains optimal cardiac output and hemodynamic stability  Description: INTERVENTIONS:  - Monitor I/O, vital signs and rhythm  - Monitor for S/S and trends of decreased cardiac output  - Administer and titrate ordered vasoactive medications to optimize hemodynamic stability  - Assess quality of pulses, skin color and temperature  - Assess for signs of decreased coronary artery perfusion  - Instruct patient to report change in severity of symptoms  Outcome: Not Progressing  Goal: Absence of cardiac dysrhythmias or at baseline rhythm  Description: INTERVENTIONS:  - Continuous cardiac monitoring, vital signs, obtain 12 lead EKG if ordered  - Administer antiarrhythmic and heart rate control medications as ordered  - Monitor electrolytes and administer replacement therapy as ordered  Outcome: Not Progressing     Problem: RESPIRATORY - ADULT  Goal: Achieves optimal ventilation and oxygenation  Description: INTERVENTIONS:  - Assess for changes in respiratory status  - Assess for changes in mentation and behavior  - Position to facilitate oxygenation and minimize respiratory effort  - Oxygen administered by appropriate delivery if ordered  - Initiate smoking cessation education as indicated  - Encourage broncho-pulmonary hygiene including cough, deep breathe, Incentive Spirometry  - Assess the need for suctioning and aspirate as needed  - Assess and instruct to report SOB or any respiratory difficulty  - Respiratory Therapy support as indicated  Outcome: Progressing     Problem: NEUROSENSORY - ADULT  Goal: Achieves stable or improved neurological status  Description: INTERVENTIONS  - Monitor and report changes in neurological status  - Monitor vital signs such as temperature, blood pressure, glucose, and any other labs ordered   - Initiate measures to prevent increased intracranial pressure  - Monitor for seizure activity and implement precautions if appropriate      Outcome: Progressing     Problem: MOBILITY - ADULT  Goal: Maintain or return to baseline ADL function  Description: INTERVENTIONS:  -  Assess patient's ability to carry out ADLs; assess patient's baseline for ADL function and identify physical deficits which impact ability to perform ADLs (bathing, care of mouth/teeth, toileting, grooming, dressing, etc )  - Assess/evaluate cause of self-care deficits   - Assess range of motion  - Assess patient's mobility; develop plan if impaired  - Assess patient's need for assistive devices and provide as appropriate  - Encourage maximum independence but intervene and supervise when necessary  - Involve family in performance of ADLs  - Assess for home care needs following discharge   - Consider OT consult to assist with ADL evaluation and planning for discharge  - Provide patient education as appropriate  Outcome: Progressing     Problem: INFECTION - ADULT  Goal: Absence or prevention of progression during hospitalization  Description: INTERVENTIONS:  - Assess and monitor for signs and symptoms of infection  - Monitor lab/diagnostic results  - Monitor all insertion sites, i e  indwelling lines, tubes, and drains  - Monitor endotracheal if appropriate and nasal secretions for changes in amount and color  - [place] appropriate cooling/warming therapies per order  - Administer medications as ordered  - Instruct and encourage patient and family to use good hand hygiene technique  - Identify and instruct in appropriate isolation precautions for identified infection/condition  Outcome: Progressing     Problem: PAIN - ADULT  Goal: Verbalizes/displays adequate comfort level or baseline comfort level  Description: Interventions:  - Encourage patient to monitor pain and request assistance  - Assess pain using appropriate pain scale  - Administer analgesics based on type and severity of pain and evaluate response  - Implement non-pharmacological measures as appropriate and evaluate response  - Consider cultural and social influences on pain and pain management  - Notify physician/advanced practitioner if interventions unsuccessful or patient reports new pain  Outcome: Progressing

## 2022-04-15 NOTE — PROGRESS NOTES
[facility]  Progress Note - Roberto Abisai 1950, 71 y o  female MRN: [id number]  Unit/Bed#: S -01 Encounter: [id number]  Primary Care Provider: Kat Ward MD   Date and time admitted to hospital: 4/11/2022  7:45 AM    Ambulatory dysfunction  Assessment & Plan  - Status post fall with the below noted injuries  - Fall precautions  - Geriatric Medicine consultation for evaluation, medication review and recommendations   - PT and OT evaluation and treatment as indicated  - Case Management consultation for disposition planning  Subdural hemorrhage [facility])  Assessment & Plan  - 4/11 CTH: Eccentric right anterior parafalcine subdural hemorrhage measuring 3 mm in thickness  No significant mass effect  No concomitant subarachnoid or parenchymal hemorrhage  Correlate for history of trauma or anticoagulation  No acute findings of the brain parenchyma, itself  - Neuro exam: GCS 15, non-focal  - Continue neurologic checks: Every 4 hours  - Reversal agent administered: none  - Repeat CT head 4/12 stable  - Appreciate Neurosurgery evaluation and recommendations  - Complete 7 day course of Keppra for seizure prophylaxis  - Chemical DVT prophylaxis: SQH  - Hold all anticoagulants and anti platelet medications for 2 weeks and/or until cleared by Neurosurgery to resume   - PT and OT (including cognitive evaluation) evaluation and treatment as indicated    - Follow up with NSG in 2 weeks      * Tachy-dunia syndrome (HCC)  Assessment & Plan  - Initial EKG in ED was normal sinus rhythm  - Upon trauma evaluation, patients HR was 130 bpm and higher, EKG indicated that patient was experiencing atrial fibrillation with RVR  - Asymptomatic, pt denies chest pain, palpitations, shortness of breath  - New onset afib felt to be precipitated by Flu/SDH  - with significant and ongoing pauses  - Internal medicine consult appreciated as well as Cardiology  - Cardiology recommending discontinue metoprolol tartrate and amiodarone due to bradycardia and monitor only on Cardizem CD which is currently on hold with ongoing bradycardia  Given ongoing pauses patient will need pacemaker  Plan for placement 4/16    - overnight for 14 patient with AFib with RVR into the 170s with several short nonsustained rounds of V-tach  Patient asymptomatic throughout all and no currently AFib in the 110s to 130s  Patient NPO pending cardiology evaluation this morning  Influenza A  Assessment & Plan  - 4/11 pt positive for influenza A   - maintain contact and droplet precautions  - Currently on room air, apply supplemental O2 as needed to maintain O2 > 94%  - Internal medicine following  - with new expiratory wheezing this a m  Will add albuterol    MDD (major depressive disorder)  Assessment & Plan  - Home medications: Lexapro, Wellbutrin, Elavil  PRN Xanax and Ambien QHS PRN for insomnia  - Hold Xanax and Ambien at this time  - Geriatric medicine consult pending      Essential hypertension  Assessment & Plan  - Home medication: Lisinopril  - Internal Medicine Consult appreciated, started Cardizem for a fib  - cardiology consult, appreciate input  - PRN hydralazine          Disposition: continue medical management with telemetry monitoring, plan for pacemaker placement 4/16    SUBJECTIVE:  Chief Complaint:  Tired    Subjective:  Reports she is tired she had poor night of sleep  She denies dizziness, nausea, palpitations, shortness of breath  She reports she does not feel like she is currently in AFib  She reports some chest tightness with deep inspiration  OBJECTIVE:   Vitals:   Temp:  [97 5 °F (36 4 °C)-98 3 °F (36 8 °C)] 97 7 °F (36 5 °C)  HR:  [36-54] 53  Resp:  [16-17] 17  BP: (106-156)/(57-76) 156/76    Intake/Output:  I/O       04/13 0701  04/14 0700 04/14 0701  04/15 0700    P  O  300 500    Total Intake(mL/kg) 300 (3 7) 500 (6 1)    Net +300 +500          Unmeasured Urine Occurrence 1 x          Nutrition: Diet NPO; Sips with meds  GI Proph/Bowel Reg: protonix, colace  VTE Prophylaxis:Heparin     Physical Exam:   GENERAL APPEARANCE: no acute distress, resting supine in bed  NEURO: AAOX3, GCS 15, no focal neuro deficit  HEENT: PERRL EOMI mucus membranes moist  CV: RRR S1 S2 without murmur, rub, or gallop  LUNGS:  Equal breath sounds bilaterally with bilateral expiratory wheezes without crackles or rhonchi  GI: soft, non-tender non-distended  : voiding independently  MSK: non tender without deformity  SKIN: warm, dry intact    Invasive Devices  Report    Peripheral Intravenous Line            Peripheral IV 04/12/22 Dorsal (posterior); Left Forearm 2 days                      Lab Results:   BMP/CMP:   Lab Results   Component Value Date    SODIUM 139 04/15/2022    K 4 2 04/15/2022     04/15/2022    CO2 24 04/15/2022    BUN 13 04/15/2022    CREATININE 0 91 04/15/2022    CALCIUM 8 3 04/15/2022    EGFR 63 04/15/2022    and CBC:   Lab Results   Component Value Date    WBC 3 29 (L) 04/15/2022    HGB 14 3 04/15/2022    HCT 42 4 04/15/2022    MCV 96 04/15/2022     04/15/2022    MCH 32 4 04/15/2022    MCHC 33 7 04/15/2022    RDW 12 9 04/15/2022    MPV 10 1 04/15/2022    NRBC 0 04/15/2022     Imaging/EKG Studies: I have personally reviewed pertinent reports       Other Studies: none

## 2022-04-16 ENCOUNTER — APPOINTMENT (INPATIENT)
Dept: RADIOLOGY | Facility: HOSPITAL | Age: 72
DRG: 041 | End: 2022-04-16
Payer: MEDICARE

## 2022-04-16 ENCOUNTER — ANESTHESIA (INPATIENT)
Dept: PERIOP | Facility: HOSPITAL | Age: 72
DRG: 041 | End: 2022-04-16
Payer: MEDICARE

## 2022-04-16 LAB
ANION GAP SERPL CALCULATED.3IONS-SCNC: 9 MMOL/L (ref 4–13)
APTT PPP: 35 SECONDS (ref 23–37)
BACTERIA BLD CULT: NORMAL
BACTERIA BLD CULT: NORMAL
BASOPHILS # BLD AUTO: 0.02 THOUSANDS/ΜL (ref 0–0.1)
BASOPHILS NFR BLD AUTO: 1 % (ref 0–1)
BUN SERPL-MCNC: 10 MG/DL (ref 5–25)
CALCIUM SERPL-MCNC: 8.2 MG/DL (ref 8.3–10.1)
CHLORIDE SERPL-SCNC: 103 MMOL/L (ref 100–108)
CO2 SERPL-SCNC: 25 MMOL/L (ref 21–32)
CREAT SERPL-MCNC: 0.95 MG/DL (ref 0.6–1.3)
EOSINOPHIL # BLD AUTO: 0.02 THOUSAND/ΜL (ref 0–0.61)
EOSINOPHIL NFR BLD AUTO: 1 % (ref 0–6)
ERYTHROCYTE [DISTWIDTH] IN BLOOD BY AUTOMATED COUNT: 13.1 % (ref 11.6–15.1)
GFR SERPL CREATININE-BSD FRML MDRD: 60 ML/MIN/1.73SQ M
GLUCOSE SERPL-MCNC: 69 MG/DL (ref 65–140)
HCT VFR BLD AUTO: 43.4 % (ref 34.8–46.1)
HGB BLD-MCNC: 14 G/DL (ref 11.5–15.4)
IMM GRANULOCYTES # BLD AUTO: 0.01 THOUSAND/UL (ref 0–0.2)
IMM GRANULOCYTES NFR BLD AUTO: 0 % (ref 0–2)
INR PPP: 0.91 (ref 0.84–1.19)
LYMPHOCYTES # BLD AUTO: 1.83 THOUSANDS/ΜL (ref 0.6–4.47)
LYMPHOCYTES NFR BLD AUTO: 53 % (ref 14–44)
MAGNESIUM SERPL-MCNC: 2.2 MG/DL (ref 1.6–2.6)
MCH RBC QN AUTO: 31.4 PG (ref 26.8–34.3)
MCHC RBC AUTO-ENTMCNC: 32.3 G/DL (ref 31.4–37.4)
MCV RBC AUTO: 97 FL (ref 82–98)
MONOCYTES # BLD AUTO: 0.5 THOUSAND/ΜL (ref 0.17–1.22)
MONOCYTES NFR BLD AUTO: 15 % (ref 4–12)
NEUTROPHILS # BLD AUTO: 1.01 THOUSANDS/ΜL (ref 1.85–7.62)
NEUTS SEG NFR BLD AUTO: 30 % (ref 43–75)
NRBC BLD AUTO-RTO: 0 /100 WBCS
PHOSPHATE SERPL-MCNC: 3.1 MG/DL (ref 2.3–4.1)
PLATELET # BLD AUTO: 188 THOUSANDS/UL (ref 149–390)
PMV BLD AUTO: 9.9 FL (ref 8.9–12.7)
POTASSIUM SERPL-SCNC: 5 MMOL/L (ref 3.5–5.3)
PROTHROMBIN TIME: 12.3 SECONDS (ref 11.6–14.5)
RBC # BLD AUTO: 4.46 MILLION/UL (ref 3.81–5.12)
SODIUM SERPL-SCNC: 137 MMOL/L (ref 136–145)
WBC # BLD AUTO: 3.39 THOUSAND/UL (ref 4.31–10.16)

## 2022-04-16 PROCEDURE — 3E0132A INTRODUCTION OF ANTI-INFECTIVE ENVELOPE INTO SUBCUTANEOUS TISSUE, PERCUTANEOUS APPROACH: ICD-10-PCS | Performed by: INTERNAL MEDICINE

## 2022-04-16 PROCEDURE — 02H63JZ INSERTION OF PACEMAKER LEAD INTO RIGHT ATRIUM, PERCUTANEOUS APPROACH: ICD-10-PCS | Performed by: INTERNAL MEDICINE

## 2022-04-16 PROCEDURE — C1785 PMKR, DUAL, RATE-RESP: HCPCS | Performed by: INTERNAL MEDICINE

## 2022-04-16 PROCEDURE — 80048 BASIC METABOLIC PNL TOTAL CA: CPT | Performed by: PHYSICIAN ASSISTANT

## 2022-04-16 PROCEDURE — 85610 PROTHROMBIN TIME: CPT | Performed by: INTERNAL MEDICINE

## 2022-04-16 PROCEDURE — 76000 FLUOROSCOPY <1 HR PHYS/QHP: CPT

## 2022-04-16 PROCEDURE — 85025 COMPLETE CBC W/AUTO DIFF WBC: CPT | Performed by: PHYSICIAN ASSISTANT

## 2022-04-16 PROCEDURE — 02HK3JZ INSERTION OF PACEMAKER LEAD INTO RIGHT VENTRICLE, PERCUTANEOUS APPROACH: ICD-10-PCS | Performed by: INTERNAL MEDICINE

## 2022-04-16 PROCEDURE — 0JH606Z INSERTION OF PACEMAKER, DUAL CHAMBER INTO CHEST SUBCUTANEOUS TISSUE AND FASCIA, OPEN APPROACH: ICD-10-PCS | Performed by: INTERNAL MEDICINE

## 2022-04-16 PROCEDURE — 99024 POSTOP FOLLOW-UP VISIT: CPT | Performed by: INTERNAL MEDICINE

## 2022-04-16 PROCEDURE — 71045 X-RAY EXAM CHEST 1 VIEW: CPT

## 2022-04-16 PROCEDURE — C1769 GUIDE WIRE: HCPCS | Performed by: INTERNAL MEDICINE

## 2022-04-16 PROCEDURE — 84100 ASSAY OF PHOSPHORUS: CPT | Performed by: PHYSICIAN ASSISTANT

## 2022-04-16 PROCEDURE — 99231 SBSQ HOSP IP/OBS SF/LOW 25: CPT

## 2022-04-16 PROCEDURE — C1898 LEAD, PMKR, OTHER THAN TRANS: HCPCS | Performed by: INTERNAL MEDICINE

## 2022-04-16 PROCEDURE — 85730 THROMBOPLASTIN TIME PARTIAL: CPT | Performed by: INTERNAL MEDICINE

## 2022-04-16 PROCEDURE — 99232 SBSQ HOSP IP/OBS MODERATE 35: CPT | Performed by: SURGERY

## 2022-04-16 PROCEDURE — 83735 ASSAY OF MAGNESIUM: CPT | Performed by: PHYSICIAN ASSISTANT

## 2022-04-16 PROCEDURE — 33208 INSRT HEART PM ATRIAL & VENT: CPT | Performed by: INTERNAL MEDICINE

## 2022-04-16 DEVICE — LEAD 407452 MRI US BI MVC RCMRCD
Type: IMPLANTABLE DEVICE | Site: CHEST  WALL | Status: FUNCTIONAL
Brand: CAPSURE SENSE MRI™ SURESCAN™

## 2022-04-16 DEVICE — ENVELOPE CMRM6122 ABSORB MED MR
Type: IMPLANTABLE DEVICE | Site: CHEST  WALL | Status: FUNCTIONAL
Brand: TYRX™

## 2022-04-16 DEVICE — IPG W1DR01 AZURE XT DR MRI USA
Type: IMPLANTABLE DEVICE | Site: CHEST  WALL | Status: FUNCTIONAL
Brand: AZURE™ XT DR MRI SURESCAN™

## 2022-04-16 RX ORDER — AMIODARONE HYDROCHLORIDE 50 MG/ML
INJECTION, SOLUTION INTRAVENOUS AS NEEDED
Status: DISCONTINUED | OUTPATIENT
Start: 2022-04-16 | End: 2022-04-16

## 2022-04-16 RX ORDER — MIDAZOLAM HYDROCHLORIDE 2 MG/2ML
INJECTION, SOLUTION INTRAMUSCULAR; INTRAVENOUS AS NEEDED
Status: DISCONTINUED | OUTPATIENT
Start: 2022-04-16 | End: 2022-04-16

## 2022-04-16 RX ORDER — PROPOFOL 10 MG/ML
INJECTION, EMULSION INTRAVENOUS CONTINUOUS PRN
Status: DISCONTINUED | OUTPATIENT
Start: 2022-04-16 | End: 2022-04-16

## 2022-04-16 RX ORDER — ONDANSETRON 2 MG/ML
INJECTION INTRAMUSCULAR; INTRAVENOUS AS NEEDED
Status: DISCONTINUED | OUTPATIENT
Start: 2022-04-16 | End: 2022-04-16

## 2022-04-16 RX ORDER — MEPERIDINE HYDROCHLORIDE 25 MG/ML
12.5 INJECTION INTRAMUSCULAR; INTRAVENOUS; SUBCUTANEOUS ONCE
Status: DISCONTINUED | OUTPATIENT
Start: 2022-04-16 | End: 2022-04-16 | Stop reason: HOSPADM

## 2022-04-16 RX ORDER — AMIODARONE HYDROCHLORIDE 200 MG/1
200 TABLET ORAL
Status: DISCONTINUED | OUTPATIENT
Start: 2022-04-16 | End: 2022-04-17 | Stop reason: HOSPADM

## 2022-04-16 RX ORDER — OXYCODONE HYDROCHLORIDE 5 MG/1
5 TABLET ORAL EVERY 6 HOURS PRN
Status: DISCONTINUED | OUTPATIENT
Start: 2022-04-16 | End: 2022-04-17 | Stop reason: HOSPADM

## 2022-04-16 RX ORDER — LIDOCAINE HYDROCHLORIDE 10 MG/ML
INJECTION, SOLUTION EPIDURAL; INFILTRATION; INTRACAUDAL; PERINEURAL AS NEEDED
Status: DISCONTINUED | OUTPATIENT
Start: 2022-04-16 | End: 2022-04-16 | Stop reason: HOSPADM

## 2022-04-16 RX ORDER — FENTANYL CITRATE/PF 50 MCG/ML
25 SYRINGE (ML) INJECTION
Status: DISCONTINUED | OUTPATIENT
Start: 2022-04-16 | End: 2022-04-16 | Stop reason: HOSPADM

## 2022-04-16 RX ORDER — SODIUM CHLORIDE, SODIUM LACTATE, POTASSIUM CHLORIDE, CALCIUM CHLORIDE 600; 310; 30; 20 MG/100ML; MG/100ML; MG/100ML; MG/100ML
INJECTION, SOLUTION INTRAVENOUS CONTINUOUS PRN
Status: DISCONTINUED | OUTPATIENT
Start: 2022-04-16 | End: 2022-04-16

## 2022-04-16 RX ORDER — SODIUM CHLORIDE, SODIUM LACTATE, POTASSIUM CHLORIDE, CALCIUM CHLORIDE 600; 310; 30; 20 MG/100ML; MG/100ML; MG/100ML; MG/100ML
125 INJECTION, SOLUTION INTRAVENOUS CONTINUOUS
Status: CANCELLED | OUTPATIENT
Start: 2022-04-16

## 2022-04-16 RX ORDER — PROMETHAZINE HYDROCHLORIDE 25 MG/ML
12.5 INJECTION, SOLUTION INTRAMUSCULAR; INTRAVENOUS ONCE AS NEEDED
Status: DISCONTINUED | OUTPATIENT
Start: 2022-04-16 | End: 2022-04-16 | Stop reason: HOSPADM

## 2022-04-16 RX ORDER — PROPOFOL 10 MG/ML
INJECTION, EMULSION INTRAVENOUS AS NEEDED
Status: DISCONTINUED | OUTPATIENT
Start: 2022-04-16 | End: 2022-04-16

## 2022-04-16 RX ORDER — HYDROMORPHONE HCL/PF 1 MG/ML
0.5 SYRINGE (ML) INJECTION
Status: DISCONTINUED | OUTPATIENT
Start: 2022-04-16 | End: 2022-04-16 | Stop reason: HOSPADM

## 2022-04-16 RX ORDER — LABETALOL 20 MG/4 ML (5 MG/ML) INTRAVENOUS SYRINGE
5
Status: DISCONTINUED | OUTPATIENT
Start: 2022-04-16 | End: 2022-04-16 | Stop reason: HOSPADM

## 2022-04-16 RX ORDER — ONDANSETRON 2 MG/ML
4 INJECTION INTRAMUSCULAR; INTRAVENOUS ONCE AS NEEDED
Status: DISCONTINUED | OUTPATIENT
Start: 2022-04-16 | End: 2022-04-16 | Stop reason: HOSPADM

## 2022-04-16 RX ORDER — METOPROLOL TARTRATE 5 MG/5ML
5 INJECTION INTRAVENOUS ONCE
Status: CANCELLED | OUTPATIENT
Start: 2022-04-16 | End: 2022-04-16

## 2022-04-16 RX ORDER — ALBUTEROL SULFATE 2.5 MG/3ML
2.5 SOLUTION RESPIRATORY (INHALATION) ONCE AS NEEDED
Status: DISCONTINUED | OUTPATIENT
Start: 2022-04-16 | End: 2022-04-16 | Stop reason: HOSPADM

## 2022-04-16 RX ORDER — ACETAMINOPHEN 325 MG/1
650 TABLET ORAL EVERY 4 HOURS PRN
Status: DISCONTINUED | OUTPATIENT
Start: 2022-04-16 | End: 2022-04-17 | Stop reason: HOSPADM

## 2022-04-16 RX ADMIN — HEPARIN SODIUM 5000 UNITS: 5000 INJECTION INTRAVENOUS; SUBCUTANEOUS at 22:56

## 2022-04-16 RX ADMIN — ACETAMINOPHEN 325MG 975 MG: 325 TABLET ORAL at 18:31

## 2022-04-16 RX ADMIN — LEVETIRACETAM 500 MG: 500 TABLET, FILM COATED ORAL at 16:53

## 2022-04-16 RX ADMIN — LEVETIRACETAM 500 MG: 500 TABLET, FILM COATED ORAL at 22:56

## 2022-04-16 RX ADMIN — ONDANSETRON 4 MG: 2 INJECTION INTRAMUSCULAR; INTRAVENOUS at 08:51

## 2022-04-16 RX ADMIN — CEFAZOLIN SODIUM 2000 MG: 2 SOLUTION INTRAVENOUS at 08:11

## 2022-04-16 RX ADMIN — BUPROPION HYDROCHLORIDE 150 MG: 150 TABLET, FILM COATED, EXTENDED RELEASE ORAL at 20:00

## 2022-04-16 RX ADMIN — IOHEXOL 10 ML: 240 INJECTION, SOLUTION INTRATHECAL; INTRAVASCULAR; INTRAVENOUS; ORAL at 08:31

## 2022-04-16 RX ADMIN — SODIUM CHLORIDE, SODIUM LACTATE, POTASSIUM CHLORIDE, AND CALCIUM CHLORIDE: .6; .31; .03; .02 INJECTION, SOLUTION INTRAVENOUS at 07:59

## 2022-04-16 RX ADMIN — OXYCODONE HYDROCHLORIDE 5 MG: 5 TABLET ORAL at 16:53

## 2022-04-16 RX ADMIN — AMIODARONE HYDROCHLORIDE 200 MG: 200 TABLET ORAL at 18:31

## 2022-04-16 RX ADMIN — ACETAMINOPHEN 650 MG: 325 TABLET ORAL at 14:14

## 2022-04-16 RX ADMIN — AMITRIPTYLINE HYDROCHLORIDE 25 MG: 25 TABLET, FILM COATED ORAL at 22:56

## 2022-04-16 RX ADMIN — MIDAZOLAM HYDROCHLORIDE 1 MG: 1 INJECTION, SOLUTION INTRAMUSCULAR; INTRAVENOUS at 07:57

## 2022-04-16 RX ADMIN — PROPOFOL 20 MG: 10 INJECTION, EMULSION INTRAVENOUS at 08:09

## 2022-04-16 RX ADMIN — PROPOFOL 20 MG: 10 INJECTION, EMULSION INTRAVENOUS at 08:24

## 2022-04-16 RX ADMIN — PANTOPRAZOLE SODIUM 40 MG: 40 TABLET, DELAYED RELEASE ORAL at 20:00

## 2022-04-16 RX ADMIN — AMIODARONE HYDROCHLORIDE 150 MG: 50 INJECTION, SOLUTION INTRAVENOUS at 08:34

## 2022-04-16 RX ADMIN — ACETAMINOPHEN 325MG 975 MG: 325 TABLET ORAL at 22:56

## 2022-04-16 RX ADMIN — AMIODARONE HYDROCHLORIDE 1 MG/MIN: 50 INJECTION, SOLUTION INTRAVENOUS at 10:23

## 2022-04-16 RX ADMIN — AMIODARONE HYDROCHLORIDE 0.5 MG/MIN: 50 INJECTION, SOLUTION INTRAVENOUS at 16:42

## 2022-04-16 RX ADMIN — PROPOFOL 100 MCG/KG/MIN: 10 INJECTION, EMULSION INTRAVENOUS at 08:10

## 2022-04-16 RX ADMIN — MIDAZOLAM HYDROCHLORIDE 1 MG: 1 INJECTION, SOLUTION INTRAMUSCULAR; INTRAVENOUS at 08:04

## 2022-04-16 RX ADMIN — PROPOFOL 10 MG: 10 INJECTION, EMULSION INTRAVENOUS at 08:14

## 2022-04-16 RX ADMIN — DOCUSATE SODIUM 100 MG: 100 CAPSULE, LIQUID FILLED ORAL at 18:31

## 2022-04-16 RX ADMIN — PHENYLEPHRINE HYDROCHLORIDE 30 MCG/MIN: 10 INJECTION INTRAVENOUS at 08:10

## 2022-04-16 RX ADMIN — OXYCODONE HYDROCHLORIDE 5 MG: 5 TABLET ORAL at 09:32

## 2022-04-16 NOTE — ANESTHESIA PREPROCEDURE EVALUATION
Review of Systems/Medical History  Patient summary reviewed  Chart reviewed  No history of anesthetic complications     Cardiovascular  Exercise tolerance (METS): >4 METS ,  Hypertension ,    Pulmonary       GI/Hepatic    GERD ,             Endo/Other     GYN       Hematology   Musculoskeletal       Neurology   Psychology   Anxiety, Depression ,              Physical Exam    Airway    Mallampati score: III  TM Distance: >3 FB  Neck ROM: full     Dental       Cardiovascular      Pulmonary      Other Findings        Anesthesia Plan  ASA Score- 3     Anesthesia Type- IV sedation with anesthesia with ASA Monitors  Additional Monitors:   Airway Plan:     Comment: I have seen the patient and reviewed the history  Patient to receive IV sedation with full ASA monitors  Risks discussed with the patient, consent signed          Plan Factors-Exercise tolerance (METS): >4 METS  Chart reviewed  Patient summary reviewed  Induction- intravenous  Postoperative Plan-     Informed Consent- Anesthetic plan and risks discussed with patient  I personally reviewed this patient with the CRNA  Discussed and agreed on the Anesthesia Plan with the CRNA  Myron Mejia

## 2022-04-16 NOTE — PROGRESS NOTES
General Cardiology   Progress Note -  [facility] [employer]   Idalmis Fontaine 71 y o  female MRN: [id number]    Unit/Bed#: S -01 Encounter: [id number]    Assessment  1  New onset atrial fibrillation with RVR   2  Tachy-dunia syndrome w/pauses now s/p MDT DC PPM implant   -Symptomatic w/c/o brief/intermittent dizziness/lightheadness, no prior LOC/syncope  -ECG on admission NSR, w/ subsequent ECG demonstrating atrial fibrillation w/ RVR, rate 152 bpm  -24 hour telemetry review 4/15-4/16- SB-NSR majority of the afternoon/evening in overnight hours  Atrial fibrillation with RVR with rates in the 120-130 range early this morning   -Received IV amiodarone bolus 150 mg postprocedure today  -[id number] score = 3 (age, sex, HTN) - unable to utilize systemic anticoagulation secondary to # 3  -K+/mag - 5 0/2 2  -TSH level 0 87  3  S/p MDT DC ppm implant (4/16/22)- POD # 0  -Doing well postoperatively  2  Preserved LV systolic function   -TTE 2/65; LVEF 65%, RV normal size/systolic function, no significant valve disease  3  Fall - appears to be mechanical in nature, likely stemming from generalized weakness  4  Traumatic subdural hemorrhage  -Management per the neurosurgery service    -CT of the head without contrast - right anterior parafalcine subdural hemorrhage measuring 3 mm in thickness   No significant mass effect   No concomitant subarachnoid or parenchymal hemorrhage   -Recommending holding of all anti-platelet/anticoagulant medications for at least 2 weeks and/or until cleared by the Neurosurgery Service  5  Influenza A +   -On contact/droplet precautions  -Afebrile, no significant leukocytosis  Stable from a respiratory standpoint  6  Essential hypertension  -Avg /76, last recorded 139/61   -Outpatient BP regimen; lisinopril 10 mg daily   -Inpatient BP regimen:  lisinopril 10 mg daily      Plan  -Post pacer instructions/orders    -IV amiodarone bolus 150 mg x 1, start IV amiodarone GTT + oral amiodarone 200 mg t i d    -Continue lisinopril 10 mg daily  -Systemic anticoagulation unable to initiated secondary to # 4    -Continue to monitor renal function and electrolytes closely - replete to maintain K +level at 4 0, and magnesium at 2 0    -Continue to monitor closely on telemetry   -Anticipate discharge within the next 24 hours  Will arrange follow-up with Cardiology/EP service on discharge      Subjective  Review of Systems   Constitutional: Positive for malaise/fatigue  Negative for chills and fever  Eyes: Negative for visual disturbance  Cardiovascular: Negative for chest pain, dyspnea on exertion, leg swelling, orthopnea and palpitations  Respiratory: Positive for cough  Negative for sputum production  Gastrointestinal: Negative for abdominal pain  Neurological: Negative for dizziness, headaches and light-headedness  Objective:   Physical Exam  Vitals and nursing note reviewed  Constitutional:       General: She is not in acute distress  Appearance: She is obese  She is not diaphoretic  HENT:      Head: Normocephalic and atraumatic  Mouth/Throat:      Mouth: Mucous membranes are moist    Eyes:      General: No scleral icterus  Cardiovascular:      Rate and Rhythm: Normal rate and regular rhythm  Pulses: Normal pulses  Heart sounds: Normal heart sounds  No murmur heard  Pulmonary:      Effort: Pulmonary effort is normal       Breath sounds: Normal breath sounds  No wheezing or rales  Abdominal:      Palpations: Abdomen is soft  Musculoskeletal:      Cervical back: Neck supple  Right lower leg: No edema  Left lower leg: No edema  Skin:     General: Skin is warm and dry  Capillary Refill: Capillary refill takes less than 2 seconds  Comments: Left chest wall ppm site covered with CDI dressing  Neurological:      General: No focal deficit present  Mental Status: She is alert and oriented to person, place, and time     Psychiatric:         Mood and Affect: Mood normal          Vitals: Blood pressure 139/61, pulse 60, temperature (!) 97 °F (36 1 °C), resp  rate 20, height 5' 5" (1 651 m), weight 81 6 kg (180 lb), SpO2 100 %  ,     Body mass index is 29 95 kg/m²  ,   Systolic (16OPI), RFO:324 , Min:118 , VGJ:901     Diastolic (50XZL), OFT:09, Min:58, Max:107      Intake/Output Summary (Last 24 hours) at 4/16/2022 0941  Last data filed at 4/16/2022 0857  Gross per 24 hour   Intake 640 ml   Output --   Net 640 ml     Weight (last 2 days)     None          LABORATORY RESULTS  Results from last 7 days   Lab Units 04/12/22  0529 04/11/22  0834   CK TOTAL U/L 779* 331*   CK MB INDEX % <1 0 1 0     CBC with diff:   Results from last 7 days   Lab Units 04/16/22  0438 04/15/22  0538 04/14/22  0428 04/12/22  0533 04/11/22  0834   WBC Thousand/uL 3 39* 3 29* 3 81* 4 23* 5 62   HEMOGLOBIN g/dL 14 0 14 3 12 9 12 8 13 5   HEMATOCRIT % 43 4 42 4 40 0 39 0 42 0   MCV fL 97 96 99* 98 100*   PLATELETS Thousands/uL 188 169 196 173 176   MCH pg 31 4 32 4 31 8 32 2 32 1   MCHC g/dL 32 3 33 7 32 3 32 8 32 1   RDW % 13 1 12 9 12 9 13 1 13 1   MPV fL 9 9 10 1 10 5 10 3 11 7   NRBC AUTO /100 WBCs 0 0 0 0 0       CMP:  Results from last 7 days   Lab Units 04/16/22  0438 04/15/22  0538 04/14/22  0428 04/13/22  0459 04/12/22  0529 04/11/22  0834   POTASSIUM mmol/L 5 0 4 2 4 2 4 5 3 4* 4 0   CHLORIDE mmol/L 103 104 104 105 105 103   CO2 mmol/L 25 24 18* 22 24 25   BUN mg/dL 10 13 15 11 12 11   CREATININE mg/dL 0 95 0 91 1 19 0 90 0 99 1 00   CALCIUM mg/dL 8 2* 8 3 7 7* 7 6* 7 8* 8 3   AST U/L  --   --   --   --   --  30   ALT U/L  --   --   --   --   --  19   ALK PHOS U/L  --   --   --   --   --  65   EGFR ml/min/1 73sq m 60 63 46 64 57 56       BMP:  Results from last 7 days   Lab Units 04/16/22  0438 04/15/22  0538 04/14/22  0428 04/13/22  0459 04/12/22  0529 04/11/22  0834   POTASSIUM mmol/L 5 0 4 2 4 2 4 5 3 4* 4 0   CHLORIDE mmol/L 103 104 104 105 105 103   CO2 mmol/L 25 24 18* 22 24 25   BUN mg/dL 10 13 15 11 12 11   CREATININE mg/dL 0 95 0 91 1 19 0 90 0 99 1 00   CALCIUM mg/dL 8 2* 8 3 7 7* 7 6* 7 8* 8 3       No results found for: NTBNP     Results from last 7 days   Lab Units 04/16/22  0438 04/15/22  0538 04/14/22  0428 04/12/22  0529   MAGNESIUM mg/dL 2 2 1 8 2 0 2 0             Results from last 7 days   Lab Units 04/11/22  0834   TSH 3RD GENERATON uIU/mL 0 873       Results from last 7 days   Lab Units 04/16/22  0438 04/11/22  0834   INR  0 91 1 00       Lipid Profile:   Lab Results   Component Value Date    CHOL 242 (H) 02/10/2017     Lab Results   Component Value Date    HDL 73 02/04/2022    HDL 84 04/12/2021    HDL 75 09/19/2019     Lab Results   Component Value Date    LDLCALC 143 (H) 02/04/2022    LDLCALC 116 (H) 04/12/2021    LDLCALC 141 (H) 09/19/2019     Lab Results   Component Value Date    TRIG 110 02/04/2022    TRIG 69 04/12/2021    TRIG 99 09/19/2019       Cardiac testing:   No results found for this or any previous visit  No results found for this or any previous visit  No results found for this or any previous visit  No valid procedures specified  No results found for this or any previous visit        Meds/Allergies   all current active meds have been reviewed and current meds:   Current Facility-Administered Medications   Medication Dose Route Frequency    acetaminophen (TYLENOL) tablet 650 mg  650 mg Oral Q4H PRN    [MAR Hold] acetaminophen (TYLENOL) tablet 975 mg  975 mg Oral Q6H [address]    [MAR Hold] albuterol (PROVENTIL HFA,VENTOLIN HFA) inhaler 2 puff  2 puff Inhalation Q4H PRN    amiodarone (CORDARONE) 900 mg in dextrose 5 % 500 mL infusion  1 mg/min Intravenous Continuous    Followed by   Jaron Bland amiodarone (CORDARONE) 900 mg in dextrose 5 % 500 mL infusion  0 5 mg/min Intravenous Continuous    amiodarone tablet 200 mg  200 mg Oral TID With Meals    [MAR Hold] amitriptyline (ELAVIL) tablet 25 mg  25 mg Oral HS    [MAR Hold] buPROPion (WELLBUTRIN XL) 24 hr tablet 150 mg  150 mg Oral Daily    chlorhexidine (PERIDEX) 0 12 % oral rinse 15 mL  15 mL Swish & Spit Once    [facility] Hold] docusate sodium (COLACE) capsule 100 mg  100 mg Oral BID    [MAR Hold] escitalopram (LEXAPRO) tablet 5 mg  5 mg Oral Daily    [MAR Hold] heparin (porcine) subcutaneous injection 5,000 Units  5,000 Units Subcutaneous Q8H [facility]    [MAR Hold] levETIRAcetam (KEPPRA) tablet 500 mg  500 mg Oral Q12H    [MAR Hold] levothyroxine tablet 50 mcg  50 mcg Oral Early Morning    [MAR Hold] lisinopril (ZESTRIL) tablet 10 mg  10 mg Oral Daily    [MAR Hold] metoprolol (LOPRESSOR) injection 5 mg  5 mg Intravenous Q6H PRN    oxyCODONE (ROXICODONE) IR tablet 5 mg  5 mg Oral Q6H PRN    [MAR Hold] pantoprazole (PROTONIX) EC tablet 40 mg  40 mg Oral Daily     amiodarone, 1 mg/min   Followed by  amiodarone, 0 5 mg/min      EKG personally reviewed by HOME Moreira  Assessment:  Principal Problem:    Tachy-dunia syndrome (HCC)  Active Problems:    Essential hypertension    Subdural hemorrhage (HCC)    MDD (major depressive disorder)    Weakness generalized    Influenza A    Ambulatory dysfunction    Counseling / Coordination of Care  Total floor / unit time spent today 20 minutes  Greater than 50% of total time was spent with the patient and / or family counseling and / or coordination of care  ** Please Note: Dragon 360 Dictation voice to text software may have been used in the creation of this document   **

## 2022-04-16 NOTE — ANESTHESIA POSTPROCEDURE EVALUATION
Post-Op Assessment Note    CV Status:  Stable  Pain Score: 0    Pain management: adequate     Mental Status:  Alert and awake   Hydration Status:  Euvolemic   PONV Controlled:  Controlled   Airway Patency:  Patent      Post Op Vitals Reviewed: Yes      Staff: Anesthesiologist, CRNA         No complications documented      BP      Temp      Pulse    Resp      SpO2

## 2022-04-16 NOTE — OP NOTE
ELECTROPHYSIOLOGY  OPERATIVE REPORT  PATIENT NAME: Xiomara Donald  :  1950  MRN: [id number]  Date of surgery: 22  Surgeon: Radha Garza DO Pt Location:  Operating room [address] [place] [address] Rd:   1)Dual Chamber Permanent Pacemaker Implantation      Preoperative Medications: ancef  ANESTHESIA: genera    PREOPERATIVE DIAGNOSIS:   Symptomatic Sick Sinus syndrome   Tachycardia bradycardia syndrome      POSTOPERATIVE DIAGNOSIS: Successful Dual Chamber Permanent Pacemaker implant  Same as Preop  Informed Consent: Risks, benefits, and alternatives discussed with patient and any family present  The patient understands risks, which include but are not limited to life threatening  bleeding, infection, air around lungs, blood around the heart and reoperation dislodged or malfunctioning device  Procedure Description:  After informed consent was obtained, the patient was brought to the electrophysiology laboratory NPO  A time out was called and the patient was properly identified  The patient was pre-medicated as above  The left infraclavicular area was prepped and draped in the usual sterile fashion  After local anesthetic infiltration with 1% lidocaine, an incision was made below clavicle  The incision was extended down to the level of the pectoral fascia  A pocket was formed above the pectoral fascia using cautery and blunt dissection  axillary approach was done  A safe sheath introducer was advanced over a wire into the axillary vein, the wire was confirmed to be in the right atrium on flouroscopy, the wire was removed  Through the introducer the pacing lead was passed into the right heart  Under fluoroscopic guidance the right ventricular lead was positioned on the right ventricular septum  After satisfactory ventricular sensing and pacing thresholds were confirmed, the lead was sewn to the pectoral fascia/muscle using 0 silk sutures       The right atrial lead was placed in the right atrial appendage with similar fashion using a preformed J stylet, the helix was deployed, tissue contact was confirmed and good lead parameters were seen, the Safe-sheath was removed and the lead was tied down with 0 silk sutures to the muscle  Pocket flushed with antibiotic solution  Device placed in an antibiotic pouch  The lead and pulse generator were placed in the pre-pectoral pocket  The pocket was then closed with 3 layers of 2-0, 3-0, 4-0 -Vicryl suture was used to close the skin  Sterile dressing applied  EBL: Minimal  Complications: None  [id number]:17 cc  Findings: atrial fibrillation with rapid ventricular response and sinus bradycardia    The patient tolerated the procedure well  Plan: Routine postoperative care, CXR, and overnight observation with telemetry  Follow-up in 2 weeks with incision check and interrogation

## 2022-04-16 NOTE — PROGRESS NOTES
[facility]  Progress Note - Claudia James 1950, 71 y o  female MRN: [id number]  Unit/Bed#: S -01 Encounter: [id number]  Primary Care Provider: Shashank Fernandes MD   Date and time admitted to hospital: 4/11/2022  7:45 AM    Ambulatory dysfunction  Assessment & Plan  - Status post fall with the below noted injuries  - Fall precautions  - Geriatric Medicine consultation for evaluation, medication review and recommendations   - PT and OT evaluation and treatment as indicated  - Case Management consultation for disposition planning  Influenza A  Assessment & Plan  - 4/11 pt positive for influenza A   - maintain contact and droplet precautions  - Currently on room air, apply supplemental O2 as needed to maintain O2 > 94%  - Internal medicine following  - with new expiratory wheezing this a m  Will add albuterol    Weakness generalized  Assessment & Plan  - Pt reports generalized weakness throughout all extremities for the past few days, and this is why she fell  - She reports her legs gave out on her and she fell backwards onto her buttocks, striking her head on a wall  - New onset atrial fib, new diagnosis of Influenza A could be potential causes  - Internal medicine consult, appreciate input  - Injuries as listed  - PT and OT indicate no rehab needs      MDD (major depressive disorder)  Assessment & Plan  - Home medications: Lexapro, Wellbutrin, Elavil  PRN Xanax and Ambien QHS PRN for insomnia  - Hold Xanax and Ambien at this time  - Geriatric medicine consult pending      Subdural hemorrhage [facility])  Assessment & Plan  - 4/11 CTH: Eccentric right anterior parafalcine subdural hemorrhage measuring 3 mm in thickness  No significant mass effect  No concomitant subarachnoid or parenchymal hemorrhage  Correlate for history of trauma or anticoagulation  No acute findings of the brain parenchyma, itself    - Neuro exam: GCS 15, non-focal  - Continue neurologic checks: Every 4 hours   - Reversal agent administered: none  - Repeat CT head 4/12 stable  - Appreciate Neurosurgery evaluation and recommendations  - Complete 7 day course of Keppra for seizure prophylaxis  - Chemical DVT prophylaxis: SQH  - Hold all anticoagulants and anti platelet medications for 2 weeks and/or until cleared by Neurosurgery to resume   - PT and OT (including cognitive evaluation) evaluation and treatment as indicated  - Follow up with NSG in 2 weeks      Essential hypertension  Assessment & Plan  - Home medication: Lisinopril  - Internal Medicine Consult appreciated, started Cardizem for a fib  - cardiology consult, appreciate input  - PRN hydralazine      * Tachy-dunia syndrome ([facility] [address] )  Assessment & Plan  - Initial EKG in ED was normal sinus rhythm  - Upon trauma evaluation, patients HR was 130 bpm and higher, EKG indicated that patient was experiencing atrial fibrillation with RVR  - Asymptomatic, pt denies chest pain, palpitations, shortness of breath  - New onset afib felt to be precipitated by Flu/SDH  - with significant and ongoing pauses  - Internal medicine consult appreciated as well as Cardiology  - cardiology/EP placing pacemaker today on 04/16/2022  - will follow-up in the postoperative setting; will also monitor rate control medications   - patient will likely discharge on 04/17/2022 if cardiology gives clearance based on medication management    DVT prophylaxis: SCDs and SQH  PT and OT: eval and treat    Disposition:  Follow-up cardiology recommendations  Continue to monitor rate rate control medications  Patient will go for pacemaker today  SUBJECTIVE:  Chief Complaint:  No new complaints    Subjective:  Patient resting in bed offering no new complaints  She denies any new pain  Denies any new chest pain or shortness of breath  No nausea or vomiting  No fevers, chills, sweats      OBJECTIVE:   Vitals:   Temp:  [97 °F (36 1 °C)-97 9 °F (36 6 °C)] 97 4 °F (36 3 °C)  HR:  [47-60] 60  Resp: [16-20] 17  BP: (118-166)/(58-84) 119/67    Intake/Output:  I/O       04/14 0701  04/15 0700 04/15 0701  04/16 0700 04/16 0701 04/17 0700    P  O  500 360     I V  (mL/kg)  30 (0 4) 200 (2 5)    IV Piggyback  50     Total Intake(mL/kg) 500 (6 1) 440 (5 4) 200 (2 5)    Net +500 +440 +200           Unmeasured Urine Occurrence  1 x     Unmeasured Stool Occurrence  3 x          Nutrition: Diet Cardiovascular; Cardiac      Physical Exam:   GENERAL APPEARANCE:  No acute distress  NEURO:  GCS 15  HEENT:  Normocephalic  CV:  Regular rate and rhythm  LUNGS:  CTA bilaterally  GI:  Nontender, nondistended  :  No Wilhelm  MSK:  Moving all extremities  SKIN: warm, dry, intact    Invasive Devices  Report    Peripheral Intravenous Line            Peripheral IV 04/12/22 Dorsal (posterior); Left Forearm 3 days                      Lab Results:   Results: I have personally reviewed all pertinent laboratory/tests results, BMP/CMP:   Lab Results   Component Value Date    SODIUM 137 04/16/2022    K 5 0 04/16/2022     04/16/2022    CO2 25 04/16/2022    BUN 10 04/16/2022    CREATININE 0 95 04/16/2022    CALCIUM 8 2 (L) 04/16/2022    EGFR 60 04/16/2022   , CBC:   Lab Results   Component Value Date    WBC 3 39 (L) 04/16/2022    HGB 14 0 04/16/2022    HCT 43 4 04/16/2022    MCV 97 04/16/2022     04/16/2022    MCH 31 4 04/16/2022    MCHC 32 3 04/16/2022    RDW 13 1 04/16/2022    MPV 9 9 04/16/2022    NRBC 0 04/16/2022    and Coagulation:   Lab Results   Component Value Date    INR 0 91 04/16/2022     Imaging/EKG Studies: I have personally reviewed pertinent reports       Other Studies:  No other studies

## 2022-04-16 NOTE — PROGRESS NOTES
[facility]  Progress Note - Odilia Hunter 1950, 71 y o  female MRN: [id number]  Unit/Bed#: S -01 Encounter: [id number]  Primary Care Provider: Yola Whipple MD   Date and time admitted to hospital: 4/11/2022  7:45 AM    * Tachy-dunia syndrome [facility])  Assessment & Plan  · Patient noted to be in AFib with RVR while in the emergency department felt to be provoked by followed, subdural hemorrhage and influenza a  · Patient now appears to have tachy-dunia syndrome with pauses of 3 5 seconds and up to 5 seconds while sleeping  · Patient had been on oral amiodarone and Cardizem secondary to new AFib with RVR  · Continue to hold metoprolol, amiodarone  · Cardizem held 4/14 secondary to persistent bradycardia with heart rate 35-45  · S/P PPM on 4/16  · Telemetry reviewed: Paced with HR 60  · Cardiology following, appreciate recommendations:  · Patient started on amnio drip to load with oral administration  · Continue to hold beta-blockers at this time  · Can likely be discharged tomorrow    Subdural hemorrhage [facility])  Assessment & Plan  · POA  · 4/11 CT head: Eccentric right anterior parafalcine subdural hemorrhage measuring 3 mm in thickness   No significant mass effect   No concomitant subarachnoid or parenchymal hemorrhage  · Repeat CT of the head on 04/12 stable  · Continue 7 day course of Keppra for seizure prophylaxis  · Holding all anticoagulant antiplatelet medications for 2 weeks and/or until cleared by Neurosurgery to resume  · Management per primary service and Neurosurgery    Influenza A  Assessment & Plan  · POA on 4/11/22-> Stable  · Presented with several days of myalgias and fatigue    · Symptoms vastly improved 4/14/2022  · No indication for Tamiflu given onset of symptoms greater than 48 hours prior to admission  · Patient remains free of respiratory distress    Weakness generalized  Assessment & Plan  · Likely d/t influenza   · PT/OT evaluation: No acute rehab needs    Ambulatory dysfunction  Assessment & Plan  · PT/OT evaluation: No acute rehab needs    MDD (major depressive disorder)  Assessment & Plan  · Continue Home medication:  · Escitalopram, wellbutrin, and amitriptyline     Essential hypertension  Assessment & Plan  · BP reviewed and stable  · Continue to hold beta-blocker and calcium channel blocker at this time  · Continue lisinopril      VTE Pharmacologic Prophylaxis: VTE Score: 4 Moderate Risk (Score 3-4) - Pharmacological DVT Prophylaxis Ordered: heparin  Patient Centered Rounds: I performed bedside rounds with nursing staff today  Discussions with Specialists or Other Care Team Provider: Cardiology    Education and Discussions with Family / Patient: Patient declined call to   Time Spent for Care: 30 minutes  More than 50% of total time spent on counseling and coordination of care as described above  Current Length of Stay: 5 day(s)  Current Patient Status: Inpatient   Certification Statement: Consulted on the management of chronic conditions, not currently stable for discharge  Pending Cardiology clearance  Discharge Plan: Anticipate discharge tomorrow to home  Code Status: Level 3 - DNAR and DNI    Subjective:   Patient complaining of incisional pain  Patient denies any active chest pain, shortness breast, abdominal pain, nausea, vomiting or diarrhea  Objective:     Vitals:   Temp (24hrs), Av 5 °F (36 4 °C), Min:97 °F (36 1 °C), Max:97 9 °F (36 6 °C)    Temp:  [97 °F (36 1 °C)-97 9 °F (36 6 °C)] 97 9 °F (36 6 °C)  HR:  [47-60] 60  Resp:  [16-20] 17  BP: (117-166)/(58-84) 117/63  SpO2:  [94 %-100 %] 96 %  Body mass index is 29 95 kg/m²  Input and Output Summary (last 24 hours): Intake/Output Summary (Last 24 hours) at 2022 1748  Last data filed at 2022 0857  Gross per 24 hour   Intake 200 ml   Output --   Net 200 ml       Physical Exam:   Physical Exam  Vitals and nursing note reviewed  Constitutional:       General: She is not in acute distress  Appearance: She is not ill-appearing  HENT:      Head: Normocephalic  Nose: Nose normal  No congestion  Mouth/Throat:      Mouth: Mucous membranes are moist       Pharynx: Oropharynx is clear  Cardiovascular:      Rate and Rhythm: Normal rate and regular rhythm  Pulses: Normal pulses  Heart sounds: Normal heart sounds  No murmur heard  Pulmonary:      Effort: Pulmonary effort is normal  No respiratory distress  Breath sounds: Normal breath sounds  Abdominal:      General: Abdomen is flat  Bowel sounds are normal  There is no distension  Palpations: Abdomen is soft  Tenderness: There is no abdominal tenderness  Musculoskeletal:         General: Normal range of motion  Cervical back: Normal range of motion  Right lower leg: No edema  Left lower leg: No edema  Skin:     General: Skin is warm and dry  Capillary Refill: Capillary refill takes less than 2 seconds  Comments: Left Chest dressing, CDI   Neurological:      Mental Status: She is alert and oriented to person, place, and time  Mental status is at baseline  Motor: No weakness  Psychiatric:         Speech: Speech normal          Behavior: Behavior is cooperative           Judgment: Judgment normal           Additional Data:     Labs:  Results from last 7 days   Lab Units 04/16/22  0438   WBC Thousand/uL 3 39*   HEMOGLOBIN g/dL 14 0   HEMATOCRIT % 43 4   PLATELETS Thousands/uL 188   NEUTROS PCT % 30*   LYMPHS PCT % 53*   MONOS PCT % 15*   EOS PCT % 1     Results from last 7 days   Lab Units 04/16/22  0438 04/12/22  0529 04/11/22  0834   SODIUM mmol/L 137   < > 138   POTASSIUM mmol/L 5 0   < > 4 0   CHLORIDE mmol/L 103   < > 103   CO2 mmol/L 25   < > 25   BUN mg/dL 10   < > 11   CREATININE mg/dL 0 95   < > 1 00   ANION GAP mmol/L 9   < > 10   CALCIUM mg/dL 8 2*   < > 8 3   ALBUMIN g/dL  --   --  3 3*   TOTAL BILIRUBIN mg/dL  --   --  0 56   ALK PHOS U/L  --   --  65   ALT U/L  --   --  19   AST U/L  --   --  30   GLUCOSE RANDOM mg/dL 69   < > 92    < > = values in this interval not displayed  Results from last 7 days   Lab Units 04/16/22  0438   INR  0 91                   Lines/Drains:  Invasive Devices  Report    Peripheral Intravenous Line            Peripheral IV 04/12/22 Dorsal (posterior); Left Forearm 4 days                  Telemetry:  Telemetry Orders (From admission, onward)             48 Hour Telemetry Monitoring  Continuous x 48 hours        References:    Telemetry Guidelines   Question:  Reason for 48 Hour Telemetry  Answer:  Arrhythmias Requiring Medical Therapy (eg  SVT, Vtach/fib, Bradycardia, Uncontrolled A-fib)                 Telemetry Reviewed: Paced  Indication for Continued Telemetry Use: Post op PPM or ICD             Imaging: Reviewed radiology reports from this admission including: chest xray    Recent Cultures (last 7 days):   Results from last 7 days   Lab Units 04/11/22  0846 04/11/22  0838   BLOOD CULTURE  No Growth After 5 Days  No Growth After 5 Days         Last 24 Hours Medication List:   Current Facility-Administered Medications   Medication Dose Route Frequency Provider Last Rate    acetaminophen  650 mg Oral Q4H PRN Quinn Ko PA-C      acetaminophen  975 mg Oral Q6H [facility] Lakeshia Clarke PA-C      albuterol  2 puff Inhalation Q4H PRN Lakeshia Clarke PA-C      amiodarone  0 5 mg/min Intravenous Continuous HOME Martin 0 5 mg/min (04/16/22 1642)    amiodarone  200 mg Oral TID With Meals HOME Martin      amitriptyline  25 mg Oral HS Lakeshia Clarke PA-C      buPROPion  150 mg Oral Daily Juni Butt      chlorhexidine  15 mL Swish & Spit Once [place]      docusate sodium  100 mg Oral BID Laekshia Clarke PA-C      escitalopram  5 mg Oral Daily Lakeshia Clarke PA-C      heparin (porcine)  5,000 Units Subcutaneous [facility] OLIVIA Burton      levETIRAcetam  500 mg Oral Q12H Veleria Babinski [place], [place]      levothyroxine  50 mcg Oral Early Morning Veleria Babinski [place]      lisinopril  10 mg Oral Daily Juni Davis      metoprolol  5 mg Intravenous Q6H PRN Clayton García PA-C      oxyCODONE  5 mg Oral Q6H PRN Geronimo Lomax PA-C      pantoprazole  40 mg Oral Daily Clayton García PA-C          Today, Patient Was Seen By: HOME Watkins    **Please Note: This note may have been constructed using a voice recognition system  **

## 2022-04-17 ENCOUNTER — APPOINTMENT (INPATIENT)
Dept: RADIOLOGY | Facility: HOSPITAL | Age: 72
DRG: 041 | End: 2022-04-17
Payer: MEDICARE

## 2022-04-17 VITALS
OXYGEN SATURATION: 96 % | WEIGHT: 180 LBS | TEMPERATURE: 97.9 F | HEIGHT: 65 IN | BODY MASS INDEX: 29.99 KG/M2 | DIASTOLIC BLOOD PRESSURE: 67 MMHG | SYSTOLIC BLOOD PRESSURE: 128 MMHG | RESPIRATION RATE: 18 BRPM | HEART RATE: 60 BPM

## 2022-04-17 LAB
ANION GAP SERPL CALCULATED.3IONS-SCNC: 11 MMOL/L (ref 4–13)
ATRIAL RATE: 278 BPM
ATRIAL RATE: 340 BPM
ATRIAL RATE: 45 BPM
BUN SERPL-MCNC: 11 MG/DL (ref 5–25)
CALCIUM SERPL-MCNC: 7.9 MG/DL (ref 8.3–10.1)
CHLORIDE SERPL-SCNC: 101 MMOL/L (ref 100–108)
CO2 SERPL-SCNC: 26 MMOL/L (ref 21–32)
CREAT SERPL-MCNC: 0.89 MG/DL (ref 0.6–1.3)
ERYTHROCYTE [DISTWIDTH] IN BLOOD BY AUTOMATED COUNT: 13.1 % (ref 11.6–15.1)
GFR SERPL CREATININE-BSD FRML MDRD: 65 ML/MIN/1.73SQ M
GLUCOSE SERPL-MCNC: 87 MG/DL (ref 65–140)
HCT VFR BLD AUTO: 43.6 % (ref 34.8–46.1)
HGB BLD-MCNC: 14.1 G/DL (ref 11.5–15.4)
MCH RBC QN AUTO: 31.5 PG (ref 26.8–34.3)
MCHC RBC AUTO-ENTMCNC: 32.3 G/DL (ref 31.4–37.4)
MCV RBC AUTO: 98 FL (ref 82–98)
P AXIS: 45 DEGREES
PLATELET # BLD AUTO: 196 THOUSANDS/UL (ref 149–390)
PMV BLD AUTO: 10 FL (ref 8.9–12.7)
POTASSIUM SERPL-SCNC: 3.9 MMOL/L (ref 3.5–5.3)
PR INTERVAL: 158 MS
QRS AXIS: 30 DEGREES
QRS AXIS: 36 DEGREES
QRS AXIS: 47 DEGREES
QRSD INTERVAL: 76 MS
QRSD INTERVAL: 82 MS
QRSD INTERVAL: 88 MS
QT INTERVAL: 346 MS
QT INTERVAL: 374 MS
QT INTERVAL: 458 MS
QTC INTERVAL: 396 MS
QTC INTERVAL: 501 MS
QTC INTERVAL: 552 MS
RBC # BLD AUTO: 4.47 MILLION/UL (ref 3.81–5.12)
SODIUM SERPL-SCNC: 138 MMOL/L (ref 136–145)
T WAVE AXIS: -47 DEGREES
T WAVE AXIS: -56 DEGREES
T WAVE AXIS: 42 DEGREES
VENTRICULAR RATE: 126 BPM
VENTRICULAR RATE: 131 BPM
VENTRICULAR RATE: 45 BPM
WBC # BLD AUTO: 5.26 THOUSAND/UL (ref 4.31–10.16)

## 2022-04-17 PROCEDURE — 80048 BASIC METABOLIC PNL TOTAL CA: CPT | Performed by: SURGERY

## 2022-04-17 PROCEDURE — 99238 HOSP IP/OBS DSCHRG MGMT 30/<: CPT | Performed by: PHYSICIAN ASSISTANT

## 2022-04-17 PROCEDURE — 93010 ELECTROCARDIOGRAM REPORT: CPT | Performed by: INTERNAL MEDICINE

## 2022-04-17 PROCEDURE — 71046 X-RAY EXAM CHEST 2 VIEWS: CPT

## 2022-04-17 PROCEDURE — 99024 POSTOP FOLLOW-UP VISIT: CPT | Performed by: INTERNAL MEDICINE

## 2022-04-17 PROCEDURE — 85027 COMPLETE CBC AUTOMATED: CPT | Performed by: SURGERY

## 2022-04-17 PROCEDURE — NC001 PR NO CHARGE: Performed by: SURGERY

## 2022-04-17 PROCEDURE — 99231 SBSQ HOSP IP/OBS SF/LOW 25: CPT

## 2022-04-17 RX ORDER — AMIODARONE HYDROCHLORIDE 200 MG/1
200 TABLET ORAL
Qty: 90 TABLET | Refills: 1 | Status: CANCELLED | OUTPATIENT
Start: 2022-04-17

## 2022-04-17 RX ORDER — LEVETIRACETAM 500 MG/1
500 TABLET ORAL EVERY 12 HOURS
Qty: 1 TABLET | Refills: 0 | Status: SHIPPED | OUTPATIENT
Start: 2022-04-17 | End: 2022-04-27

## 2022-04-17 RX ORDER — AMIODARONE HYDROCHLORIDE 200 MG/1
200 TABLET ORAL DAILY
Qty: 60 TABLET | Refills: 0 | Status: SHIPPED | OUTPATIENT
Start: 2022-04-30 | End: 2022-05-09

## 2022-04-17 RX ORDER — AMIODARONE HYDROCHLORIDE 200 MG/1
200 TABLET ORAL
Qty: 15 TABLET | Refills: 0 | Status: SHIPPED | OUTPATIENT
Start: 2022-04-17 | End: 2022-04-27 | Stop reason: SDUPTHER

## 2022-04-17 RX ORDER — DOCUSATE SODIUM 100 MG/1
100 CAPSULE, LIQUID FILLED ORAL 2 TIMES DAILY
Refills: 0
Start: 2022-04-17 | End: 2022-06-20

## 2022-04-17 RX ORDER — ACETAMINOPHEN 325 MG/1
975 TABLET ORAL EVERY 8 HOURS
Refills: 0
Start: 2022-04-17 | End: 2022-04-21

## 2022-04-17 RX ORDER — OXYCODONE HYDROCHLORIDE 5 MG/1
TABLET ORAL
Qty: 15 TABLET | Refills: 0 | Status: SHIPPED | OUTPATIENT
Start: 2022-04-17 | End: 2022-04-27 | Stop reason: ALTCHOICE

## 2022-04-17 RX ORDER — AMIODARONE HYDROCHLORIDE 200 MG/1
200 TABLET ORAL 2 TIMES DAILY
Qty: 14 TABLET | Refills: 0 | Status: SHIPPED | OUTPATIENT
Start: 2022-04-23 | End: 2022-04-21

## 2022-04-17 RX ORDER — ESCITALOPRAM OXALATE 20 MG/1
10 TABLET ORAL DAILY
Qty: 90 TABLET | Refills: 0 | Status: SHIPPED | OUTPATIENT
Start: 2022-04-17 | End: 2022-04-21

## 2022-04-17 RX ADMIN — LEVOTHYROXINE SODIUM 50 MCG: 50 TABLET ORAL at 04:33

## 2022-04-17 RX ADMIN — LISINOPRIL 10 MG: 10 TABLET ORAL at 07:41

## 2022-04-17 RX ADMIN — ESCITALOPRAM OXALATE 5 MG: 10 TABLET ORAL at 07:41

## 2022-04-17 RX ADMIN — AMIODARONE HYDROCHLORIDE 200 MG: 200 TABLET ORAL at 11:08

## 2022-04-17 RX ADMIN — AMIODARONE HYDROCHLORIDE 0.5 MG/MIN: 50 INJECTION, SOLUTION INTRAVENOUS at 11:03

## 2022-04-17 RX ADMIN — ACETAMINOPHEN 325MG 975 MG: 325 TABLET ORAL at 04:33

## 2022-04-17 RX ADMIN — AMIODARONE HYDROCHLORIDE 200 MG: 200 TABLET ORAL at 07:41

## 2022-04-17 RX ADMIN — LEVETIRACETAM 500 MG: 500 TABLET, FILM COATED ORAL at 11:08

## 2022-04-17 RX ADMIN — DOCUSATE SODIUM 100 MG: 100 CAPSULE, LIQUID FILLED ORAL at 07:41

## 2022-04-17 RX ADMIN — HEPARIN SODIUM 5000 UNITS: 5000 INJECTION INTRAVENOUS; SUBCUTANEOUS at 14:19

## 2022-04-17 RX ADMIN — OXYCODONE HYDROCHLORIDE 5 MG: 5 TABLET ORAL at 02:54

## 2022-04-17 NOTE — PROGRESS NOTES
General Cardiology   Progress Note -  [employer]   Siddharth Riding 71 y o  female MRN: [id number]    Unit/Bed#: S -01 Encounter: [id number]    Assessment  1  New onset atrial fibrillation w/ RVR (POA)  2  Tachy-dunia syndrome w/pauses now s/p MDT DC PPM implant (4/16/22)  -Symptomatic w/c/o brief/intermittent dizziness/lightheadness, no prior LOC/syncope  -ECG on admission NSR, w/ subsequent ECG demonstrating atrial fibrillation w/ RVR, rate 152 bpm  -24 hour telemetry review 4/16-4/17 - atrial paced, rate 60 bpm  -[id number] score = 3 (age, sex, HTN) - unable to utilize systemic anticoagulation secondary to # 3  -K+/mag - 3 9/2 2  -TSH level 0 87  3  S/p MDT DC ppm implant (4/16/22)- POD # 1  -Doing well postoperatively  -Chest x-ray 4/16; left pacer placement w/o complication, no acute cardiopulmonary disease   -Chest x-ray 4/17; pending imaging results  4  Preserved LV systolic function   -TTE 1/23; LVEF 65%, RV normal size/systolic function, no significant valve disease  5  Fall - appears to be mechanical in nature, likely stemming from generalized weakness  6  Traumatic subdural hemorrhage  -Management per the neurosurgery service    -CT of the head without contrast - right anterior parafalcine subdural hemorrhage measuring 3 mm in thickness   No significant mass effect   No concomitant subarachnoid or parenchymal hemorrhage   -Recommending holding of all anti-platelet/anticoagulant medications for at least 2 weeks and/or until cleared by the Neurosurgery Service  7  Influenza A + (POA)  -On contact/droplet precautions  -Afebrile, no significant leukocytosis   Stable from a respiratory standpoint  8  Essential hypertension  -Avg /65, last recorded 137/69, HR 60    -Outpatient BP regimen; lisinopril 10 mg daily   -Inpatient BP regimen:  lisinopril 10 mg daily      Plan  -Post pacer instructions  -DC IV amiodarone, continue oral amiodarone 200 mg t i d for 5 more days total, then 200 mg b i d  for 1 week and then 200 mg daily   -Continue lisinopril 10 mg daily  -Systemic anticoagulation unable to initiated secondary to # 4  Will be re-evaluated as an outpatient once cleared by the Neurosurgery Service   -Anticipate discharge later today  Pending x-ray imaging results  Subjective  Review of Systems   Constitutional: Negative for chills, fever and malaise/fatigue  Eyes: Negative for visual disturbance  Cardiovascular: Negative for chest pain, dyspnea on exertion, leg swelling, orthopnea and palpitations  Respiratory: Positive for cough  Negative for shortness of breath  Gastrointestinal: Negative for abdominal pain  Neurological: Negative for dizziness, headaches and light-headedness  Objective:   Physical Exam  Vitals and nursing note reviewed  Constitutional:       General: She is not in acute distress  Appearance: She is not diaphoretic  HENT:      Head: Normocephalic and atraumatic  Mouth/Throat:      Mouth: Mucous membranes are moist    Eyes:      General: No scleral icterus  Cardiovascular:      Rate and Rhythm: Normal rate  Pulses: Normal pulses  Heart sounds: Normal heart sounds  No murmur heard  Comments: Atrial paced  Pulmonary:      Effort: Pulmonary effort is normal       Breath sounds: Normal breath sounds  No wheezing or rales  Abdominal:      Palpations: Abdomen is soft  Musculoskeletal:      Cervical back: Neck supple  Right lower leg: No edema  Left lower leg: No edema  Skin:     General: Skin is warm and dry  Capillary Refill: Capillary refill takes less than 2 seconds  Neurological:      General: No focal deficit present  Mental Status: She is alert and oriented to person, place, and time  Psychiatric:         Mood and Affect: Mood normal          Vitals: Blood pressure 137/69, pulse 60, temperature 97 9 °F (36 6 °C), temperature source Temporal, resp   rate 18, height 5' 5" (1 651 m), weight 81 6 kg (180 lb), SpO2 94 %  ,     Body mass index is 29 95 kg/m²  ,   Systolic (62TOF), EAB:762 , Min:117 , AJD:503     Diastolic (32BJD), DC, Min:61, Max:69    No intake or output data in the 24 hours ending 22 1009  Weight (last 2 days)     None          LABORATORY RESULTS  Results from last 7 days   Lab Units 22  0529 22  0834   CK TOTAL U/L 779* 331*   CK MB INDEX % <1 0 1 0     CBC with diff:   Results from last 7 days   Lab Units 22  0300 22  0438 04/15/22  0538 22  0428 22  0533 22  0834   WBC Thousand/uL 5 26 3 39* 3 29* 3 81* 4 23* 5 62   HEMOGLOBIN g/dL 14 1 14 0 14 3 12 9 12 8 13 5   HEMATOCRIT % 43 6 43 4 42 4 40 0 39 0 42 0   MCV fL 98 97 96 99* 98 100*   PLATELETS Thousands/uL 196 188 169 196 173 176   MCH pg 31 5 31 4 32 4 31 8 32 2 32 1   MCHC g/dL 32 3 32 3 33 7 32 3 32 8 32 1   RDW % 13 1 13 1 12 9 12 9 13 1 13 1   MPV fL 10 0 9 9 10 1 10 5 10 3 11 7   NRBC AUTO /100 WBCs  --  0 0 0 0 0       CMP:  Results from last 7 days   Lab Units 22  0300 22  0438 04/15/22  0538 22  0428 22  0459 22  0529 22  0834   POTASSIUM mmol/L 3 9 5 0 4 2 4 2 4 5 3 4* 4 0   CHLORIDE mmol/L 101 103 104 104 105 105 103   CO2 mmol/L 26 25 24 18* 22 24 25   BUN mg/dL 11 10 13 15 11 12 11   CREATININE mg/dL 0 89 0 95 0 91 1 19 0 90 0 99 1 00   CALCIUM mg/dL 7 9* 8 2* 8 3 7 7* 7 6* 7 8* 8 3   AST U/L  --   --   --   --   --   --  30   ALT U/L  --   --   --   --   --   --  19   ALK PHOS U/L  --   --   --   --   --   --  65   EGFR ml/min/1 73sq m 65 60 63 46 64 57 56       BMP:  Results from last 7 days   Lab Units 22  0300 22  0438 04/15/22  0538 22  0428 22  0459 22  0529 22  0834   POTASSIUM mmol/L 3 9 5 0 4 2 4 2 4 5 3 4* 4 0   CHLORIDE mmol/L 101 103 104 104 105 105 103   CO2 mmol/L 26 25 24 18* 22 24 25   BUN mg/dL 11 10 13 15 11 12 11   CREATININE mg/dL 0 89 0 95 0 91 1 19 0 90 0 99 1 00   CALCIUM mg/dL 7 9* 8 2* 8 3 7  7* 7 6* 7 8* 8 3       No results found for: NTBNP     Results from last 7 days   Lab Units 04/16/22  0438 04/15/22  0538 04/14/22  0428 04/12/22  0529   MAGNESIUM mg/dL 2 2 1 8 2 0 2 0             Results from last 7 days   Lab Units 04/11/22  0834   TSH 3RD GENERATON uIU/mL 0 873       Results from last 7 days   Lab Units 04/16/22  0438 04/11/22  0834   INR  0 91 1 00       Lipid Profile:   Lab Results   Component Value Date    CHOL 242 (H) 02/10/2017     Lab Results   Component Value Date    HDL 73 02/04/2022    HDL 84 04/12/2021    HDL 75 09/19/2019     Lab Results   Component Value Date    LDLCALC 143 (H) 02/04/2022    LDLCALC 116 (H) 04/12/2021    LDLCALC 141 (H) 09/19/2019     Lab Results   Component Value Date    TRIG 110 02/04/2022    TRIG 69 04/12/2021    TRIG 99 09/19/2019       Cardiac testing:   No results found for this or any previous visit  No results found for this or any previous visit  No results found for this or any previous visit  No valid procedures specified  No results found for this or any previous visit        Meds/Allergies   all current active meds have been reviewed and current meds:   Current Facility-Administered Medications   Medication Dose Route Frequency    acetaminophen (TYLENOL) tablet 650 mg  650 mg Oral Q4H PRN    acetaminophen (TYLENOL) tablet 975 mg  975 mg Oral Q6H [facility]    albuterol (PROVENTIL HFA,VENTOLIN HFA) inhaler 2 puff  2 puff Inhalation Q4H PRN    amiodarone (CORDARONE) 900 mg in dextrose 5 % 500 mL infusion  0 5 mg/min Intravenous Continuous    amiodarone tablet 200 mg  200 mg Oral TID With Meals    amitriptyline (ELAVIL) tablet 25 mg  25 mg Oral HS    buPROPion (WELLBUTRIN XL) 24 hr tablet 150 mg  150 mg Oral Daily    chlorhexidine (PERIDEX) 0 12 % oral rinse 15 mL  15 mL Swish & Spit Once    docusate sodium (COLACE) capsule 100 mg  100 mg Oral BID    escitalopram (LEXAPRO) tablet 5 mg  5 mg Oral Daily    heparin (porcine) subcutaneous injection 5,000 Units  5,000 Units Subcutaneous Q8H [facility]    levETIRAcetam (KEPPRA) tablet 500 mg  500 mg Oral Q12H    levothyroxine tablet 50 mcg  50 mcg Oral Early Morning    lisinopril (ZESTRIL) tablet 10 mg  10 mg Oral Daily    metoprolol (LOPRESSOR) injection 5 mg  5 mg Intravenous Q6H PRN    oxyCODONE (ROXICODONE) IR tablet 5 mg  5 mg Oral Q6H PRN    pantoprazole (PROTONIX) EC tablet 40 mg  40 mg Oral Daily     amiodarone, 0 5 mg/min, Last Rate: 0 5 mg/min (04/16/22 3692)      Telemetry Review: No significant arrhythmias seen on telemetry review  EKG personally reviewed by HOME Samuel  Assessment:  Principal Problem:    Tachy-dunia syndrome (HCC)  Active Problems:    Essential hypertension    Subdural hemorrhage (HCC)    MDD (major depressive disorder)    Weakness generalized    Influenza A    Ambulatory dysfunction    Counseling / Coordination of Care  Total floor / unit time spent today 20 minutes  Greater than 50% of total time was spent with the patient and / or family counseling and / or coordination of care  ** Please Note: Dragon 360 Dictation voice to text software may have been used in the creation of this document   **

## 2022-04-17 NOTE — DISCHARGE INSTRUCTIONS
Cardiology Medications  Amiodarone 200 mg three times daily for 5 more days (through 4/22/22)  Then take Amiodarone 200 mg twice daily for one week (through 4/29/22)  Then take Amiodarone 200 mg once daily after that  Scripts were sent to pharmacy  IN REGARDS TO YOUR PACEMAKER:  Please refer to post device implantation discharge instructions and restrictions below and your device booklet/temporary card  Keep incision dry for one week  Do not use lotions, powders, ointments, or creams on the incision  Leave outer bandage in place for one week  The bandage is water proof  You may shower with it as long as it is fully adhered to your skin  If the bandage appears to be coming off or if there is an open gap in the bandage to the area of your incision, then please keep the whole area dry for the remaining week  After one week, please remove the bandage by gently pulling all edges away from the center and slowly remove it from your skin  Do not quickly rip off the bandage  No overhead reaching, pushing, or pulling with your left arm for 6 weeks  No lifting greater than 10 lbs with your left arm for 6 weeks  No driving for 48 hours  Please call the office if you notice redness, swelling, bleeding, or drainage from incision or if you develop fevers  AFTER PACEMAKER CARE:    If you have any questions, please call [phone] to speak with a nurse (8:30am-4pm, or [phone] after hours)  For appointments, please call [phone]  WHAT YOU SHOULD KNOW:   A pacemaker is a small, battery-powered device that is placed under your skin in your upper chest area with wires placed through a vein that lead directly into the heart  It helps regulate your heart rate and prevent your heart from beating too slowly  AFTER YOU LEAVE:     Medicines:     · Pain medicine: You may need medicine to take away or decrease pain  ¨ Learn how to take your medicine   Ask what medicine and how much you should take  Be sure you know how, when, and how often to take it  Usually Over the counter pain medicine is sufficient to control pain (Acetominophen or Ibuprofen) Ask your doctor if you may take these  If this does not control your pain, narcotic pain killers may be prescribed, please call if you need prescription  ¨ Do not wait until the pain is severe before you take your medicine  Tell caregivers if your pain does not decrease  ¨ Pain medicine can make you dizzy or sleepy  Prevent falls by calling someone when you get out of bed or if you need help  Take your medicine as directed  Call your healthcare provider if you think your medicine is not helping or if you have side effects  Tell him if you are allergic to any medicine  Follow up with your cardiologist after your procedure: You will need a follow-up visit approximately 2 weeks after you leave the hospital  Your cardiologist will check your wound and make sure that your pacemaker is working correctly  Follow the instructions to check your pacemaker: Your cardiologist or primary healthcare provider will check your pacemaker and the battery regularly  He will use a computer to check your pacemaker over the telephone or wireless device which will be given to you  Pacemaker batteries usually last 8 to 10 years  The pacemaker unit will be replaced when the battery gets low  This is a simpler procedure than the original one to implant your pacemaker  Wound care:  Keep your incision dry for one week  Do not use lotions/powders/creams on incision  Leave outer bandage in place for 1 week - it is water proof, and as long as it is fully adhered to your skin you may shower with it  If it appears as though the bandage is coming off and/or there is any communication to the area of device incision, please then keep the whole area dry for the remaining week    After 1 week, please remove by pulling all edges away from the center of the bandage  Please call the office if you notice redness, swelling, bleeding, or drainage from incision or if you develop fevers  Activity:   · Arm movement and lifting:  Be careful using the arm on the side of your pacemaker  Do not move your arm for the first 24 hours after your procedure  Do not  lift your arm above your shoulder or lift more than 10 pounds for one month after your procedure  Avoid pushing, pulling, or repetitive arm movements for one month  This helps the leads stay in place and helps your wound heal  Ask your caregiver when you can drive after your procedure  You may move your arm side to side without lifting above your shoulder, and do not need to wear a sling at home  · Driving: you are ok to drive 48 hours after pacemaker is implanted   · Sports:  Ask your caregiver when it is okay to play tennis, golf, basketball, or any sport that requires you to lift your arms  Do not play full contact sports, such as football, that could damage your pacemaker  Ask your cardiologist or primary healthcare provider how much and what kinds of physical activity are safe for you  Living with a pacemaker:   · Tell all caregivers you have a pacemaker: This includes surgeons, radiologists, and medical technicians  You may want to wear a medical alert ID bracelet or necklace that states that you have a pacemaker  · Carry your pacemaker ID card: Make sure you receive a pacemaker ID card  Carry it with you at all times  It lists important information about your pacemaker  Show it to airport security if you travel  · Avoid electrical interference:  Avoid welding equipment and other equipment with large magnets or electric fields  These things could interfere with how your pacemaker works  Use your cell phone on the ear opposite from your pacemaker  Do not carry your cell phone in your shirt pocket over your chest      · Some Pacemakers are MRI safe   Ask you doctor if it is safe to proceed with MRI and let the radiologist and staff know you have a pacemaker  · Do not touch the skin around your pacemaker: This can cause damage to the lead wires or move the pacemaker unit from where it should be  Contact your cardiologist or primary healthcare provider if:   · The area around your pacemaker has increasing amount of pain after surgery  The pain should improve over first few days after implantation  · The skin around your stitches has increasing redness, swelling, or has drainage  This may mean that you have an infection  · You have a fever  · You have chills, a cough, and feel weak or achy  These are also signs of infection  · Your feet or ankles are more swollen than your baseline  · Your Heart rate is less than 50 beats per minute     Seek care immediately if:   · Your bandage becomes soaked with blood  · Your pacemaker is swelling rapidly    · Your stitches open up  · You feel your heart suddenly beating very slowly or quickly  · You become too weak or dizzy to stand, or you pass out  · Your arm or leg feels warm, tender, and painful  It may look swollen and red  · You have chest pain that does not go away with rest or medicine  · You feel lightheaded, short of breath, and have chest pain  · You cough up blood  © 2014 [address] is for End User's use only and may not be sold, redistributed or otherwise used for commercial purposes  All illustrations and images included in CareNotes® are the copyrighted property of [employer] D [employer] M [employer]  or Cristhian Schwartz  The above information is an  only  It is not intended as medical advice for individual conditions or treatments  Talk to your doctor, nurse or pharmacist before following any medical regimen to see if it is safe and effective for you            Neurosurgery discharge instructions following traumatic head bleed:      Do not take any blood thinning medications (ie  No Advil  No motrin  No ibuprofen  No Aleve  No Aspirin  No fishoil  No heparin  No antiplatelet / no anticoagulation medication)   Refrain from activity that increases chance of trauma to head or falls  Recommend you take fall precaution   No strenuous activity or sports   Return to hospital Emergency Room if you experience worsening / new headache, nausea/vomiting, speech/vision change, seizure, confusion / mental status change, weakness, or other neurological changes  Follow-up as scheduled with a repeat CT head without contrast to be completed 2-3 days prior to visit  Prescription has been entered electronically  Please call  to schedule

## 2022-04-17 NOTE — INCIDENTAL FINDINGS
The following findings require follow up:  Radiographic finding   Findin   Radiopaque object in the lower middle mediastinum of uncertain etiology  This may be something ingested, possibly in the lower thoracic esophagus  Follow up required: Yes   Follow up should be done within 2-4 week(s)    Please notify the following clinician to assist with the follow up:   Primary Provider  Patient understanding of finding

## 2022-04-17 NOTE — DISCHARGE SUMMARY
[facility]  Discharge- Lewis Suero 1950, 71 y o  female MRN: [id number]  Unit/Bed#: S -01 Encounter: [id number]  Primary Care Provider: Curtis Knapp MD   Date and time admitted to hospital: 4/11/2022  7:45 AM    Ambulatory dysfunction  Assessment & Plan  - Status post fall with the below noted injuries  - Fall precautions  - Geriatric Medicine consultation for evaluation, medication review and recommendations   - PT and OT evaluation and treatment as indicated  Recommending discharge home with outpatient therapy  - Case Management consultation for disposition planning   - Discharge home today  Subdural hemorrhage [facility])  Assessment & Plan  - 4/11 CTH: Eccentric right anterior parafalcine subdural hemorrhage measuring 3 mm in thickness  No significant mass effect  No concomitant subarachnoid or parenchymal hemorrhage  Correlate for history of trauma or anticoagulation  No acute findings of the brain parenchyma, itself  - Neuro exam: GCS 15, non-focal  - Reversal agent administered: none  - Repeat CT head 4/12 stable  - Appreciate Neurosurgery evaluation and recommendations  - Complete 7 day course of Keppra for seizure prophylaxis, ending this evening, 4/17    - Chemical DVT prophylaxis: SQH  - Hold all anticoagulants and anti platelet medications for 2 weeks and/or until cleared by Neurosurgery to resume   - PT and OT (including cognitive evaluation) evaluation and treatment as indicated    - Follow up with NSG in 2 weeks      * Tachy-dunia syndrome (HCC)  Assessment & Plan  - Initial EKG in ED was normal sinus rhythm  - Upon trauma evaluation, patients HR was 130 bpm and higher, EKG indicated that patient was experiencing atrial fibrillation with RVR  - Asymptomatic, pt denies chest pain, palpitations, shortness of breath  - New onset afib felt to be precipitated by Flu/SDH  - Internal medicine consult appreciated as well as Cardiology  - cardiology/EP placed pacemaker on 04/16/2022  - patient cleared for d/c on 4/17/22 and will f/u with cardiology device clinic in 2 weeks  - continue amiodarone 200 mg TID at this time    Influenza A  Assessment & Plan  - 4/11 pt positive for influenza A   - maintain contact and droplet precautions  - Currently on room air, apply supplemental O2 as needed to maintain O2 > 94%  Currently on room air    - Internal medicine following  - f/u with PCP    Weakness generalized  Assessment & Plan  - Pt reports generalized weakness throughout all extremities for the past few days, and this is why she fell  - She reports her legs gave out on her and she fell backwards onto her buttocks, striking her head on a wall  - New onset atrial fib, new diagnosis of Influenza A could be potential causes  - Internal medicine consult, appreciate input  - Injuries as listed  - PT and OT indicate no rehab needs      MDD (major depressive disorder)  Assessment & Plan  - Home medications: Lexapro, Wellbutrin, Elavil  PRN Xanax and Ambien QHS PRN for insomnia  - Hold Xanax and Ambien at this time and on discharge, per Geriatrics recommendations  Will reduce lexapro to 10 mg daily as well and send new script to pharmacy     - Geriatric medicine consult appreciated  - f/u outpatient with psychiatry and family medicine    Essential hypertension  Assessment & Plan  - Home medication: Lisinopril  - Internal Medicine Consult appreciated  - cardiology consult, appreciate input  - f/u with PCP and cardiology                Medical Problems             Resolved Problems  Date Reviewed: 4/17/2022    None                Admission Date:   Admission Orders (From admission, onward)     Ordered        04/11/22 1055  Inpatient Admission  Once                        Admitting Diagnosis: Subdural hemorrhage ([address] ) [I62 00]  Cough [R05 9]  Atrial fibrillation with rapid ventricular response (HCC) [I48 91]  Atrial fibrillation with RVR ([address] ) [I48 91]  Ambulatory dysfunction [R26 2]  Atrial fibrillation, unspecified type ([facility] [address] ) [I48 91]  Major depressive disorder, remission status unspecified, unspecified whether recurrent [F32 9]    HPI: As documented by Ronaldo Priest PA-C who evaluated the patient on admission, "Alise Strickland is a 71 y o  female who presents with generalized weakness  Pt reports that yesterday she was unable to ambulate when getting out of bed, felt like her legs gave out becaue of weakness and fell onto her buttocks, striking the back of her head on the wall  She denies loss of consciousness or use of antiplatelet/ anticoagulation medications  Patient reports she had a fever last night and came into the ED today because of weakness  She reports a cough, denies headaches, dizziness, neck pain, back pain, shortness of breath, chest pain, abdominal pain, numbness or tingling "    Procedures Performed:   Orders Placed This Encounter   Procedures    ED ECG Documentation Only    ED ECG Documentation Only       Summary of Hospital Course:  Patient was placed on the trauma service following fall when she sustained a subdural hematoma  She had been experiencing some generalized weakness and was found to be in rapid AFib and have influenza a as well  She was seen by Neurosurgery who recommended conservative management  She underwent a follow-up CT head which showed stability  She was placed on Keppra for seizure prophylaxis for 7 days  She remained a GCS of 15 and nonfocal throughout the duration of her stay and neurosurgery signed off  She was managed symptomatically for influenza A  Cardiology evaluated the patient as well as Internal Medicine given new onset atrial fibrillation  She was initially placed on IV diltiazem  She was transition to amiodarone  She ultimately was taken for pacemaker placement on 04/16/2022 for tachy-dunia syndrome as well  She is to continue amiodarone 200 mg t i d  For 5 days  This will then be decreased to 200 mg b i d   For 7 days on 04/23/2022  This would then be reduced to 200 mg once daily thereafter on 04/30/2022  Patient's heart rate was well controlled and she was breathing comfortably  She was on room air  She is up and ambulatory with PT/OT who cleared her for discharge home  She is to have outpatient PT and OT per their recommendations  She is medically stable for discharge on 04/17/2022  She will follow up with Neurosurgery in 2 weeks with a repeat CT head at that time  She is to follow-up in the cardiology device clinic in 2 weeks as well  She was given specific instructions on restrictions and showering by the cardiology team prior to discharge  She may follow up with Trauma on an as-needed basis  She should follow up with her primary care doctor in 2 weeks for continuity of care  Significant Findings, Care, Treatment and Services Provided:   XR chest portable    Result Date: 4/16/2022  Impression: Left biventricular pacer placement without complication  No acute cardiopulmonary disease  Workstation performed: [id number]     XR chest 2 views    Result Date: 4/11/2022  Impression: Basilar streaky opacities likely represent atelectasis from hypoventilation unless there is compelling clinical evidence for pneumonia  No other acute cardiopulmonary findings  Workstation performed: [id number]     CT head wo contrast    Result Date: 4/12/2022  Impression: Unchanged small acute subdural hematoma in right anterior parafalcine region  No new acute intracranial abnormality  Workstation performed: [id number]     CT head without contrast    Result Date: 4/11/2022  Impression: Eccentric right anterior parafalcine subdural hemorrhage measuring 3 mm in thickness  No significant mass effect  No concomitant subarachnoid or parenchymal hemorrhage  Correlate for history of trauma or anticoagulation  No acute findings of the brain parenchyma, itself    I personally discussed this study with Chio Parks on 4/11/2022 at 10:20 AM  She reports the patient has nebulous history of several recent falls  Workstation performed: [id number]     FL < 1 hour    Result Date: 4/16/2022  Impression: Fluoroscopic guidance provided for procedure guidance  Please refer to the separate procedure notes for additional details  Workstation performed: [id number]       Complications: none    Condition at Discharge: good         Discharge instructions/Information to patient and family:   See after visit summary for information provided to patient and family  Provisions for Follow-Up Care:  See after visit summary for information related to follow-up care and any pertinent home health orders  PCP: Shannon Roque MD    Disposition: Home    Planned Readmission: No    Discharge Statement   I spent 25 minutes discharging the patient  This time was spent on the day of discharge  I had direct contact with the patient on the day of discharge  Additional documentation is required if more than 30 minutes were spent on discharge  Discharge Medications:  See after visit summary for reconciled discharge medications provided to patient and family

## 2022-04-17 NOTE — PROGRESS NOTES
[facility]  Progress Note - Arnie Stamp 1950, 71 y o  female MRN: [id number]  Unit/Bed#: S -01 Encounter: [id number]  Primary Care Provider: Gene Wolf MD   Date and time admitted to hospital: 4/11/2022  7:45 AM    Ambulatory dysfunction  Assessment & Plan  - Status post fall with the below noted injuries  - Fall precautions  - Geriatric Medicine consultation for evaluation, medication review and recommendations   - PT and OT evaluation and treatment as indicated  - Case Management consultation for disposition planning  Influenza A  Assessment & Plan  - 4/11 pt positive for influenza A   - maintain contact and droplet precautions  - Currently on room air, apply supplemental O2 as needed to maintain O2 > 94%  - Internal medicine following  - with new expiratory wheezing this a m  Will add albuterol    Weakness generalized  Assessment & Plan  - Pt reports generalized weakness throughout all extremities for the past few days, and this is why she fell  - She reports her legs gave out on her and she fell backwards onto her buttocks, striking her head on a wall  - New onset atrial fib, new diagnosis of Influenza A could be potential causes  - Internal medicine consult, appreciate input  - Injuries as listed  - PT and OT indicate no rehab needs      MDD (major depressive disorder)  Assessment & Plan  - Home medications: Lexapro, Wellbutrin, Elavil  PRN Xanax and Ambien QHS PRN for insomnia  - Hold Xanax and Ambien at this time  - Geriatric medicine consult pending      Subdural hemorrhage [facility])  Assessment & Plan  - 4/11 CTH: Eccentric right anterior parafalcine subdural hemorrhage measuring 3 mm in thickness  No significant mass effect  No concomitant subarachnoid or parenchymal hemorrhage  Correlate for history of trauma or anticoagulation  No acute findings of the brain parenchyma, itself    - Neuro exam: GCS 15, non-focal  - Continue neurologic checks: Every 4 hours   - Reversal agent administered: none  - Repeat CT head 4/12 stable  - Appreciate Neurosurgery evaluation and recommendations  - Complete 7 day course of Keppra for seizure prophylaxis  - Chemical DVT prophylaxis: SQH  - Hold all anticoagulants and anti platelet medications for 2 weeks and/or until cleared by Neurosurgery to resume   - PT and OT (including cognitive evaluation) evaluation and treatment as indicated  - Follow up with NSG in 2 weeks      Essential hypertension  Assessment & Plan  - Home medication: Lisinopril  - Internal Medicine Consult appreciated, started Cardizem for a fib  - cardiology consult, appreciate input  - PRN hydralazine      * Tachy-dunia syndrome ([facility] [address] )  Assessment & Plan  - Initial EKG in ED was normal sinus rhythm  - Upon trauma evaluation, patients HR was 130 bpm and higher, EKG indicated that patient was experiencing atrial fibrillation with RVR  - Asymptomatic, pt denies chest pain, palpitations, shortness of breath  - New onset afib felt to be precipitated by Flu/SDH  - with significant and ongoing pauses  - Internal medicine consult appreciated as well as Cardiology  - cardiology/EP placed pacemaker on 04/16/2022  - patient will likely discharge on 04/17/2022 if cardiology gives clearance based on medication management  - follow-up a m  Recommendations    DVT prophylaxis: SCDs and SQH  PT and OT: eval and treat    Disposition:  DC planning today  Follow-up x-ray  Initiate rate control medications on discharge  DC IV amiodarone today  SUBJECTIVE:  Chief Complaint: "No new complaints "    Subjective:  Patient offering no new complaints today on presentation  Currently resting in bed    OBJECTIVE:   Vitals:   Temp:  [97 9 °F (36 6 °C)-98 1 °F (36 7 °C)] 97 9 °F (36 6 °C)  HR:  [58-64] 60  Resp:  [18] 18  BP: (117-139)/(61-69) 128/67    Intake/Output:  I/O       04/15 0701  04/16 0700 04/16 0701  04/17 0700 04/17 0701 04/18 0700    P  O  360      I V  (mL/kg) 30 (0 4) 200 (2 5)     IV Piggyback 50      Total Intake(mL/kg) 440 (5 4) 200 (2 5)     Net +440 +200            Unmeasured Urine Occurrence 1 x      Unmeasured Stool Occurrence 3 x           Nutrition: Diet Cardiovascular; Cardiac    Physical Exam:   GENERAL APPEARANCE:  No acute distress  NEURO:  GCS 15  HEENT:  Normocephalic  CV:  Regular rate and rhythm  LUNGS:  CTA bilaterally  GI:  Nontender, nondistended  :  No Wilhelm  MSK:  +2 pulses on extremities  SKIN:  Warm, dry, intact    Invasive Devices  Report    Peripheral Intravenous Line            Peripheral IV 04/16/22 Proximal;Right;Ventral (anterior) Antecubital <1 day                      Lab Results:   Results: I have personally reviewed all pertinent laboratory/tests results, BMP/CMP:   Lab Results   Component Value Date    SODIUM 138 04/17/2022    K 3 9 04/17/2022     04/17/2022    CO2 26 04/17/2022    BUN 11 04/17/2022    CREATININE 0 89 04/17/2022    CALCIUM 7 9 (L) 04/17/2022    EGFR 65 04/17/2022    and CBC:   Lab Results   Component Value Date    WBC 5 26 04/17/2022    HGB 14 1 04/17/2022    HCT 43 6 04/17/2022    MCV 98 04/17/2022     04/17/2022    MCH 31 5 04/17/2022    MCHC 32 3 04/17/2022    RDW 13 1 04/17/2022    MPV 10 0 04/17/2022     Imaging/EKG Studies: I have personally reviewed pertinent reports       Other Studies:  No other studies

## 2022-04-17 NOTE — PLAN OF CARE
Problem: PAIN - ADULT  Goal: Verbalizes/displays adequate comfort level or baseline comfort level  Description: Interventions:  - Encourage patient to monitor pain and request assistance  - Assess pain using appropriate pain scale  - Administer analgesics based on type and severity of pain and evaluate response  - Implement non-pharmacological measures as appropriate and evaluate response  - Consider cultural and social influences on pain and pain management  - Notify physician/advanced practitioner if interventions unsuccessful or patient reports new pain  Outcome: Progressing     Problem: INFECTION - ADULT  Goal: Absence or prevention of progression during hospitalization  Description: INTERVENTIONS:  - Assess and monitor for signs and symptoms of infection  - Monitor lab/diagnostic results  - Monitor all insertion sites, i e  indwelling lines, tubes, and drains  - Monitor endotracheal if appropriate and nasal secretions for changes in amount and color  - [place] appropriate cooling/warming therapies per order  - Administer medications as ordered  - Instruct and encourage patient and family to use good hand hygiene technique  - Identify and instruct in appropriate isolation precautions for identified infection/condition  Outcome: Progressing     Problem: SAFETY ADULT  Goal: Maintain or return to baseline ADL function  Description: INTERVENTIONS:  -  Assess patient's ability to carry out ADLs; assess patient's baseline for ADL function and identify physical deficits which impact ability to perform ADLs (bathing, care of mouth/teeth, toileting, grooming, dressing, etc )  - Assess/evaluate cause of self-care deficits   - Assess range of motion  - Assess patient's mobility; develop plan if impaired  - Assess patient's need for assistive devices and provide as appropriate  - Encourage maximum independence but intervene and supervise when necessary  - Involve family in performance of ADLs  - Assess for home care needs following discharge   - Consider OT consult to assist with ADL evaluation and planning for discharge  - Provide patient education as appropriate  Outcome: Progressing  Goal: Maintains/Returns to pre admission functional level  Description: INTERVENTIONS:  - Perform BMAT or MOVE assessment daily    - Set and communicate daily mobility goal to care team and patient/family/caregiver  - Collaborate with rehabilitation services on mobility goals if consulted  - Out of bed for toileting  - Record patient progress and toleration of activity level   Outcome: Progressing     Problem: NEUROSENSORY - ADULT  Goal: Achieves stable or improved neurological status  Description: INTERVENTIONS  - Monitor and report changes in neurological status  - Monitor vital signs such as temperature, blood pressure, glucose, and any other labs ordered   - Initiate measures to prevent increased intracranial pressure  - Monitor for seizure activity and implement precautions if appropriate      Outcome: Progressing  Goal: Achieves maximal functionality and self care  Description: INTERVENTIONS  - Monitor swallowing and airway patency with patient fatigue and changes in neurological status  - Encourage and assist patient to increase activity and self care     - Encourage visually impaired, hearing impaired and aphasic patients to use assistive/communication devices  Outcome: Progressing     Problem: RESPIRATORY - ADULT  Goal: Achieves optimal ventilation and oxygenation  Description: INTERVENTIONS:  - Assess for changes in respiratory status  - Assess for changes in mentation and behavior  - Position to facilitate oxygenation and minimize respiratory effort  - Oxygen administered by appropriate delivery if ordered  - Initiate smoking cessation education as indicated  - Encourage broncho-pulmonary hygiene including cough, deep breathe, Incentive Spirometry  - Assess the need for suctioning and aspirate as needed  - Assess and instruct to report SOB or any respiratory difficulty  - Respiratory Therapy support as indicated  Outcome: Progressing     Problem: CARDIOVASCULAR - ADULT  Goal: Maintains optimal cardiac output and hemodynamic stability  Description: INTERVENTIONS:  - Monitor I/O, vital signs and rhythm  - Monitor for S/S and trends of decreased cardiac output  - Administer and titrate ordered vasoactive medications to optimize hemodynamic stability  - Assess quality of pulses, skin color and temperature  - Assess for signs of decreased coronary artery perfusion  - Instruct patient to report change in severity of symptoms  Outcome: Progressing  Goal: Absence of cardiac dysrhythmias or at baseline rhythm  Description: INTERVENTIONS:  - Continuous cardiac monitoring, vital signs, obtain 12 lead EKG if ordered  - Administer antiarrhythmic and heart rate control medications as ordered  - Monitor electrolytes and administer replacement therapy as ordered  Outcome: Progressing     Problem: Potential for Falls  Goal: Patient will remain free of falls  Description: INTERVENTIONS:  - Educate patient/family on patient safety including physical limitations  - Instruct patient to call for assistance with activity   - Consult OT/PT to assist with strengthening/mobility   - Keep Call bell within reach  - Keep bed low and locked with side rails adjusted as appropriate  - Keep care items and personal belongings within reach  - Initiate and maintain comfort rounds  - Make Fall Risk Sign visible to staff  - Apply yellow socks and bracelet for high fall risk patients  - Consider moving patient to room near nurses station  Outcome: Progressing     Problem: MOBILITY - ADULT  Goal: Maintain or return to baseline ADL function  Description: INTERVENTIONS:  -  Assess patient's ability to carry out ADLs; assess patient's baseline for ADL function and identify physical deficits which impact ability to perform ADLs (bathing, care of mouth/teeth, toileting, grooming, dressing, etc )  - Assess/evaluate cause of self-care deficits   - Assess range of motion  - Assess patient's mobility; develop plan if impaired  - Assess patient's need for assistive devices and provide as appropriate  - Encourage maximum independence but intervene and supervise when necessary  - Involve family in performance of ADLs  - Assess for home care needs following discharge   - Consider OT consult to assist with ADL evaluation and planning for discharge  - Provide patient education as appropriate  Outcome: Progressing  Goal: Maintains/Returns to pre admission functional level  Description: INTERVENTIONS:  - Perform BMAT or MOVE assessment daily    - Set and communicate daily mobility goal to care team and patient/family/caregiver  - Collaborate with rehabilitation services on mobility goals if consulted  - Out of bed for toileting  - Record patient progress and toleration of activity level   Outcome: Progressing     Problem: Prexisting or High Potential for Compromised Skin Integrity  Goal: Skin integrity is maintained or improved  Description: INTERVENTIONS:  - Identify patients at risk for skin breakdown  - Assess and monitor skin integrity  - Assess and monitor nutrition and hydration status  - Monitor labs   - Assess for incontinence   - Turn and reposition patient  - Assist with mobility/ambulation  - Relieve pressure over bony prominences  - Avoid friction and shearing  - Provide appropriate hygiene as needed including keeping skin clean and dry  - Evaluate need for skin moisturizer/barrier cream  - Collaborate with interdisciplinary team   - Patient/family teaching  - Consider wound care consult   Outcome: Progressing     Problem: Nutrition/Hydration-ADULT  Goal: Nutrient/Hydration intake appropriate for improving, restoring or maintaining nutritional needs  Description: Monitor and assess patient's nutrition/hydration status for malnutrition   Collaborate with interdisciplinary team and initiate plan and interventions as ordered  Monitor patient's weight and dietary intake as ordered or per policy  Utilize nutrition screening tool and intervene as necessary  Determine patient's food preferences and provide high-protein, high-caloric foods as appropriate       INTERVENTIONS:  - Monitor oral intake, urinary output, labs, and treatment plans  - Assess nutrition and hydration status and recommend course of action  - Evaluate amount of meals eaten  - Assist patient with eating if necessary   - Allow adequate time for meals  - Recommend/ encourage appropriate diets, oral nutritional supplements, and vitamin/mineral supplements  - Order, calculate, and assess calorie counts as needed  - Recommend, monitor, and adjust tube feedings and TPN/PPN based on assessed needs  - Assess need for intravenous fluids  - Provide specific nutrition/hydration education as appropriate  - Include patient/family/caregiver in decisions related to nutrition  Outcome: Progressing

## 2022-04-17 NOTE — PROGRESS NOTES
[facility]  Progress Note - Arnie Stamp 1950, 71 y o  female MRN: [id number]  Unit/Bed#: S -01 Encounter: [id number]  Primary Care Provider: Gene Wolf MD   Date and time admitted to hospital: 4/11/2022  7:45 AM    * Tachy-dunia syndrome [facility])  Assessment & Plan  · Patient noted to be in AFib with RVR while in the emergency department felt to be provoked by followed, subdural hemorrhage and influenza a  · Patient now appears to have tachy-dunia syndrome with pauses of 3 5 seconds and up to 5 seconds while sleeping  · Patient had been on oral amiodarone and Cardizem secondary to new AFib with RVR  · Continue to hold metoprolol, amiodarone  · Cardizem held 4/14 secondary to persistent bradycardia with heart rate 35-45  · S/P PPM on 4/16  · Telemetry reviewed: Paced with HR 60  · Cardiology following, appreciate recommendations:  · Patient started on amnio drip to load with oral administration  · Can likely be discharged today    Subdural hemorrhage [facility])  Assessment & Plan  · POA  · 4/11 CT head: Eccentric right anterior parafalcine subdural hemorrhage measuring 3 mm in thickness   No significant mass effect   No concomitant subarachnoid or parenchymal hemorrhage  · Repeat CT of the head on 04/12 stable  · Continue 7 day course of Keppra for seizure prophylaxis  · Holding all anticoagulant antiplatelet medications for 2 weeks and/or until cleared by Neurosurgery to resume  · Management per primary service and Neurosurgery    Influenza A  Assessment & Plan  · POA on 4/11/22-> Stable  · Presented with several days of myalgias and fatigue    · Symptoms vastly improved 4/14/2022  · No indication for Tamiflu given onset of symptoms greater than 48 hours prior to admission  · Patient remains free of respiratory distress  · Continue supportive care    Weakness generalized  Assessment & Plan  · Likely d/t influenza   · PT/OT evaluation: No acute rehab needs    Essential hypertension  Assessment & Plan  · BP reviewed and stable  · Cardiology following    Ambulatory dysfunction  Assessment & Plan  · PT/OT evaluation: No acute rehab needs    MDD (major depressive disorder)  Assessment & Plan  · Continue Home medication:  · Escitalopram, wellbutrin, and amitriptyline       VTE Prophylaxis: VTE Score: 4 Moderate Risk (Score 3-4) - Pharmacological DVT Prophylaxis Contraindicated  Sequential Compression Devices Ordered  Recommendations for Discharge:  · Continue Home depression medication and supportive care for Influenza A   · Will Sign Off, please call with further questions    Counseling / Coordination of Care Time: 30 minutes Greater than 50% of total time spent on patient counseling and coordination of care  Collaboration of Care: Were Recommendations Directly Discussed with Primary Treatment Team? Yes      Past Medical and Surgical History:   Past Medical History:   Diagnosis Date    Anxiety     Congenital absence of one kidney     Depression     GERD (gastroesophageal reflux disease)     History of transfusion     Hypertension     Insomnia     Menopause     Other specified hypothyroidism 1/28/2019    Vitamin B12 deficiency        Past Surgical History:   Procedure Laterality Date    COLONOSCOPY      ID COLONOSCOPY FLX DX W/COLLJ SPEC WHEN PFRMD N/A 5/30/2017    Procedure: EGD AND COLONOSCOPY;  Surgeon: Jessica Guardado MD;  [facility]; Service: Gastroenterology    ID OPEN [place] LAT MALLEOLUS Left 7/13/2020    Procedure: OPEN REDUCTION W/ INTERNAL FIXATION (ORIF) ANKLE;  Surgeon: Lisa Scott DO;  Location: [facility];  Service: Orthopedics    REDUCTION MAMMAPLASTY Bilateral 06/06/2011    REDUCTION MAMMAPLASTY      SHOULDER SURGERY      TONSILLECTOMY         Allergies: No Known Allergies    Social History:  Marital Status:    Substance Use History:   Social History     Substance and Sexual Activity   Alcohol Use Yes    Alcohol/week: 7 0 standard drinks    Types: 7 Glasses of wine per week    Comment: 1 glass of wine nightly      Social History     Tobacco Use   Smoking Status Never Smoker   Smokeless Tobacco Never Used     Social History     Substance and Sexual Activity   Drug Use No       Physical Exam:   Vitals:   Blood Pressure: 128/67 (04/17/22 1104)  Pulse: 60 (04/17/22 1104)  Temperature: 97 9 °F (36 6 °C) (04/17/22 1104)  Temp Source: Temporal (04/17/22 0744)  Respirations: 18 (04/17/22 0744)  Height: 5' 5" (165 1 cm) (04/12/22 0900)  Weight - Scale: 81 6 kg (180 lb) (04/12/22 0900)  SpO2: 96 % (04/17/22 1104)    Physical Exam   Vitals/Notes Reviewed  Neuro: AOx4  No focal defecits  Psych: Calm, Cooperative, Normal Judgement  Cardiac: Heart sounds regular, S1/S2, no murmur  Pulm: No acute respiratory distress, Normal breath sounds, no wheezing  Abd: Soft, flat, normoactive bowel sounds  Musk: ROM intact, tenderness Left chest site  Skin: Warm, dry  Left Chest incision, dressing CDI      Additional Data:   Lab Results:    Results from last 7 days   Lab Units 04/17/22  0300 04/16/22  0438 04/16/22  0438   WBC Thousand/uL 5 26   < > 3 39*   HEMOGLOBIN g/dL 14 1   < > 14 0   HEMATOCRIT % 43 6   < > 43 4   PLATELETS Thousands/uL 196   < > 188   NEUTROS PCT %  --   --  30*   LYMPHS PCT %  --   --  53*   MONOS PCT %  --   --  15*   EOS PCT %  --   --  1    < > = values in this interval not displayed       Results from last 7 days   Lab Units 04/17/22  0300 04/12/22  0529 04/11/22  0834   SODIUM mmol/L 138   < > 138   POTASSIUM mmol/L 3 9   < > 4 0   CHLORIDE mmol/L 101   < > 103   CO2 mmol/L 26   < > 25   BUN mg/dL 11   < > 11   CREATININE mg/dL 0 89   < > 1 00   ANION GAP mmol/L 11   < > 10   CALCIUM mg/dL 7 9*   < > 8 3   ALBUMIN g/dL  --   --  3 3*   TOTAL BILIRUBIN mg/dL  --   --  0 56   ALK PHOS U/L  --   --  65   ALT U/L  --   --  19   AST U/L  --   --  30   GLUCOSE RANDOM mg/dL 87   < > 92    < > = values in this interval not displayed  Results from last 7 days   Lab Units 04/16/22  0438   INR  0 91         No results found for: HGBA1C            Imaging: Reviewed radiology reports from this admission including: chest xray  XR chest portable   Final Result by Nadege Monk MD (04/16 1005)   Left biventricular pacer placement without complication  No acute cardiopulmonary disease  Workstation performed: [id number]         FL < 1 hour   Final Result by Emely Mariscal MD (04/16 1002)      Fluoroscopic guidance provided for procedure guidance  Please refer to the separate procedure notes for additional details  Workstation performed: [id number]         CT head wo contrast   Final Result by Tan Bright MD (04/12 1042)      Unchanged small acute subdural hematoma in right anterior parafalcine region  No new acute intracranial abnormality  Workstation performed: [id number]         CT head without contrast   Final Result by Franchesca Harvey MD (04/11 1021)      Eccentric right anterior parafalcine subdural hemorrhage measuring 3 mm in thickness  No significant mass effect  No concomitant subarachnoid or parenchymal hemorrhage  Correlate for history of trauma or anticoagulation  No acute findings of the brain parenchyma, itself  I personally discussed this study with NOE MUSA on 4/11/2022 at 10:20 AM   She reports the patient has nebulous history of several recent falls  Workstation performed: [id number]         XR chest 2 views   ED Interpretation by Peter Horner PA-C (32/28 [id number])   Cardiomegaly  No focal consolidation  Final Result by Franchesca Harvey MD (04/11 1010)      Basilar streaky opacities likely represent atelectasis from hypoventilation unless there is compelling clinical evidence for pneumonia  No other acute cardiopulmonary findings                    Workstation performed: [id number]         CT head wo contrast    (Results Pending)   XR chest 2 views    (Results Pending)       EKG, Pathology, and Other Studies Reviewed on Admission:   · EKG: Paced rhythm  HR 60     ** Please Note: This note may have been constructed using a voice recognition system   **

## 2022-04-17 NOTE — QUICK NOTE
* s/p MDT DC ppm implant by Dr Tima High on 2022   * today post device site is soft without any ecchymosis or hematoma    * device was interrogated and found to be in appropriate function    * CXRs reviewed from  - demonstrated left pacer placement without complication  Repeat chest x-ray  - pending imaging results  * we discussed proper post site care includin  No lifting with left arm above the level of the left breast for 6 weeks while leads mature, no lifting more then 5-10 lbs in this hands during this time    2   Keep dressing on for 1 week, as long as dressing is stuck down to skin OK to shower as bandage is water proof    * pt will f/u in our device clinic in 2 weeks time to ensure proper device function and proper wound healing, as well as discussion of monitoring device from home, this appointment was placed in EPIC

## 2022-04-18 ENCOUNTER — TRANSITIONAL CARE MANAGEMENT (OUTPATIENT)
Dept: FAMILY MEDICINE CLINIC | Facility: CLINIC | Age: 72
End: 2022-04-18

## 2022-04-18 ENCOUNTER — TELEPHONE (OUTPATIENT)
Dept: NEUROSURGERY | Facility: CLINIC | Age: 72
End: 2022-04-18

## 2022-04-18 NOTE — TELEPHONE ENCOUNTER
Pt calling in because she is scheduled in office 4/27 with [facility] prior  First available appt for [facility] isn't until May  Can pt wait that long to be seen?

## 2022-04-18 NOTE — PROGRESS NOTES
Assessment/Plan:    1  Subdural hemorrhage (HCC)  Assessment & Plan:  S/p fall  No neurologic deficit  Has repeat CT head scheduled and will f/u with neurosurgery as scheduled  2  Tachy-dunia syndrome ([facility] [address] )  Assessment & Plan:  She is now s/p pacemaker and is on an amiodarone load, decreasing her dose each week  F/u with cardiology as recommended  3  Influenza A  Assessment & Plan:  Improving but still with a cough/wheeze  Rx for tessalon and atrovent inhaler, which should not affect heart rate  Symptomatic relief was discussed  Orders:  -     ipratropium (Atrovent HFA) 17 mcg/act inhaler; Inhale 2 puffs every 6 (six) hours  -     benzonatate (TESSALON) 200 MG capsule; Take 1 capsule (200 mg total) by mouth 3 (three) times a day as needed for cough    4  Essential hypertension  Assessment & Plan: This is well controlled and will continue current dose of lisinopril  5  Depression, unspecified depression type    6  Abnormal chest x-ray  -     XR chest pa & lateral; Future; Expected date: 04/21/2022    7  Mild episode of recurrent major depressive disorder [facility])  Assessment & Plan:  She feels her tearfulness is reactive to recent hospitalization and denies recurrent depressive symptoms  She is tapering off all of her medications  We discussed how to taper bupropion  Asked her to f/u in one month or sooner for any complaints or issues  8  Continuous opioid dependence ([facility] [address] )            Patient Instructions   For bupropion taper:  1/2 tablet every day for two weeks, then 1/2 tablet every other day for one week, then stop  Return in about 4 weeks (around 5/19/2022)  Subjective:      Patient ID: Harley Ocampo is a 71 y o  female  Chief Complaint   Patient presents with    Transition of Care Management     [facility]       Here for TCM, as per that note  Was admitted for acute influenza A and had fallen several times at home with head trauma  Had a small intracerebral bleed    [employer] into afib in the hospital and was noted to have tachy dunia syndrome with several pauses, had PM placed  She is having difficulty eating and sleeping since home  Continues to have a cough with the flu  Is afraid to take anything due to new addition of amiodarone  Has been weaned off escitalopram and is no longer taking zolpidem or alprazolam   She also wants to stop her bupropion and we discussed how to taper this  She states she does not feel depressed but is tearful over her recent ordeal     Denies chest pain, dyspnea, palpitations  Has follow up with cardiology in early May and neurosurgery in late April after repeat CT  Also needs repeat CXR due to abnormal CXR with ? FB right before she was discharged  TCM Call (since 3/21/2022)     Date and time call was made  4/18/2022  9:25 9 Wandy De Santiago care reviewed  Records reviewed        Patient was hospitialized at  [facility]        Date of Admission  04/11/22    Date of discharge  04/17/22    Diagnosis  Tachy-dunia syndrome    Disposition  Home    Were the patients medications reviewed and updated  Yes    Current Symptoms  --  generalized body aches      TCM Call (since 3/21/2022)     Post hospital issues  None    Should patient be enrolled in anticoag monitoring? No    Scheduled for follow up?   Yes    Patients specialists  Cardiologist; Other (comment)    Cardiologist name  patient will schedule     Other specialists contcat #  neuro/will schedule    Did you obtain your prescribed medications  Yes    Do you need help managing your prescriptions or medications  No    Is transportation to your appointment needed  No    I have advised the patient to call PCP with any new or worsening symptoms  Andra Beltran, [address]    The type of support provided  Emotional; Physical    Do you have social support  Yes, as much as I need    Are you recieving any outpatient services  No    Are you recieving home care services  No    Are you using any community resources  No    Current waiver services  No    Have you fallen in the last 12 months  Yes    How many times  1    Interperter language line needed  No    Counseling  Patient    Comments  patient reports having some generalized body aches post discharge  she reports taking all medications as prescribed  [id number]           The following portions of the patient's history were reviewed and updated as appropriate: allergies, current medications, past family history, past medical history, past social history, past surgical history and problem list     Review of Systems   Constitutional: Positive for fatigue  Negative for fever  HENT: Positive for rhinorrhea and sore throat  Negative for congestion  Respiratory: Positive for cough  Negative for shortness of breath and wheezing  Cardiovascular: Negative  Neurological: Negative  Psychiatric/Behavioral: Positive for sleep disturbance  Current Outpatient Medications   Medication Sig Dispense Refill    amiodarone 200 mg tablet Take 1 tablet (200 mg total) by mouth 3 (three) times a day with meals for 5 days 15 tablet 0    amitriptyline (ELAVIL) 25 mg tablet TAKE 1 TABLET BY MOUTH ONCE DAILY AT BEDTIME 30 tablet 0    buPROPion (WELLBUTRIN XL) 150 mg 24 hr tablet Take 1 tablet (150 mg total) by mouth daily 30 tablet 5    docusate sodium (COLACE) 100 mg capsule Take 1 capsule (100 mg total) by mouth 2 (two) times a day  0    levothyroxine (Euthyrox) 50 mcg tablet Take 1 tablet (50 mcg total) by mouth daily 30 tablet 5    lisinopril (ZESTRIL) 10 mg tablet Take 1 tablet (10 mg total) by mouth daily 90 tablet 3    oxyCODONE (ROXICODONE) 5 immediate release tablet 2 5 mg to 5 mg p o  Every 4 hours as needed for moderate to severe pain  Ongoing therapy   15 tablet 0    pantoprazole (PROTONIX) 40 mg tablet TAKE 1 TABLET EVERY DAY 90 tablet 0    [START ON 4/30/2022] amiodarone 200 mg tablet Take 1 tablet (200 mg total) by mouth daily (Patient not taking: Reported on 4/21/2022 ) 60 tablet 0    benzonatate (TESSALON) 200 MG capsule Take 1 capsule (200 mg total) by mouth 3 (three) times a day as needed for cough 30 capsule 0    ipratropium (Atrovent HFA) 17 mcg/act inhaler Inhale 2 puffs every 6 (six) hours 12 9 g 3    levETIRAcetam (KEPPRA) 500 mg tablet Take 1 tablet (500 mg total) by mouth every 12 (twelve) hours for 1 dose (Patient not taking: Reported on 4/18/2022 ) 1 tablet 0     No current facility-administered medications for this visit  Objective:    /82   Pulse 73   Temp 98 3 °F (36 8 °C)   Resp 16   Ht 5' 5" (1 651 m)   LMP  (LMP Unknown)   SpO2 96%   BMI 29 95 kg/m²        Physical Exam  Constitutional:       Appearance: She is well-developed  HENT:      Nose: Congestion present  Mouth/Throat:      Mouth: Mucous membranes are moist    Eyes:      Conjunctiva/sclera: Conjunctivae normal    Neck:      Thyroid: No thyromegaly  Vascular: No JVD  Cardiovascular:      Rate and Rhythm: Normal rate and regular rhythm  Heart sounds: Normal heart sounds  No murmur heard  No friction rub  No gallop  Pulmonary:      Effort: Pulmonary effort is normal       Breath sounds: Wheezing present  No rales  Comments: Faint wheeze scattered throughout, no rales  Abdominal:      General: Bowel sounds are normal  There is no distension  Palpations: Abdomen is soft  Tenderness: There is no abdominal tenderness  Musculoskeletal:      Cervical back: Neck supple  Neurological:      General: No focal deficit present  Mental Status: She is alert and oriented to person, place, and time  Cranial Nerves: Cranial nerves are intact  Motor: Motor function is intact  Psychiatric:         Attention and Perception: Attention normal          Mood and Affect: Affect is tearful           Speech: Speech normal          Behavior: Behavior normal          Thought Content: Thought content normal  Thought content does not include homicidal or suicidal ideation  Thought content does not include homicidal or suicidal plan                  Riya Sigala MD

## 2022-04-18 NOTE — QUICK NOTE
Called patient after she arrived home  Confirmed that she was able to ascertain all prescriptions as they were prescribed on discharge  She confirmed that she did take the [address] more time tonight as well as amiodarone  She was understanding of the plan  She had no other questions at this time

## 2022-04-18 NOTE — QUICK NOTE
Prior to patient going to the pharmacy; the pharmacy that the patient requested was contacted  Confirmed dosages of the amiodarone at 200 mg 3 times a day for 5 days, 200 mg twice a day for 7 days, 200 mg daily indefinitely  Also confirmed that the patient is no longer taking Ambien and Xanax  Recommended prescription of oxycodone  Discussed all risks benefits and alternatives of current medication regimen in the recommendations are made by the cardiologist to the pharmacist directly

## 2022-04-19 ENCOUNTER — RA CDI HCC (OUTPATIENT)
Dept: OTHER | Facility: HOSPITAL | Age: 72
End: 2022-04-19

## 2022-04-19 NOTE — PROGRESS NOTES
[facility] [address]  coding opportunities       Chart reviewed, no opportunity found: CHART REVIEWED, NO OPPORTUNITY FOUND        Patients Insurance     Medicare Insurance: Medicare

## 2022-04-21 ENCOUNTER — OFFICE VISIT (OUTPATIENT)
Dept: FAMILY MEDICINE CLINIC | Facility: CLINIC | Age: 72
End: 2022-04-21
Payer: MEDICARE

## 2022-04-21 VITALS
OXYGEN SATURATION: 96 % | HEART RATE: 73 BPM | BODY MASS INDEX: 29.95 KG/M2 | RESPIRATION RATE: 16 BRPM | TEMPERATURE: 98.3 F | SYSTOLIC BLOOD PRESSURE: 128 MMHG | HEIGHT: 65 IN | DIASTOLIC BLOOD PRESSURE: 82 MMHG

## 2022-04-21 DIAGNOSIS — I10 ESSENTIAL HYPERTENSION: ICD-10-CM

## 2022-04-21 DIAGNOSIS — I62.00 SUBDURAL HEMORRHAGE (HCC): Primary | ICD-10-CM

## 2022-04-21 DIAGNOSIS — R93.89 ABNORMAL CHEST X-RAY: ICD-10-CM

## 2022-04-21 DIAGNOSIS — I49.5 TACHY-BRADY SYNDROME (HCC): ICD-10-CM

## 2022-04-21 DIAGNOSIS — F11.20 CONTINUOUS OPIOID DEPENDENCE (HCC): ICD-10-CM

## 2022-04-21 DIAGNOSIS — J10.1 INFLUENZA A: ICD-10-CM

## 2022-04-21 DIAGNOSIS — F33.0 MILD EPISODE OF RECURRENT MAJOR DEPRESSIVE DISORDER (HCC): ICD-10-CM

## 2022-04-21 DIAGNOSIS — F32.A DEPRESSION, UNSPECIFIED DEPRESSION TYPE: ICD-10-CM

## 2022-04-21 PROCEDURE — 99496 TRANSJ CARE MGMT HIGH F2F 7D: CPT | Performed by: INTERNAL MEDICINE

## 2022-04-21 RX ORDER — IPRATROPIUM BROMIDE 17 UG/1
2 AEROSOL, METERED RESPIRATORY (INHALATION) EVERY 6 HOURS
Qty: 12.9 G | Refills: 3 | Status: SHIPPED | OUTPATIENT
Start: 2022-04-21 | End: 2022-04-27

## 2022-04-21 RX ORDER — BENZONATATE 200 MG/1
200 CAPSULE ORAL 3 TIMES DAILY PRN
Qty: 30 CAPSULE | Refills: 0 | Status: SHIPPED | OUTPATIENT
Start: 2022-04-21 | End: 2022-06-20

## 2022-04-21 NOTE — ASSESSMENT & PLAN NOTE
Improving but still with a cough/wheeze  Rx for tessalon and atrovent inhaler, which should not affect heart rate  Symptomatic relief was discussed

## 2022-04-21 NOTE — ASSESSMENT & PLAN NOTE
S/p fall  No neurologic deficit  Has repeat CT head scheduled and will f/u with neurosurgery as scheduled

## 2022-04-21 NOTE — ASSESSMENT & PLAN NOTE
She is now s/p pacemaker and is on an amiodarone load, decreasing her dose each week  F/u with cardiology as recommended

## 2022-04-21 NOTE — ASSESSMENT & PLAN NOTE
She feels her tearfulness is reactive to recent hospitalization and denies recurrent depressive symptoms  She is tapering off all of her medications  We discussed how to taper bupropion  Asked her to f/u in one month or sooner for any complaints or issues

## 2022-04-21 NOTE — PATIENT INSTRUCTIONS
For bupropion taper:  1/2 tablet every day for two weeks, then 1/2 tablet every other day for one week, then stop

## 2022-04-25 ENCOUNTER — HOSPITAL ENCOUNTER (OUTPATIENT)
Dept: RADIOLOGY | Facility: HOSPITAL | Age: 72
Discharge: HOME | End: 2022-04-25
Payer: MEDICARE

## 2022-04-25 DIAGNOSIS — I62.00 SUBDURAL HEMORRHAGE (HCC): ICD-10-CM

## 2022-04-25 PROCEDURE — 70450 CT HEAD/BRAIN W/O DYE: CPT

## 2022-04-25 PROCEDURE — G1004 CDSM NDSC: HCPCS

## 2022-04-26 NOTE — ASSESSMENT & PLAN NOTE
Patient presents for 2 week hospital follow up   · S/p fall on 4/11 with SDH  · No documented AC / AP therapies    Imaging:   CT head wo contrast 4/25/2022:  No acute intracranial abnormality  Plan:   Exam:  GCS 15, nonfocal   Imaging reviewed with patient, demonstrates complete resolution of hemorrhage without new hemorrhage noted   No further imaging or intervention needed at this time   She is following with cardiology regarding atrial fibrillation  Currently has pacemaker in place but may need to start anticoagulation therapy  This is okay to do from a neurosurgery standpoint     Patient is to return to the office on an as needed basis or sooner should patient develop worsening symptoms or red flag signs

## 2022-04-27 ENCOUNTER — OFFICE VISIT (OUTPATIENT)
Dept: NEUROSURGERY | Facility: CLINIC | Age: 72
End: 2022-04-27
Payer: MEDICARE

## 2022-04-27 VITALS
SYSTOLIC BLOOD PRESSURE: 115 MMHG | RESPIRATION RATE: 18 BRPM | HEART RATE: 66 BPM | TEMPERATURE: 97.9 F | DIASTOLIC BLOOD PRESSURE: 63 MMHG | HEIGHT: 65 IN | BODY MASS INDEX: 29.95 KG/M2

## 2022-04-27 DIAGNOSIS — I62.00 SUBDURAL HEMORRHAGE (HCC): Primary | ICD-10-CM

## 2022-04-27 PROCEDURE — 99214 OFFICE O/P EST MOD 30 MIN: CPT | Performed by: PHYSICIAN ASSISTANT

## 2022-04-27 NOTE — PATIENT INSTRUCTIONS
Outpatient follow-up on an as-needed basis or sooner should she develop any worsening symptoms or red flag signs  Continue follow-up with Cardiology regarding atrial fibrillation, can start anticoagulation therapy from Neurosurgery standpoint if this is deemed medically necessary

## 2022-04-27 NOTE — PROGRESS NOTES
Neurosurgery Office Note  Lewis Suero 71 y o  female MRN: [id number]      Assessment/Plan     Subdural hemorrhage [facility])  Patient presents for 2 week hospital follow up   · S/p fall on 4/11 with SDH  · No documented AC / AP therapies    Imaging:   CT head wo contrast 4/25/2022:  No acute intracranial abnormality  Plan:   Exam:  GCS 15, nonfocal   Imaging reviewed with patient, demonstrates complete resolution of hemorrhage without new hemorrhage noted   No further imaging or intervention needed at this time   She is following with cardiology regarding atrial fibrillation  Currently has pacemaker in place but may need to start anticoagulation therapy  This is okay to do from a neurosurgery standpoint   Patient is to return to the office on an as needed basis or sooner should patient develop worsening symptoms or red flag signs        CHIEF COMPLAINT    Chief Complaint   Patient presents with    Follow-up       HISTORY    History of Present Illness     71y o  year old female with past medical history significant for anxiety and depression, GERD, hypertension, newly diagnosed atrial fibrillation with pacemaker in place who presents as a 2 week hospital follow-up status post fall with anterior parafalcine subdural hematoma  Patient states she has been doing okay  Does report some fatigue and loss of appetite but feels this is likely due to her accident in being in the hospital   She was having head pain after her fall but states this is completely resolved  She reports she is slightly amnestic to the events of her fall, only remember waking up a few hours later in the hospital   She otherwise denies any seizure stroke-like activity, numbness/tingling/weakness, confusion, etc     She is following closely with cardiology in the outpatient setting    She currently has a pacemaker in place but feels they may want to start her on anticoagulation therapy if this is okay from a neurosurgery standpoint  See Discussion    REVIEW OF SYSTEMS    Review of Systems   Constitutional: Positive for appetite change (loss of apptite) and fatigue  HENT: Negative  Eyes: Negative  Respiratory: Negative  Cardiovascular: Negative  H/o HTN   Gastrointestinal: Negative  H/o GERD   Endocrine: Negative  H/o Thyroid Disease   Genitourinary: Negative  Musculoskeletal: Negative  Skin: Negative  Allergic/Immunologic: Negative  Neurological: Negative  ED on 4/11/22--SDH -S/p  fall out of bed at home    Hematological: Negative  Psychiatric/Behavioral: Negative  All other systems reviewed and are negative  Meds/Allergies     Current Outpatient Medications   Medication Sig Dispense Refill    [START ON 4/30/2022] amiodarone 200 mg tablet Take 1 tablet (200 mg total) by mouth daily 60 tablet 0    amitriptyline (ELAVIL) 25 mg tablet TAKE 1 TABLET BY MOUTH ONCE DAILY AT BEDTIME 30 tablet 0    benzonatate (TESSALON) 200 MG capsule Take 1 capsule (200 mg total) by mouth 3 (three) times a day as needed for cough 30 capsule 0    buPROPion (WELLBUTRIN XL) 150 mg 24 hr tablet Take 1 tablet (150 mg total) by mouth daily 30 tablet 5    docusate sodium (COLACE) 100 mg capsule Take 1 capsule (100 mg total) by mouth 2 (two) times a day  0    levothyroxine (Euthyrox) 50 mcg tablet Take 1 tablet (50 mcg total) by mouth daily 30 tablet 5    lisinopril (ZESTRIL) 10 mg tablet Take 1 tablet (10 mg total) by mouth daily 90 tablet 3    pantoprazole (PROTONIX) 40 mg tablet TAKE 1 TABLET EVERY DAY 90 tablet 0     No current facility-administered medications for this visit         No Known Allergies    PAST HISTORY    Past Medical History:   Diagnosis Date    Anxiety     Congenital absence of one kidney     Depression     GERD (gastroesophageal reflux disease)     History of transfusion     Hypertension     Insomnia     Menopause     Other specified hypothyroidism 1/28/2019    Vitamin B12 deficiency        Past Surgical History:   Procedure Laterality Date    CARDIAC PACEMAKER PLACEMENT Left 4/16/2022    Procedure: INSERTION PACEMAKER;  Surgeon: Senthil Viera DO;  [facility];  Service: Cardiology    COLONOSCOPY      SD COLONOSCOPY FLX DX W/COLLJ SPEC WHEN PFRMD N/A 5/30/2017    Procedure: EGD AND COLONOSCOPY;  Surgeon: Janice Gonzalez MD;  Location: [facility]; Service: Gastroenterology    SD OPEN [place] LAT MALLEOLUS Left 7/13/2020    Procedure: OPEN REDUCTION W/ INTERNAL FIXATION (ORIF) ANKLE;  Surgeon: Angela Cherry DO;  Location: [facility];  Service: Orthopedics    REDUCTION MAMMAPLASTY Bilateral 06/06/2011    REDUCTION MAMMAPLASTY      SHOULDER SURGERY      TONSILLECTOMY         Social History     Tobacco Use    Smoking status: Never Smoker    Smokeless tobacco: Never Used   Vaping Use    Vaping Use: Never used   Substance Use Topics    Alcohol use: Yes     Alcohol/week: 7 0 standard drinks     Types: 7 Glasses of wine per week     Comment: 1 glass of wine nightly     Drug use: No       Family History   Problem Relation Age of Onset    Lung cancer Mother 79    Heart disease Father     Lung cancer Brother 48    No Known Problems Maternal Grandmother     No Known Problems Maternal Grandfather     No Known Problems Paternal Grandmother     No Known Problems Paternal Grandfather          Above history personally reviewed  EXAM    Vitals:Blood pressure 115/63, pulse 66, temperature 97 9 °F (36 6 °C), temperature source Temporal, resp  rate 18, height 5' 5" (1 651 m)  ,Body mass index is 29 95 kg/m²  Physical Exam  Constitutional:       General: She is awake  Appearance: Normal appearance  HENT:      Head: Normocephalic and atraumatic  Eyes:      Extraocular Movements: Extraocular movements intact and EOM normal       Conjunctiva/sclera: Conjunctivae normal    Cardiovascular:      Rate and Rhythm: Normal rate  Pulmonary:      Effort: Pulmonary effort is normal  No respiratory distress  Skin:     General: Skin is warm and dry  Neurological:      Mental Status: She is alert and oriented to person, place, and time  Coordination: Finger-Nose-Finger Test normal       Gait: Gait is intact  Deep Tendon Reflexes: Strength normal    Psychiatric:         Attention and Perception: Attention and perception normal          Mood and Affect: Mood and affect normal          Speech: Speech normal          Behavior: Behavior normal  Behavior is cooperative  Thought Content: Thought content normal          Cognition and Memory: Cognition and memory normal          Judgment: Judgment normal          Neurologic Exam     Mental Status   Oriented to person, place, and time  Follows 1 step commands  Attention: normal  Concentration: normal    Speech: speech is normal   Level of consciousness: alert  Knowledge: good  Able to perform simple calculations  Able to repeat  Normal comprehension  Cranial Nerves     CN III, IV, VI   Extraocular motions are normal    CN III: no CN III palsy  CN VI: no CN VI palsy  Nystagmus: none   Diplopia: none  Ophthalmoparesis: none  Upgaze: normal  Downgaze: normal  Conjugate gaze: present    CN V   Right facial sensation deficit: none  Left facial sensation deficit: none    CN VII   Right facial weakness: none  Left facial weakness: none    CN VIII   Hearing: intact    CN IX, X   CN IX normal    CN X normal      CN XI   Right trapezius strength: normal  Left trapezius strength: normal    CN XII   CN XII normal      Motor Exam   Muscle bulk: normal  Overall muscle tone: normal  Right arm pronator drift: absent  Left arm pronator drift: absent    Strength   Strength 5/5 throughout       Sensory Exam   Light touch normal      Gait, Coordination, and Reflexes     Gait  Gait: normal    Coordination   Finger to nose coordination: normal    Tremor   Resting tremor: absent  Intention tremor: absent  Action tremor: absent    Reflexes   Right : 2+  Left : 2+  Right Mohan: absent  Left Mohan: absent  Right ankle clonus: absent  Left ankle clonus: absent        MEDICAL DECISION MAKING    Imaging Studies:     XR chest portable    Result Date: 4/16/2022  Narrative: CHEST INDICATION:   Patient s/p Pacemaker/ICD Insertion  COMPARISON:  4/11/2022 EXAM PERFORMED/VIEWS:  XR CHEST PORTABLE FINDINGS:  Left biventricular pacer in satisfactory position  Cardiomediastinal silhouette appears unremarkable  The lungs are clear  No pneumothorax or pleural effusion  Osseous structures appear within normal limits for patient age  Impression: Left biventricular pacer placement without complication  No acute cardiopulmonary disease  Workstation performed: [id number]     XR chest 2 views    Result Date: 4/17/2022  Narrative: CHEST INDICATION:   Patient s/p Pacemaker/ICD Insertion, To be done post-procedure day one at 7a  m  and read before 9 a m  Do not lift affected arm above shoulder    COMPARISON:  4/16/2022 EXAM PERFORMED/VIEWS:  XR CHEST PA & LATERAL  The frontal view was performed utilizing dual energy radiographic technique  Images: 4 FINDINGS:  Stable dual lead cardiac pacer since previous  There is a 1 3 cm rounded opacity in the lower central mediastinum seen on both views  Etiology is not clear  Cardiac size remains normal  No active lung consolidation, pneumothorax or pleural effusion apparent  Left shoulder arthropathy, unchanged  Impression: No acute cardiopulmonary disease  Stable appearing pacer  Radiopaque object in the lower middle mediastinum of uncertain etiology  This may be something ingested, possibly in the lower thoracic esophagus  The study was marked in [facility] for immediate notification  Workstation performed: [id number]     XR chest 2 views    Result Date: 4/11/2022  Narrative: CHEST INDICATION:   Cough  COMPARISON:  CT of the abdomen/pelvis dated 4/29/2021   EXAM PERFORMED/VIEWS:  XR CHEST PA & LATERAL FINDINGS: Cardiomediastinal silhouette is within allowable limits for technique and patient rotation  No pulmonary vascular cephalization  Known hiatal hernia is not as apparent as on prior CT  Streaky right basilar opacities likely atelectasis related to hypoventilation  No other acute consolidations  No pneumothorax or layering effusion  Bones are unremarkable  Impression: Basilar streaky opacities likely represent atelectasis from hypoventilation unless there is compelling clinical evidence for pneumonia  No other acute cardiopulmonary findings  Workstation performed: [id number]     CT head wo contrast    Result Date: 4/26/2022  Narrative: CT BRAIN - WITHOUT CONTRAST INDICATION:   I62 00: Nontraumatic subdural hemorrhage, unspecified  COMPARISON:  April 12, 2022 TECHNIQUE:  CT examination of the brain was performed  In addition to axial images, sagittal and coronal 2D reformatted images were created and submitted for interpretation  Radiation dose length product (DLP) for this visit:  872 mGy-cm   This examination, like all CT scans performed in the [facility], was performed utilizing techniques to minimize radiation dose exposure, including the use of iterative reconstruction and automated exposure control  IMAGE QUALITY:  Diagnostic  FINDINGS: PARENCHYMA:  No intracranial mass, mass effect or midline shift  No CT signs of acute infarction  No acute parenchymal hemorrhage  VENTRICLES AND EXTRA-AXIAL SPACES:  Normal for the patient's age  VISUALIZED ORBITS AND PARANASAL SINUSES:  Unremarkable  CALVARIUM AND EXTRACRANIAL SOFT TISSUES:  Normal      Impression: No acute intracranial abnormality  Workstation performed: [id number]     CT head wo contrast    Result Date: 4/12/2022  Narrative: CT BRAIN - WITHOUT CONTRAST INDICATION:   Subdural hemorrhage re-evaluate subdural hemorrhage  COMPARISON:  CT head without contrast April 11, 2022  TECHNIQUE:  CT examination of the brain was performed  In addition to axial images, sagittal and coronal 2D reformatted images were created and submitted for interpretation  Radiation dose length product (DLP) for this visit:  825 mGy-cm   This examination, like all CT scans performed in the [facility], was performed utilizing techniques to minimize radiation dose exposure, including the use of iterative reconstruction and automated exposure control  IMAGE QUALITY:  Diagnostic  FINDINGS: PARENCHYMA AND EXTRA-AXIAL SPACES: Unchanged small acute subdural hematoma in right anterior parafalcine region measuring 0 3 cm thick (2:25)  No intracranial mass, mass effect or midline shift  No CT signs of acute infarction  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  No acute parenchymal hemorrhage  VENTRICLES: Normal for the patient's age  VISUALIZED ORBITS AND PARANASAL SINUSES:  Unremarkable  CALVARIUM AND EXTRACRANIAL SOFT TISSUES:  Normal      Impression: Unchanged small acute subdural hematoma in right anterior parafalcine region  No new acute intracranial abnormality  Workstation performed: [id number]     CT head without contrast    Result Date: 4/11/2022  Narrative: CT BRAIN - WITHOUT CONTRAST INDICATION:   Altered mental status and weakness  COMPARISON:  None  TECHNIQUE:  CT examination of the brain was performed  In addition to axial images, sagittal and coronal 2D reformatted images were created and submitted for interpretation  Radiation dose length product (DLP) for this visit:  897 mGy-cm   This examination, like all CT scans performed in the [facility], was performed utilizing techniques to minimize radiation dose exposure, including the use of iterative reconstruction and automated exposure control  IMAGE QUALITY:  Diagnostic   FINDINGS: PARENCHYMA:  There is gyriform relative hyperdensity associated with the anterior frontal lobes (2/28-31) appears to be related to beam hardening artifact  No parenchymal hemorrhage, acute infarct, or mass demonstrated in the cerebral hemispheres, cerebellum, or brainstem  VENTRICLES AND EXTRA-AXIAL SPACES:  Eccentric right parafalcine hyperdensity, anteriorly (2/29), measuring 3 mm in thickness  No significant mass effect  No other extra-axial hemorrhage demonstrated  VISUALIZED ORBITS AND PARANASAL SINUSES:  Unremarkable  CALVARIUM AND EXTRACRANIAL SOFT TISSUES:  Mild hyperostosis frontalis  No displaced or depressed skull fractures  No destructive osseous lesions  No acute scalp swelling  Impression: Eccentric right anterior parafalcine subdural hemorrhage measuring 3 mm in thickness  No significant mass effect  No concomitant subarachnoid or parenchymal hemorrhage  Correlate for history of trauma or anticoagulation  No acute findings of the brain parenchyma, itself  I personally discussed this study with NOE MUSA on 4/11/2022 at 10:20 AM   She reports the patient has nebulous history of several recent falls  Workstation performed: [id number]     FL < 1 hour    Result Date: 4/16/2022  Narrative: C-ARM - chest INDICATION: pacer placement  Procedure guidance  COMPARISON:  None TECHNIQUE: FLUOROSCOPY TIME:   185 1 seconds 10 FLUOROSCOPIC IMAGES FINDINGS: Fluoroscopic guidance provided for procedure guidance  Images demonstrate placement of a left-sided pacemaker device  Osseous and soft tissue detail limited by technique  Impression: Fluoroscopic guidance provided for procedure guidance  Please refer to the separate procedure notes for additional details  Workstation performed: [id number]     Mammo screening bilateral w 3d & cad    Result Date: 4/3/2022  Narrative: DIAGNOSIS: Encounter for screening mammogram for malignant neoplasm of breast ; Screening for breast cancer TECHNIQUE: Digital screening mammography was performed  Computer Aided Detection (CAD) analyzed all applicable images   COMPARISONS: Prior breast imaging dated: 05/20/2019 and 08/11/2015 RELEVANT HISTORY: Family Breast Cancer History: No known family history of breast cancer  Family Medical History: No known relevant family medical history  Personal History: No known relevant hormone history  Surgical history includes breast reduction  No known relevant medical history  The patient is scheduled in a reminder system for screening mammography  8-10% of cancers will be missed on mammography  Management of a palpable abnormality must be based on clinical grounds  Patients will be notified of their results via letter from our facility  Accredited by [employer] of [employer] and FDA  RISK ASSESSMENT: 5 Year Tyrer-Cuzick: 1 01 % 10 Year Tyrer-Cuzick: 2 17 % Lifetime Tyrer-Cuzick: 3 18 % TISSUE DENSITY: The breasts are almost entirely fatty  INDICATION: Yolanda Medeiros is a 71 y o  female presenting for screening mammography  FINDINGS: There are no suspicious masses, grouped microcalcifications or areas of architectural distortion  The skin and nipple areolar complex are unremarkable  Impression: No mammographic evidence of malignancy  ASSESSMENT/BI-RADS CATEGORY: Left: 1 - Negative Right: 1 - Negative Overall: 1 - Negative RECOMMENDATION:      - Routine screening mammogram in 1 year for both breasts  Workstation ID: [id number]     Echo complete w/ contrast if indicated    Result Date: 4/12/2022  Narrative: Teresita Ayers  Left Ventricle: Left ventricular cavity size is normal  Wall thickness is normal  The left ventricular ejection fraction is 65%  Systolic function is normal  Although no diagnostic regional wall motion abnormality was identified, this possibility cannot be completely excluded on the basis of this study  I have personally reviewed pertinent reports     and I have personally reviewed pertinent films in PACS

## 2022-05-02 DIAGNOSIS — I10 ESSENTIAL HYPERTENSION: ICD-10-CM

## 2022-05-02 RX ORDER — LISINOPRIL 10 MG/1
TABLET ORAL
Qty: 90 TABLET | Refills: 3 | Status: SHIPPED | OUTPATIENT
Start: 2022-05-02

## 2022-05-07 DIAGNOSIS — R10.13 EPIGASTRIC PAIN: ICD-10-CM

## 2022-05-08 RX ORDER — AMITRIPTYLINE HYDROCHLORIDE 25 MG/1
TABLET, FILM COATED ORAL
Qty: 30 TABLET | Refills: 0 | Status: SHIPPED | OUTPATIENT
Start: 2022-05-08 | End: 2022-06-11

## 2022-05-09 ENCOUNTER — IN-CLINIC DEVICE VISIT (OUTPATIENT)
Dept: CARDIOLOGY CLINIC | Facility: CLINIC | Age: 72
End: 2022-05-09
Payer: MEDICARE

## 2022-05-09 ENCOUNTER — OFFICE VISIT (OUTPATIENT)
Dept: CARDIOLOGY CLINIC | Facility: CLINIC | Age: 72
End: 2022-05-09
Payer: MEDICARE

## 2022-05-09 VITALS
SYSTOLIC BLOOD PRESSURE: 112 MMHG | HEART RATE: 63 BPM | BODY MASS INDEX: 29.95 KG/M2 | DIASTOLIC BLOOD PRESSURE: 62 MMHG | HEIGHT: 65 IN

## 2022-05-09 DIAGNOSIS — I49.5 TACHY-BRADY SYNDROME (HCC): Primary | ICD-10-CM

## 2022-05-09 DIAGNOSIS — Z95.0 PRESENCE OF PERMANENT CARDIAC PACEMAKER: Primary | ICD-10-CM

## 2022-05-09 PROCEDURE — 99024 POSTOP FOLLOW-UP VISIT: CPT | Performed by: INTERNAL MEDICINE

## 2022-05-09 PROCEDURE — 99214 OFFICE O/P EST MOD 30 MIN: CPT | Performed by: INTERNAL MEDICINE

## 2022-05-09 PROCEDURE — 93000 ELECTROCARDIOGRAM COMPLETE: CPT | Performed by: INTERNAL MEDICINE

## 2022-05-09 NOTE — PATIENT INSTRUCTIONS
1  Stop your amiodarone  This will take  days to get correction out of your system  This amiodarone helps keep you OUT of afib but in the long term may not be the best medication for you  2  Start you on a medication called eliquis 5mg twice a day starting tonight   I

## 2022-05-09 NOTE — PROGRESS NOTES
Results for orders placed or performed in visit on 05/09/22   Cardiac EP device report    Narrative    DEVICE INTERROGATED IN THE [place] OFFICE  BATTERY VOLTAGE ADEQUATE  (12 7 YRS) AP 76%  <1%  ALL LEAD PARAMETERS WITHIN NORMAL LIMITS HOWEVER VENTRICULAR THRESHOLD ELEVATED  8 AT/AF EPISODE DETECTED (12 6% BURDEN) LONGEST <4 HOURS  PATIENT IS NOT ON AC'S DUE TO BRAIN BLEED  AUTO CAPTURE ON V LEAD CHANGED TO MONITOR  PATIENT TO SEE MARIAH TODAY  NORMAL DEVICE FUNCTION  ---LARES

## 2022-05-09 NOTE — PROGRESS NOTES
Electrophysiology Office Note    Arnie Stamp  1950  [id number]  HEART & VASCULAR [facility] [address]        Assessment/Plan     Primary diagnosis:   1   paroxysmal atrial fibrillation   * patient presents to B EP office today for post hospital follow up and to establish care in our outpatient clinic  * today ECG showing a paced rhythm    * had TBS w/ PAF during hospitalization in April 2022 after fall with SDH and acute influenza  Was loaded on amiodarone with sinus bradycardia and sinus pauses requiring DC PPM implant  Device interrogated showing 12% AF burden, last episode was for 3 hours on May 2nd (was discharged April 18th)  * she did have episodes of brief LH not sure what was this was caused by    * the question is if she truly has PAF or this is related to recent illnesses, we will stop her amiodarone and monitor her device for AF  I had alerts set to 6 hours  Will start eliquis 5mg BID as she was cleared by NSX for [facility]  A  If has afib need to see if symptomatic or not  If symptomatic plan for rhythm control, class III's may not be the best given psychiatric medications and hx of nephrectomy     B  If not symptomatic then can trial rate control as she has good BP    * of note inpatient echo completely normal    * will have her f/u with me in 6 weeks to assess her rhythms and response to eliquis  Long term [facility] pending true AF burden  Secondary diagnosis:   1  LUIS E - on Wellbutrin and amitriptyline   2  HTN   3  Hx Nephrectomy   4  Hx mechanical fall with SDH   5  Hx mechanical fall with leg fracture             Rhythm History:   Atrial fibrillation:   1  Diagnosed with paroxysmal atrial fibrillation during hospitalization - April 2022 - started on amiodarone - developed TBS - 4/2022  2  Amiodarone stopped eliquis 5mg BID started - 5/2022     Atrial flutter:     SVT:     VT/VF:     Device history:   Pacemaker:  1   S/p DC PPM implant for TBS - 4/2022    Defibrillator:    BIV PPM:    BIV ICD:    ILR:      Cardiac Testing:     ECHO: No results found for this or any previous visit  CATH/STRESS TEST:   None     EKG:   AP VS         History of Present Illness     HPI/INTERVAL HISTORY: Arnie Mar is a 71 y o  female with history as above who presents to [facility] EP office today under direction of Dr Haylee Pepper for post hospital follow up  Patient hospitalized in April after she fell resulting in a SDH, fell as she was dehydrated from influenza  During her stay developed atrial fibrillation w/ RVR requiring amiodarone use  Subsequently diagnosed with TBS requiring PPM implant  Since returning home from hospitalization she has felt very well  Has a few questions regarding long term AC and amiodarone management  Also has some PPM questions today  Review of Systems  ROS as noted above, otherwise 12 point review of systems was performed and is negative  Historical Information   Social History     Socioeconomic History    Marital status:      Spouse name: Not on file    Number of children: Not on file    Years of education: Not on file    Highest education level: Not on file   Occupational History    Not on file   Tobacco Use    Smoking status: Never Smoker    Smokeless tobacco: Never Used   Vaping Use    Vaping Use: Never used   Substance and Sexual Activity    Alcohol use:  Yes     Alcohol/week: 7 0 standard drinks     Types: 7 Glasses of wine per week     Comment: 1 glass of wine nightly     Drug use: No    Sexual activity: Not on file   Other Topics Concern    Not on file   Social History Narrative    Feels safe at home     Social Determinants of Health     Financial Resource Strain: Not on file   Food Insecurity: No Food Insecurity    Worried About Running Out of Food in the Last Year: Never true    Abel of Food in the Last Year: Never true   Transportation Needs: No Transportation Needs    Lack of Transportation (Medical): No    Lack of Transportation (Non-Medical): No   Physical Activity: Not on file   Stress: Not on file   Social Connections: Not on file   Intimate Partner Violence: Not on file   Housing Stability: Unknown    Unable to Pay for Housing in the Last Year: No    Number of Places Lived in the Last Year: Not on file    Unstable Housing in the Last Year: No     Past Medical History:   Diagnosis Date    Anxiety     Congenital absence of one kidney     Depression     GERD (gastroesophageal reflux disease)     History of transfusion     Hypertension     Insomnia     Menopause     Other specified hypothyroidism 1/28/2019    Vitamin B12 deficiency      Past Surgical History:   Procedure Laterality Date    CARDIAC PACEMAKER PLACEMENT Left 4/16/2022    Procedure: INSERTION PACEMAKER;  Surgeon: Maureen Sainz DO;  [facility];  Service: Cardiology    COLONOSCOPY      PA COLONOSCOPY FLX DX W/COLLJ SPEC WHEN PFRMD N/A 5/30/2017    Procedure: EGD AND COLONOSCOPY;  Surgeon: Kaleb Coleman MD;  [facility];   Service: Gastroenterology    PA OPEN [place] DISTAL FIBULAR FRACTURE LAT MALLEOLUS Left 7/13/2020    Procedure: OPEN REDUCTION W/ INTERNAL FIXATION (ORIF) ANKLE;  Surgeon: Jg Tiwari DO;  [facility];  Service: Orthopedics    REDUCTION MAMMAPLASTY Bilateral 06/06/2011    REDUCTION MAMMAPLASTY      SHOULDER SURGERY      TONSILLECTOMY       Social History     Substance and Sexual Activity   Alcohol Use Yes    Alcohol/week: 7 0 standard drinks    Types: 7 Glasses of wine per week    Comment: 1 glass of wine nightly      Social History     Substance and Sexual Activity   Drug Use No     Social History     Tobacco Use   Smoking Status Never Smoker   Smokeless Tobacco Never Used     Family History   Problem Relation Age of Onset    Lung cancer Mother 79    Heart disease Father     Lung cancer Brother 48    No Known Problems Maternal Grandmother     No Known Problems Maternal Grandfather     No Known Problems Paternal Grandmother     No Known Problems Paternal Grandfather        Meds/Allergies       Current Outpatient Medications:     amitriptyline (ELAVIL) 25 mg tablet, TAKE 1 TABLET BY MOUTH ONCE DAILY AT BEDTIME, Disp: 30 tablet, Rfl: 0    benzonatate (TESSALON) 200 MG capsule, Take 1 capsule (200 mg total) by mouth 3 (three) times a day as needed for cough, Disp: 30 capsule, Rfl: 0    buPROPion (WELLBUTRIN XL) 150 mg 24 hr tablet, Take 1 tablet (150 mg total) by mouth daily, Disp: 30 tablet, Rfl: 5    docusate sodium (COLACE) 100 mg capsule, Take 1 capsule (100 mg total) by mouth 2 (two) times a day, Disp: , Rfl: 0    levothyroxine (Euthyrox) 50 mcg tablet, Take 1 tablet (50 mcg total) by mouth daily, Disp: 30 tablet, Rfl: 5    lisinopril (ZESTRIL) 10 mg tablet, TAKE 1 TABLET EVERY DAY, Disp: 90 tablet, Rfl: 3    pantoprazole (PROTONIX) 40 mg tablet, TAKE 1 TABLET EVERY DAY, Disp: 90 tablet, Rfl: 0    No Known Allergies    Objective   Vitals: Blood pressure 112/62, pulse 63, height 5' 5" (1 651 m)  Physical Exam  Constitutional:       Appearance: She is well-developed  HENT:      Head: Normocephalic and atraumatic  Eyes:      Pupils: Pupils are equal, round, and reactive to light  Cardiovascular:      Rate and Rhythm: Normal rate and regular rhythm  Pulmonary:      Effort: Pulmonary effort is normal       Breath sounds: Normal breath sounds  Abdominal:      General: Bowel sounds are normal       Palpations: Abdomen is soft  Musculoskeletal:         General: Normal range of motion  Cervical back: Normal range of motion and neck supple  Skin:     General: Skin is warm and dry  Neurological:      Mental Status: She is alert and oriented to person, place, and time  Labs:  No results displayed because visit has over 200 results  Imaging: I have personally reviewed pertinent reports

## 2022-05-10 ENCOUNTER — TELEPHONE (OUTPATIENT)
Dept: CARDIOLOGY CLINIC | Facility: CLINIC | Age: 72
End: 2022-05-10

## 2022-05-10 NOTE — TELEPHONE ENCOUNTER
eliquis 5 mg BID    60 for 30 $492 00 - Due to deductible after first  it will drop down to $ 47 00 for  30 day supply
For Medical Surgical Hx obtained at Admission, Please see Provider H&P

## 2022-05-13 ENCOUNTER — APPOINTMENT (OUTPATIENT)
Dept: RADIOLOGY | Facility: CLINIC | Age: 72
End: 2022-05-13
Payer: MEDICARE

## 2022-05-13 ENCOUNTER — OFFICE VISIT (OUTPATIENT)
Dept: URGENT CARE | Facility: CLINIC | Age: 72
End: 2022-05-13
Payer: MEDICARE

## 2022-05-13 VITALS
SYSTOLIC BLOOD PRESSURE: 130 MMHG | HEART RATE: 66 BPM | TEMPERATURE: 97.1 F | OXYGEN SATURATION: 99 % | BODY MASS INDEX: 31.65 KG/M2 | WEIGHT: 172 LBS | HEIGHT: 62 IN | DIASTOLIC BLOOD PRESSURE: 72 MMHG | RESPIRATION RATE: 16 BRPM

## 2022-05-13 DIAGNOSIS — R93.89 ABNORMAL CHEST X-RAY: ICD-10-CM

## 2022-05-13 DIAGNOSIS — S93.602A SPRAIN OF LEFT FOOT, INITIAL ENCOUNTER: ICD-10-CM

## 2022-05-13 DIAGNOSIS — S93.602A SPRAIN OF LEFT FOOT, INITIAL ENCOUNTER: Primary | ICD-10-CM

## 2022-05-13 PROCEDURE — G0463 HOSPITAL OUTPT CLINIC VISIT: HCPCS | Performed by: PHYSICIAN ASSISTANT

## 2022-05-13 PROCEDURE — 99213 OFFICE O/P EST LOW 20 MIN: CPT | Performed by: PHYSICIAN ASSISTANT

## 2022-05-13 PROCEDURE — 71046 X-RAY EXAM CHEST 2 VIEWS: CPT

## 2022-05-13 PROCEDURE — 73630 X-RAY EXAM OF FOOT: CPT

## 2022-05-13 NOTE — PATIENT INSTRUCTIONS
Sprain left foot  Rest, ice, elevate  Follow up with PCP in 3-5 days  Proceed to  ER if symptoms worsen

## 2022-05-13 NOTE — PROGRESS NOTES
[address]        NAME: Lata Baumann is a 71 y o  female  : 1950    MRN: [id number]  DATE: May 13, 2022  TIME: 11:08 AM    Assessment and Plan   Sprain of left foot, initial encounter [[id number] [id number]]  1  Sprain of left foot, initial encounter           Patient Instructions     Sprain left foot  Rest, ice, elevate  Follow up with PCP in 3-5 days  Proceed to  ER if symptoms worsen  Chief Complaint     Chief Complaint   Patient presents with    Foot Pain     Left foot pain x 1 week  Kisha Kawasaki and hit foot off of floor  Swelling at ankle and painful  History of Present Illness       71-year-old female who presents complaining of left foot pain  Patient states that she had inversion type injury 1 week ago and was hoping the pain would subside but that has not  Denies head trauma, loss of consciousness      Review of Systems   Review of Systems   Constitutional: Negative for activity change, appetite change, chills, diaphoresis, fatigue and fever  HENT: Negative for congestion, ear discharge, ear pain, facial swelling, hearing loss, mouth sores, nosebleeds, postnasal drip, rhinorrhea, sinus pressure, sinus pain, sneezing, sore throat and voice change  Respiratory: Negative for apnea, cough, choking, chest tightness, shortness of breath, wheezing and stridor  Cardiovascular: Negative  Musculoskeletal: Positive for arthralgias           Current Medications       Current Outpatient Medications:     apixaban (Eliquis) 5 mg, Take 5 mg by mouth in the morning and 5 mg in the evening , Disp: , Rfl:     buPROPion (WELLBUTRIN XL) 150 mg 24 hr tablet, Take 1 tablet (150 mg total) by mouth daily, Disp: 30 tablet, Rfl: 5    docusate sodium (COLACE) 100 mg capsule, Take 1 capsule (100 mg total) by mouth 2 (two) times a day, Disp: , Rfl: 0    levothyroxine (Euthyrox) 50 mcg tablet, Take 1 tablet (50 mcg total) by mouth daily, Disp: 30 tablet, Rfl: 5    lisinopril (ZESTRIL) 10 mg tablet, TAKE 1 TABLET EVERY DAY, Disp: 90 tablet, Rfl: 3    pantoprazole (PROTONIX) 40 mg tablet, TAKE 1 TABLET EVERY DAY, Disp: 90 tablet, Rfl: 0    amitriptyline (ELAVIL) 25 mg tablet, TAKE 1 TABLET BY MOUTH ONCE DAILY AT BEDTIME (Patient not taking: Reported on 5/13/2022), Disp: 30 tablet, Rfl: 0    benzonatate (TESSALON) 200 MG capsule, Take 1 capsule (200 mg total) by mouth 3 (three) times a day as needed for cough (Patient not taking: Reported on 5/13/2022), Disp: 30 capsule, Rfl: 0    Current Allergies     Allergies as of 05/13/2022    (No Known Allergies)            The following portions of the patient's history were reviewed and updated as appropriate: allergies, current medications, past family history, past medical history, past social history, past surgical history and problem list      Past Medical History:   Diagnosis Date    Anxiety     Congenital absence of one kidney     Depression     GERD (gastroesophageal reflux disease)     History of transfusion     Hypertension     Insomnia     Menopause     Other specified hypothyroidism 1/28/2019    Vitamin B12 deficiency        Past Surgical History:   Procedure Laterality Date    CARDIAC PACEMAKER PLACEMENT Left 4/16/2022    Procedure: INSERTION PACEMAKER;  Surgeon: Maureen Sainz DO;  [facility];  Service: Cardiology    COLONOSCOPY      MI COLONOSCOPY FLX DX W/COLLJ SPEC WHEN PFRMD N/A 5/30/2017    Procedure: EGD AND COLONOSCOPY;  Surgeon: Kaleb Coleman MD;  [facility];   Service: Gastroenterology    MI OPEN [place] LAT MALLEOLUS Left 7/13/2020    Procedure: OPEN REDUCTION W/ INTERNAL FIXATION (ORIF) ANKLE;  Surgeon: Jg Tiwari DO;  [facility];  Service: Orthopedics    REDUCTION MAMMAPLASTY Bilateral 06/06/2011    REDUCTION MAMMAPLASTY      SHOULDER SURGERY      TONSILLECTOMY         Family History   Problem Relation Age of Onset    Lung cancer Mother 79    Heart disease Father     Lung cancer Brother 48    No Known Problems Maternal Grandmother     No Known Problems Maternal Grandfather     No Known Problems Paternal Grandmother     No Known Problems Paternal Grandfather          Medications have been verified  Objective   /72   Pulse 66   Temp (!) 97 1 °F (36 2 °C)   Resp 16   Ht 5' 2" (1 575 m)   Wt 78 kg (172 lb)   LMP  (LMP Unknown)   SpO2 99%   BMI 31 46 kg/m²        Physical Exam     Physical Exam  Constitutional:       Appearance: She is well-developed  HENT:      Head: Normocephalic and atraumatic  Right Ear: External ear normal       Left Ear: External ear normal       Nose: Nose normal       Mouth/Throat:      Pharynx: No oropharyngeal exudate  Cardiovascular:      Rate and Rhythm: Normal rate and regular rhythm  Heart sounds: Normal heart sounds  Pulmonary:      Effort: Pulmonary effort is normal  No respiratory distress  Breath sounds: Normal breath sounds  No wheezing or rales  Chest:      Chest wall: No tenderness  Musculoskeletal:      Cervical back: Normal range of motion and neck supple  Left foot: Normal range of motion and normal capillary refill  Swelling, tenderness and bony tenderness present  No deformity, bunion, Charcot foot, foot drop, prominent metatarsal heads, laceration or crepitus  Normal pulse  Legs:    Lymphadenopathy:      Cervical: No cervical adenopathy

## 2022-05-18 ENCOUNTER — TELEPHONE (OUTPATIENT)
Dept: NON INVASIVE DIAGNOSTICS | Facility: HOSPITAL | Age: 72
End: 2022-05-18

## 2022-05-18 DIAGNOSIS — K21.9 GASTROESOPHAGEAL REFLUX DISEASE: ICD-10-CM

## 2022-05-18 RX ORDER — PANTOPRAZOLE SODIUM 40 MG/1
TABLET, DELAYED RELEASE ORAL
Qty: 90 TABLET | Refills: 0 | Status: SHIPPED | OUTPATIENT
Start: 2022-05-18

## 2022-05-18 NOTE — TELEPHONE ENCOUNTER
Called patient about device showing 9 hours AF on may 17th, need to start BB on her unless BP is prohibitive  Need to re discuss AC

## 2022-05-19 ENCOUNTER — TELEPHONE (OUTPATIENT)
Dept: NON INVASIVE DIAGNOSTICS | Facility: HOSPITAL | Age: 72
End: 2022-05-19

## 2022-05-19 NOTE — TELEPHONE ENCOUNTER
Attempted to call patient to discuss device  findings of 9 hours of afib on 5/17/22  There was no answer  Message left previously by Armando Hankins to call back office to discuss anticoagulation and beta blocker

## 2022-05-20 ENCOUNTER — TELEPHONE (OUTPATIENT)
Dept: NON INVASIVE DIAGNOSTICS | Facility: HOSPITAL | Age: 72
End: 2022-05-20

## 2022-05-20 DIAGNOSIS — I49.5 TACHY-BRADY SYNDROME (HCC): Primary | ICD-10-CM

## 2022-05-20 NOTE — TELEPHONE ENCOUNTER
Spoke with patient regarding device finding of 9 hrs of afib on 5/17  Patient was asymptomatic during this episode  Patient states her systolic blood pressure typically runs 120-130s Prescription for Lopressor 25 mg BID sent to [address] [place] [address]  Patient instructed to check blood pressure 2 hours after taking the medication after the first dose  Patient instructed to call the office if she has a low blood pressure or feels lightheaded  Follow up scheduled with Jayson Covarrubias PA-C on 6/20

## 2022-05-31 DIAGNOSIS — I48.0 PAROXYSMAL A-FIB (HCC): Primary | ICD-10-CM

## 2022-06-03 DIAGNOSIS — F41.9 ANXIETY: ICD-10-CM

## 2022-06-06 RX ORDER — ALPRAZOLAM 0.5 MG/1
TABLET ORAL
Qty: 30 TABLET | Refills: 0 | Status: SHIPPED | OUTPATIENT
Start: 2022-06-06 | End: 2022-07-06

## 2022-06-11 DIAGNOSIS — R10.13 EPIGASTRIC PAIN: ICD-10-CM

## 2022-06-11 RX ORDER — AMITRIPTYLINE HYDROCHLORIDE 25 MG/1
TABLET, FILM COATED ORAL
Qty: 30 TABLET | Refills: 0 | Status: SHIPPED | OUTPATIENT
Start: 2022-06-11 | End: 2022-06-18

## 2022-06-18 DIAGNOSIS — R10.13 EPIGASTRIC PAIN: ICD-10-CM

## 2022-06-18 RX ORDER — AMITRIPTYLINE HYDROCHLORIDE 25 MG/1
TABLET, FILM COATED ORAL
Qty: 30 TABLET | Refills: 0 | Status: SHIPPED | OUTPATIENT
Start: 2022-06-18

## 2022-06-20 ENCOUNTER — OFFICE VISIT (OUTPATIENT)
Dept: CARDIOLOGY CLINIC | Facility: CLINIC | Age: 72
End: 2022-06-20
Payer: MEDICARE

## 2022-06-20 VITALS
HEART RATE: 72 BPM | BODY MASS INDEX: 31.46 KG/M2 | SYSTOLIC BLOOD PRESSURE: 116 MMHG | HEIGHT: 62 IN | DIASTOLIC BLOOD PRESSURE: 76 MMHG

## 2022-06-20 DIAGNOSIS — I49.5 TACHY-BRADY SYNDROME (HCC): Primary | ICD-10-CM

## 2022-06-20 PROCEDURE — 93000 ELECTROCARDIOGRAM COMPLETE: CPT | Performed by: PHYSICIAN ASSISTANT

## 2022-06-20 PROCEDURE — 99214 OFFICE O/P EST MOD 30 MIN: CPT | Performed by: PHYSICIAN ASSISTANT

## 2022-06-20 NOTE — PROGRESS NOTES
Electrophysiology Office Note    Pranay Noel  1950  [id number]  HEART & VASCULAR [facility] [address] [id number]        Assessment/Plan     Primary diagnosis:   1  Paroxysmal atrial fibrillation, asymptomatic    * patient presents to B EP office at my recommendation for afib management  She will be an outpatient of Dr Manpreet Ashraf    * on 5-18 had 9 hour episode of afib w/ RVR for which she was asymptomatic  Started on metoprolol 25mg BID with device check today showing no recurrence    * remains on eliquis for AC, [id number] is 3 (HTN, age >71, female sex category)  It is possible she still had some cardiac inflammation after flu leading to her long episode may 18th  We will continue her on [facility] for the next 6 months, if no further afib can consider stopping the medication given her history of mechanical falls resulting in prior significant injuries    * cost of eliquis is close to $500 until she hits her deductible then drops to $47 a mo which she is fine with ; samples given today    * given limited afib burden thus far will continue with BB, if burden increases will consider rhythm control    * EF 65%, normal LA size and no MV disease    * f/u in 6 months with me     2  Elevated RV capture threshold    * RV threshold is up to 2 5V, she has 0%  only atrial pacing   * if her  burden increases with time can consider lead revision for now will monitor     Secondary diagnosis:   1  LUIS E - on Wellbutrin and amitriptyline   2  HTN   3  Hx Nephrectomy   4  Hx mechanical fall with SDH 2/2 dehydration from influenza   5  Hx mechanical fall with leg fracture               Rhythm History:   Atrial fibrillation:   1  Diagnosed with paroxysmal atrial fibrillation during hospitalization - April 2022 - started on amiodarone - developed TBS - 4/2022  2  Amiodarone stopped eliquis 5mg BID started - 5/2022   3   Metoprolol tartrate 25mg BID added due to 9 hour episode - 5/2022     Atrial flutter:     SVT:     VT/VF/PVC:     Device history:   Pacemaker:  1  S/p DC PPM implant for TBS - 4/2022  2  RV threshold elevated at 2 5V - 5/2022     Defibrillator:    BIV PPM:    BIV ICD:    ILR:      Cardiac Testing:     ECHO: No results found for this or any previous visit  History of Present Illness     HPI/INTERVAL HISTORY: Arnie Mar is a 71 y o  female with history as above who presents to [facility] EP office today for afib follow up  After last OV with EP we stopped her amiodarone and continued eliquis  Plans were to maybe discontinue eliquis but we wanted to see if she truly had afib or this was related to her acute hospitalization for influenza and SDH  On May 17th she had almost 10 hours of afib  She was started on metoprolol 25mg BID  She has felt fine from a cardiac standpoint  eliquis co pay is close to $500 until her deductible is met after this it is $47/mo  Not having any further falls or bleeding issues on the eliquis  She has stopped her anxiety medications and feels fatigued / tearful since doing so  Review of Systems  ROS as noted above, otherwise 12 point review of systems was performed and is negative  Historical Information   Social History     Socioeconomic History    Marital status:      Spouse name: Not on file    Number of children: Not on file    Years of education: Not on file    Highest education level: Not on file   Occupational History    Not on file   Tobacco Use    Smoking status: Never Smoker    Smokeless tobacco: Never Used   Vaping Use    Vaping Use: Never used   Substance and Sexual Activity    Alcohol use:  Yes     Alcohol/week: 7 0 standard drinks     Types: 7 Glasses of wine per week     Comment: 1 glass of wine nightly     Drug use: No    Sexual activity: Not on file   Other Topics Concern    Not on file   Social History Narrative    Feels safe at home     Social Determinants of Health     Financial Resource Strain: Not on file   Food Insecurity: No Food Insecurity    Worried About Running Out of Food in the Last Year: Never true    Ran Out of Food in the Last Year: Never true   Transportation Needs: No Transportation Needs    Lack of Transportation (Medical): No    Lack of Transportation (Non-Medical): No   Physical Activity: Not on file   Stress: Not on file   Social Connections: Not on file   Intimate Partner Violence: Not on file   Housing Stability: Unknown    Unable to Pay for Housing in the Last Year: No    Number of Places Lived in the Last Year: Not on file    Unstable Housing in the Last Year: No     Past Medical History:   Diagnosis Date    Anxiety     Congenital absence of one kidney     Depression     GERD (gastroesophageal reflux disease)     History of transfusion     Hypertension     Insomnia     Menopause     Other specified hypothyroidism 1/28/2019    Vitamin B12 deficiency      Past Surgical History:   Procedure Laterality Date    CARDIAC PACEMAKER PLACEMENT Left 4/16/2022    Procedure: INSERTION PACEMAKER;  Surgeon: Jose David Sevilla DO;  [facility];  Service: Cardiology    COLONOSCOPY      MD COLONOSCOPY FLX DX W/COLLJ SPEC WHEN PFRMD N/A 5/30/2017    Procedure: EGD AND COLONOSCOPY;  Surgeon: Meri Wagner MD;  [facility];   Service: Gastroenterology    MD OPEN [place] DISTAL FIBULAR FRACTURE LAT MALLEOLUS Left 7/13/2020    Procedure: OPEN REDUCTION W/ INTERNAL FIXATION (ORIF) ANKLE;  Surgeon: Anaya Rojas DO;  [facility];  Service: Orthopedics    REDUCTION MAMMAPLASTY Bilateral 06/06/2011    REDUCTION MAMMAPLASTY      SHOULDER SURGERY      TONSILLECTOMY       Social History     Substance and Sexual Activity   Alcohol Use Yes    Alcohol/week: 7 0 standard drinks    Types: 7 Glasses of wine per week    Comment: 1 glass of wine nightly      Social History     Substance and Sexual Activity   Drug Use No     Social History     Tobacco Use   Smoking Status Never Smoker   Smokeless Tobacco Never Used     Family History   Problem Relation Age of Onset    Lung cancer Mother 79    Heart disease Father     Lung cancer Brother 48    No Known Problems Maternal Grandmother     No Known Problems Maternal Grandfather     No Known Problems Paternal Grandmother     No Known Problems Paternal Grandfather        Meds/Allergies       Current Outpatient Medications:     ALPRAZolam (XANAX) 0 5 mg tablet, TAKE 1 TABLET BY MOUTH ONCE DAILY AS NEEDED FOR ANXIETY, Disp: 30 tablet, Rfl: 0    amitriptyline (ELAVIL) 25 mg tablet, TAKE 1 TABLET BY MOUTH ONCE DAILY AT BEDTIME, Disp: 30 tablet, Rfl: 0    apixaban (Eliquis) 5 mg, Take 1 tablet (5 mg total) by mouth 2 (two) times a day, Disp: 180 tablet, Rfl: 3    levothyroxine (Euthyrox) 50 mcg tablet, Take 1 tablet (50 mcg total) by mouth daily, Disp: 30 tablet, Rfl: 5    lisinopril (ZESTRIL) 10 mg tablet, TAKE 1 TABLET EVERY DAY, Disp: 90 tablet, Rfl: 3    metoprolol tartrate (LOPRESSOR) 25 mg tablet, Take 1 tablet (25 mg total) by mouth every 12 (twelve) hours, Disp: 60 tablet, Rfl: 11    pantoprazole (PROTONIX) 40 mg tablet, TAKE 1 TABLET EVERY DAY, Disp: 90 tablet, Rfl: 0    No Known Allergies    Objective   Vitals: Blood pressure 116/76, pulse 72, height 5' 2" (1 575 m)  Physical Exam  Constitutional:       Appearance: She is well-developed  HENT:      Head: Normocephalic and atraumatic  Eyes:      Pupils: Pupils are equal, round, and reactive to light  Cardiovascular:      Rate and Rhythm: Normal rate and regular rhythm  Pulmonary:      Effort: Pulmonary effort is normal       Breath sounds: Normal breath sounds  Abdominal:      General: Bowel sounds are normal       Palpations: Abdomen is soft  Musculoskeletal:         General: Normal range of motion  Cervical back: Normal range of motion and neck supple  Skin:     General: Skin is warm and dry     Neurological:      Mental Status: She is alert and oriented to person, place, and time  Labs:  No visits with results within 2 Month(s) from this visit  Latest known visit with results is:   No results displayed because visit has over 200 results  Imaging: I have personally reviewed pertinent reports

## 2022-07-06 DIAGNOSIS — F41.9 ANXIETY: ICD-10-CM

## 2022-07-06 RX ORDER — ALPRAZOLAM 0.5 MG/1
TABLET ORAL
Qty: 30 TABLET | Refills: 0 | Status: SHIPPED | OUTPATIENT
Start: 2022-07-06 | End: 2022-08-04

## 2022-07-21 ENCOUNTER — OFFICE VISIT (OUTPATIENT)
Dept: URGENT CARE | Facility: CLINIC | Age: 72
End: 2022-07-21
Payer: MEDICARE

## 2022-07-21 VITALS
WEIGHT: 172 LBS | DIASTOLIC BLOOD PRESSURE: 63 MMHG | HEART RATE: 84 BPM | TEMPERATURE: 97.9 F | HEIGHT: 65 IN | RESPIRATION RATE: 20 BRPM | SYSTOLIC BLOOD PRESSURE: 111 MMHG | BODY MASS INDEX: 28.66 KG/M2

## 2022-07-21 DIAGNOSIS — L23.9 ALLERGIC CONTACT DERMATITIS OF LOWER LEG: Primary | ICD-10-CM

## 2022-07-21 PROCEDURE — 99213 OFFICE O/P EST LOW 20 MIN: CPT | Performed by: NURSE PRACTITIONER

## 2022-07-21 PROCEDURE — G0463 HOSPITAL OUTPT CLINIC VISIT: HCPCS | Performed by: NURSE PRACTITIONER

## 2022-07-21 RX ORDER — FLUOCINONIDE 0.5 MG/G
OINTMENT TOPICAL 2 TIMES DAILY
Qty: 60 G | Refills: 0 | Status: SHIPPED | OUTPATIENT
Start: 2022-07-21 | End: 2022-09-12

## 2022-07-21 NOTE — PATIENT INSTRUCTIONS
--Apply Rx topical steroid as prescribed to affected areas  Do not use longer than 2 weeks continuously  --Avoid any known triggers/contact precipitants  --Follow-up with PCP or dermatologist if no resolution/worsening over the next 1-2 weeks despite above measures

## 2022-07-21 NOTE — PROGRESS NOTES
[address] [facility] [address]        NAME: Elicia Yip is a 71 y o  female  : 1950    MRN: [id number]  DATE: 2022  TIME: 4:09 PM    Assessment and Plan   Allergic contact dermatitis of lower leg [L23 9]  1  Allergic contact dermatitis of lower leg  fluocinonide (LIDEX) 0 05 % ointment    Ambulatory referral to Dermatology         Patient Instructions     --Apply Rx topical steroid as prescribed to affected areas  Do not use longer than 2 weeks continuously  --Avoid any known triggers/contact precipitants  --Follow-up with PCP or dermatologist if no resolution/worsening over the next 1-2 weeks despite above measures  Chief Complaint     Chief Complaint   Patient presents with    Rash     Started 2 weeks ago on R lower leg  Tried OTC  Now starting on L lower leg  Very painful and itching  History of Present Illness       Here with complaints of itchy rash on lower legs  Started on right shin and has since spread to left shin  Quite itchy, mildly tender  No weeping  No relief from OTC cortisone, lidocaine, Benadryl  No known contact precipitants  No recent change in soaps, detergents, etc    No rash noted elsewhere  Denies prior history of dermatologic conditions  Denies venous stasis/insufficiency  Review of Systems   Review of Systems   Constitutional: Negative for fever  Skin: Positive for rash           Current Medications       Current Outpatient Medications:     ALPRAZolam (XANAX) 0 5 mg tablet, TAKE 1 TABLET BY MOUTH ONCE DAILY AS NEEDED FOR ANXIETY, Disp: 30 tablet, Rfl: 0    amitriptyline (ELAVIL) 25 mg tablet, TAKE 1 TABLET BY MOUTH ONCE DAILY AT BEDTIME, Disp: 30 tablet, Rfl: 0    apixaban (Eliquis) 5 mg, Take 1 tablet (5 mg total) by mouth 2 (two) times a day, Disp: 180 tablet, Rfl: 3    fluocinonide (LIDEX) 0 05 % ointment, Apply topically 2 (two) times a day, Disp: 60 g, Rfl: 0    levothyroxine (Euthyrox) 50 mcg tablet, Take 1 tablet (50 mcg total) by mouth daily, Disp: 30 tablet, Rfl: 5    lisinopril (ZESTRIL) 10 mg tablet, TAKE 1 TABLET EVERY DAY, Disp: 90 tablet, Rfl: 3    metoprolol tartrate (LOPRESSOR) 25 mg tablet, Take 1 tablet (25 mg total) by mouth every 12 (twelve) hours, Disp: 60 tablet, Rfl: 11    pantoprazole (PROTONIX) 40 mg tablet, TAKE 1 TABLET EVERY DAY, Disp: 90 tablet, Rfl: 0    Current Allergies     Allergies as of 07/21/2022    (No Known Allergies)            The following portions of the patient's history were reviewed and updated as appropriate: allergies, current medications, past family history, past medical history, past social history, past surgical history and problem list      Past Medical History:   Diagnosis Date    Anxiety     Congenital absence of one kidney     Depression     GERD (gastroesophageal reflux disease)     History of transfusion     Hypertension     Insomnia     Menopause     Other specified hypothyroidism 1/28/2019    Vitamin B12 deficiency        Past Surgical History:   Procedure Laterality Date    CARDIAC PACEMAKER PLACEMENT Left 4/16/2022    Procedure: INSERTION PACEMAKER;  Surgeon: Gustavo Grande DO;  [facility];  Service: Cardiology    COLONOSCOPY      ID COLONOSCOPY FLX DX W/COLLJ SPEC WHEN PFRMD N/A 5/30/2017    Procedure: EGD AND COLONOSCOPY;  Surgeon: Chucho Heaton MD;  Location: [facility];   Service: Gastroenterology    ID OPEN TX DISTAL FIBULAR FRACTURE LAT MALLEOLUS Left 7/13/2020    Procedure: OPEN REDUCTION W/ INTERNAL FIXATION (ORIF) ANKLE;  Surgeon: Jeanette Moore DO;  Location: [facility];  Service: Orthopedics    REDUCTION MAMMAPLASTY Bilateral 06/06/2011    REDUCTION MAMMAPLASTY      SHOULDER SURGERY      TONSILLECTOMY         Family History   Problem Relation Age of Onset    Lung cancer Mother 79    Heart disease Father     Lung cancer Brother 48    No Known Problems Maternal Grandmother     No Known Problems Maternal Grandfather     No Known Problems Paternal Grandmother     No Known Problems Paternal Grandfather          Medications have been verified  Objective   /63 (Patient Position: Sitting)   Pulse 84   Temp 97 9 °F (36 6 °C) (Temporal)   Resp 20   Ht 5' 5" (1 651 m)   Wt 78 kg (172 lb)   LMP  (LMP Unknown)   BMI 28 62 kg/m²   No LMP recorded (lmp unknown)  Patient is postmenopausal        Physical Exam     Physical Exam  Constitutional:       General: She is not in acute distress  Skin:     Findings: Rash present  Comments: Lower 2/3rds of anterior lower legs with discrete-->confluent erythematous maculopapular lesions-->plaques, R>L side  Nontender, non-indurated  No vesicles, scabs, scale, weeping noted  No rash noted elsewhere  Neurological:      Mental Status: She is alert     Psychiatric:         Mood and Affect: Mood normal

## 2022-07-27 ENCOUNTER — CONSULT (OUTPATIENT)
Dept: DERMATOLOGY | Facility: CLINIC | Age: 72
End: 2022-07-27
Payer: MEDICARE

## 2022-07-27 VITALS — WEIGHT: 174.1 LBS | HEIGHT: 65 IN | BODY MASS INDEX: 29.01 KG/M2 | TEMPERATURE: 97.6 F

## 2022-07-27 DIAGNOSIS — R21 RASH: Primary | ICD-10-CM

## 2022-07-27 DIAGNOSIS — L23.9 ALLERGIC CONTACT DERMATITIS OF LOWER LEG: ICD-10-CM

## 2022-07-27 DIAGNOSIS — D48.5 NEOPLASM OF UNCERTAIN BEHAVIOR OF SKIN: ICD-10-CM

## 2022-07-27 PROCEDURE — 88305 TISSUE EXAM BY PATHOLOGIST: CPT | Performed by: STUDENT IN AN ORGANIZED HEALTH CARE EDUCATION/TRAINING PROGRAM

## 2022-07-27 PROCEDURE — 11102 TANGNTL BX SKIN SINGLE LES: CPT | Performed by: STUDENT IN AN ORGANIZED HEALTH CARE EDUCATION/TRAINING PROGRAM

## 2022-07-27 PROCEDURE — 99204 OFFICE O/P NEW MOD 45 MIN: CPT | Performed by: STUDENT IN AN ORGANIZED HEALTH CARE EDUCATION/TRAINING PROGRAM

## 2022-07-27 NOTE — PATIENT INSTRUCTIONS
RASH likely eczematous process      Assessment and Plan:  Based on a thorough discussion of this condition and the management approach to it (including a comprehensive discussion of the known risks, side effects and potential benefits of treatment), the patient (family) agrees to implement the following specific plan:  recommenced Triamcinolone ointment to use as needed twice a day no more then 2 weeks at a time       [address] [place] [address] and Plan:  I have discussed with the patient that a sample of skin via a "skin biopsy would be potentially helpful to further make a specific diagnosis under the microscope  Based on a thorough discussion of this condition and the management approach to it (including a comprehensive discussion of the known risks, side effects and potential benefits of treatment), the patient (family) agrees to implement the following specific plan:    Procedure:  Skin Biopsy  After a thorough discussion of treatment options and risk/benefits/alternatives (including but not limited to local pain, scarring, dyspigmentation, blistering, possible superinfection, and inability to confirm a diagnosis via histopathology), verbal and written consent were obtained and portion of the rash was biopsied for tissue sample  See below for consent that was obtained from patient and subsequent Procedure Note  INFORMED CONSENT DISCUSSION AND POST-OPERATIVE INSTRUCTIONS FOR PATIENT    I   RATIONALE FOR PROCEDURE  I understand that a skin biopsy allows the Dermatologist to test a lesion or rash under the microscope to obtain a diagnosis  It usually involves numbing the area with numbing medication and removing a small piece of skin; sometimes the area will be closed with sutures  In this specific procedure, sutures are not usually needed  If any sutures are placed, then they are usually need to be removed in 2 weeks or less      I understand that my Dermatologist recommends that a skin "shave" biopsy be performed today  A local anesthetic, similar to the kind that a dentist uses when filling a cavity, will be injected with a very small needle into the skin area to be sampled  The injected skin and tissue underneath "will go to sleep and become numb so no pain should be felt afterwards  An instrument shaped like a tiny "razor blade" (shave biopsy instrument) will be used to cut a small piece of tissue and skin from the area so that a sample of tissue can be taken and examined more closely under the microscope  A slight amount of bleeding will occur, but it will be stopped with direct pressure and a pressure bandage and any other appropriate methods  I understands that a scar will form where the wound was created  Surgical ointment will be applied to help protect the wound  Sutures are not usually needed  II   RISKS AND POTENTIAL COMPLICATIONS   I understand the risks and potential complications of a skin biopsy include but are not limited to the following:  Bleeding  Infection  Pain  Scar/keloid  Skin discoloration  Incomplete Removal  Recurrence  Nerve Damage/Numbness/Loss of Function  Allergic Reaction to Anesthesia  Biopsies are diagnostic procedures and based on findings additional treatment or evaluation may be required  Loss or destruction of specimen resulting in no additional findings    My Dermatologist has explained to me the nature of the condition, the nature of the procedure, and the benefits to be reasonably expected compared with alternative approaches  My Dermatologist has discussed the likelihood of major risks or complications of this procedure including the specific risks listed above, such as bleeding, infection, and scarring/keloid  I understand that a scar is expected after this procedure  I understand that my physician cannot predict if the scar will form a "keloid," which extends beyond the borders of the wound that is created    A keloid is a thick, painful, and bumpy scar  A keloid can be difficult to treat, as it does not always respond well to therapy, which includes injecting cortisone directly into the keloid every few weeks  While this usually reduces the pain and size of the scar, it does not eliminate it  I understand that photographs may be taken before and after the procedure  These will be maintained as part of the medical providers confidential records and may not be made available to me  I further authorize the medical provider to use the photographs for teaching purposes or to illustrate scientific papers, books, or lectures if in his/her judgment, medical research, education, or science may benefit from its use  I have had an opportunity to fully inquire about the risks and benefits of this procedure and its alternatives  I have been given ample time and opportunity to ask questions and to seek a second opinion if I wished to do so  I acknowledge that there have specifically been no guarantees as to the cosmetic results from the procedure  I am aware that with any procedure there is always the possibility of an unexpected complication  III  POST-PROCEDURAL CARE (WHAT YOU WILL NEED TO DO "AFTER THE BIOPSY" TO OPTIMIZE HEALING)    Keep the area clean and dry  Try NOT to remove the bandage or get it wet for the first 24 hours  Gently clean the area and apply surgical ointment (such as Vaseline petrolatum ointment, which is available "over the counter" and not a prescription) to the biopsy site for up to 2 weeks straight  This acts to protect the wound from the outside world  Sutures are not usually placed in this procedure  If any sutures were placed, return for suture removal as instructed (generally 1 week for the face, 2 weeks for the body)  Take Acetaminophen (Tylenol) for discomfort, if no contraindications  Ibuprofen or aspirin could make bleeding worse      Call our office immediately for signs of infection: fever, chills, increased redness, warmth, tenderness, discomfort/pain, or pus or foul smell coming from the wound  WHAT TO DO IF THERE IS ANY BLEEDING? If a small amount of bleeding is noticed, place a clean cloth over the area and apply firm pressure for ten minutes  Check the wound after 10 minutes of direct pressure  If bleeding persists, try one more time for an additional 10 minutes of direct pressure on the area  If the bleeding becomes heavier or does not stop after the second attempt, or if you have any other questions about this procedure, then please call your [facility]'s Dermatologist by calling [phone] ([phone])  I hereby acknowledge that I have reviewed and verified the site with my Dermatologist and have requested and authorized my Dermatologist to proceed with the procedure

## 2022-07-27 NOTE — PROGRESS NOTES
[facility] Note     Patient Name: Susie July  Encounter Date: 7/27/2022     Have you been cared for by a [facility] Dermatologist in the last 3 years and, if so, which one? No    · Have you traveled outside of the [facility] in the past 3 months? No     May we call your Preferred Phone number to discuss your specific medical information? Yes     May we leave a detailed message that includes your specific medical information? Yes      Today's Chief Concerns:   Concern #1:  Rash on legs for 3 weeks    Concern #2:  Growth on inner right leg     Past Medical History:  Have you personally ever had or currently have any of the following? · Skin cancer (such as Melanoma, Basal Cell Carcinoma, Squamous Cell Carcinoma? (If Yes, please provide more detail)- YES, basal cell removed from chest by rashmi 2-3 years ago  · Eczema: No  · Psoriasis: No  · HIV/AIDS: No  · Hepatitis B or C: No  · Tuberculosis: No  · Systemic Immunosuppression such as Diabetes, Biologic or Immunotherapy, Chemotherapy, Organ Transplantation, Bone Marrow Transplantation (If YES, please provide more detail): No  · Radiation Treatment (If YES, please provide more detail): No  · Any other major medical conditions/concerns? (If Yes, which types)- YES, afib and bradycardia     Social History:    What is/was your primary occupation? Retired     What are your hobbies/past-times? n/a    Family history:    Have any of your "first degree relatives" (parent, brother, sister, or child) had any of the following       · Skin cancer such as Melanoma or Merkel Cell Carcinoma or Pancreatic Cancer? No  · Eczema, Asthma, Hay Fever or Seasonal Allergies: No  · Psoriasis or Psoriatic Arthritis: No  · Do any other medical conditions seem to run in your family? If Yes, what condition and which relatives?   YES, mother -  Lung cancer    Current Medications (update all dermatological medications before printing patient's AVS):    Current Outpatient Medications:     ALPRAZolam (XANAX) 0 5 mg tablet, TAKE 1 TABLET BY MOUTH ONCE DAILY AS NEEDED FOR ANXIETY, Disp: 30 tablet, Rfl: 0    amitriptyline (ELAVIL) 25 mg tablet, TAKE 1 TABLET BY MOUTH ONCE DAILY AT BEDTIME, Disp: 30 tablet, Rfl: 0    apixaban (Eliquis) 5 mg, Take 1 tablet (5 mg total) by mouth 2 (two) times a day, Disp: 180 tablet, Rfl: 3    fluocinonide (LIDEX) 0 05 % ointment, Apply topically 2 (two) times a day, Disp: 60 g, Rfl: 0    levothyroxine (Euthyrox) 50 mcg tablet, Take 1 tablet (50 mcg total) by mouth daily, Disp: 30 tablet, Rfl: 5    lisinopril (ZESTRIL) 10 mg tablet, TAKE 1 TABLET EVERY DAY, Disp: 90 tablet, Rfl: 3    metoprolol tartrate (LOPRESSOR) 25 mg tablet, Take 1 tablet (25 mg total) by mouth every 12 (twelve) hours, Disp: 60 tablet, Rfl: 11    pantoprazole (PROTONIX) 40 mg tablet, TAKE 1 TABLET EVERY DAY, Disp: 90 tablet, Rfl: 0      Review of Systems/System Alerts:  Have you recently had or currently have any of the following? If YES, what are you doing for the problem? · Fever, chills or unintended weight loss: No  · Sudden loss or change in your vision: No  · Nausea, vomiting or blood in your stool: No  · Painful or swollen joints: YES, arthritis   · Wheezing or cough: No  · Changing mole or non-healing wound: No  · Nosebleeds: No  · Excessive sweating: No  · Easy or prolonged bleeding? YES, eliquis  · Over the last 2 weeks, how often have you been bothered by the following problems? · Taking little interest or pleasure in doing things: 1 - Not at All  · Feeling down, depressed, or hopeless: 1 - Not at All  · Rapid heartbeat with epinephrine:  No  · FEMALES ONLY:    · Are you pregnant or planning to become pregnant? No  · Are you currently or planning to be nursing or breast feeding? No  · Any known allergies?   No  No Known Allergies      PHYSICAL EXAM:       Was a chaperone (Derm Clinical Assistant) present throughout the entire Physical Exam? Yes     Did the Dermatology Team specifically  the patient on the importance of a Full Skin Exam to be sure that nothing is missed clinically?  Yes}  o Did the patient request or accept a Full Skin Exam?  NO  o Did the patient specifically refuse to have the areas "under-the-bra" examined by the Dermatologist? No  o Did the patient specifically refuse to have the areas "under-the-underwear" examined by the Dermatologist? No      CONSTITUTIONAL (Height, Weight, and Temperature must be recorded):   Vitals:    07/27/22 1021   Temp: 97 6 °F (36 4 °C)   TempSrc: Temporal   Weight: 79 kg (174 lb 1 6 oz)   Height: 5' 5" (1 651 m)         PSYCH: Normal mood and affect  EYES: Normal conjunctiva  ENT: Normal lips and oral mucosa  CARDIOVASCULAR: No edema  RESPIRATORY: Normal respirations  HEME/LYMPH/IMMUNO:  No regional lymphadenopathy except as noted below in ASSESSMENT AND PLAN BY DIAGNOSIS    SKIN:  FULL ORGAN SYSTEM EXAM  Hair, Scalp, Ears, Face Normal except as noted below in Assessment   Neck, Cervical Chain Nodes Normal except as noted below in Assessment   Right Arm/Hand/Fingers Normal except as noted below in Assessment   Left Arm/Hand/Fingers Normal except as noted below in Assessment   Chest/Breasts/Axillae Viewed areas Normal except as noted below in Assessment   Abdomen, Umbilicus Normal except as noted below in Assessment   Back/Spine Normal except as noted below in Assessment   Groin/Genitalia/Buttocks NOT EXAMINED   Right Leg, Foot, Toes Normal except as noted below in Assessment   Left Leg, Foot, Toes Normal except as noted below in Assessment        ASSESSMENT AND PLAN BY DIAGNOSIS:    History of Present Condition:     Duration:  How long has this been an issue for you?    o  3 weeks    Location Affected:  Where on the body is this affecting you?    o  lower legs - started on right leg    Quality:  Is there any bleeding, pain, itch, burning/irritation, or redness associated with the skin lesion? o  redness  o Pain  o Itch  o Burning/irritation  o Bleeding from scratching    Severity:  Describe any bleeding, pain, itch, burning/irritation, or redness on a scale of 1 to 10 (with 10 being the worst)  o  10 but using steroid cream 2-3   Timing:  Does this condition seem to be there pretty constantly or do you notice it more at specific times throughout the day?    o  intermittent since using cream    Context:  Have you ever noticed that this condition seems to be associated with specific activities you do?    o  denies   Modifying Factors:    o Anything that seems to make the condition worse?    -  denies   o What have you tried to do to make the condition better?    -  lidex ointment    Associated Signs and Symptoms:  Does this skin lesion seem to be associated with any of the following:  o  SL AMB DERM SIGNS AND SYMPTOMS: Redness, Itching and Scratching and Bleeding     RASH likely eczematous process    Physical Exam:   (Anatomic Location); (Size and Morphological Description); (Differential Diagnosis):  o Red plaques on legs (much improved)    Pertinent Positives:   Pertinent Negatives: Additional History of Present Condition:  Rash started 3 weeks ago on right leg with pain, itching and burning    Given Lidex ointment with some improvement    Assessment and Plan:  Based on a thorough discussion of this condition and the management approach to it (including a comprehensive discussion of the known risks, side effects and potential benefits of treatment), the patient (family) agrees to implement the following specific plan:   recommenced Triamcinolone ointment to use as needed twice a day no more then 2 weeks at a time       NEOPLASM OF UNCERTAIN BEHAVIOR OF SKIN    Physical Exam:   (Anatomic Location); (Size and Morphological Description); (Differential Diagnosis):  o Specimen A: right lower leg; skin; tangential biopsy; 71year old female with a 0 4 x 0 3 cm indurated crusty papule; differential diagnosis: seborrheic keratosis versus squamous cell carcinoma    Pertinent Positives:   Pertinent Negatives: Additional History of Present Condition:  Patient noticed area 2 months ago and denies symptoms  Patient had area removed from chest 2-3 years ago basal cell carcinoma - had patient signed medical release form     Assessment and Plan:   I have discussed with the patient that a sample of skin via a "skin biopsy would be potentially helpful to further make a specific diagnosis under the microscope   Based on a thorough discussion of this condition and the management approach to it (including a comprehensive discussion of the known risks, side effects and potential benefits of treatment), the patient (family) agrees to implement the following specific plan:    o Procedure:  Skin Biopsy  After a thorough discussion of treatment options and risk/benefits/alternatives (including but not limited to local pain, scarring, dyspigmentation, blistering, possible superinfection, and inability to confirm a diagnosis via histopathology), verbal and written consent were obtained and portion of the rash was biopsied for tissue sample  See below for consent that was obtained from patient and subsequent Procedure Note  PROCEDURE TANGENTIAL (SHAVE) BIOPSY NOTE:     Performing Physician: Orien Denver Anatomic Location; Clinical Description with size (cm); Pre-Op Diagnosis:   Specimen A: right lower leg; skin; tangential biopsy; 71year old female with a 0 4 x 0 3 cm indurated crusty papule; differential diagnosis: seborrheic keratosis versus squamous cell carcinoma    Post-op diagnosis: Same      Local anesthesia: 1% xylocaine with epi       Topical anesthesia: None     Hemostasis: Aluminum chloride       After obtaining informed consent  at which time there was a discussion about the purpose of biopsy  and low risks of infection and bleeding  The area was prepped and draped in the usual fashion  Anesthesia was obtained with 1% lidocaine with epinephrine  A shave biopsy to an appropriate sampling depth was obtained by Shave (Dermablade or 15 blade) The resulting wound was covered with surgical ointment and bandaged appropriately  The patient tolerated the procedure well without complications and was without signs of functional compromise  Specimen has been sent for review by Dermatopathology  Standard post-procedure care has been explained and has been included in written form within the patient's copy of Informed Consent  INFORMED CONSENT DISCUSSION AND POST-OPERATIVE INSTRUCTIONS FOR PATIENT    I   RATIONALE FOR PROCEDURE  I understand that a skin biopsy allows the Dermatologist to test a lesion or rash under the microscope to obtain a diagnosis  It usually involves numbing the area with numbing medication and removing a small piece of skin; sometimes the area will be closed with sutures  In this specific procedure, sutures are not usually needed  If any sutures are placed, then they are usually need to be removed in 2 weeks or less  I understand that my Dermatologist recommends that a skin "shave" biopsy be performed today  A local anesthetic, similar to the kind that a dentist uses when filling a cavity, will be injected with a very small needle into the skin area to be sampled  The injected skin and tissue underneath "will go to sleep and become numb so no pain should be felt afterwards  An instrument shaped like a tiny "razor blade" (shave biopsy instrument) will be used to cut a small piece of tissue and skin from the area so that a sample of tissue can be taken and examined more closely under the microscope  A slight amount of bleeding will occur, but it will be stopped with direct pressure and a pressure bandage and any other appropriate methods  I understands that a scar will form where the wound was created  Surgical ointment will be applied to help protect the wound  Sutures are not usually needed  II   RISKS AND POTENTIAL COMPLICATIONS   I understand the risks and potential complications of a skin biopsy include but are not limited to the following:   Bleeding   Infection   Pain   Scar/keloid   Skin discoloration   Incomplete Removal   Recurrence   Nerve Damage/Numbness/Loss of Function   Allergic Reaction to Anesthesia   Biopsies are diagnostic procedures and based on findings additional treatment or evaluation may be required   Loss or destruction of specimen resulting in no additional findings    My Dermatologist has explained to me the nature of the condition, the nature of the procedure, and the benefits to be reasonably expected compared with alternative approaches  My Dermatologist has discussed the likelihood of major risks or complications of this procedure including the specific risks listed above, such as bleeding, infection, and scarring/keloid  I understand that a scar is expected after this procedure  I understand that my physician cannot predict if the scar will form a "keloid," which extends beyond the borders of the wound that is created  A keloid is a thick, painful, and bumpy scar  A keloid can be difficult to treat, as it does not always respond well to therapy, which includes injecting cortisone directly into the keloid every few weeks  While this usually reduces the pain and size of the scar, it does not eliminate it  I understand that photographs may be taken before and after the procedure  These will be maintained as part of the medical providers confidential records and may not be made available to me  I further authorize the medical provider to use the photographs for teaching purposes or to illustrate scientific papers, books, or lectures if in his/her judgment, medical research, education, or science may benefit from its use      I have had an opportunity to fully inquire about the risks and benefits of this procedure and its alternatives  I have been given ample time and opportunity to ask questions and to seek a second opinion if I wished to do so  I acknowledge that there have specifically been no guarantees as to the cosmetic results from the procedure  I am aware that with any procedure there is always the possibility of an unexpected complication  III  POST-PROCEDURAL CARE (WHAT YOU WILL NEED TO DO "AFTER THE BIOPSY" TO OPTIMIZE HEALING)     Keep the area clean and dry  Try NOT to remove the bandage or get it wet for the first 24 hours   Gently clean the area and apply surgical ointment (such as Vaseline petrolatum ointment, which is available "over the counter" and not a prescription) to the biopsy site for up to 2 weeks straight  This acts to protect the wound from the outside world   Sutures are not usually placed in this procedure  If any sutures were placed, return for suture removal as instructed (generally 1 week for the face, 2 weeks for the body)   Take Acetaminophen (Tylenol) for discomfort, if no contraindications  Ibuprofen or aspirin could make bleeding worse   Call our office immediately for signs of infection: fever, chills, increased redness, warmth, tenderness, discomfort/pain, or pus or foul smell coming from the wound  WHAT TO DO IF THERE IS ANY BLEEDING? If a small amount of bleeding is noticed, place a clean cloth over the area and apply firm pressure for ten minutes  Check the wound after 10 minutes of direct pressure  If bleeding persists, try one more time for an additional 10 minutes of direct pressure on the area  If the bleeding becomes heavier or does not stop after the second attempt, or if you have any other questions about this procedure, then please call your [facility] by calling [phone] (SKIN)       I hereby acknowledge that I have reviewed and verified the site with my Dermatologist and have requested and authorized my Dermatologist to proceed with the procedure            Scribe Attestation    I,:  Antonio Dixonsindhu am acting as a scribe while in the presence of the attending physician :       I,:  Vira Berger MD personally performed the services described in this documentation    as scribed in my presence :

## 2022-08-03 DIAGNOSIS — F41.9 ANXIETY: ICD-10-CM

## 2022-08-04 RX ORDER — ALPRAZOLAM 0.5 MG/1
TABLET ORAL
Qty: 30 TABLET | Refills: 0 | Status: SHIPPED | OUTPATIENT
Start: 2022-08-04 | End: 2022-09-09

## 2022-08-05 NOTE — RESULT ENCOUNTER NOTE
Called patient and informed her about results  Pt understands  Pt instructed to let us know if lesion returns  Pt understands instruction  unable to assess no

## 2022-08-15 ENCOUNTER — IN-CLINIC DEVICE VISIT (OUTPATIENT)
Dept: CARDIOLOGY CLINIC | Facility: CLINIC | Age: 72
End: 2022-08-15
Payer: MEDICARE

## 2022-08-15 DIAGNOSIS — Z95.0 PRESENCE OF CARDIAC PACEMAKER: Primary | ICD-10-CM

## 2022-08-15 PROCEDURE — 93280 PM DEVICE PROGR EVAL DUAL: CPT | Performed by: INTERNAL MEDICINE

## 2022-08-15 NOTE — PROGRESS NOTES
Results for orders placed or performed in visit on 08/15/22   Cardiac EP device report    Narrative    MDT DUAL CHAMBER PPM (AAIR-DDDR 60) - ACTIVE SYSTEM IS MRI CONDITIONAL  DEVICE INTERROGATED IN THE [facility] OFFICE: BATTERY VOLTAGE ADEQUATE (11 YRS)  AP: 75 3%  : 0 2% (MVP-ON)  ALL LEAD PARAMETERS WITHIN NORMAL LIMITS  4 FAST A&V EPISODES W/ AVAIL EGMS SHOWING RVR W/ -188 BPM, MAX DURATION 14 MINS 33 SECS  11 AT/AF EPISODES W/ AVAIL EGMS SHOWING AF, MAX DURAITON 11 HRS 49 MINS  AF BURDEN: 4 5%  PT TAKES ELQIUIS, METOPROLOL TART  EF: 65% (ECHO 4/12/22)  NO PROGRAMMING CHANGES MADE TO DEVICE PARAMETERS  PACEMAKER FUNCTIONING APPROPRIATELY    [address] [place] [address] Street

## 2022-08-19 DIAGNOSIS — R10.13 EPIGASTRIC PAIN: ICD-10-CM

## 2022-08-19 RX ORDER — AMITRIPTYLINE HYDROCHLORIDE 25 MG/1
TABLET, FILM COATED ORAL
Qty: 30 TABLET | Refills: 0 | Status: SHIPPED | OUTPATIENT
Start: 2022-08-19 | End: 2022-09-19

## 2022-09-09 DIAGNOSIS — F41.9 ANXIETY: ICD-10-CM

## 2022-09-09 RX ORDER — ALPRAZOLAM 0.5 MG/1
TABLET ORAL
Qty: 30 TABLET | Refills: 0 | Status: SHIPPED | OUTPATIENT
Start: 2022-09-09 | End: 2022-10-05

## 2022-09-12 ENCOUNTER — TELEPHONE (OUTPATIENT)
Dept: CARDIOLOGY CLINIC | Facility: CLINIC | Age: 72
End: 2022-09-12

## 2022-09-12 DIAGNOSIS — I48.0 PAROXYSMAL ATRIAL FIBRILLATION (HCC): Primary | ICD-10-CM

## 2022-09-12 RX ORDER — METOPROLOL TARTRATE 50 MG/1
50 TABLET, FILM COATED ORAL EVERY 12 HOURS SCHEDULED
Qty: 60 TABLET | Refills: 11 | Status: SHIPPED | OUTPATIENT
Start: 2022-09-12 | End: 2023-05-15 | Stop reason: SDUPTHER

## 2022-09-12 NOTE — TELEPHONE ENCOUNTER
Reviewed device report  Will increase metoprolol to 50mg BID due to RVR  Next step plan for NM stress and flecainide

## 2022-09-19 DIAGNOSIS — R10.13 EPIGASTRIC PAIN: ICD-10-CM

## 2022-09-19 RX ORDER — AMITRIPTYLINE HYDROCHLORIDE 25 MG/1
TABLET, FILM COATED ORAL
Qty: 30 TABLET | Refills: 0 | Status: SHIPPED | OUTPATIENT
Start: 2022-09-19

## 2022-10-05 DIAGNOSIS — F41.9 ANXIETY: ICD-10-CM

## 2022-10-05 RX ORDER — ALPRAZOLAM 0.5 MG/1
TABLET ORAL
Qty: 30 TABLET | Refills: 0 | Status: SHIPPED | OUTPATIENT
Start: 2022-10-05

## 2022-10-11 PROBLEM — J10.1 INFLUENZA A: Status: RESOLVED | Noted: 2022-04-11 | Resolved: 2022-10-11

## 2022-10-13 DIAGNOSIS — K21.9 GASTROESOPHAGEAL REFLUX DISEASE: ICD-10-CM

## 2022-10-13 RX ORDER — PANTOPRAZOLE SODIUM 40 MG/1
TABLET, DELAYED RELEASE ORAL
Qty: 90 TABLET | Refills: 0 | Status: SHIPPED | OUTPATIENT
Start: 2022-10-13

## 2022-10-19 ENCOUNTER — OFFICE VISIT (OUTPATIENT)
Dept: FAMILY MEDICINE CLINIC | Facility: CLINIC | Age: 72
End: 2022-10-19
Payer: MEDICARE

## 2022-10-19 VITALS
TEMPERATURE: 97.6 F | WEIGHT: 175 LBS | BODY MASS INDEX: 29.16 KG/M2 | RESPIRATION RATE: 18 BRPM | HEART RATE: 70 BPM | SYSTOLIC BLOOD PRESSURE: 110 MMHG | HEIGHT: 65 IN | DIASTOLIC BLOOD PRESSURE: 72 MMHG

## 2022-10-19 DIAGNOSIS — M25.50 ARTHRALGIA, UNSPECIFIED JOINT: ICD-10-CM

## 2022-10-19 DIAGNOSIS — E03.8 OTHER SPECIFIED HYPOTHYROIDISM: ICD-10-CM

## 2022-10-19 DIAGNOSIS — I48.0 PAROXYSMAL A-FIB (HCC): ICD-10-CM

## 2022-10-19 DIAGNOSIS — F32.9 MAJOR DEPRESSIVE DISORDER, REMISSION STATUS UNSPECIFIED, UNSPECIFIED WHETHER RECURRENT: Chronic | ICD-10-CM

## 2022-10-19 DIAGNOSIS — Z23 NEED FOR VACCINATION: ICD-10-CM

## 2022-10-19 DIAGNOSIS — I10 ESSENTIAL HYPERTENSION: Primary | ICD-10-CM

## 2022-10-19 PROBLEM — S82.832D CLOSED FRACTURE OF DISTAL END OF LEFT FIBULA WITH ROUTINE HEALING: Status: RESOLVED | Noted: 2020-07-13 | Resolved: 2022-10-19

## 2022-10-19 PROBLEM — I62.00 SUBDURAL HEMORRHAGE (HCC): Status: RESOLVED | Noted: 2022-04-11 | Resolved: 2022-10-19

## 2022-10-19 PROBLEM — R53.1 WEAKNESS GENERALIZED: Status: RESOLVED | Noted: 2022-04-11 | Resolved: 2022-10-19

## 2022-10-19 PROBLEM — F11.20 CONTINUOUS OPIOID DEPENDENCE (HCC): Status: RESOLVED | Noted: 2022-04-21 | Resolved: 2022-10-19

## 2022-10-19 PROCEDURE — 90662 IIV NO PRSV INCREASED AG IM: CPT

## 2022-10-19 PROCEDURE — G0009 ADMIN PNEUMOCOCCAL VACCINE: HCPCS

## 2022-10-19 PROCEDURE — G0008 ADMIN INFLUENZA VIRUS VAC: HCPCS

## 2022-10-19 PROCEDURE — 90677 PCV20 VACCINE IM: CPT

## 2022-10-19 PROCEDURE — 99214 OFFICE O/P EST MOD 30 MIN: CPT | Performed by: INTERNAL MEDICINE

## 2022-10-19 RX ORDER — ESCITALOPRAM OXALATE 10 MG/1
10 TABLET ORAL DAILY
Start: 2022-10-19

## 2022-10-19 NOTE — ASSESSMENT & PLAN NOTE
Managed by cardiology and she continues on apixaban and now has a pacemaker  Denies recent symptoms of palpitations

## 2022-10-19 NOTE — ASSESSMENT & PLAN NOTE
This is worsening  She has not been on her antidepressants  Will restart escitalopram 10 mg daily  Possible side effects were discussed  She will follow up in 1 month to recheck  Asked patient to call sooner than next scheduled appointment for any complaints or issues

## 2022-10-19 NOTE — PROGRESS NOTES
Name: Jose Rice      : 1950      MRN: [id number]  Encounter Provider: Lisandro Junior MD  Encounter Date: 10/19/2022   Encounter department: [address] [place] [address]     1  Essential hypertension  Assessment & Plan:  Well controlled and will continue lisinopril as ordered  Encouraged continued exercise  2  Major depressive disorder, remission status unspecified, unspecified whether recurrent  Assessment & Plan: This is worsening  She has not been on her antidepressants  Will restart escitalopram 10 mg daily  Possible side effects were discussed  She will follow up in 1 month to recheck  Asked patient to call sooner than next scheduled appointment for any complaints or issues  Orders:  -     escitalopram (Lexapro) 10 mg tablet; Take 1 tablet (10 mg total) by mouth daily    3  Paroxysmal A-fib [facility])  Assessment & Plan:  Managed by cardiology and she continues on apixaban and now has a pacemaker  Denies recent symptoms of palpitations  4  Other specified hypothyroidism  Assessment & Plan:  Recent TSH is normal and she will continue levothyroxine at current dosage  5  Need for vaccination  -     influenza vaccine, high-dose, PF 0 7 mL (FLUZONE HIGH-DOSE)  -     Pneumococcal Conjugate Vaccine 20-valent (Pcv20)    6  Arthralgia, unspecified joint  -     Ambulatory Referral to Orthopedic Surgery; Future           Subjective      She is here for depression  She was previously on lexapro and bupropion but was taken off this  She thinks she needs to go back on lexapro  Has anhedonia and tearfulness  Sleeping and eating are okay  Denies SI/HI  There is no chest pain or palpitations  Is s/p pacemaker and doing well, no further afib that she is aware of  There is no cold or heat intolerance, diarrhea or constipation, hair or skin changes, unanticipated weight gain or loss    Has a lot of knee and right shoulder pains and would like to see an orthopedist  Review of Systems   Constitutional: Negative  Respiratory: Negative  Cardiovascular: Negative  Musculoskeletal: Positive for arthralgias  Psychiatric/Behavioral: Positive for dysphoric mood  Negative for self-injury, sleep disturbance and suicidal ideas  Current Outpatient Medications on File Prior to Visit   Medication Sig   • ALPRAZolam (XANAX) 0 5 mg tablet TAKE 1 TABLET BY MOUTH ONCE DAILY AS NEEDED FOR ANXIETY   • amitriptyline (ELAVIL) 25 mg tablet TAKE 1 TABLET BY MOUTH ONCE DAILY AT BEDTIME   • apixaban (Eliquis) 5 mg Take 1 tablet (5 mg total) by mouth 2 (two) times a day   • levothyroxine (Euthyrox) 50 mcg tablet Take 1 tablet (50 mcg total) by mouth daily   • lisinopril (ZESTRIL) 10 mg tablet TAKE 1 TABLET EVERY DAY   • metoprolol tartrate (LOPRESSOR) 50 mg tablet Take 1 tablet (50 mg total) by mouth every 12 (twelve) hours   • pantoprazole (PROTONIX) 40 mg tablet TAKE 1 TABLET EVERY DAY       Objective     /72   Pulse 70   Temp 97 6 °F (36 4 °C)   Resp 18   Ht 5' 5" (1 651 m)   Wt 79 4 kg (175 lb)   LMP  (LMP Unknown)   BMI 29 12 kg/m²     Physical Exam  Constitutional:       Appearance: She is well-developed  Eyes:      Conjunctiva/sclera: Conjunctivae normal    Neck:      Thyroid: No thyromegaly  Vascular: No JVD  Cardiovascular:      Rate and Rhythm: Normal rate and regular rhythm  Heart sounds: Normal heart sounds  No murmur heard  No friction rub  No gallop  Pulmonary:      Effort: Pulmonary effort is normal       Breath sounds: Normal breath sounds  No wheezing or rales  Abdominal:      General: Bowel sounds are normal  There is no distension  Palpations: Abdomen is soft  Tenderness: There is no abdominal tenderness  Musculoskeletal:      Cervical back: Neck supple  Psychiatric:         Mood and Affect: Mood is depressed  Behavior: Behavior normal          Thought Content:  Thought content normal          Judgment: Judgment normal        Moises Giron MD

## 2022-10-25 DIAGNOSIS — E03.8 OTHER SPECIFIED HYPOTHYROIDISM: ICD-10-CM

## 2022-10-25 RX ORDER — LEVOTHYROXINE SODIUM 0.05 MG/1
TABLET ORAL
Qty: 30 TABLET | Refills: 0 | Status: SHIPPED | OUTPATIENT
Start: 2022-10-25

## 2022-11-04 DIAGNOSIS — R10.13 EPIGASTRIC PAIN: ICD-10-CM

## 2022-11-04 RX ORDER — AMITRIPTYLINE HYDROCHLORIDE 25 MG/1
TABLET, FILM COATED ORAL
Qty: 30 TABLET | Refills: 0 | Status: SHIPPED | OUTPATIENT
Start: 2022-11-04

## 2022-11-10 DIAGNOSIS — F41.9 ANXIETY: ICD-10-CM

## 2022-11-14 RX ORDER — ALPRAZOLAM 0.5 MG/1
TABLET ORAL
Qty: 30 TABLET | Refills: 0 | Status: SHIPPED | OUTPATIENT
Start: 2022-11-14

## 2022-11-21 ENCOUNTER — REMOTE DEVICE CLINIC VISIT (OUTPATIENT)
Dept: CARDIOLOGY CLINIC | Facility: CLINIC | Age: 72
End: 2022-11-21

## 2022-11-21 DIAGNOSIS — Z95.0 PRESENCE OF PERMANENT CARDIAC PACEMAKER: Primary | ICD-10-CM

## 2022-11-21 NOTE — PROGRESS NOTES
MDT DUAL CHAMBER PPM (AAIR-DDDR 60) - ACTIVE SYSTEM IS MRI CONDITIONAL   CARELINK TRANSMISSION: BATTERY VOLTAGE ADEQUATE (13 5 YRS)  AP 85 6%  0 2% (AAIR-DDDR 60); HX OF CHRONIC ELEVATED RV CAPTURE THRESHOLD/STABLE; ALL AVAILABLE LEAD PARAMETERS WITHIN NORMAL LIMITS  1 VT-NS EPISODE WITH SVT/PAT ON EGM STORED, 17 @  BPM  4 SVT-AF WITH AF/RVR ON EGM , MAX DURATION 7 MIN 56 SECS @  BPM  13 AT/AF EPISODE WITH PAF/PAFL ON EGM'S, MAX DURATION 15 HRS 40 MINS  AT/AF BURDEN 2 5%  PATIENT ON ELIQUIS, METOPROLOL TART   PACEMAKER FUNCTIONING APPROPRIATELY   ES

## 2022-11-26 DIAGNOSIS — E03.8 OTHER SPECIFIED HYPOTHYROIDISM: ICD-10-CM

## 2022-11-26 RX ORDER — LEVOTHYROXINE SODIUM 0.05 MG/1
TABLET ORAL
Qty: 30 TABLET | Refills: 0 | Status: SHIPPED | OUTPATIENT
Start: 2022-11-26

## 2022-11-28 NOTE — PROGRESS NOTES
Progress Note - Electrophysiology-Cardiology (EP)   Niki Funes 71 y o  female MRN: [id number]  Unit/Bed#:  Encounter: [id number]           1  Paroxysmal atrial fibrillation (HCC)  POCT ECG    Home Study      2  Other specified hypothyroidism  Home Study      3  Tachy-dunia syndrome ([facility] )  Home Study      4  Paroxysmal A-fib ([facility] )  Home Study      5  Essential hypertension  Home Study      6  Obstructive sleep apnea (adult) (pediatric)  Home Study      7  Obstructive sleep apnea        8  Subdural hemorrhage ([facility] )        9  Overweight (BMI 25 0-29 9)        10  Other fatigue        11   Current use of long term anticoagulation             Assessment/Plan   Paroxysmal atrial fibrillation - from 12 % to 2 5% on device check  Tachy-dunia syndrome, post pacemaker  Elevated RV threshold, 4 at 1, however RV paced 0 3%  Fall with subdural hemorrhage in April 2022    Fatigue  Generalized anxiety disorder  Hypertension  Hypothyroidism  Overweight   Solitary kidney  Mechanical fall with SDH  + mechanical fall with leg fracture        Summary of my recommendations for this patient:  Sleep study evaluate and treat-snoring, morning fatigue and daytime sleepiness  RV lead-minimal RV pacing and hence lead revision not recommended    Nuclear stress study to rule out ischemia for considering flecainide as antiarrhythmic    Check hemoglobin A1c and TSH          Atrial fibrillation  Long-term anticoagulation  AFib load coming down progressively as monitored by device  Currently AFib load is 2 5%   Previously was over 12%    Anticoagulation-apixaban   Rate control-metoprolol  Rhythm control-none    No stress test available to allow consideration of class 1 C agent   Solitary kidney from birth and hence class 3 agents are not being used  Patient has AFib burden is decreasing and hence amiodarone is not considered    Summary of my recommendation for the patient   Continue to monitor AFib load  Consider nuclear stress study so that class 1 C agents may be used as needed            Obesity / overweight  BMI-30  This increases the risk of-CAD, CVA, vascular disease, diabetes, kidney dysfunction, hypertension, hyperlipidemia  Diet is responsible for 80% of weight gain   Advice nutritional counseling and healthy diet  Also advised to increase activity  He is going to follow up with his primary care        Obstructive sleep apnea   Patient has history of snoring, morning fatigue and daytime sleepiness at least on some days  Examination is also suggestive  Recommended to follow up with primary care and Pulmonary Medicine          Hypertension   Blood pressure-116/68  Medication-lisinopril, metoprolol  My recommendation-continue with same        Mixed hyperlipidemia   Consider initiation of statin   Will follow with primary care physician        Chronic kidney disease   Has single kidney from birth  Preferred to avoid class 3 agents        Thyroid condition  Hypothyroidism and on replacement levothyroxine 50 mcg   Follow TSH                  History of Present Illness   HPI: Niki Funes is a 71y o  year old female has been referred to me by PCP for the management of atrial fibrillation with RVR    The patient has significant medical illnesses which include  Paroxysmal atrial fibrillation - from 12 % to 2 5% on device check  Tachy-dunia syndrome, post pacemaker  Elevated RV threshold, 4 at 1, however RV paced 0 3%  Fall with subdural hemorrhage in April 2022  Fatigue  Generalized anxiety disorder  Hypertension  Hypothyroidism  Overweight   Solitary kidney  Mechanical fall with SDH  + mechanical fall with leg fracture    The patient does feel palpitation from time to time   She is not complaining of angina, orthopnea, PND, leg swelling, presyncope or syncope   She did have a fall and subdural hemorrhage   This was however a mechanical fall    She has a pacemaker in place and AFib load is diminishing   She however is not very active and VR unsure of underlying cardiac ischemic status    The patient does have a history of intermittent snoring, morning fatigue and daytime sleepiness     Patient does not have a history of smoking, alcohol abuse or energy drink use        Historical Information   Past Medical History:   Diagnosis Date   • Anxiety    • Congenital absence of one kidney    • Depression    • GERD (gastroesophageal reflux disease)    • History of transfusion    • Hypertension    • Insomnia    • Menopause    • Other specified hypothyroidism 1/28/2019   • Vitamin B12 deficiency      Past Surgical History:   Procedure Laterality Date   • CARDIAC PACEMAKER PLACEMENT Left 4/16/2022    Procedure: INSERTION PACEMAKER;  Surgeon: Osbaldo Khan DO;  Location: [facility];  Service: Cardiology   • COLONOSCOPY     • ID COLONOSCOPY FLX DX W/COLLJ SPEC WHEN PFRMD N/A 5/30/2017    Procedure: EGD AND COLONOSCOPY;  Surgeon: Dwain Rosenberg MD;  Location: [facility]; Service: Gastroenterology   • ID OPEN [place] LAT MALLEOLUS Left 7/13/2020    Procedure: OPEN REDUCTION W/ INTERNAL FIXATION (ORIF) ANKLE;  Surgeon: Milo Gloria DO;  Location: [address] [place] [address];  Service: Orthopedics   • REDUCTION MAMMAPLASTY Bilateral 06/06/2011   • REDUCTION MAMMAPLASTY     • SHOULDER SURGERY     • TONSILLECTOMY       Social History     Substance and Sexual Activity   Alcohol Use Yes   • Alcohol/week: 7 0 standard drinks   • Types: 7 Glasses of wine per week    Comment: few glasses of wine weekly     Social History     Substance and Sexual Activity   Drug Use No     Social History     Tobacco Use   Smoking Status Never   Smokeless Tobacco Never     Social History     Socioeconomic History   • Marital status:       Spouse name: Not on file   • Number of children: Not on file   • Years of education: Not on file   • Highest education level: Not on file   Occupational History   • Not on file   Tobacco Use   • Smoking status: Never   • Smokeless tobacco: Never   Vaping Use   • Vaping Use: Never used   Substance and Sexual Activity   • Alcohol use: Yes     Alcohol/week: 7 0 standard drinks     Types: 7 Glasses of wine per week     Comment: few glasses of wine weekly   • Drug use: No   • Sexual activity: Not on file   Other Topics Concern   • Not on file   Social History Narrative    Feels safe at home     Social Determinants of Health     Financial Resource Strain: Not on file   Food Insecurity: No Food Insecurity   • Worried About Running Out of Food in the Last Year: Never true   • Ran Out of Food in the Last Year: Never true   Transportation Needs: No Transportation Needs   • Lack of Transportation (Medical): No   • Lack of Transportation (Non-Medical): No   Physical Activity: Not on file   Stress: Not on file   Social Connections: Not on file   Intimate Partner Violence: Not on file   Housing Stability: Unknown   • Unable to Pay for Housing in the Last Year: No   • Number of Places Lived in the Last Year: Not on file   • Unstable Housing in the Last Year: No        Family History:  Family History   Problem Relation Age of Onset   • Lung cancer Mother 79   • Heart disease Father    • Lung cancer Brother 48   • No Known Problems Maternal Grandmother    • No Known Problems Maternal Grandfather    • No Known Problems Paternal Grandmother    • No Known Problems Paternal Grandfather          Meds/Allergies      No current facility-administered medications for this visit  (Not in a hospital admission)      No Known Allergies        Objective   Vitals:   Visit Vitals  /68   Pulse 89   Ht 5' 5" (1 651 m)   Wt 81 6 kg (180 lb)   LMP  (LMP Unknown)   SpO2 96%   BMI 29 95 kg/m²   OB Status Postmenopausal   Smoking Status Never   BSA 1 89 m²        Invasive Devices     None                   ROS  Review of Systems   All other systems reviewed and are negative  As described in my history of present illness          PHYSICAL EXAM  Physical Exam  Vitals reviewed     Constitutional: General: She is not in acute distress  Appearance: Normal appearance  She is not ill-appearing  Comments: BMI 30   HENT:      Head: Normocephalic and atraumatic  Right Ear: External ear normal       Left Ear: External ear normal       Nose: Nose normal       Mouth/Throat:      Comments: Posterior pharynx is crowded  Eyes:      General: No scleral icterus  Extraocular Movements: Extraocular movements intact  Conjunctiva/sclera: Conjunctivae normal       Pupils: Pupils are equal, round, and reactive to light  Cardiovascular:      Rate and Rhythm: Normal rate and regular rhythm  Pulses: Normal pulses  Heart sounds: Normal heart sounds  No murmur heard  Pulmonary:      Effort: Pulmonary effort is normal  No respiratory distress  Breath sounds: Normal breath sounds  No wheezing  Abdominal:      General: Bowel sounds are normal  There is no distension  Tenderness: There is no abdominal tenderness  Comments: Central obesity   Musculoskeletal:         General: No swelling, tenderness or deformity  Cervical back: Neck supple  No rigidity  Skin:     Coloration: Skin is not jaundiced  Findings: No bruising  Neurological:      Mental Status: She is alert  Mental status is at baseline  Motor: No weakness  Psychiatric:         Mood and Affect: Mood normal          Thought Content:  Thought content normal                LAB RESULTS:        CBC:  Results from Last 12 Months   Lab Units 04/17/22  0300 04/16/22  0438 04/15/22  0538 04/14/22  0428 04/12/22  0533 04/11/22  0834   WBC Thousand/uL 5 26 3 39* 3 29* 3 81* 4 23* 5 62   HEMOGLOBIN g/dL 14 1 14 0 14 3 12 9 12 8 13 5   HEMATOCRIT % 43 6 43 4 42 4 40 0 39 0 42 0   MCV fL 98 97 96 99* 98 100*   PLATELETS Thousands/uL 196 188 169 196 173 176   MCH pg 31 5 31 4 32 4 31 8 32 2 32 1   MCHC g/dL 32 3 32 3 33 7 32 3 32 8 32 1   RDW % 13 1 13 1 12 9 12 9 13 1 13 1   MPV fL 10 0 9 9 10 1 10 5 10 3 11 7   NRBC AUTO /100 WBCs  --  0 0 0 0 0        CMP:  Results from Last 12 Months   Lab Units 04/17/22  0300 04/16/22  0438 04/15/22  0538 04/14/22  0428 04/13/22  0459 04/12/22  0529 04/11/22  0834 02/04/22  1219   POTASSIUM mmol/L 3 9 5 0 4 2 4 2 4 5   < > 4 0 5 0   CHLORIDE mmol/L 101 103 104 104 105   < > 103 108   CO2 mmol/L 26 25 24 18* 22   < > 25 30   BUN mg/dL 11 10 13 15 11   < > 11 11   CREATININE mg/dL 0 89 0 95 0 91 1 19 0 90   < > 1 00 0 95   CALCIUM mg/dL 7 9* 8 2* 8 3 7 7* 7 6*   < > 8 3 9 1   AST U/L  --   --   --   --   --   --  30 10   ALT U/L  --   --   --   --   --   --  19 19   ALK PHOS U/L  --   --   --   --   --   --  65 68   EGFR ml/min/1 73sq m 65 60 63 46 64   < > 56 60    < > = values in this interval not displayed  Magnesium:   Results from Last 12 Months   Lab Units 04/16/22  0438 04/15/22  0538 04/14/22  0428 04/12/22  0529   MAGNESIUM mg/dL 2 2 1 8 2 0 2 0       A1C:  No results for input(s): HGBA1C in the last 8784 hours  TSH:  No results for input(s): TSH in the last 8784 hours  PT/INR:  Results from Last 12 Months   Lab Units 04/16/22  0438 04/11/22  0834   PROTIME seconds 12 3 13 2   INR  0 91 1 00       Lipid Panel:  Results from Last 12 Months   Lab Units 02/04/22  1219   CHOLESTEROL mg/dL 238*   TRIGLYCERIDES mg/dL 110   HDL mg/dL 73   NON-HDL-CHOL (CHOL-HDL) mg/dl 165        Troponin:  No results for input(s): TROPONINI in the last 8784 hours  Imaging:  Echocardiogram  April 2022  Findings    Left Ventricle Left ventricular cavity size is normal  Wall thickness is normal  The left ventricular ejection fraction is 65%  Systolic function is normal   Although no diagnostic regional wall motion abnormality was identified, this possibility cannot be completely excluded on the basis of this study  Unable to assess diastolic function due to atrial fibrillation     Right Ventricle Right ventricular cavity size is normal  Systolic function is normal  Wall thickness is normal    Left Atrium The atrium is normal in size  Right Atrium The atrium is normal in size  Aortic Valve The aortic valve is trileaflet  The leaflets are not thickened  The leaflets are not calcified  The leaflets exhibit normal mobility  There is trace regurgitation  There is no evidence of stenosis  Mitral Valve The mitral valve has normal structure and function  There is trace regurgitation  There is no evidence of stenosis  Tricuspid Valve Tricuspid valve structure is normal  There is trace regurgitation  There is no evidence of stenosis  Pulmonic Valve Pulmonic valve structure is normal  There is no evidence of regurgitation  There is no evidence of stenosis  Ascending Aorta The aortic root is normal in size  IVC/SVC The inferior vena cava is normal in size  Pericardium There is no pericardial effusion  The pericardium is normal in appearance  Stress Test:   No results found for this or any previous visit  Cardiac catheterization :  No results found for this or any previous visit  DEVICE CHECK:      Results for orders placed or performed in visit on 11/17/22   Cardiac EP device report    Narrative    MDT DUAL CHAMBER PPM (AAIR-DDDR 60) - ACTIVE SYSTEM IS MRI CONDITIONAL  CARELINK TRANSMISSION: BATTERY VOLTAGE ADEQUATE (13 5 YRS)  AP 85 6%  0 2% (AAIR-DDDR 60); HX OF CHRONIC ELEVATED RV CAPTURE THRESHOLD/STABLE; ALL AVAILABLE LEAD PARAMETERS WITHIN NORMAL LIMITS  1 VT-NS EPISODE WITH SVT/PAT ON EGM STORED, 17 @  BPM  4 SVT-AF WITH AF/RVR ON EGM , MAX DURATION 7 MIN 56 SECS @  BPM  13 AT/AF EPISODE WITH PAF/PAFL ON EGM'S, MAX DURATION 15 HRS 40 MINS  AT/AF BURDEN 2 5%  PATIENT ON ELIQUIS, METOPROLOL TART  PACEMAKER FUNCTIONING APPROPRIATELY    ES                    Code Status: [unfilled]  Advance Directive and Living Will:      Power of :    POLST:      Counseling / Coordination of Care  Detailed discussion done with regards to management of Farhat Souza MD

## 2022-11-30 ENCOUNTER — OFFICE VISIT (OUTPATIENT)
Dept: CARDIOLOGY CLINIC | Facility: CLINIC | Age: 72
End: 2022-11-30

## 2022-11-30 VITALS
SYSTOLIC BLOOD PRESSURE: 116 MMHG | BODY MASS INDEX: 29.99 KG/M2 | HEIGHT: 65 IN | OXYGEN SATURATION: 96 % | WEIGHT: 180 LBS | DIASTOLIC BLOOD PRESSURE: 68 MMHG | HEART RATE: 89 BPM

## 2022-11-30 DIAGNOSIS — I62.00 SUBDURAL HEMORRHAGE (HCC): ICD-10-CM

## 2022-11-30 DIAGNOSIS — I10 ESSENTIAL HYPERTENSION: ICD-10-CM

## 2022-11-30 DIAGNOSIS — G47.33 OBSTRUCTIVE SLEEP APNEA (ADULT) (PEDIATRIC): ICD-10-CM

## 2022-11-30 DIAGNOSIS — I49.5 TACHY-BRADY SYNDROME (HCC): ICD-10-CM

## 2022-11-30 DIAGNOSIS — E66.3 OVERWEIGHT (BMI 25.0-29.9): ICD-10-CM

## 2022-11-30 DIAGNOSIS — G47.33 OBSTRUCTIVE SLEEP APNEA: ICD-10-CM

## 2022-11-30 DIAGNOSIS — Z79.01 CURRENT USE OF LONG TERM ANTICOAGULATION: ICD-10-CM

## 2022-11-30 DIAGNOSIS — I48.0 PAROXYSMAL A-FIB (HCC): ICD-10-CM

## 2022-11-30 DIAGNOSIS — I48.0 PAROXYSMAL ATRIAL FIBRILLATION (HCC): Primary | ICD-10-CM

## 2022-11-30 DIAGNOSIS — R53.83 OTHER FATIGUE: ICD-10-CM

## 2022-11-30 DIAGNOSIS — E03.8 OTHER SPECIFIED HYPOTHYROIDISM: ICD-10-CM

## 2022-11-30 NOTE — PATIENT INSTRUCTIONS
Sleep Study as ordered  Follow up in 6 months with Dr Richard Abdullahi in [facility]

## 2022-11-30 NOTE — LETTER
December 4, 2022     Dale Pinto MD  [address]  [address] [facility] [address]    Patient: Irving Adamson   YOB: 1950   Date of Visit: 11/30/2022       Dear Dr Stephania Bence:    Thank you for referring Irving Adamson to me for evaluation  Below are my notes for this consultation  If you have questions, please do not hesitate to call me  I look forward to following your patient along with you  Sincerely,        Ag Painting MD        CC: GRAHAM Morocho MD  12/4/2022  2:24 PM  Sign when Signing Visit  Progress Note - Electrophysiology-Cardiology (EP)   Irving Adamson 71 y o  female MRN: [id number]  Unit/Bed#:  Encounter: [id number]           1  Paroxysmal atrial fibrillation (HCC)  POCT ECG    Home Study      2  Other specified hypothyroidism  Home Study      3  Tachy-dunia syndrome ([address] )  Home Study      4  Paroxysmal A-fib ([facility] [address] )  Home Study      5  Essential hypertension  Home Study      6  Obstructive sleep apnea (adult) (pediatric)  Home Study      7  Obstructive sleep apnea        8  Subdural hemorrhage ([address] )        9  Overweight (BMI 25 0-29 9)        10  Other fatigue        11   Current use of long term anticoagulation             Assessment/Plan   Paroxysmal atrial fibrillation - from 12 % to 2 5% on device check  Tachy-dunia syndrome, post pacemaker  Elevated RV threshold, 4 at 1, however RV paced 0 3%  Fall with subdural hemorrhage in April 2022    Fatigue  Generalized anxiety disorder  Hypertension  Hypothyroidism  Overweight   Solitary kidney  Mechanical fall with SDH  + mechanical fall with leg fracture        Summary of my recommendations for this patient:  Sleep study evaluate and treat-snoring, morning fatigue and daytime sleepiness  RV lead-minimal RV pacing and hence lead revision not recommended    Nuclear stress study to rule out ischemia for considering flecainide as antiarrhythmic    Check hemoglobin A1c and TSH          Atrial fibrillation  Long-term anticoagulation  AFib load coming down progressively as monitored by device  Currently AFib load is 2 5%   Previously was over 12%    Anticoagulation-apixaban   Rate control-metoprolol  Rhythm control-none    No stress test available to allow consideration of class 1 C agent   Solitary kidney from birth and hence class 3 agents are not being used  Patient has AFib burden is decreasing and hence amiodarone is not considered    Summary of my recommendation for the patient   Continue to monitor AFib load  Consider nuclear stress study so that class 1 C agents may be used as needed            Obesity / overweight  BMI-30  This increases the risk of-CAD, CVA, vascular disease, diabetes, kidney dysfunction, hypertension, hyperlipidemia  Diet is responsible for 80% of weight gain   Advice nutritional counseling and healthy diet  Also advised to increase activity  He is going to follow up with his primary care        Obstructive sleep apnea   Patient has history of snoring, morning fatigue and daytime sleepiness at least on some days  Examination is also suggestive  Recommended to follow up with primary care and Pulmonary Medicine          Hypertension   Blood pressure-116/68  Medication-lisinopril, metoprolol  My recommendation-continue with same        Mixed hyperlipidemia   Consider initiation of statin   Will follow with primary care physician        Chronic kidney disease   Has single kidney from birth  Preferred to avoid class 3 agents        Thyroid condition  Hypothyroidism and on replacement levothyroxine 50 mcg   Follow TSH                  History of Present Illness   HPI: Charan Champagne is a 71y o  year old female has been referred to me by PCP for the management of atrial fibrillation with RVR    The patient has significant medical illnesses which include  Paroxysmal atrial fibrillation - from 12 % to 2 5% on device check  Tachy-dunia syndrome, post pacemaker  Elevated RV threshold, 4 at 1, however RV paced 0 3%  Fall with subdural hemorrhage in April 2022  Fatigue  Generalized anxiety disorder  Hypertension  Hypothyroidism  Overweight   Solitary kidney  Mechanical fall with SDH  + mechanical fall with leg fracture    The patient does feel palpitation from time to time   She is not complaining of angina, orthopnea, PND, leg swelling, presyncope or syncope   She did have a fall and subdural hemorrhage   This was however a mechanical fall    She has a pacemaker in place and AFib load is diminishing   She however is not very active and VR unsure of underlying cardiac ischemic status    The patient does have a history of intermittent snoring, morning fatigue and daytime sleepiness     Patient does not have a history of smoking, alcohol abuse or energy drink use        Historical Information   Past Medical History:   Diagnosis Date   • Anxiety    • Congenital absence of one kidney    • Depression    • GERD (gastroesophageal reflux disease)    • History of transfusion    • Hypertension    • Insomnia    • Menopause    • Other specified hypothyroidism 1/28/2019   • Vitamin B12 deficiency      Past Surgical History:   Procedure Laterality Date   • CARDIAC PACEMAKER PLACEMENT Left 4/16/2022    Procedure: INSERTION PACEMAKER;  Surgeon: Raven Moroe DO;  Location: [facility];  Service: Cardiology   • COLONOSCOPY     • [place] COLONOSCOPY FLX DX W/COLLJ SPEC WHEN PFRMD N/A 5/30/2017    Procedure: EGD AND COLONOSCOPY;  Surgeon: Alan Massey MD;  Location: [facility];   Service: Gastroenterology   • [place] OPEN [place] LAT MALLEOLUS Left 7/13/2020    Procedure: OPEN REDUCTION W/ INTERNAL FIXATION (ORIF) ANKLE;  Surgeon: Colin Ochoa DO;  Location: [address] [place] [address];  Service: Orthopedics   • REDUCTION MAMMAPLASTY Bilateral 06/06/2011   • REDUCTION MAMMAPLASTY     • SHOULDER SURGERY     • TONSILLECTOMY       Social History     Substance and Sexual Activity   Alcohol Use Yes   • Alcohol/week: 7 0 standard drinks   • Types: 7 Glasses of wine per week    Comment: few glasses of wine weekly     Social History     Substance and Sexual Activity   Drug Use No     Social History     Tobacco Use   Smoking Status Never   Smokeless Tobacco Never     Social History     Socioeconomic History   • Marital status:      Spouse name: Not on file   • Number of children: Not on file   • Years of education: Not on file   • Highest education level: Not on file   Occupational History   • Not on file   Tobacco Use   • Smoking status: Never   • Smokeless tobacco: Never   Vaping Use   • Vaping Use: Never used   Substance and Sexual Activity   • Alcohol use: Yes     Alcohol/week: 7 0 standard drinks     Types: 7 Glasses of wine per week     Comment: few glasses of wine weekly   • Drug use: No   • Sexual activity: Not on file   Other Topics Concern   • Not on file   Social History Narrative    Feels safe at home     Social Determinants of Health     Financial Resource Strain: Not on file   Food Insecurity: No Food Insecurity   • Worried About Running Out of Food in the Last Year: Never true   • Ran Out of Food in the Last Year: Never true   Transportation Needs: No Transportation Needs   • Lack of Transportation (Medical): No   • Lack of Transportation (Non-Medical):  No   Physical Activity: Not on file   Stress: Not on file   Social Connections: Not on file   Intimate Partner Violence: Not on file   Housing Stability: Unknown   • Unable to Pay for Housing in the Last Year: No   • Number of Places Lived in the Last Year: Not on file   • Unstable Housing in the Last Year: No        Family History:  Family History   Problem Relation Age of Onset   • Lung cancer Mother 79   • Heart disease Father    • Lung cancer Brother 48   • No Known Problems Maternal Grandmother    • No Known Problems Maternal Grandfather    • No Known Problems Paternal Grandmother    • No Known Problems Paternal Grandfather          Meds/Allergies       No current facility-administered medications for this visit  (Not in a hospital admission)      No Known Allergies        Objective    Vitals:   Visit Vitals  /68   Pulse 89   Ht 5' 5" (1 651 m)   Wt 81 6 kg (180 lb)   LMP  (LMP Unknown)   SpO2 96%   BMI 29 95 kg/m²   OB Status Postmenopausal   Smoking Status Never   BSA 1 89 m²        Invasive Devices     None                   ROS  Review of Systems   All other systems reviewed and are negative  As described in my history of present illness          PHYSICAL EXAM  Physical Exam  Vitals reviewed  Constitutional:       General: She is not in acute distress  Appearance: Normal appearance  She is not ill-appearing  Comments: BMI 30   HENT:      Head: Normocephalic and atraumatic  Right Ear: External ear normal       Left Ear: External ear normal       Nose: Nose normal       Mouth/Throat:      Comments: Posterior pharynx is crowded  Eyes:      General: No scleral icterus  Extraocular Movements: Extraocular movements intact  Conjunctiva/sclera: Conjunctivae normal       Pupils: Pupils are equal, round, and reactive to light  Cardiovascular:      Rate and Rhythm: Normal rate and regular rhythm  Pulses: Normal pulses  Heart sounds: Normal heart sounds  No murmur heard  Pulmonary:      Effort: Pulmonary effort is normal  No respiratory distress  Breath sounds: Normal breath sounds  No wheezing  Abdominal:      General: Bowel sounds are normal  There is no distension  Tenderness: There is no abdominal tenderness  Comments: Central obesity   Musculoskeletal:         General: No swelling, tenderness or deformity  Cervical back: Neck supple  No rigidity  Skin:     Coloration: Skin is not jaundiced  Findings: No bruising  Neurological:      Mental Status: She is alert  Mental status is at baseline  Motor: No weakness     Psychiatric:         Mood and Affect: Mood normal          Thought Content: Thought content normal                LAB RESULTS:        CBC:  Results from Last 12 Months   Lab Units 04/17/22  0300 04/16/22 0438 04/15/22  0538 04/14/22  0428 04/12/22  0533 04/11/22  0834   WBC Thousand/uL 5 26 3 39* 3 29* 3 81* 4 23* 5 62   HEMOGLOBIN g/dL 14 1 14 0 14 3 12 9 12 8 13 5   HEMATOCRIT % 43 6 43 4 42 4 40 0 39 0 42 0   MCV fL 98 97 96 99* 98 100*   PLATELETS Thousands/uL 196 188 169 196 173 176   MCH pg 31 5 31 4 32 4 31 8 32 2 32 1   MCHC g/dL 32 3 32 3 33 7 32 3 32 8 32 1   RDW % 13 1 13 1 12 9 12 9 13 1 13 1   MPV fL 10 0 9 9 10 1 10 5 10 3 11 7   NRBC AUTO /100 WBCs  --  0 0 0 0 0        CMP:  Results from Last 12 Months   Lab Units 04/17/22  0300 04/16/22  0438 04/15/22  0538 04/14/22 0428 04/13/22  0459 04/12/22  0529 04/11/22  0834 02/04/22  1219   POTASSIUM mmol/L 3 9 5 0 4 2 4 2 4 5   < > 4 0 5 0   CHLORIDE mmol/L 101 103 104 104 105   < > 103 108   CO2 mmol/L 26 25 24 18* 22   < > 25 30   BUN mg/dL 11 10 13 15 11   < > 11 11   CREATININE mg/dL 0 89 0 95 0 91 1 19 0 90   < > 1 00 0 95   CALCIUM mg/dL 7 9* 8 2* 8 3 7 7* 7 6*   < > 8 3 9 1   AST U/L  --   --   --   --   --   --  30 10   ALT U/L  --   --   --   --   --   --  19 19   ALK PHOS U/L  --   --   --   --   --   --  65 68   EGFR ml/min/1 73sq m 65 60 63 46 64   < > 56 60    < > = values in this interval not displayed  Magnesium:   Results from Last 12 Months   Lab Units 04/16/22  0438 04/15/22  0538 04/14/22  0428 04/12/22  0529   MAGNESIUM mg/dL 2 2 1 8 2 0 2 0       A1C:  No results for input(s): HGBA1C in the last 8784 hours  TSH:  No results for input(s): TSH in the last 8784 hours        PT/INR:  Results from Last 12 Months   Lab Units 04/16/22  0438 04/11/22  0834   PROTIME seconds 12 3 13 2   INR  0 91 1 00       Lipid Panel:  Results from Last 12 Months   Lab Units 02/04/22  1219   CHOLESTEROL mg/dL 238*   TRIGLYCERIDES mg/dL 110   HDL mg/dL 73   NON-HDL-CHOL (CHOL-HDL) mg/dl 165        Troponin:  No results for input(s): TROPONINI in the last 8784 hours  Imaging:  Echocardiogram  April 2022  Findings    Left Ventricle Left ventricular cavity size is normal  Wall thickness is normal  The left ventricular ejection fraction is 65%  Systolic function is normal   Although no diagnostic regional wall motion abnormality was identified, this possibility cannot be completely excluded on the basis of this study  Unable to assess diastolic function due to atrial fibrillation  Right Ventricle Right ventricular cavity size is normal  Systolic function is normal  Wall thickness is normal    Left Atrium The atrium is normal in size  Right Atrium The atrium is normal in size  Aortic Valve The aortic valve is trileaflet  The leaflets are not thickened  The leaflets are not calcified  The leaflets exhibit normal mobility  There is trace regurgitation  There is no evidence of stenosis  Mitral Valve The mitral valve has normal structure and function  There is trace regurgitation  There is no evidence of stenosis  Tricuspid Valve Tricuspid valve structure is normal  There is trace regurgitation  There is no evidence of stenosis  Pulmonic Valve Pulmonic valve structure is normal  There is no evidence of regurgitation  There is no evidence of stenosis  Ascending Aorta The aortic root is normal in size  IVC/SVC The inferior vena cava is normal in size  Pericardium There is no pericardial effusion  The pericardium is normal in appearance  Stress Test:   No results found for this or any previous visit  Cardiac catheterization :  No results found for this or any previous visit  DEVICE CHECK:      Results for orders placed or performed in visit on 11/17/22   Cardiac EP device report    Narrative    MDT DUAL CHAMBER PPM (AAIR-DDDR 60) - ACTIVE SYSTEM IS MRI CONDITIONAL  CARELINK TRANSMISSION: BATTERY VOLTAGE ADEQUATE (13 5 YRS)   AP 85 6%  0 2% (AAIR-DDDR 60); HX OF CHRONIC ELEVATED RV CAPTURE THRESHOLD/STABLE; ALL AVAILABLE LEAD PARAMETERS WITHIN NORMAL LIMITS  1 VT-NS EPISODE WITH SVT/PAT ON EGM STORED, 17 @  BPM  4 SVT-AF WITH AF/RVR ON EGM , MAX DURATION 7 MIN 56 SECS @  BPM  13 AT/AF EPISODE WITH PAF/PAFL ON EGM'S, MAX DURATION 15 HRS 40 MINS  AT/AF BURDEN 2 5%  PATIENT ON ELIQUIS, METOPROLOL TART  PACEMAKER FUNCTIONING APPROPRIATELY    ES                    Code Status: [unfilled]  Advance Directive and Living Will:      Power of :    POLST:      Counseling / Coordination of Care  Detailed discussion done with regards to management of AFib      Lucio Gleason MD

## 2022-12-04 DIAGNOSIS — R10.13 EPIGASTRIC PAIN: ICD-10-CM

## 2022-12-04 RX ORDER — AMITRIPTYLINE HYDROCHLORIDE 25 MG/1
TABLET, FILM COATED ORAL
Qty: 30 TABLET | Refills: 0 | Status: SHIPPED | OUTPATIENT
Start: 2022-12-04

## 2022-12-07 ENCOUNTER — TELEPHONE (OUTPATIENT)
Dept: OBGYN CLINIC | Facility: CLINIC | Age: 72
End: 2022-12-07

## 2022-12-07 NOTE — TELEPHONE ENCOUNTER
lvm to cb about upcoming appointment 12/13  Appointment is scheduled for 15 minutes and she is coming in for b/l knees and lbp    Did want to clarify that since appointment is only 15 mins long Dr Nic Gonzalez may not be able to see both knees and back at same appointment and to clarify what she would like to be seen for at that visit

## 2022-12-10 DIAGNOSIS — I10 ESSENTIAL HYPERTENSION: ICD-10-CM

## 2022-12-11 DIAGNOSIS — F41.9 ANXIETY: ICD-10-CM

## 2022-12-12 RX ORDER — LISINOPRIL 10 MG/1
10 TABLET ORAL DAILY
Qty: 90 TABLET | Refills: 0 | Status: SHIPPED | OUTPATIENT
Start: 2022-12-12

## 2022-12-12 RX ORDER — ALPRAZOLAM 0.5 MG/1
TABLET ORAL
Qty: 30 TABLET | Refills: 0 | Status: SHIPPED | OUTPATIENT
Start: 2022-12-12

## 2022-12-13 ENCOUNTER — TELEPHONE (OUTPATIENT)
Dept: SLEEP CENTER | Facility: CLINIC | Age: 72
End: 2022-12-13

## 2022-12-16 ENCOUNTER — TELEPHONE (OUTPATIENT)
Dept: OBGYN CLINIC | Facility: CLINIC | Age: 72
End: 2022-12-16

## 2022-12-16 NOTE — TELEPHONE ENCOUNTER
LVM unfortunately Dr Dot Montana will be out off office 1/12/2023 and appointment will needed to be rescheduled      Appt was canceled

## 2022-12-25 DIAGNOSIS — E03.8 OTHER SPECIFIED HYPOTHYROIDISM: ICD-10-CM

## 2022-12-27 DIAGNOSIS — R53.83 OTHER FATIGUE: ICD-10-CM

## 2022-12-27 DIAGNOSIS — R73.01 IMPAIRED FASTING GLUCOSE: ICD-10-CM

## 2022-12-27 DIAGNOSIS — I49.5 TACHY-BRADY SYNDROME (HCC): ICD-10-CM

## 2022-12-27 DIAGNOSIS — I48.0 PAROXYSMAL A-FIB (HCC): Primary | ICD-10-CM

## 2022-12-27 DIAGNOSIS — E66.3 OVERWEIGHT (BMI 25.0-29.9): ICD-10-CM

## 2022-12-27 DIAGNOSIS — R74.8 ELEVATED CK: ICD-10-CM

## 2022-12-27 RX ORDER — LEVOTHYROXINE SODIUM 0.05 MG/1
TABLET ORAL
Qty: 30 TABLET | Refills: 0 | Status: SHIPPED | OUTPATIENT
Start: 2022-12-27

## 2023-01-10 DIAGNOSIS — F41.9 ANXIETY: ICD-10-CM

## 2023-01-10 DIAGNOSIS — R10.13 EPIGASTRIC PAIN: ICD-10-CM

## 2023-01-10 RX ORDER — AMITRIPTYLINE HYDROCHLORIDE 25 MG/1
TABLET, FILM COATED ORAL
Qty: 30 TABLET | Refills: 0 | Status: SHIPPED | OUTPATIENT
Start: 2023-01-10

## 2023-01-11 RX ORDER — ALPRAZOLAM 0.5 MG/1
TABLET ORAL
Qty: 30 TABLET | Refills: 0 | Status: SHIPPED | OUTPATIENT
Start: 2023-01-11

## 2023-01-26 DIAGNOSIS — E03.8 OTHER SPECIFIED HYPOTHYROIDISM: ICD-10-CM

## 2023-01-26 RX ORDER — LEVOTHYROXINE SODIUM 0.05 MG/1
TABLET ORAL
Qty: 30 TABLET | Refills: 0 | Status: SHIPPED | OUTPATIENT
Start: 2023-01-26

## 2023-02-12 DIAGNOSIS — R10.13 EPIGASTRIC PAIN: ICD-10-CM

## 2023-02-12 RX ORDER — AMITRIPTYLINE HYDROCHLORIDE 25 MG/1
TABLET, FILM COATED ORAL
Qty: 30 TABLET | Refills: 0 | Status: SHIPPED | OUTPATIENT
Start: 2023-02-12

## 2023-02-20 ENCOUNTER — REMOTE DEVICE CLINIC VISIT (OUTPATIENT)
Dept: CARDIOLOGY CLINIC | Facility: CLINIC | Age: 73
End: 2023-02-20

## 2023-02-20 DIAGNOSIS — Z95.0 PRESENCE OF PERMANENT CARDIAC PACEMAKER: Primary | ICD-10-CM

## 2023-02-20 NOTE — PROGRESS NOTES
Results for orders placed or performed in visit on 02/20/23   Cardiac EP device report    Narrative    MDT DUAL CHAMBER PPM (AAIR-DDDR 60) - ACTIVE SYSTEM IS MRI CONDITIONAL  CARELINK TRANSMISSION: BATTERY VOLTAGE ADEQUATE(13 3 YRS)   AP 97%  <1%  ALL AVAILABLE LEAD PARAMETERS WITHIN NORMAL LIMITS  1 VHR EPISODE DETECTED, SVT NOTED  8 FAST A & V EPISODES  18 AT/AF EPISODES DETECTED LONGEST <18 HOURS  PATIENT IS ON ELIQUIS AND METOPROLOL TART  NORMAL DEVICE FUNCTION  ----LARES

## 2023-03-08 DIAGNOSIS — K21.9 GASTROESOPHAGEAL REFLUX DISEASE: ICD-10-CM

## 2023-03-08 RX ORDER — PANTOPRAZOLE SODIUM 40 MG/1
TABLET, DELAYED RELEASE ORAL
Qty: 90 TABLET | Refills: 0 | Status: SHIPPED | OUTPATIENT
Start: 2023-03-08

## 2023-03-09 DIAGNOSIS — F41.9 ANXIETY: ICD-10-CM

## 2023-03-09 DIAGNOSIS — I10 ESSENTIAL HYPERTENSION: ICD-10-CM

## 2023-03-09 RX ORDER — ALPRAZOLAM 0.5 MG/1
TABLET ORAL
Qty: 30 TABLET | Refills: 0 | Status: SHIPPED | OUTPATIENT
Start: 2023-03-09

## 2023-03-09 RX ORDER — LISINOPRIL 10 MG/1
TABLET ORAL
Qty: 90 TABLET | Refills: 0 | Status: SHIPPED | OUTPATIENT
Start: 2023-03-09

## 2023-03-13 DIAGNOSIS — R10.13 EPIGASTRIC PAIN: ICD-10-CM

## 2023-03-13 RX ORDER — AMITRIPTYLINE HYDROCHLORIDE 25 MG/1
TABLET, FILM COATED ORAL
Qty: 30 TABLET | Refills: 0 | Status: SHIPPED | OUTPATIENT
Start: 2023-03-13

## 2023-03-20 DIAGNOSIS — F32.9 MAJOR DEPRESSIVE DISORDER, REMISSION STATUS UNSPECIFIED, UNSPECIFIED WHETHER RECURRENT: Chronic | ICD-10-CM

## 2023-03-20 RX ORDER — ESCITALOPRAM OXALATE 10 MG/1
10 TABLET ORAL DAILY
Qty: 90 TABLET | Refills: 1 | Status: SHIPPED | OUTPATIENT
Start: 2023-03-20

## 2023-05-09 DIAGNOSIS — R10.13 EPIGASTRIC PAIN: ICD-10-CM

## 2023-05-09 DIAGNOSIS — F41.9 ANXIETY: ICD-10-CM

## 2023-05-10 RX ORDER — ALPRAZOLAM 0.5 MG/1
TABLET ORAL
Qty: 30 TABLET | Refills: 0 | Status: SHIPPED | OUTPATIENT
Start: 2023-05-10

## 2023-05-14 DIAGNOSIS — E03.8 OTHER SPECIFIED HYPOTHYROIDISM: ICD-10-CM

## 2023-05-15 DIAGNOSIS — I48.0 PAROXYSMAL ATRIAL FIBRILLATION (HCC): ICD-10-CM

## 2023-05-15 RX ORDER — METOPROLOL TARTRATE 50 MG/1
50 TABLET, FILM COATED ORAL EVERY 12 HOURS SCHEDULED
Qty: 60 TABLET | Refills: 0 | Status: SHIPPED | OUTPATIENT
Start: 2023-05-15

## 2023-05-15 RX ORDER — METOPROLOL TARTRATE 50 MG/1
50 TABLET, FILM COATED ORAL EVERY 12 HOURS SCHEDULED
Qty: 60 TABLET | Refills: 11 | Status: SHIPPED | OUTPATIENT
Start: 2023-05-15 | End: 2023-05-15 | Stop reason: SDUPTHER

## 2023-05-15 RX ORDER — LEVOTHYROXINE SODIUM 0.05 MG/1
50 TABLET ORAL DAILY
Qty: 30 TABLET | Refills: 5 | Status: SHIPPED | OUTPATIENT
Start: 2023-05-15

## 2023-05-18 DIAGNOSIS — R10.13 EPIGASTRIC PAIN: ICD-10-CM

## 2023-05-18 RX ORDER — AMITRIPTYLINE HYDROCHLORIDE 25 MG/1
25 TABLET, FILM COATED ORAL
Qty: 30 TABLET | Refills: 2 | Status: SHIPPED | OUTPATIENT
Start: 2023-05-18 | End: 2023-07-18 | Stop reason: SDUPTHER

## 2023-05-20 RX ORDER — AMITRIPTYLINE HYDROCHLORIDE 25 MG/1
25 TABLET, FILM COATED ORAL
Qty: 30 TABLET | Refills: 0 | OUTPATIENT
Start: 2023-05-20

## 2023-05-31 ENCOUNTER — OFFICE VISIT (OUTPATIENT)
Dept: FAMILY MEDICINE CLINIC | Facility: CLINIC | Age: 73
End: 2023-05-31

## 2023-05-31 ENCOUNTER — REMOTE DEVICE CLINIC VISIT (OUTPATIENT)
Dept: CARDIOLOGY CLINIC | Facility: CLINIC | Age: 73
End: 2023-05-31

## 2023-05-31 VITALS
SYSTOLIC BLOOD PRESSURE: 112 MMHG | HEART RATE: 86 BPM | BODY MASS INDEX: 28.32 KG/M2 | DIASTOLIC BLOOD PRESSURE: 68 MMHG | RESPIRATION RATE: 16 BRPM | HEIGHT: 65 IN | WEIGHT: 170 LBS | OXYGEN SATURATION: 95 % | TEMPERATURE: 97.7 F

## 2023-05-31 DIAGNOSIS — Z12.31 ENCOUNTER FOR SCREENING MAMMOGRAM FOR MALIGNANT NEOPLASM OF BREAST: ICD-10-CM

## 2023-05-31 DIAGNOSIS — I10 ESSENTIAL HYPERTENSION: Primary | ICD-10-CM

## 2023-05-31 DIAGNOSIS — M25.561 CHRONIC PAIN OF BOTH KNEES: ICD-10-CM

## 2023-05-31 DIAGNOSIS — I48.0 PAROXYSMAL ATRIAL FIBRILLATION (HCC): ICD-10-CM

## 2023-05-31 DIAGNOSIS — F32.9 MAJOR DEPRESSIVE DISORDER, REMISSION STATUS UNSPECIFIED, UNSPECIFIED WHETHER RECURRENT: Chronic | ICD-10-CM

## 2023-05-31 DIAGNOSIS — Z12.39 SCREENING BREAST EXAMINATION: ICD-10-CM

## 2023-05-31 DIAGNOSIS — E78.2 MIXED HYPERLIPIDEMIA: ICD-10-CM

## 2023-05-31 DIAGNOSIS — Z78.0 MENOPAUSE: ICD-10-CM

## 2023-05-31 DIAGNOSIS — G89.29 CHRONIC PAIN OF BOTH KNEES: ICD-10-CM

## 2023-05-31 DIAGNOSIS — I48.0 PAROXYSMAL A-FIB (HCC): ICD-10-CM

## 2023-05-31 DIAGNOSIS — E03.8 OTHER SPECIFIED HYPOTHYROIDISM: ICD-10-CM

## 2023-05-31 DIAGNOSIS — Z95.0 CARDIAC PACEMAKER IN SITU: Primary | ICD-10-CM

## 2023-05-31 DIAGNOSIS — E53.8 VITAMIN B 12 DEFICIENCY: ICD-10-CM

## 2023-05-31 DIAGNOSIS — M25.562 CHRONIC PAIN OF BOTH KNEES: ICD-10-CM

## 2023-05-31 DIAGNOSIS — S06.5X0A TRAUMATIC SUBDURAL HEMORRHAGE WITHOUT LOSS OF CONSCIOUSNESS, INITIAL ENCOUNTER (HCC): ICD-10-CM

## 2023-05-31 NOTE — PROGRESS NOTES
"Results for orders placed or performed in visit on 05/31/23   Cardiac EP device report    Narrative    MDT DUAL CHAMBER PPM (AAIR-DDDR 60) - ACTIVE SYSTEM IS MRI CONDITIONAL  CARELINK TRANSMISSION: BATTERY STATUS \"[address]  \" AP 95%  0%  ALL AVAILABLE LEAD PARAMETERS WITHIN NORMAL LIMITS  CHRONICALLY HIGH RV THRESHOLDS  2 VHRS & 15 FAST A/V NOTED; AVAIL EGRAMS PRESENT AS RVR & SVT  1 AF NOTED; 0% BURDEN; 15+ HRS  AVAIL EGRAM SHOWING AF  PT ON WARFARIN, FLECAINIDE, & BISOPROLOL  EF 55% (NUC 2022)  NORMAL DEVICE FUNCTION   NC         "

## 2023-05-31 NOTE — ASSESSMENT & PLAN NOTE
She feels this is well controlled on escitalopram as ordered, will continue  Asked patient to call sooner than next scheduled appointment for any complaints or issues

## 2023-05-31 NOTE — ASSESSMENT & PLAN NOTE
Managed by cardiology and has upcoming appointment, continue apixaban  Denies signs or symptoms of abnormal bruising or bleeding

## 2023-05-31 NOTE — PROGRESS NOTES
"Answers for HPI/ROS submitted by the patient on 5/24/2023  How would you rate your overall health?: good  Compared to last year, how is your physical health?: slightly worse  In general, how satisfied are you with your life?: satisfied  Compared to last year, how is your eyesight?: slightly worse  Compared to last year, how is your hearing?: same  Compared to last year, how is your emotional/mental health?: same  How often is anger a problem for you?: never, rarely  If you answered \"some\" or \"a lot\", please rate the severity of your pain on a scale of 1 to 10 (1 being the least severe pain and 10 being the most intense pain)  : 8/10  One or more falls in the last year: Yes  In the past 6 months, have you accidentally leaked urine?: Yes  Do you have trouble with the stairs inside or outside your home?: Yes  Does your home have working smoke alarms?: Yes  Does your home have a carbon monoxide monitor?: Yes  Which safety hazards (if any) have you experienced in your home? Please select all that apply : none  How would you describe your current diet?  Please select all that apply : Regular  In addition to prescription medications, are you taking any over-the-counter supplements?: Yes  If yes, what supplements are you taking?: Andrea  Can you manage your medications?: Yes  Are you currently taking any opioid medications?: No  Can you walk and transfer into and out of your bed and chair?: Yes  Can you dress and groom yourself?: Yes  Can you bathe or shower yourself?: Yes  Can you feed yourself?: Yes  Can you do your laundry/ housekeeping?: Yes  Can you manage your money, pay your bills, and track your expenses?: Yes  Can you make your own meals?: Yes  Can you do your own shopping?: Yes  Within the last 12 months, have you had any hospitalizations or Emergency Department visits?: No  Do you have a living will?: Yes  Do you have a Durable POA (Power of ) for healthcare decisions?: Yes  Do you have an Advanced " Directive for end of life decisions?: Yes  How often have you used an illegal drug (including marijuana) or a prescription medication for non-medical reasons in the past year?: never  What is the typical number of drinks you consume in a day?: 0  What is the typical number of drinks you consume in a week?: 2  How often did you have a drink containing alcohol in the past year?: monthly or less  How many drinks did you have on a typical day  when you were drinking in the past year?: 3 to 4  How often did you have 6 or more drinks on one occasion in the past year?: never       Assessment and Plan:     Problem List Items Addressed This Visit        Endocrine    Other specified hypothyroidism     She is clinically euthyroid and will check TSH, continue levothyroxine as ordered  Relevant Orders    TSH, 3rd generation with Free T4 reflex       Cardiovascular and Mediastinum    Essential hypertension - Primary     Well controlled on current medication and will continue  Encouraged walking for exercising  Paroxysmal A-fib [facility])     Managed by cardiology and has upcoming appointment, continue apixaban  Denies signs or symptoms of abnormal bruising or bleeding  Relevant Orders    CBC and differential    Comprehensive metabolic panel    Lipid panel       Nervous and Auditory    Traumatic subdural hemorrhage without loss of consciousness, initial encounter ([facility] [address] )     By history, no further symptoms  Other    MDD (major depressive disorder) (Chronic)     She feels this is well controlled on escitalopram as ordered, will continue  Asked patient to call sooner than next scheduled appointment for any complaints or issues            Other Visit Diagnoses     Paroxysmal atrial fibrillation (HCC)        Screening breast examination        Relevant Orders    Mammo screening bilateral w 3d & cad    Vitamin B 12 deficiency        Relevant Orders    Vitamin B12    Chronic pain of both knees Relevant Orders    Ambulatory Referral to Orthopedic Surgery    Menopause        Relevant Orders    DXA bone density spine hip and pelvis    Encounter for screening mammogram for malignant neoplasm of breast        Relevant Orders    Mammo screening bilateral w 3d & cad    Mixed hyperlipidemia        Relevant Orders    Lipid panel           Preventive health issues were discussed with patient, and age appropriate screening tests were ordered as noted in patient's After Visit Summary  Personalized health advice and appropriate referrals for health education or preventive services given if needed, as noted in patient's After Visit Summary  History of Present Illness:     Patient presents for a Medicare Wellness Visit    Here for AWV and follow up  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  She is feeling well and feels her depression is well controlled  Does not feel she needs to change her medication at this time  Feels she is due for some labs for her TSH  There is no cold or heat intolerance, diarrhea or constipation, hair or skin changes, unanticipated weight gain or loss  Patient Care Team:  Gaylin Habermann, MD as PCP - Barbar Brood, MD Rayvon Buerger, MD Mordechai Cleaves, MD Mordechai Cleaves, MD as Endoscopist     Review of Systems:     Review of Systems   Constitutional: Negative  HENT: Negative  Eyes: Negative  Respiratory: Negative  Cardiovascular: Negative  Gastrointestinal: Negative  Endocrine: Negative  Genitourinary: Negative  Musculoskeletal: Negative  Skin: Negative  Allergic/Immunologic: Negative  Neurological: Negative  Hematological: Negative  Psychiatric/Behavioral: Negative           Problem List:     Patient Active Problem List   Diagnosis   • Esophageal reflux   • Vitamin B12 deficiency   • Essential hypertension   • Other specified hypothyroidism   • Epigastric pain   • Gastritis without bleeding   • Tachy-dunia syndrome ([facility] [address] )   • MDD (major depressive disorder)   • Elevated CK   • Paroxysmal A-fib (HCC)   • Overweight (BMI 25 0-29  9)   • Obstructive sleep apnea   • Other fatigue   • Current use of long term anticoagulation   • Subdural hemorrhage (HCC)   • Traumatic subdural hemorrhage without loss of consciousness, initial encounter [facility])      Past Medical and Surgical History:     Past Medical History:   Diagnosis Date   • Anxiety    • Congenital absence of one kidney    • Depression    • GERD (gastroesophageal reflux disease)    • History of transfusion    • Hypertension    • Insomnia    • Menopause    • Other specified hypothyroidism 1/28/2019   • Vitamin B12 deficiency      Past Surgical History:   Procedure Laterality Date   • CARDIAC PACEMAKER PLACEMENT Left 4/16/2022    Procedure: INSERTION PACEMAKER;  Surgeon: Jony Arroyo DO;  [facility];  Service: Cardiology   • COLONOSCOPY     • RI COLONOSCOPY FLX DX W/COLLJ SPEC WHEN PFRMD N/A 5/30/2017    Procedure: EGD AND COLONOSCOPY;  Surgeon: Airam Carrasco MD;  Location: [facility]; Service: Gastroenterology   • RI OPEN TX DISTAL FIBULAR FRACTURE LAT MALLEOLUS Left 7/13/2020    Procedure: OPEN REDUCTION W/ INTERNAL FIXATION (ORIF) ANKLE;  Surgeon: Penny Dillard DO;  Location: [facility];  Service: Orthopedics   • REDUCTION MAMMAPLASTY Bilateral 06/06/2011   • REDUCTION MAMMAPLASTY     • SHOULDER SURGERY     • TONSILLECTOMY        Family History:     Family History   Problem Relation Age of Onset   • Lung cancer Mother 79   • Heart disease Father    • Lung cancer Brother 48   • No Known Problems Maternal Grandmother    • No Known Problems Maternal Grandfather    • No Known Problems Paternal Grandmother    • No Known Problems Paternal Grandfather       Social History:     Social History     Socioeconomic History   • Marital status:       Spouse name: None   • Number of children: None   • Years of education: None   • Highest education level: None Occupational History   • None   Tobacco Use   • Smoking status: Never   • Smokeless tobacco: Never   Vaping Use   • Vaping Use: Never used   Substance and Sexual Activity   • Alcohol use: Yes     Alcohol/week: 7 0 standard drinks of alcohol     Types: 7 Glasses of wine per week     Comment: few glasses of wine weekly   • Drug use: No   • Sexual activity: None   Other Topics Concern   • None   Social History Narrative    Feels safe at home     Social Determinants of Health     Financial Resource Strain: Low Risk  (5/24/2023)    Overall Financial Resource Strain (CARDIA)    • Difficulty of Paying Living Expenses: Not hard at all   Food Insecurity: No Food Insecurity (4/12/2022)    Hunger Vital Sign    • Worried About Running Out of Food in the Last Year: Never true    • Ran Out of Food in the Last Year: Never true   Transportation Needs: No Transportation Needs (5/24/2023)    PRAPARE - Transportation    • Lack of Transportation (Medical): No    • Lack of Transportation (Non-Medical):  No   Physical Activity: Not on file   Stress: Not on file   Social Connections: Not on file   Intimate Partner Violence: Not on file   Housing Stability: Unknown (4/12/2022)    Housing Stability Vital Sign    • Unable to Pay for Housing in the Last Year: No    • Number of Places Lived in the Last Year: Not on file    • Unstable Housing in the Last Year: No      Medications and Allergies:     Current Outpatient Medications   Medication Sig Dispense Refill   • ALPRAZolam (XANAX) 0 5 mg tablet TAKE 1 TABLET BY MOUTH ONCE DAILY AS NEEDED FOR ANXIETY 30 tablet 0   • amitriptyline (ELAVIL) 25 mg tablet Take 1 tablet (25 mg total) by mouth daily at bedtime 30 tablet 2   • apixaban (Eliquis) 5 mg Take 1 tablet (5 mg total) by mouth 2 (two) times a day 180 tablet 3   • escitalopram (Lexapro) 10 mg tablet Take 1 tablet (10 mg total) by mouth daily 90 tablet 1   • levothyroxine 50 mcg tablet Take 1 tablet (50 mcg total) by mouth daily 30 tablet 5 • lisinopril (ZESTRIL) 10 mg tablet Take 1 tablet by mouth once daily 90 tablet 0   • metoprolol tartrate (LOPRESSOR) 50 mg tablet Take 1 tablet (50 mg total) by mouth every 12 (twelve) hours 60 tablet 0   • pantoprazole (PROTONIX) 40 mg tablet TAKE 1 TABLET EVERY DAY 90 tablet 0     No current facility-administered medications for this visit  No Known Allergies   Immunizations:     Immunization History   Administered Date(s) Administered   • COVID-19 PFIZER VACCINE 0 3 ML IM 02/19/2021, 03/10/2021, 10/16/2021   • Influenza, high dose seasonal 0 7 mL 10/19/2022   • Pneumococcal Conjugate Vaccine 20-valent (Pcv20), Polysace 10/19/2022      Health Maintenance:         Topic Date Due   • Breast Cancer Screening: Mammogram  04/02/2023   • Colorectal Cancer Screening  05/30/2027   • Hepatitis C Screening  Completed         Topic Date Due   • COVID-19 Vaccine (4 - Pfizer series) 12/11/2021      Medicare Screening Tests and Risk Assessments:     Damon Cordova is here for her Subsequent Wellness visit  Health Risk Assessment:   Patient rates overall health as good  Patient feels that their physical health rating is slightly worse  Patient is satisfied with their life  Eyesight was rated as slightly worse  Hearing was rated as much worse  Patient feels that their emotional and mental health rating is same  Patients states they are never, rarely angry  Depression Screening:   PHQ-9 Score: 7      Fall Risk Screening: In the past year, patient has experienced: history of falling in past year      Urinary Incontinence Screening:   Patient has leaked urine accidently in the last six months  Home Safety:  Patient has trouble with stairs inside or outside of their home  Patient has working smoke alarms and has working carbon monoxide detector  Home safety hazards include: none  Nutrition:   Current diet is Regular  Medications:   Patient is currently taking over-the-counter supplements   OTC medications include: Andrea  Patient is able to manage medications  Activities of Daily Living (ADLs)/Instrumental Activities of Daily Living (IADLs):   Walk and transfer into and out of bed and chair?: Yes  Dress and groom yourself?: Yes    Bathe or shower yourself?: Yes    Feed yourself? Yes  Do your laundry/housekeeping?: Yes  Manage your money, pay your bills and track your expenses?: Yes  Make your own meals?: Yes    Do your own shopping?: Yes    Previous Hospitalizations:   Any hospitalizations or ED visits within the last 12 months?: No      Advance Care Planning:   Living will: Yes    Durable POA for healthcare: Yes    Advanced directive: Yes    End of Life Decisions reviewed with patient: Yes      Cognitive Screening:   Provider or family/friend/caregiver concerned regarding cognition?: No    PREVENTIVE SCREENINGS      Cardiovascular Screening:    General: Screening Current      Diabetes Screening:     General: Risks and Benefits Discussed    Due for: Blood Glucose      Colorectal Cancer Screening:     General: Screening Current      Breast Cancer Screening:     General: Risks and Benefits Discussed    Due for: Mammogram        Cervical Cancer Screening:    General: Screening Not Indicated      Osteoporosis Screening:    General: Risks and Benefits Discussed      Abdominal Aortic Aneurysm (AAA) Screening:        General: Screening Not Indicated      Lung Cancer Screening:     General: Screening Not Indicated      Hepatitis C Screening:    General: Screening Current    Screening, Brief Intervention, and Referral to Treatment (SBIRT)    Screening  Typical number of drinks in a day: 0  Typical number of drinks in a week: 2  Interpretation: Low risk drinking behavior  AUDIT-C Screenin) How often did you have a drink containing alcohol in the past year? monthly or less  2) How many drinks did you have on a typical day when you were drinking in the past year?  3 to 4  3) How often did you have 6 or more drinks on one "occasion in the past year? never    AUDIT-C Score: 1  Interpretation: Score 0-2 (female): Negative screen for alcohol misuse    Single Item Drug Screening:  How often have you used an illegal drug (including marijuana) or a prescription medication for non-medical reasons in the past year? never    Single Item Drug Screen Score: 0  Interpretation: Negative screen for possible drug use disorder    Brief Intervention  Alcohol & drug use screenings were reviewed  No concerns regarding substance use disorder identified  Other Counseling Topics:   Car/seat belt/driving safety, skin self-exam, sunscreen and regular weightbearing exercise and calcium and vitamin D intake  No results found  Physical Exam:     /68   Pulse 86   Temp 97 7 °F (36 5 °C)   Resp 16   Ht 5' 5\" (1 651 m)   Wt 77 1 kg (170 lb)   LMP  (LMP Unknown)   SpO2 95%   BMI 28 29 kg/m²     Physical Exam  Constitutional:       General: She is not in acute distress  Appearance: She is well-developed  She is not diaphoretic  HENT:      Head: Normocephalic and atraumatic  Right Ear: External ear normal       Left Ear: External ear normal       Nose: Nose normal    Eyes:      Pupils: Pupils are equal, round, and reactive to light  Neck:      Thyroid: No thyromegaly  Vascular: No JVD  Cardiovascular:      Rate and Rhythm: Regular rhythm  Heart sounds: No murmur heard  No friction rub  No gallop  Pulmonary:      Effort: Pulmonary effort is normal       Breath sounds: Normal breath sounds  No wheezing or rales  Abdominal:      General: Bowel sounds are normal  There is no distension  Palpations: Abdomen is soft  Tenderness: There is no abdominal tenderness  Musculoskeletal:         General: Normal range of motion  Cervical back: Normal range of motion and neck supple  Skin:     General: Skin is warm and dry  Findings: No rash     Neurological:      Mental Status: She is alert and oriented " to person, place, and time  Cranial Nerves: No cranial nerve deficit  Sensory: No sensory deficit  Motor: No abnormal muscle tone  Coordination: Coordination normal       Deep Tendon Reflexes: Reflexes normal    Psychiatric:         Behavior: Behavior normal          Thought Content:  Thought content normal          Judgment: Judgment normal           Sidney Abdullahi MD

## 2023-06-11 DIAGNOSIS — F41.9 ANXIETY: ICD-10-CM

## 2023-06-12 DIAGNOSIS — I10 ESSENTIAL HYPERTENSION: ICD-10-CM

## 2023-06-12 RX ORDER — ALPRAZOLAM 0.5 MG/1
TABLET ORAL
Qty: 30 TABLET | Refills: 0 | Status: SHIPPED | OUTPATIENT
Start: 2023-06-12

## 2023-06-13 RX ORDER — LISINOPRIL 10 MG/1
TABLET ORAL
Qty: 90 TABLET | Refills: 3 | Status: SHIPPED | OUTPATIENT
Start: 2023-06-13

## 2023-06-19 DIAGNOSIS — I48.0 PAROXYSMAL ATRIAL FIBRILLATION (HCC): ICD-10-CM

## 2023-06-19 RX ORDER — METOPROLOL TARTRATE 50 MG/1
TABLET, FILM COATED ORAL
Qty: 60 TABLET | Refills: 0 | Status: SHIPPED | OUTPATIENT
Start: 2023-06-19

## 2023-06-22 NOTE — PROGRESS NOTES
Progress Note - Electrophysiology-Cardiology (EP)   Natali Isaacs 68 y o  female MRN: [id number]  Unit/Bed#:  Encounter: [id number]           1  Paroxysmal atrial fibrillation (HCC)  POCT ECG      2  Other specified hypothyroidism        3  Obstructive sleep apnea        4  Essential hypertension        5  Tachy-dunia syndrome (HCC)        6  Paroxysmal A-fib (HCC)        7  Subdural hemorrhage (HCC)        8  Overweight (BMI 25 0-29 9)        9  Other fatigue        10   Current use of long term anticoagulation               Summary of my recommendations for this patient:  Pacemaker in place-A-fib load down to 0 6%  Continue with metoprolol and apixaban  If any increase will consider medication    Get a nuclear stress study if flecainide is to be considered          Clinical conditions  Paroxysmal atrial fibrillation - from 12 % to 2 5% on device check-currently down to 0 6%  Tachy-dunia syndrome, post pacemaker-Elevated RV threshold, 4 at 1, however RV paced 0 3%  Fall with subdural hemorrhage in April 2022    Fatigue  Generalized anxiety disorder  Hypertension  Hypothyroidism  Overweight   Solitary kidney  Mechanical fall with SDH  + mechanical fall with leg fracture            Atrial fibrillation  Long-term anticoagulation  AFib load coming down progressively as monitored by device  Currently AFib load is 0 6% from - Previously was over 12%    Anticoagulation-apixaban   Rate control-metoprolol  Rhythm control-none    No stress test available to allow consideration of class 1 C agent   Solitary kidney from birth and hence class 3 agents are not being used  Patient has AFib burden is decreasing and hence amiodarone is not considered    Summary of my recommendation for the patient   Continue to monitor AFib load  Consider nuclear stress study so that class 1 C agents may be used as needed            Obesity / overweight  BMI-28  This increases the risk of-CAD, CVA, vascular disease, diabetes, kidney dysfunction, hypertension, hyperlipidemia  Diet is responsible for 80% of weight gain   Advice nutritional counseling and healthy diet  Also advised to increase activity  He is going to follow up with his primary care        Obstructive sleep apnea   Patient has history of snoring, morning fatigue and daytime sleepiness at least on some days  Examination is also suggestive  Recommended to follow up with primary care and Pulmonary Medicine          Hypertension   Blood pressure-90/62  Medication-lisinopril, metoprolol  My recommendation-continue with same        Mixed hyperlipidemia   Consider initiation of statin   Will follow with primary care physician        Chronic kidney disease   Has single kidney from birth  Preferred to avoid class 3 agents        Thyroid condition  Hypothyroidism and on replacement levothyroxine 50 mcg   Follow TSH                  History of Present Illness   HPI: Maryse Rosenthal is a 68y o  year old female has been referred to me by PCP for the management of atrial fibrillation with RVR    Patient has undergone a pacemaker and is feeling well  A-fib load is down to 0 6%  She has lost some weight    The patient has significant medical illnesses which include  Paroxysmal atrial fibrillation - from 12 % to 0 6%  Tachy-dunia syndrome, post pacemaker  Elevated RV threshold, 4 at 1, however RV paced 0 3%  Fall with subdural hemorrhage in April 2022  Fatigue  Generalized anxiety disorder  Hypertension  Hypothyroidism  Overweight   Solitary kidney  Mechanical fall with SDH  + mechanical fall with leg fracture    The patient does feel palpitation from time to time   She is not complaining of angina, orthopnea, PND, leg swelling, presyncope or syncope   She did have a fall and subdural hemorrhage   This was however a mechanical fall    She has a pacemaker in place and AFib load is diminishing   She however is not very active and unsure of underlying cardiac ischemic status    The patient does have a history of intermittent snoring, morning fatigue and daytime sleepiness     Patient does not have a history of smoking, alcohol abuse or energy drink use        Historical Information   Past Medical History:   Diagnosis Date   • Anxiety    • Congenital absence of one kidney    • Depression    • GERD (gastroesophageal reflux disease)    • History of transfusion    • Hypertension    • Insomnia    • Menopause    • Other specified hypothyroidism 1/28/2019   • Vitamin B12 deficiency      Past Surgical History:   Procedure Laterality Date   • CARDIAC PACEMAKER PLACEMENT Left 4/16/2022    Procedure: INSERTION PACEMAKER;  Surgeon: Dee Merchant DO;  Location: [facility];  Service: Cardiology   • COLONOSCOPY     • DC COLONOSCOPY FLX DX W/COLLJ SPEC WHEN PFRMD N/A 5/30/2017    Procedure: EGD AND COLONOSCOPY;  Surgeon: Laura Gibson MD;  Location: [facility]; Service: Gastroenterology   • DC OPEN [place] LAT MALLEOLUS Left 7/13/2020    Procedure: OPEN REDUCTION W/ INTERNAL FIXATION (ORIF) ANKLE;  Surgeon: Farideh Silvestre DO;  Location: [facility];  Service: Orthopedics   • REDUCTION MAMMAPLASTY Bilateral 06/06/2011   • REDUCTION MAMMAPLASTY     • SHOULDER SURGERY     • TONSILLECTOMY       Social History     Substance and Sexual Activity   Alcohol Use Not Currently    Comment: rarely     Social History     Substance and Sexual Activity   Drug Use No     Social History     Tobacco Use   Smoking Status Never   Smokeless Tobacco Never     Social History     Socioeconomic History   • Marital status:       Spouse name: Not on file   • Number of children: Not on file   • Years of education: Not on file   • Highest education level: Not on file   Occupational History   • Not on file   Tobacco Use   • Smoking status: Never   • Smokeless tobacco: Never   Vaping Use   • Vaping Use: Never used   Substance and Sexual Activity   • Alcohol use: Not Currently     Comment: rarely   • Drug use: No   • Sexual activity: Not on file "  Other Topics Concern   • Not on file   Social History Narrative    Feels safe at home     Social Determinants of Health     Financial Resource Strain: Low Risk  (5/24/2023)    Overall Financial Resource Strain (CARDIA)    • Difficulty of Paying Living Expenses: Not hard at all   Food Insecurity: No Food Insecurity (4/12/2022)    Hunger Vital Sign    • Worried About Running Out of Food in the Last Year: Never true    • Ran Out of Food in the Last Year: Never true   Transportation Needs: No Transportation Needs (5/24/2023)    PRAPARE - Transportation    • Lack of Transportation (Medical): No    • Lack of Transportation (Non-Medical): No   Physical Activity: Not on file   Stress: Not on file   Social Connections: Not on file   Intimate Partner Violence: Not on file   Housing Stability: Unknown (4/12/2022)    Housing Stability Vital Sign    • Unable to Pay for Housing in the Last Year: No    • Number of Places Lived in the Last Year: Not on file    • Unstable Housing in the Last Year: No        Family History:  Family History   Problem Relation Age of Onset   • Lung cancer Mother 79   • Heart disease Father    • Lung cancer Brother 48   • No Known Problems Maternal Grandmother    • No Known Problems Maternal Grandfather    • No Known Problems Paternal Grandmother    • No Known Problems Paternal Grandfather          Meds/Allergies      No current facility-administered medications for this visit  (Not in a hospital admission)      No Known Allergies        Objective   Vitals:   Visit Vitals  BP 90/62   Pulse 62   Ht 5' 5\" (1 651 m)   Wt 76 7 kg (169 lb)   LMP  (LMP Unknown)   BMI 28 12 kg/m²   OB Status Postmenopausal   Smoking Status Never   BSA 1 84 m²        Invasive Devices     None                   ROS  Review of Systems   All other systems reviewed and are negative  As described in my history of present illness          PHYSICAL EXAM  Physical Exam  Vitals reviewed     Constitutional:       General: She is " not in acute distress  Appearance: Normal appearance  She is not ill-appearing  Comments: BMI 30   HENT:      Head: Normocephalic and atraumatic  Right Ear: External ear normal       Left Ear: External ear normal       Nose: Nose normal       Mouth/Throat:      Comments: Posterior pharynx is crowded  Eyes:      General: No scleral icterus  Extraocular Movements: Extraocular movements intact  Conjunctiva/sclera: Conjunctivae normal       Pupils: Pupils are equal, round, and reactive to light  Cardiovascular:      Rate and Rhythm: Normal rate and regular rhythm  Pulses: Normal pulses  Heart sounds: Normal heart sounds  No murmur heard  Pulmonary:      Effort: Pulmonary effort is normal  No respiratory distress  Breath sounds: Normal breath sounds  No wheezing  Abdominal:      General: Bowel sounds are normal  There is no distension  Tenderness: There is no abdominal tenderness  Comments: Central obesity   Musculoskeletal:         General: No swelling, tenderness or deformity  Cervical back: Neck supple  No rigidity  Skin:     Coloration: Skin is not jaundiced  Findings: No bruising  Neurological:      Mental Status: She is alert  Mental status is at baseline  Motor: No weakness  Psychiatric:         Mood and Affect: Mood normal          Thought Content:  Thought content normal                LAB RESULTS:        CBC:     Component Ref Range & Units 4/17/22  3:00 AM 4/16/22  4:38 AM 4/15/22  5:38 AM 4/14/22  4:28 AM 4/12/22  5:33 AM 4/11/22  8:34 AM 2/4/22 12:19 PM   WBC 4 31 - 10 16 Thousand/uL 5 26  3 39 Low   3 29 Low   3 81 Low   4 23 Low   5 62  7 06    RBC 3 81 - 5 12 Million/uL 4 47  4 46  4 41  4 06  3 97  4 21  4 36    Hemoglobin 11 5 - 15 4 g/dL 14 1  14 0  14 3  12 9  12 8  13 5  14 1    Hematocrit 34 8 - 46 1 % 43 6  43 4  42 4  40 0  39 0  42 0  43 7    MCV 82 - 98 fL 98  97  96  99 High   98  100 High   100 High     MCH 26 8 - 34 3 pg "31 5  31 4  32 4  31 8  32 2  32 1  32 3    MCHC 31 4 - 37 4 g/dL 32 3  32 3  33 7  32 3  32 8  32 1  32 3    RDW 11 6 - 15 1 % 13 1  13 1  12 9  12 9  13 1  13 1  12 8    Platelets 629 - 717 Thousands/uL 196  188  169  196  173  176  266    MPV 8 9 - 12 7 fL 10 0  9 9  10 1  10 5  10 3  11 7  10 9                        CMP:     Component Ref Range & Units 4/17/22  3:00 AM 4/16/22  4:38 AM 4/15/22  5:38 AM 4/14/22  4:28 AM 4/13/22  4:59 AM 4/12/22  5:29 AM 4/11/22  8:34 AM   Sodium 136 - 145 mmol/L 138  137  139  139  140  142  138    Potassium 3 5 - 5 3 mmol/L 3 9  5 0  4 2  4 2  4 5  3 4 Low   4 0 CM    Chloride 100 - 108 mmol/L 101  103  104  104  105  105  103    CO2 21 - 32 mmol/L 26  25  24  18 Low   22  24  25    ANION GAP 4 - 13 mmol/L 11  9  11  17 High   13  13  10    BUN 5 - 25 mg/dL 11  10  13  15  11  12  11    Creatinine 0 60 - 1 30 mg/dL 0 89  0 95 CM  0 91 CM  1 19 CM  0 90 CM  0 99 CM  1 00 CM    Comment: Standardized to IDMS reference method   Glucose 65 - 140 mg/dL 87  69 CM  82 CM  74 CM  63 Low  CM  76 CM  92 CM    Comment: If the patient is fasting, the ADA then defines impaired fasting glucose as > 100 mg/dL and diabetes as > or equal to 123 mg/dL  Specimen collection should occur prior to Sulfasalazine administration due to the potential for falsely depressed results  Specimen collection should occur prior to Sulfapyridine administration due to the potential for falsely elevated results  Calcium 8 3 - 10 1 mg/dL 7 9 Low   8 2 Low   8 3  7 7 Low   7 6 Low   7 8 Low   8 3    eGFR ml/min/1 73sq m 65  60  63  46  64  57  56        Magnesium:   Component Ref Range & Units 4/16/22  4:38 AM 4/15/22  5:38 AM 4/14/22  4:28 AM 4/12/22  5:29 AM   Magnesium 1 6 - 2 6 mg/dL 2 2  1 8  2 0  2 0          A1C:  No results for input(s): \"HGBA1C\" in the last 8784 hours       TSH 3RD GENERATION WITH FREE T4 REFLEX  2/4/2022    Component Ref Range & Units 2/4/22 12:19 PM 4/12/21 10:44 AM 2/10/17  8:23 AM " "  TSH 3RD GENERATON 0 358 - 3 740 uIU/mL 3 830 High   3 350 CM  5 35 High  R          PT/INR:  No results for input(s): \"PROTIME\", \"INR\" in the last 8784 hours  Lipid Panel:  2/4/2022    Component Ref Range & Units 2/4/22 12:19 PM 4/12/21 10:44 AM 9/19/19 10:05 AM 1/25/19 10:09 AM 2/10/17  8:23 AM   Cholesterol See Comment mg/dL 238 High   214 High  R, CM  237 High  R  243 High  R  242 High  R           Troponin:  No results for input(s): \"TROPONINI\" in the last 8784 hours  Imaging:    EKG  11/30/2022            Echocardiogram  April 2022  Findings    Left Ventricle Left ventricular cavity size is normal  Wall thickness is normal  The left ventricular ejection fraction is 65%  Systolic function is normal   Although no diagnostic regional wall motion abnormality was identified, this possibility cannot be completely excluded on the basis of this study  Unable to assess diastolic function due to atrial fibrillation  Right Ventricle Right ventricular cavity size is normal  Systolic function is normal  Wall thickness is normal    Left Atrium The atrium is normal in size  Right Atrium The atrium is normal in size  Aortic Valve The aortic valve is trileaflet  The leaflets are not thickened  The leaflets are not calcified  The leaflets exhibit normal mobility  There is trace regurgitation  There is no evidence of stenosis  Mitral Valve The mitral valve has normal structure and function  There is trace regurgitation  There is no evidence of stenosis  Tricuspid Valve Tricuspid valve structure is normal  There is trace regurgitation  There is no evidence of stenosis  Pulmonic Valve Pulmonic valve structure is normal  There is no evidence of regurgitation  There is no evidence of stenosis  Ascending Aorta The aortic root is normal in size  IVC/SVC The inferior vena cava is normal in size  Pericardium There is no pericardial effusion  The pericardium is normal in appearance             Stress " "Test:   No results found for this or any previous visit  Cardiac catheterization :  No results found for this or any previous visit  DEVICE CHECK:      Results for orders placed or performed in visit on 05/31/23   Cardiac EP device report    Narrative    MDT DUAL CHAMBER PPM (AAIR-DDDR 60) - ACTIVE SYSTEM IS MRI CONDITIONAL  CARELINK TRANSMISSION: BATTERY STATUS \"13 YRS  \" AP 95%  0%  ALL AVAILABLE LEAD PARAMETERS WITHIN NORMAL LIMITS  CHRONICALLY HIGH RV THRESHOLDS  2 VHRS & 15 FAST A/V NOTED; AVAIL EGRAMS PRESENT AS RVR & SVT  1 AF NOTED; 0% BURDEN; 15+ HRS  AVAIL EGRAM SHOWING AF  PT ON WARFARIN, FLECAINIDE, & BISOPROLOL  EF 55% (NUC 2022)  NORMAL DEVICE FUNCTION   NC                    Code Status: [unfilled]  Advance Directive and Living Will:      Power of :    POLST:      Counseling / Coordination of Care  Detailed discussion done with regards to management of AFib      Jairon Glaser MD    "

## 2023-06-26 ENCOUNTER — OFFICE VISIT (OUTPATIENT)
Dept: CARDIOLOGY CLINIC | Facility: CLINIC | Age: 73
End: 2023-06-26
Payer: MEDICARE

## 2023-06-26 VITALS
HEART RATE: 62 BPM | DIASTOLIC BLOOD PRESSURE: 62 MMHG | BODY MASS INDEX: 28.16 KG/M2 | HEIGHT: 65 IN | WEIGHT: 169 LBS | SYSTOLIC BLOOD PRESSURE: 90 MMHG

## 2023-06-26 DIAGNOSIS — I10 ESSENTIAL HYPERTENSION: ICD-10-CM

## 2023-06-26 DIAGNOSIS — I49.5 TACHY-BRADY SYNDROME (HCC): ICD-10-CM

## 2023-06-26 DIAGNOSIS — E03.8 OTHER SPECIFIED HYPOTHYROIDISM: ICD-10-CM

## 2023-06-26 DIAGNOSIS — Z79.01 CURRENT USE OF LONG TERM ANTICOAGULATION: ICD-10-CM

## 2023-06-26 DIAGNOSIS — I62.00 SUBDURAL HEMORRHAGE (HCC): ICD-10-CM

## 2023-06-26 DIAGNOSIS — I48.0 PAROXYSMAL A-FIB (HCC): ICD-10-CM

## 2023-06-26 DIAGNOSIS — R53.83 OTHER FATIGUE: ICD-10-CM

## 2023-06-26 DIAGNOSIS — I48.0 PAROXYSMAL ATRIAL FIBRILLATION (HCC): Primary | ICD-10-CM

## 2023-06-26 DIAGNOSIS — E66.3 OVERWEIGHT (BMI 25.0-29.9): ICD-10-CM

## 2023-06-26 DIAGNOSIS — G47.33 OBSTRUCTIVE SLEEP APNEA: ICD-10-CM

## 2023-06-26 PROCEDURE — 99214 OFFICE O/P EST MOD 30 MIN: CPT | Performed by: INTERNAL MEDICINE

## 2023-06-26 PROCEDURE — 93000 ELECTROCARDIOGRAM COMPLETE: CPT | Performed by: INTERNAL MEDICINE

## 2023-06-26 NOTE — LETTER
June 27, 2023     Camelia Gonzalez MD  [address]  [address] [facility] [address]    Patient: Steve Menon   YOB: 1950   Date of Visit: 6/26/2023       Dear Dr Sindy Vega:    Thank you for referring Setve Menon to me for evaluation  Below are my notes for this consultation  If you have questions, please do not hesitate to call me  I look forward to following your patient along with you  Sincerely,        Renee Morillo MD        CC: Carlos Jose MD  6/27/2023 10:01 PM  Sign when Signing Visit  Progress Note - Electrophysiology-Cardiology (EP)   Steve Menon 68 y o  female MRN: [id number]  Unit/Bed#:  Encounter: [id number]           1  Paroxysmal atrial fibrillation (HCC)  POCT ECG      2  Other specified hypothyroidism        3  Obstructive sleep apnea        4  Essential hypertension        5  Tachy-dunia syndrome (HCC)        6  Paroxysmal A-fib (HCC)        7  Subdural hemorrhage (HCC)        8  Overweight (BMI 25 0-29 9)        9  Other fatigue        10   Current use of long term anticoagulation               Summary of my recommendations for this patient:  Pacemaker in place-A-fib load down to 0 6%  Continue with metoprolol and apixaban  If any increase will consider medication    Get a nuclear stress study if flecainide is to be considered          Clinical conditions  Paroxysmal atrial fibrillation - from 12 % to 2 5% on device check-currently down to 0 6%  Tachy-dunia syndrome, post pacemaker-Elevated RV threshold, 4 at 1, however RV paced 0 3%  Fall with subdural hemorrhage in April 2022    Fatigue  Generalized anxiety disorder  Hypertension  Hypothyroidism  Overweight   Solitary kidney  Mechanical fall with SDH  + mechanical fall with leg fracture            Atrial fibrillation  Long-term anticoagulation  AFib load coming down progressively as monitored by device  Currently AFib load is 0 6% from - Previously was over 12%    Anticoagulation-apixaban   Rate control-metoprolol  Rhythm control-none    No stress test available to allow consideration of class 1 C agent   Solitary kidney from birth and hence class 3 agents are not being used  Patient has AFib burden is decreasing and hence amiodarone is not considered    Summary of my recommendation for the patient   Continue to monitor AFib load  Consider nuclear stress study so that class 1 C agents may be used as needed            Obesity / overweight  BMI-28  This increases the risk of-CAD, CVA, vascular disease, diabetes, kidney dysfunction, hypertension, hyperlipidemia  Diet is responsible for 80% of weight gain   Advice nutritional counseling and healthy diet  Also advised to increase activity  He is going to follow up with his primary care        Obstructive sleep apnea   Patient has history of snoring, morning fatigue and daytime sleepiness at least on some days  Examination is also suggestive  Recommended to follow up with primary care and Pulmonary Medicine          Hypertension   Blood pressure-90/62  Medication-lisinopril, metoprolol  My recommendation-continue with same        Mixed hyperlipidemia   Consider initiation of statin   Will follow with primary care physician        Chronic kidney disease   Has single kidney from birth  Preferred to avoid class 3 agents        Thyroid condition  Hypothyroidism and on replacement levothyroxine 50 mcg   Follow TSH                  History of Present Illness   HPI: Sharlene Lamas is a 68y o  year old female has been referred to me by PCP for the management of atrial fibrillation with RVR    Patient has undergone a pacemaker and is feeling well  A-fib load is down to 0 6%  She has lost some weight    The patient has significant medical illnesses which include  Paroxysmal atrial fibrillation - from 12 % to 0 6%  Tachy-dunia syndrome, post pacemaker  Elevated RV threshold, 4 at 1, however RV paced 0 3%  Fall with subdural hemorrhage in April 2022  Fatigue  Generalized anxiety disorder  Hypertension  Hypothyroidism  Overweight   Solitary kidney  Mechanical fall with SDH  + mechanical fall with leg fracture    The patient does feel palpitation from time to time   She is not complaining of angina, orthopnea, PND, leg swelling, presyncope or syncope   She did have a fall and subdural hemorrhage   This was however a mechanical fall    She has a pacemaker in place and AFib load is diminishing   She however is not very active and unsure of underlying cardiac ischemic status    The patient does have a history of intermittent snoring, morning fatigue and daytime sleepiness     Patient does not have a history of smoking, alcohol abuse or energy drink use        Historical Information   Past Medical History:   Diagnosis Date   • Anxiety    • Congenital absence of one kidney    • Depression    • GERD (gastroesophageal reflux disease)    • History of transfusion    • Hypertension    • Insomnia    • Menopause    • Other specified hypothyroidism 1/28/2019   • Vitamin B12 deficiency      Past Surgical History:   Procedure Laterality Date   • CARDIAC PACEMAKER PLACEMENT Left 4/16/2022    Procedure: INSERTION PACEMAKER;  Surgeon: Evelina Ugarte DO;  [facility];  Service: Cardiology   • COLONOSCOPY     • ID COLONOSCOPY FLX DX W/COLLJ SPEC WHEN PFRMD N/A 5/30/2017    Procedure: EGD AND COLONOSCOPY;  Surgeon: Robert Chavez MD;  [facility];   Service: Gastroenterology   • ID OPEN [place] LAT MALLEOLUS Left 7/13/2020    Procedure: OPEN REDUCTION W/ INTERNAL FIXATION (ORIF) ANKLE;  Surgeon: Lasha Weeks DO;  Location: [address] [place] [address];  Service: Orthopedics   • REDUCTION MAMMAPLASTY Bilateral 06/06/2011   • REDUCTION MAMMAPLASTY     • SHOULDER SURGERY     • TONSILLECTOMY       Social History     Substance and Sexual Activity   Alcohol Use Not Currently    Comment: rarely     Social History     Substance and Sexual Activity   Drug Use No     Social History     Tobacco Use Smoking Status Never   Smokeless Tobacco Never     Social History     Socioeconomic History   • Marital status:      Spouse name: Not on file   • Number of children: Not on file   • Years of education: Not on file   • Highest education level: Not on file   Occupational History   • Not on file   Tobacco Use   • Smoking status: Never   • Smokeless tobacco: Never   Vaping Use   • Vaping Use: Never used   Substance and Sexual Activity   • Alcohol use: Not Currently     Comment: rarely   • Drug use: No   • Sexual activity: Not on file   Other Topics Concern   • Not on file   Social History Narrative    Feels safe at home     Social Determinants of Health     Financial Resource Strain: Low Risk  (5/24/2023)    Overall Financial Resource Strain (CARDIA)    • Difficulty of Paying Living Expenses: Not hard at all   Food Insecurity: No Food Insecurity (4/12/2022)    Hunger Vital Sign    • Worried About Running Out of Food in the Last Year: Never true    • Ran Out of Food in the Last Year: Never true   Transportation Needs: No Transportation Needs (5/24/2023)    PRAPARE - Transportation    • Lack of Transportation (Medical): No    • Lack of Transportation (Non-Medical):  No   Physical Activity: Not on file   Stress: Not on file   Social Connections: Not on file   Intimate Partner Violence: Not on file   Housing Stability: Unknown (4/12/2022)    Housing Stability Vital Sign    • Unable to Pay for Housing in the Last Year: No    • Number of Places Lived in the Last Year: Not on file    • Unstable Housing in the Last Year: No        Family History:  Family History   Problem Relation Age of Onset   • Lung cancer Mother 79   • Heart disease Father    • Lung cancer Brother 48   • No Known Problems Maternal Grandmother    • No Known Problems Maternal Grandfather    • No Known Problems Paternal Grandmother    • No Known Problems Paternal Grandfather          Meds/Allergies      No current facility-administered medications for "this visit  (Not in a hospital admission)      No Known Allergies        Objective   Vitals:   Visit Vitals  BP 90/62   Pulse 62   Ht 5' 5\" (1 651 m)   Wt 76 7 kg (169 lb)   LMP  (LMP Unknown)   BMI 28 12 kg/m²   OB Status Postmenopausal   Smoking Status Never   BSA 1 84 m²        Invasive Devices       None                     ROS  Review of Systems   All other systems reviewed and are negative  As described in my history of present illness          PHYSICAL EXAM  Physical Exam  Vitals reviewed  Constitutional:       General: She is not in acute distress  Appearance: Normal appearance  She is not ill-appearing  Comments: BMI 30   HENT:      Head: Normocephalic and atraumatic  Right Ear: External ear normal       Left Ear: External ear normal       Nose: Nose normal       Mouth/Throat:      Comments: Posterior pharynx is crowded  Eyes:      General: No scleral icterus  Extraocular Movements: Extraocular movements intact  Conjunctiva/sclera: Conjunctivae normal       Pupils: Pupils are equal, round, and reactive to light  Cardiovascular:      Rate and Rhythm: Normal rate and regular rhythm  Pulses: Normal pulses  Heart sounds: Normal heart sounds  No murmur heard  Pulmonary:      Effort: Pulmonary effort is normal  No respiratory distress  Breath sounds: Normal breath sounds  No wheezing  Abdominal:      General: Bowel sounds are normal  There is no distension  Tenderness: There is no abdominal tenderness  Comments: Central obesity   Musculoskeletal:         General: No swelling, tenderness or deformity  Cervical back: Neck supple  No rigidity  Skin:     Coloration: Skin is not jaundiced  Findings: No bruising  Neurological:      Mental Status: She is alert  Mental status is at baseline  Motor: No weakness  Psychiatric:         Mood and Affect: Mood normal          Thought Content:  Thought content normal                LAB " RESULTS:        CBC:     Component Ref Range & Units 4/17/22  3:00 AM 4/16/22  4:38 AM 4/15/22  5:38 AM 4/14/22  4:28 AM 4/12/22  5:33 AM 4/11/22  8:34 AM 2/4/22 12:19 PM   WBC 4 31 - 10 16 Thousand/uL 5 26  3 39 Low   3 29 Low   3 81 Low   4 23 Low   5 62  7 06    RBC 3 81 - 5 12 Million/uL 4 47  4 46  4 41  4 06  3 97  4 21  4 36    Hemoglobin 11 5 - 15 4 g/dL 14 1  14 0  14 3  12 9  12 8  13 5  14 1    Hematocrit 34 8 - 46 1 % 43 6  43 4  42 4  40 0  39 0  42 0  43 7    MCV 82 - 98 fL 98  97  96  99 High   98  100 High   100 High     MCH 26 8 - 34 3 pg 31 5  31 4  32 4  31 8  32 2  32 1  32 3    MCHC 31 4 - 37 4 g/dL 32 3  32 3  33 7  32 3  32 8  32 1  32 3    RDW 11 6 - 15 1 % 13 1  13 1  12 9  12 9  13 1  13 1  12 8    Platelets 672 - 933 Thousands/uL 196  188  169  196  173  176  266    MPV 8 9 - 12 7 fL 10 0  9 9  10 1  10 5  10 3  11 7  10 9                        CMP:     Component Ref Range & Units 4/17/22  3:00 AM 4/16/22  4:38 AM 4/15/22  5:38 AM 4/14/22  4:28 AM 4/13/22  4:59 AM 4/12/22  5:29 AM 4/11/22  8:34 AM   Sodium 136 - 145 mmol/L 138  137  139  139  140  142  138    Potassium 3 5 - 5 3 mmol/L 3 9  5 0  4 2  4 2  4 5  3 4 Low   4 0 CM    Chloride 100 - 108 mmol/L 101  103  104  104  105  105  103    CO2 21 - 32 mmol/L 26  25  24  18 Low   22  24  25    ANION GAP 4 - 13 mmol/L 11  9  11  17 High   13  13  10    BUN 5 - 25 mg/dL 11  10  13  15  11  12  11    Creatinine 0 60 - 1 30 mg/dL 0 89  0 95 CM  0 91 CM  1 19 CM  0 90 CM  0 99 CM  1 00 CM    Comment: Standardized to IDMS reference method   Glucose 65 - 140 mg/dL 87  69 CM  82 CM  74 CM  63 Low  CM  76 CM  92 CM    Comment: If the patient is fasting, the ADA then defines impaired fasting glucose as > 100 mg/dL and diabetes as > or equal to 123 mg/dL  Specimen collection should occur prior to Sulfasalazine administration due to the potential for falsely depressed results   Specimen collection should occur prior to Sulfapyridine administration "due to the potential for falsely elevated results  Calcium 8 3 - 10 1 mg/dL 7 9 Low   8 2 Low   8 3  7 7 Low   7 6 Low   7 8 Low   8 3    eGFR ml/min/1 73sq m 65  60  63  46  64  57  56        Magnesium:   Component Ref Range & Units 4/16/22  4:38 AM 4/15/22  5:38 AM 4/14/22  4:28 AM 4/12/22  5:29 AM   Magnesium 1 6 - 2 6 mg/dL 2 2  1 8  2 0  2 0          A1C:  No results for input(s): \"HGBA1C\" in the last 8784 hours  TSH 3RD GENERATION WITH FREE T4 REFLEX  2/4/2022    Component Ref Range & Units 2/4/22 12:19 PM 4/12/21 10:44 AM 2/10/17  8:23 AM   TSH 3RD GENERATON 0 358 - 3 740 uIU/mL 3 830 High   3 350 CM  5 35 High  R          PT/INR:  No results for input(s): \"PROTIME\", \"INR\" in the last 8784 hours  Lipid Panel:  2/4/2022    Component Ref Range & Units 2/4/22 12:19 PM 4/12/21 10:44 AM 9/19/19 10:05 AM 1/25/19 10:09 AM 2/10/17  8:23 AM   Cholesterol See Comment mg/dL 238 High   214 High  R, CM  237 High  R  243 High  R  242 High  R           Troponin:  No results for input(s): \"TROPONINI\" in the last 8784 hours  Imaging:    EKG  11/30/2022            Echocardiogram  April 2022  Findings    Left Ventricle Left ventricular cavity size is normal  Wall thickness is normal  The left ventricular ejection fraction is 65%  Systolic function is normal   Although no diagnostic regional wall motion abnormality was identified, this possibility cannot be completely excluded on the basis of this study  Unable to assess diastolic function due to atrial fibrillation  Right Ventricle Right ventricular cavity size is normal  Systolic function is normal  Wall thickness is normal    Left Atrium The atrium is normal in size  Right Atrium The atrium is normal in size  Aortic Valve The aortic valve is trileaflet  The leaflets are not thickened  The leaflets are not calcified  The leaflets exhibit normal mobility  There is trace regurgitation  There is no evidence of stenosis     Mitral Valve The mitral valve " "has normal structure and function  There is trace regurgitation  There is no evidence of stenosis  Tricuspid Valve Tricuspid valve structure is normal  There is trace regurgitation  There is no evidence of stenosis  Pulmonic Valve Pulmonic valve structure is normal  There is no evidence of regurgitation  There is no evidence of stenosis  Ascending Aorta The aortic root is normal in size  IVC/SVC The inferior vena cava is normal in size  Pericardium There is no pericardial effusion  The pericardium is normal in appearance  Stress Test:   No results found for this or any previous visit  Cardiac catheterization :  No results found for this or any previous visit  DEVICE CHECK:      Results for orders placed or performed in visit on 05/31/23   Cardiac EP device report    Narrative    MDT DUAL CHAMBER PPM (AAIR-DDDR 60) - ACTIVE SYSTEM IS MRI CONDITIONAL  CARELINK TRANSMISSION: BATTERY STATUS \"13 YRS  \" AP 95%  0%  ALL AVAILABLE LEAD PARAMETERS WITHIN NORMAL LIMITS  CHRONICALLY HIGH RV THRESHOLDS  2 VHRS & 15 FAST A/V NOTED; AVAIL EGRAMS PRESENT AS RVR & SVT  1 AF NOTED; 0% BURDEN; 15+ HRS  AVAIL EGRAM SHOWING AF  PT ON WARFARIN, FLECAINIDE, & BISOPROLOL  EF 55% (NUC 2022)  NORMAL DEVICE FUNCTION   NC                    Code Status: [unfilled]  Advance Directive and Living Will:      Power of :    POLST:      Counseling / Coordination of Care  Detailed discussion done with regards to management of AFib      Claudene Newness, MD    "

## 2023-07-05 DIAGNOSIS — F41.9 ANXIETY: ICD-10-CM

## 2023-07-05 RX ORDER — ALPRAZOLAM 0.5 MG/1
TABLET ORAL
Qty: 30 TABLET | Refills: 0 | Status: SHIPPED | OUTPATIENT
Start: 2023-07-05

## 2023-07-06 RX ORDER — ALPRAZOLAM 0.5 MG/1
TABLET ORAL
Qty: 30 TABLET | Refills: 0 | OUTPATIENT
Start: 2023-07-06

## 2023-07-11 ENCOUNTER — HOSPITAL ENCOUNTER (OUTPATIENT)
Dept: MAMMOGRAPHY | Facility: HOSPITAL | Age: 73
Discharge: HOME | End: 2023-07-11
Payer: MEDICARE

## 2023-07-11 VITALS — WEIGHT: 167 LBS | HEIGHT: 65 IN | BODY MASS INDEX: 27.82 KG/M2

## 2023-07-11 DIAGNOSIS — Z12.39 SCREENING BREAST EXAMINATION: ICD-10-CM

## 2023-07-11 DIAGNOSIS — Z12.31 ENCOUNTER FOR SCREENING MAMMOGRAM FOR MALIGNANT NEOPLASM OF BREAST: ICD-10-CM

## 2023-07-11 PROCEDURE — 77067 SCR MAMMO BI INCL CAD: CPT

## 2023-07-11 PROCEDURE — 77063 BREAST TOMOSYNTHESIS BI: CPT

## 2023-07-13 ENCOUNTER — TELEPHONE (OUTPATIENT)
Dept: FAMILY MEDICINE CLINIC | Facility: CLINIC | Age: 73
End: 2023-07-13

## 2023-07-13 NOTE — TELEPHONE ENCOUNTER
Called Zac Silva to review her mammogram result. There is an abnormality seen on her right breast that needs additional imaging. It looks like she is already scheduled for the additional right breast ultrasound and mammogram. I believe radiology called her to make her aware of her results already, but if she has any questions or concerns, please let me know.

## 2023-07-17 ENCOUNTER — TELEPHONE (OUTPATIENT)
Dept: FAMILY MEDICINE CLINIC | Facility: CLINIC | Age: 73
End: 2023-07-17

## 2023-07-17 NOTE — TELEPHONE ENCOUNTER
----- Message from Marylou Gan sent at 7/15/2023  7:46 AM EDT -----  Regarding: FW: Mammogram   Contact: [phone]  See telephone call from 7/13/23   ----- Message -----  From: Jose G Alvarez  Sent: 7/14/2023   6:51 PM EDT  To: [facility]  Subject: Jorden Campos  My test came back with a spot of concern  I am scheduled for a diagnostic mammogram and a sonogram 8/21. That’s over a month. Do you think I should   find a testing site that can take me earlier?   Thanks  Melvin Cardozo

## 2023-07-17 NOTE — TELEPHONE ENCOUNTER
Please call to see if there is any possibility of patient getting in sooner for diagnostic mammo and US.

## 2023-07-17 NOTE — TELEPHONE ENCOUNTER
Spoke to [place] and she stated that the appt that patient has scheduled is the best that they have currently and that its recommended that the patient calls periodically to check if there are any appts available sooner.  Patient informed of this No further action at this time  Sari Jacobson

## 2023-07-18 ENCOUNTER — OFFICE VISIT (OUTPATIENT)
Dept: GASTROENTEROLOGY | Facility: AMBULARY SURGERY CENTER | Age: 73
End: 2023-07-18
Payer: MEDICARE

## 2023-07-18 ENCOUNTER — APPOINTMENT (OUTPATIENT)
Dept: LAB | Facility: AMBULARY SURGERY CENTER | Age: 73
End: 2023-07-18
Payer: MEDICARE

## 2023-07-18 VITALS
BODY MASS INDEX: 27.82 KG/M2 | DIASTOLIC BLOOD PRESSURE: 70 MMHG | OXYGEN SATURATION: 100 % | RESPIRATION RATE: 18 BRPM | HEIGHT: 65 IN | WEIGHT: 167 LBS | SYSTOLIC BLOOD PRESSURE: 118 MMHG | HEART RATE: 72 BPM

## 2023-07-18 DIAGNOSIS — K21.9 GASTROESOPHAGEAL REFLUX DISEASE WITHOUT ESOPHAGITIS: Primary | ICD-10-CM

## 2023-07-18 DIAGNOSIS — E53.8 VITAMIN B 12 DEFICIENCY: ICD-10-CM

## 2023-07-18 DIAGNOSIS — E78.2 MIXED HYPERLIPIDEMIA: ICD-10-CM

## 2023-07-18 DIAGNOSIS — I48.0 PAROXYSMAL A-FIB (HCC): ICD-10-CM

## 2023-07-18 DIAGNOSIS — E03.8 OTHER SPECIFIED HYPOTHYROIDISM: ICD-10-CM

## 2023-07-18 DIAGNOSIS — R10.13 EPIGASTRIC PAIN: ICD-10-CM

## 2023-07-18 LAB
ALBUMIN SERPL BCP-MCNC: 3.8 G/DL (ref 3.5–5)
ALP SERPL-CCNC: 51 U/L (ref 46–116)
ALT SERPL W P-5'-P-CCNC: 18 U/L (ref 12–78)
ANION GAP SERPL CALCULATED.3IONS-SCNC: 2 MMOL/L
AST SERPL W P-5'-P-CCNC: 13 U/L (ref 5–45)
BASOPHILS # BLD AUTO: 0.05 THOUSANDS/ÂΜL (ref 0–0.1)
BASOPHILS NFR BLD AUTO: 1 % (ref 0–1)
BILIRUB SERPL-MCNC: 0.48 MG/DL (ref 0.2–1)
BUN SERPL-MCNC: 18 MG/DL (ref 5–25)
CALCIUM SERPL-MCNC: 8.7 MG/DL (ref 8.3–10.1)
CHLORIDE SERPL-SCNC: 108 MMOL/L (ref 96–108)
CHOLEST SERPL-MCNC: 242 MG/DL
CO2 SERPL-SCNC: 30 MMOL/L (ref 21–32)
CREAT SERPL-MCNC: 0.98 MG/DL (ref 0.6–1.3)
EOSINOPHIL # BLD AUTO: 0.25 THOUSAND/ÂΜL (ref 0–0.61)
EOSINOPHIL NFR BLD AUTO: 4 % (ref 0–6)
ERYTHROCYTE [DISTWIDTH] IN BLOOD BY AUTOMATED COUNT: 11.8 % (ref 11.6–15.1)
EST. AVERAGE GLUCOSE BLD GHB EST-MCNC: 103 MG/DL
GFR SERPL CREATININE-BSD FRML MDRD: 57 ML/MIN/1.73SQ M
GLUCOSE SERPL-MCNC: 95 MG/DL (ref 65–140)
HBA1C MFR BLD: 5.2 %
HCT VFR BLD AUTO: 42.5 % (ref 34.8–46.1)
HDLC SERPL-MCNC: 63 MG/DL
HGB BLD-MCNC: 13.5 G/DL (ref 11.5–15.4)
IMM GRANULOCYTES # BLD AUTO: 0.02 THOUSAND/UL (ref 0–0.2)
IMM GRANULOCYTES NFR BLD AUTO: 0 % (ref 0–2)
LDLC SERPL CALC-MCNC: 157 MG/DL (ref 0–100)
LYMPHOCYTES # BLD AUTO: 1.66 THOUSANDS/ÂΜL (ref 0.6–4.47)
LYMPHOCYTES NFR BLD AUTO: 29 % (ref 14–44)
MCH RBC QN AUTO: 31 PG (ref 26.8–34.3)
MCHC RBC AUTO-ENTMCNC: 31.8 G/DL (ref 31.4–37.4)
MCV RBC AUTO: 98 FL (ref 82–98)
MONOCYTES # BLD AUTO: 0.48 THOUSAND/ÂΜL (ref 0.17–1.22)
MONOCYTES NFR BLD AUTO: 8 % (ref 4–12)
NEUTROPHILS # BLD AUTO: 3.35 THOUSANDS/ÂΜL (ref 1.85–7.62)
NEUTS SEG NFR BLD AUTO: 58 % (ref 43–75)
NONHDLC SERPL-MCNC: 179 MG/DL
NRBC BLD AUTO-RTO: 0 /100 WBCS
PLATELET # BLD AUTO: 229 THOUSANDS/UL (ref 149–390)
PMV BLD AUTO: 12.2 FL (ref 8.9–12.7)
POTASSIUM SERPL-SCNC: 4.3 MMOL/L (ref 3.5–5.3)
PROT SERPL-MCNC: 7.2 G/DL (ref 6.4–8.4)
RBC # BLD AUTO: 4.36 MILLION/UL (ref 3.81–5.12)
SODIUM SERPL-SCNC: 140 MMOL/L (ref 135–147)
T4 FREE SERPL-MCNC: 0.75 NG/DL (ref 0.61–1.12)
TRIGL SERPL-MCNC: 110 MG/DL
TSH SERPL DL<=0.05 MIU/L-ACNC: 5.29 UIU/ML (ref 0.45–4.5)
VIT B12 SERPL-MCNC: 149 PG/ML (ref 180–914)
WBC # BLD AUTO: 5.81 THOUSAND/UL (ref 4.31–10.16)

## 2023-07-18 PROCEDURE — 36415 COLL VENOUS BLD VENIPUNCTURE: CPT

## 2023-07-18 PROCEDURE — 84439 ASSAY OF FREE THYROXINE: CPT

## 2023-07-18 PROCEDURE — 85025 COMPLETE CBC W/AUTO DIFF WBC: CPT

## 2023-07-18 PROCEDURE — 99214 OFFICE O/P EST MOD 30 MIN: CPT | Performed by: INTERNAL MEDICINE

## 2023-07-18 PROCEDURE — 80061 LIPID PANEL: CPT

## 2023-07-18 PROCEDURE — 80053 COMPREHEN METABOLIC PANEL: CPT

## 2023-07-18 PROCEDURE — 82607 VITAMIN B-12: CPT

## 2023-07-18 PROCEDURE — 84443 ASSAY THYROID STIM HORMONE: CPT

## 2023-07-18 RX ORDER — AMITRIPTYLINE HYDROCHLORIDE 25 MG/1
25 TABLET, FILM COATED ORAL
Qty: 30 TABLET | Refills: 2 | Status: SHIPPED | OUTPATIENT
Start: 2023-07-18

## 2023-07-18 NOTE — PROGRESS NOTES
[facility]  Hero Brian 68 y.o. female MRN: [id number]            Assessment & Plan:  Pleasant 81-year-old female with history of congenital intestinal malrotation, previous seen for intermittent bouts of abdominal pain most likely musculoskeletal versus neuropathic pain. 1.  Epigastric pain: Most likely neuropathic versus musculoskeletal -has had complete symptom resolution since starting amitriptyline, continue to take it every other day for few weeks and the patient was instructed to discontinue amitriptyline  -If she is asymptomatic, no need for further evaluation  -If she had continued symptoms would put her back on amitriptyline which she can take long-term if needed    2. GERD: Patient takes pantoprazole daily  -At this point would not change her pantoprazole, if she is successfully able to taper off amitriptyline we will see her back in 6 months, and try to slowly wean off of PPI therapy    Patient is currently being worked up for abnormal mammogram.      Zac Silva was seen today for med refill. Diagnoses and all orders for this visit:    Gastroesophageal reflux disease without esophagitis    Epigastric pain  -     amitriptyline (ELAVIL) 25 mg tablet; Take 1 tablet (25 mg total) by mouth daily at bedtime            _____________________________________________________________        CC: Follow-up    HPI:  Hero Brian is a 68 y. o.female who is here for follow-up. 81-year-old female with a history of congenital intestinal malrotation, we had seen her in the past for intermittent bouts of abdominal epigastric pain which can be quite severe at times. Evaluation prior to seeing us had been unremarkable, we suspect this that her symptoms may have been more musculoskeletal or neuropathic. She was started on amitriptyline, she notes in last 2 years she has not had any recurrent episodes. She continues take pantoprazole daily. Appetite is good, denies any nausea or vomiting.   Has some mild constipation, since her last visit patient was found to have A-fib, tachybradycardia syndrome, has a pacemaker placed. Denies any melena or rectal bleeding. Last colonoscopy in 2017. Overall she is doing very well. At the end of her last visit we had discussed with her tapering off of amitriptyline, she just already taking every other day recently      ROS:  The remainder of the ROS was negative except for the pertinent positives mentioned in HPI. Allergies: Patient has no known allergies.     Medications:   Current Outpatient Medications:   •  ALPRAZolam (XANAX) 0.5 mg tablet, TAKE 1 TABLET BY MOUTH ONCE DAILY AS NEEDED FOR ANXIETY, Disp: 30 tablet, Rfl: 0  •  amitriptyline (ELAVIL) 25 mg tablet, Take 1 tablet (25 mg total) by mouth daily at bedtime, Disp: 30 tablet, Rfl: 2  •  apixaban (Eliquis) 5 mg, Take 1 tablet (5 mg total) by mouth 2 (two) times a day, Disp: 180 tablet, Rfl: 3  •  escitalopram (Lexapro) 10 mg tablet, Take 1 tablet (10 mg total) by mouth daily, Disp: 90 tablet, Rfl: 1  •  levothyroxine 50 mcg tablet, Take 1 tablet (50 mcg total) by mouth daily, Disp: 30 tablet, Rfl: 5  •  lisinopril (ZESTRIL) 10 mg tablet, Take 1 tablet by mouth once daily, Disp: 90 tablet, Rfl: 3  •  metoprolol tartrate (LOPRESSOR) 50 mg tablet, TAKE 1 TABLET BY MOUTH EVERY 12 HOURS, Disp: 60 tablet, Rfl: 0  •  pantoprazole (PROTONIX) 40 mg tablet, TAKE 1 TABLET EVERY DAY, Disp: 90 tablet, Rfl: 0    Past Medical History:   Diagnosis Date   • Anxiety    • Congenital absence of one kidney    • Depression    • GERD (gastroesophageal reflux disease)    • History of transfusion    • Hypertension    • Insomnia    • Menopause    • Other specified hypothyroidism 1/28/2019   • Vitamin B12 deficiency        Past Surgical History:   Procedure Laterality Date   • CARDIAC PACEMAKER PLACEMENT Left 04/16/2022    Procedure: INSERTION PACEMAKER;  Surgeon: Luz Caldwell DO;  [facility];  Service: Cardiology • COLONOSCOPY     • SD COLONOSCOPY FLX DX W/COLLJ SPEC WHEN PFRMD N/A 05/30/2017    Procedure: EGD AND COLONOSCOPY;  Surgeon: Frankie Do MD;  [facility]; Service: Gastroenterology   • SD OPEN [address] LAT MALLEOLUS Left 07/13/2020    Procedure: OPEN REDUCTION W/ INTERNAL FIXATION (ORIF) ANKLE;  Surgeon: Oleg Cherry DO;  Location[facility];  Service: Orthopedics   • REDUCTION MAMMAPLASTY Bilateral 06/06/2011   • SHOULDER SURGERY     • TONSILLECTOMY     • UPPER GASTROINTESTINAL ENDOSCOPY         Family History   Problem Relation Age of Onset   • Lung cancer Mother 79   • Heart disease Father    • No Known Problems Maternal Grandmother    • No Known Problems Maternal Grandfather    • No Known Problems Paternal Grandmother    • No Known Problems Paternal Grandfather    • Lung cancer Brother 48   • No Known Problems Maternal Aunt    • No Known Problems Maternal Aunt    • No Known Problems Maternal Aunt    • No Known Problems Paternal Aunt    • No Known Problems Paternal Aunt    • No Known Problems Paternal Aunt    • No Known Problems Paternal Aunt         reports that she has never smoked. She has never used smokeless tobacco. She reports that she does not currently use alcohol. She reports that she does not use drugs.       Physical Exam:    /70 (BP Location: Right arm, Patient Position: Sitting, Cuff Size: Standard)   Pulse 72   Resp 18   Ht 5' 5" (1.651 m)   Wt 75.8 kg (167 lb)   LMP  (LMP Unknown)   SpO2 100%   BMI 27.79 kg/m²     Gen: wn/wd, NAD, healthy-appearing female  HEENT: anicteric, MMM, no cervical LAD  CVS: RRR, no m/r/g  CHEST: CTA b/l  ABD: +BS, soft, NT,ND, no hepatosplenomegaly  EXT: no c/c/e  NEURO: aaox3  SKIN: NO rashes

## 2023-07-18 NOTE — LETTER
July 18, 2023     Benedicto Sal MD  [address] [facility] [address]    Patient: Alexi Granados   YOB: 1950   Date of Visit: 7/18/2023       Dear Dr. Ronen Nuno:    Thank you for referring Radha Fuentes to me for evaluation. Below are my notes for this consultation. If you have questions, please do not hesitate to call me. I look forward to following your patient along with you. Sincerely,        Tisha Abbott MD        CC: No Recipients    Tisha Abbott MD  7/18/2023 11:37 AM  Sign when Signing Visit  [address]  [facility]  Alexi Granados 68 y.o. female MRN: [id number]            Assessment & Plan:  Pleasant 55-year-old female with history of congenital intestinal malrotation, previous seen for intermittent bouts of abdominal pain most likely musculoskeletal versus neuropathic pain. 1.  Epigastric pain: Most likely neuropathic versus musculoskeletal -has had complete symptom resolution since starting amitriptyline, continue to take it every other day for few weeks and the patient was instructed to discontinue amitriptyline  -If she is asymptomatic, no need for further evaluation  -If she had continued symptoms would put her back on amitriptyline which she can take long-term if needed    2. GERD: Patient takes pantoprazole daily  -At this point would not change her pantoprazole, if she is successfully able to taper off amitriptyline we will see her back in 6 months, and try to slowly wean off of PPI therapy    Patient is currently being worked up for abnormal mammogram.      Lucia Denilson was seen today for med refill. Diagnoses and all orders for this visit:    Gastroesophageal reflux disease without esophagitis    Epigastric pain  -     amitriptyline (ELAVIL) 25 mg tablet; Take 1 tablet (25 mg total) by mouth daily at bedtime            _____________________________________________________________        CC: Follow-up    HPI:  Alexi Granados is a 68 y. o.female who is here for follow-up. 46-year-old female with a history of congenital intestinal malrotation, we had seen her in the past for intermittent bouts of abdominal epigastric pain which can be quite severe at times. Evaluation prior to seeing us had been unremarkable, we suspect this that her symptoms may have been more musculoskeletal or neuropathic. She was started on amitriptyline, she notes in last 2 years she has not had any recurrent episodes. She continues take pantoprazole daily. Appetite is good, denies any nausea or vomiting. Has some mild constipation, since her last visit patient was found to have A-fib, tachybradycardia syndrome, has a pacemaker placed. Denies any melena or rectal bleeding. Last colonoscopy in 2017. Overall she is doing very well. At the end of her last visit we had discussed with her tapering off of amitriptyline, she just already taking every other day recently      ROS:  The remainder of the ROS was negative except for the pertinent positives mentioned in HPI. Allergies: Patient has no known allergies.     Medications:   Current Outpatient Medications:   •  ALPRAZolam (XANAX) 0.5 mg tablet, TAKE 1 TABLET BY MOUTH ONCE DAILY AS NEEDED FOR ANXIETY, Disp: 30 tablet, Rfl: 0  •  amitriptyline (ELAVIL) 25 mg tablet, Take 1 tablet (25 mg total) by mouth daily at bedtime, Disp: 30 tablet, Rfl: 2  •  apixaban (Eliquis) 5 mg, Take 1 tablet (5 mg total) by mouth 2 (two) times a day, Disp: 180 tablet, Rfl: 3  •  escitalopram (Lexapro) 10 mg tablet, Take 1 tablet (10 mg total) by mouth daily, Disp: 90 tablet, Rfl: 1  •  levothyroxine 50 mcg tablet, Take 1 tablet (50 mcg total) by mouth daily, Disp: 30 tablet, Rfl: 5  •  lisinopril (ZESTRIL) 10 mg tablet, Take 1 tablet by mouth once daily, Disp: 90 tablet, Rfl: 3  •  metoprolol tartrate (LOPRESSOR) 50 mg tablet, TAKE 1 TABLET BY MOUTH EVERY 12 HOURS, Disp: 60 tablet, Rfl: 0  •  pantoprazole (PROTONIX) 40 mg tablet, TAKE 1 TABLET EVERY DAY, Disp: 90 tablet, Rfl: 0    Past Medical History:   Diagnosis Date   • Anxiety    • Congenital absence of one kidney    • Depression    • GERD (gastroesophageal reflux disease)    • History of transfusion    • Hypertension    • Insomnia    • Menopause    • Other specified hypothyroidism 1/28/2019   • Vitamin B12 deficiency        Past Surgical History:   Procedure Laterality Date   • CARDIAC PACEMAKER PLACEMENT Left 04/16/2022    Procedure: INSERTION PACEMAKER;  Surgeon: Venkat Schafer DO;  [facility];  Service: Cardiology   • COLONOSCOPY     • FL COLONOSCOPY FLX DX W/COLLJ SPEC WHEN PFRMD N/A 05/30/2017    Procedure: EGD AND COLONOSCOPY;  Surgeon: Amber Dillard MD;  [facility]; Service: Gastroenterology   • [place] OPEN [address] LAT MALLEOLUS Left 07/13/2020    Procedure: OPEN REDUCTION W/ INTERNAL FIXATION (ORIF) ANKLE;  Surgeon: Suraj Amezcua DO;  [facility];  Service: Orthopedics   • REDUCTION MAMMAPLASTY Bilateral 06/06/2011   • SHOULDER SURGERY     • TONSILLECTOMY     • UPPER GASTROINTESTINAL ENDOSCOPY         Family History   Problem Relation Age of Onset   • Lung cancer Mother 79   • Heart disease Father    • No Known Problems Maternal Grandmother    • No Known Problems Maternal Grandfather    • No Known Problems Paternal Grandmother    • No Known Problems Paternal Grandfather    • Lung cancer Brother 48   • No Known Problems Maternal Aunt    • No Known Problems Maternal Aunt    • No Known Problems Maternal Aunt    • No Known Problems Paternal Aunt    • No Known Problems Paternal Aunt    • No Known Problems Paternal Aunt    • No Known Problems Paternal Aunt         reports that she has never smoked. She has never used smokeless tobacco. She reports that she does not currently use alcohol. She reports that she does not use drugs.       Physical Exam:    /70 (BP Location: Right arm, Patient Position: Sitting, Cuff Size: Standard)   Pulse 72   Resp 18   Ht 5' 5" (1.651 m)   Wt 75.8 kg (167 lb)   LMP  (LMP Unknown)   SpO2 100%   BMI 27.79 kg/m²     Gen: wn/wd, NAD, healthy-appearing female  HEENT: anicteric, MMM, no cervical LAD  CVS: RRR, no m/r/g  CHEST: CTA b/l  ABD: +BS, soft, NT,ND, no hepatosplenomegaly  EXT: no c/c/e  NEURO: aaox3  SKIN: NO rashes

## 2023-07-20 ENCOUNTER — OFFICE VISIT (OUTPATIENT)
Dept: FAMILY MEDICINE CLINIC | Facility: CLINIC | Age: 73
End: 2023-07-20
Payer: MEDICARE

## 2023-07-20 VITALS
RESPIRATION RATE: 16 BRPM | TEMPERATURE: 97.5 F | HEART RATE: 86 BPM | SYSTOLIC BLOOD PRESSURE: 110 MMHG | DIASTOLIC BLOOD PRESSURE: 70 MMHG | OXYGEN SATURATION: 96 % | WEIGHT: 167 LBS | HEIGHT: 65 IN | BODY MASS INDEX: 27.82 KG/M2

## 2023-07-20 DIAGNOSIS — E53.8 VITAMIN B12 DEFICIENCY: ICD-10-CM

## 2023-07-20 DIAGNOSIS — E03.8 OTHER SPECIFIED HYPOTHYROIDISM: Primary | ICD-10-CM

## 2023-07-20 PROCEDURE — 99213 OFFICE O/P EST LOW 20 MIN: CPT | Performed by: INTERNAL MEDICINE

## 2023-07-20 PROCEDURE — 96372 THER/PROPH/DIAG INJ SC/IM: CPT

## 2023-07-20 RX ORDER — LEVOTHYROXINE SODIUM 0.07 MG/1
75 TABLET ORAL DAILY
Start: 2023-07-20

## 2023-07-20 RX ORDER — CYANOCOBALAMIN 1000 UG/ML
1000 INJECTION, SOLUTION INTRAMUSCULAR; SUBCUTANEOUS ONCE
Status: COMPLETED | OUTPATIENT
Start: 2023-07-20 | End: 2023-07-20

## 2023-07-20 RX ORDER — LEVOTHYROXINE SODIUM 0.05 MG/1
75 TABLET ORAL DAILY
Qty: 90 TABLET | Refills: 1 | Status: SHIPPED | OUTPATIENT
Start: 2023-07-20 | End: 2023-07-20 | Stop reason: SDUPTHER

## 2023-07-20 RX ADMIN — CYANOCOBALAMIN 1000 MCG: 1000 INJECTION, SOLUTION INTRAMUSCULAR; SUBCUTANEOUS at 10:09

## 2023-07-20 NOTE — ASSESSMENT & PLAN NOTE
B12 replacement given today, will give x 3 monthly doses and have patient recheck B12 levels in 3 months.

## 2023-07-20 NOTE — PROGRESS NOTES
Name: Sharron Bradford      : 1950      MRN: [id number]  Encounter Provider: Donell White MD  Encounter Date: 2023   Encounter department: 2 [facility]     1. Other specified hypothyroidism  Assessment & Plan: Will increase levothyroxine to 75 mcg daily and recheck TSH in 3 months when she rechecks her B12 levels. Orders:  -     TSH, 3rd generation with Free T4 reflex; Future  -     levothyroxine 75 mcg tablet; Take 1 tablet (75 mcg total) by mouth daily    2. Vitamin B12 deficiency  Assessment & Plan:  B12 replacement given today, will give x 3 monthly doses and have patient recheck B12 levels in 3 months. Orders:  -     Vitamin B12; Future  -     cyanocobalamin injection 1,000 mcg           Subjective      Here for labwork review. 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 Denies weight change, hair or skin changes. Has history of B12 deficiency and has had to have injections in the past, while living in [facility] had them 3 months in a row but was then told she did not need them any longer. Review of Systems   Constitutional: Positive for fatigue. Respiratory: Negative. Cardiovascular: Negative.         Current Outpatient Medications on File Prior to Visit   Medication Sig   • ALPRAZolam (XANAX) 0.5 mg tablet TAKE 1 TABLET BY MOUTH ONCE DAILY AS NEEDED FOR ANXIETY   • amitriptyline (ELAVIL) 25 mg tablet Take 1 tablet (25 mg total) by mouth daily at bedtime   • apixaban (Eliquis) 5 mg Take 1 tablet (5 mg total) by mouth 2 (two) times a day   • escitalopram (Lexapro) 10 mg tablet Take 1 tablet (10 mg total) by mouth daily   • lisinopril (ZESTRIL) 10 mg tablet Take 1 tablet by mouth once daily   • metoprolol tartrate (LOPRESSOR) 50 mg tablet TAKE 1 TABLET BY MOUTH EVERY 12 HOURS   • pantoprazole (PROTONIX) 40 mg tablet TAKE 1 TABLET EVERY DAY   • [DISCONTINUED] levothyroxine 50 mcg tablet Take 1 tablet (50 mcg total) by mouth daily       Objective     /70 (BP Location: Left arm, Patient Position: Sitting, Cuff Size: Large)   Pulse 86   Temp 97.5 °F (36.4 °C) (Temporal)   Resp 16   Ht 5' 5" (1.651 m)   Wt 75.8 kg (167 lb)   LMP  (LMP Unknown)   SpO2 96%   BMI 27.79 kg/m²     Physical Exam  Constitutional:       Appearance: She is well-developed. Eyes:      Conjunctiva/sclera: Conjunctivae normal.   Neck:      Thyroid: No thyromegaly. Vascular: No JVD. Cardiovascular:      Rate and Rhythm: Normal rate and regular rhythm. Heart sounds: Normal heart sounds. No murmur heard. No friction rub. No gallop. Pulmonary:      Effort: Pulmonary effort is normal.      Breath sounds: Normal breath sounds. No wheezing or rales. Abdominal:      General: Bowel sounds are normal. There is no distension. Palpations: Abdomen is soft. Tenderness: There is no abdominal tenderness. Musculoskeletal:      Cervical back: Neck supple.        Srinivas Narvaez MD

## 2023-07-20 NOTE — ASSESSMENT & PLAN NOTE
Will increase levothyroxine to 75 mcg daily and recheck TSH in 3 months when she rechecks her B12 levels.

## 2023-07-21 DIAGNOSIS — I48.0 PAROXYSMAL ATRIAL FIBRILLATION (HCC): ICD-10-CM

## 2023-07-21 RX ORDER — METOPROLOL TARTRATE 50 MG/1
TABLET, FILM COATED ORAL
Qty: 60 TABLET | Refills: 0 | Status: SHIPPED | OUTPATIENT
Start: 2023-07-21

## 2023-08-01 ENCOUNTER — HOSPITAL ENCOUNTER (OUTPATIENT)
Dept: MAMMOGRAPHY | Facility: CLINIC | Age: 73
Discharge: HOME | End: 2023-08-01
Payer: MEDICARE

## 2023-08-01 ENCOUNTER — HOSPITAL ENCOUNTER (OUTPATIENT)
Dept: ULTRASOUND IMAGING | Facility: CLINIC | Age: 73
Discharge: HOME | End: 2023-08-01
Payer: MEDICARE

## 2023-08-01 VITALS — BODY MASS INDEX: 27.82 KG/M2 | HEIGHT: 65 IN | WEIGHT: 167 LBS

## 2023-08-01 DIAGNOSIS — R92.8 ABNORMAL MAMMOGRAM: ICD-10-CM

## 2023-08-01 PROCEDURE — 76642 ULTRASOUND BREAST LIMITED: CPT

## 2023-08-01 PROCEDURE — G0279 TOMOSYNTHESIS, MAMMO: HCPCS

## 2023-08-01 PROCEDURE — 77065 DX MAMMO INCL CAD UNI: CPT

## 2023-08-05 DIAGNOSIS — F41.9 ANXIETY: ICD-10-CM

## 2023-08-07 RX ORDER — ALPRAZOLAM 0.5 MG/1
TABLET ORAL
Qty: 30 TABLET | Refills: 0 | Status: SHIPPED | OUTPATIENT
Start: 2023-08-07

## 2023-08-15 ENCOUNTER — TELEPHONE (OUTPATIENT)
Dept: FAMILY MEDICINE CLINIC | Facility: CLINIC | Age: 73
End: 2023-08-15

## 2023-08-18 ENCOUNTER — OFFICE VISIT (OUTPATIENT)
Dept: URGENT CARE | Facility: CLINIC | Age: 73
End: 2023-08-18
Payer: MEDICARE

## 2023-08-18 ENCOUNTER — CLINICAL SUPPORT (OUTPATIENT)
Dept: FAMILY MEDICINE CLINIC | Facility: CLINIC | Age: 73
End: 2023-08-18
Payer: MEDICARE

## 2023-08-18 VITALS
SYSTOLIC BLOOD PRESSURE: 126 MMHG | WEIGHT: 161 LBS | TEMPERATURE: 97.9 F | BODY MASS INDEX: 26.82 KG/M2 | HEIGHT: 65 IN | OXYGEN SATURATION: 99 % | DIASTOLIC BLOOD PRESSURE: 69 MMHG | HEART RATE: 90 BPM | RESPIRATION RATE: 14 BRPM

## 2023-08-18 DIAGNOSIS — E53.8 VITAMIN B12 DEFICIENCY: Primary | ICD-10-CM

## 2023-08-18 DIAGNOSIS — R09.82 PND (POST-NASAL DRIP): Primary | ICD-10-CM

## 2023-08-18 PROCEDURE — 96372 THER/PROPH/DIAG INJ SC/IM: CPT

## 2023-08-18 PROCEDURE — 99213 OFFICE O/P EST LOW 20 MIN: CPT | Performed by: FAMILY MEDICINE

## 2023-08-18 RX ORDER — FLUTICASONE PROPIONATE 50 MCG
1 SPRAY, SUSPENSION (ML) NASAL 2 TIMES DAILY
Qty: 11.1 ML | Refills: 0 | Status: SHIPPED | OUTPATIENT
Start: 2023-08-18

## 2023-08-18 RX ORDER — CETIRIZINE HYDROCHLORIDE 10 MG/1
10 TABLET ORAL DAILY
Qty: 30 TABLET | Refills: 0 | Status: SHIPPED | OUTPATIENT
Start: 2023-08-18

## 2023-08-18 RX ORDER — CYANOCOBALAMIN 1000 UG/ML
1000 INJECTION, SOLUTION INTRAMUSCULAR; SUBCUTANEOUS
Status: SHIPPED | OUTPATIENT
Start: 2023-08-18

## 2023-08-18 RX ADMIN — CYANOCOBALAMIN 1000 MCG: 1000 INJECTION, SOLUTION INTRAMUSCULAR; SUBCUTANEOUS at 14:02

## 2023-08-18 NOTE — PROGRESS NOTES
[place] [facility]    NAME: Graciela Rocha is a 68 y.o. female  : 1950    MRN: [id number]  DATE: 2023  TIME: 2:31 PM    Assessment and Plan   PND (post-nasal drip) [R09.82]  1. PND (post-nasal drip)  fluticasone (FLONASE) 50 mcg/act nasal spray    cetirizine (ZyrTEC) 10 mg tablet        Will manage the patient conservatively   Advised the patient to stay well hydrated  Provided patient with precautionary measures     Patient Instructions   There are no Patient Instructions on file for this visit. Follow up with PCP in 3-5 days. Proceed to ER if symptoms worsen. Chief Complaint     Chief Complaint   Patient presents with   • Earache     Bilateral ear fullness and pressure with congestion. History of Present Illness   URI   This is a new problem. The current episode started in the past 7 days. There has been no fever. Associated symptoms comments: Positives: b/l ear fullness, ear pressure, congestion, cough  Negatives: fever, chest pain, shortness of breath, abdominal pain, n/v/d . She has tried nothing for the symptoms. Review of Systems   Review of Systems   All other systems reviewed and are negative. The following portions of the patient's history were reviewed and updated as appropriate: allergies, current medications, past family history, past medical history, past social history, past surgical history and problem list.     Medications have been verified. Objective   /69   Pulse 90   Temp 97.9 °F (36.6 °C)   Resp 14   Ht 5' 5" (1.651 m)   Wt 73 kg (161 lb)   LMP  (LMP Unknown)   SpO2 99%   BMI 26.79 kg/m²     Physical Exam  Vitals reviewed. Constitutional:       General: She is not in acute distress. Appearance: Normal appearance. She is well-developed. She is not ill-appearing, toxic-appearing or diaphoretic. HENT:      Head: Normocephalic and atraumatic. Right Ear: Tympanic membrane, ear canal and external ear normal. There is no impacted cerumen. Left Ear: Tympanic membrane, ear canal and external ear normal. There is no impacted cerumen. Nose: Rhinorrhea present. No congestion. Comments: Bilaterally inflamed nasal turbinates     Mouth/Throat:      Mouth: Mucous membranes are moist.      Pharynx: Posterior oropharyngeal erythema present. No oropharyngeal exudate. Comments: Cobblestoning noted  Eyes:      General:         Right eye: No discharge. Left eye: No discharge. Extraocular Movements: Extraocular movements intact. Conjunctiva/sclera: Conjunctivae normal.      Pupils: Pupils are equal, round, and reactive to light. Cardiovascular:      Rate and Rhythm: Normal rate. Pulmonary:      Effort: Pulmonary effort is normal. No respiratory distress. Breath sounds: Normal breath sounds. No wheezing or rales. Musculoskeletal:      Cervical back: Normal range of motion. Lymphadenopathy:      Cervical: No cervical adenopathy. Neurological:      Mental Status: She is alert.        Jeffry Rubin MD

## 2023-08-25 ENCOUNTER — OFFICE VISIT (OUTPATIENT)
Dept: URGENT CARE | Facility: CLINIC | Age: 73
End: 2023-08-25
Payer: MEDICARE

## 2023-08-25 VITALS
HEART RATE: 82 BPM | DIASTOLIC BLOOD PRESSURE: 61 MMHG | SYSTOLIC BLOOD PRESSURE: 97 MMHG | RESPIRATION RATE: 16 BRPM | TEMPERATURE: 96.2 F | OXYGEN SATURATION: 97 %

## 2023-08-25 DIAGNOSIS — H66.002 ACUTE SUPPURATIVE OTITIS MEDIA OF LEFT EAR WITHOUT SPONTANEOUS RUPTURE OF TYMPANIC MEMBRANE, RECURRENCE NOT SPECIFIED: Primary | ICD-10-CM

## 2023-08-25 DIAGNOSIS — J06.9 VIRAL UPPER RESPIRATORY TRACT INFECTION: ICD-10-CM

## 2023-08-25 DIAGNOSIS — I48.0 PAROXYSMAL ATRIAL FIBRILLATION (HCC): ICD-10-CM

## 2023-08-25 PROCEDURE — G0463 HOSPITAL OUTPT CLINIC VISIT: HCPCS | Performed by: NURSE PRACTITIONER

## 2023-08-25 PROCEDURE — 99213 OFFICE O/P EST LOW 20 MIN: CPT | Performed by: NURSE PRACTITIONER

## 2023-08-25 RX ORDER — AMOXICILLIN 875 MG/1
875 TABLET, COATED ORAL 2 TIMES DAILY
Qty: 14 TABLET | Refills: 0 | Status: SHIPPED | OUTPATIENT
Start: 2023-08-25 | End: 2023-09-01

## 2023-08-25 RX ORDER — GUAIFENESIN AND CODEINE PHOSPHATE 100; 10 MG/5ML; MG/5ML
SOLUTION ORAL
Qty: 180 ML | Refills: 0 | Status: SHIPPED | OUTPATIENT
Start: 2023-08-25

## 2023-08-25 RX ORDER — METOPROLOL TARTRATE 50 MG/1
TABLET, FILM COATED ORAL
Qty: 60 TABLET | Refills: 0 | Status: SHIPPED | OUTPATIENT
Start: 2023-08-25

## 2023-08-25 NOTE — PATIENT INSTRUCTIONS
--Frequent fluids. --Fill and start antibiotic if no improvement/worsening left ear pain over the next 2-3 days, despite other measures. --For nasal/sinus congestion, helpful measures include steam, warm compresses, an OTC saline nasal spray or Neti pot, or an OTC decongestant (such as Sudafed). The decongestant should be avoided, however, if you are under 10years of age, or have a history of high blood pressure or heart disease. In addition, an OTC nasal steroid (Flonase, Nasocort) or nasal decongestant (Afrin, Rigoberto-synephrine) may be taken. The nasal steroid should be used at bedtime, after the saline nasal spray. The nasal decongestant should not be taken more than 3 days consecutively in order to prevent rebound congestion. --For nasal drainage, postnasal drip, sneezing and itching, an OTC antihistamine (Allegra, Benadryl, etc) can be taken. --For cough, you can take an OTC expectorant such as plain Robitussion or Mucinex (active ingredient guaifenesin). A spoonful of honey at bedtime may also be helpful, as may a prescription cough medicine. Also recommended is the use of a cool mist humidifier (with or without Vicks) in the bedroom at night. --For sore throat, you can take OTC lozenges, use warm gargles (salt water or apple cider vinegar and honey), herbal teas, or an OTC throat spray (Chloraseptic). --You can take Tylenol or Motrin/Advil as needed for fever, headache, body aches. Motrin/Advil should be avoided, however, if you have a history of heart disease, bleeding ulcers, or if you take blood thinners. --You should contact your primary care provider and/or go to the ER if your symptoms are not improved or get worse over the next 7 days. This includes new onset fever, localized ear pain, sinus pain, as well as worsening cough, chest pain, shortness of breath, or significant weakness/fatigue.

## 2023-08-25 NOTE — PROGRESS NOTES
[facility]        NAME: Brandon Frankel is a 68 y.o. female  : 1950    MRN: [id number]  DATE: 2023  TIME: 1:22 PM    Assessment and Plan   Acute suppurative otitis media of left ear without spontaneous rupture of tympanic membrane, recurrence not specified [H66.002]  1. Acute suppurative otitis media of left ear without spontaneous rupture of tympanic membrane, recurrence not specified  amoxicillin (AMOXIL) 875 mg tablet      2. Viral upper respiratory tract infection  guaifenesin-codeine (GUAIFENESIN AC) 100-10 MG/5ML liquid        --Advised to avoid taking codeine cough medication rogelio time as Rx alprazolam or amitriptyline. Patient Instructions     --Frequent fluids. --Fill and start antibiotic if no improvement/worsening left ear pain over the next 2-3 days, despite other measures. --For nasal/sinus congestion, helpful measures include steam, warm compresses, an OTC saline nasal spray or Neti pot, or an OTC decongestant (such as Sudafed). The decongestant should be avoided, however, if you are under 10years of age, or have a history of high blood pressure or heart disease. In addition, an OTC nasal steroid (Flonase, Nasocort) or nasal decongestant (Afrin, Rigoberto-synephrine) may be taken. The nasal steroid should be used at bedtime, after the saline nasal spray. The nasal decongestant should not be taken more than 3 days consecutively in order to prevent rebound congestion. --For nasal drainage, postnasal drip, sneezing and itching, an OTC antihistamine (Allegra, Benadryl, etc) can be taken. --For cough, you can take an OTC expectorant such as plain Robitussion or Mucinex (active ingredient guaifenesin). A spoonful of honey at bedtime may also be helpful, as may a prescription cough medicine. Also recommended is the use of a cool mist humidifier (with or without Vicks) in the bedroom at night.    --For sore throat, you can take OTC lozenges, use warm gargles (salt water or apple cider vinegar and honey), herbal teas, or an OTC throat spray (Chloraseptic). --You can take Tylenol or Motrin/Advil as needed for fever, headache, body aches. Motrin/Advil should be avoided, however, if you have a history of heart disease, bleeding ulcers, or if you take blood thinners. --You should contact your primary care provider and/or go to the ER if your symptoms are not improved or get worse over the next 7 days. This includes new onset fever, localized ear pain, sinus pain, as well as worsening cough, chest pain, shortness of breath, or significant weakness/fatigue. Chief Complaint     Chief Complaint   Patient presents with   • URI     Cough, ear clogged left x 1 week          History of Present Illness       Here with complaints of minimally productive cough, left ear congestion x 1 week. Some post nasal drip, but minimal nasal congestion, no rhinorrhea, no sore throat. No fever, unusual body aches. No dyspnea, wheezing. Seen [facility] [address] [facility] on 8/18. Flonase and Zyrtec advised, which she is taking along with Robitussin and OTC Cold and Flu medication. Not helping, however. Cough ongoing. No COVID concerns or other known sick contacts. Review of Systems   Review of Systems   Constitutional: Negative for fever. HENT: Positive for ear pain and postnasal drip. Negative for ear discharge, rhinorrhea, sinus pain and sore throat. Respiratory: Positive for cough. Negative for shortness of breath and wheezing. Gastrointestinal: Negative for vomiting. Neurological: Positive for headaches.          Current Medications       Current Outpatient Medications:   •  ALPRAZolam (XANAX) 0.5 mg tablet, TAKE 1 TABLET BY MOUTH ONCE DAILY AS NEEDED FOR ANXIETY, Disp: 30 tablet, Rfl: 0  •  amitriptyline (ELAVIL) 25 mg tablet, Take 1 tablet (25 mg total) by mouth daily at bedtime, Disp: 30 tablet, Rfl: 2  •  amoxicillin (AMOXIL) 875 mg tablet, Take 1 tablet (875 mg total) by mouth 2 (two) times a day for 7 days, Disp: 14 tablet, Rfl: 0  •  apixaban (Eliquis) 5 mg, Take 1 tablet (5 mg total) by mouth 2 (two) times a day, Disp: 180 tablet, Rfl: 3  •  cetirizine (ZyrTEC) 10 mg tablet, Take 1 tablet (10 mg total) by mouth daily, Disp: 30 tablet, Rfl: 0  •  escitalopram (Lexapro) 10 mg tablet, Take 1 tablet (10 mg total) by mouth daily, Disp: 90 tablet, Rfl: 1  •  fluticasone (FLONASE) 50 mcg/act nasal spray, 1 spray into each nostril 2 (two) times a day, Disp: 11.1 mL, Rfl: 0  •  guaifenesin-codeine (GUAIFENESIN AC) 100-10 MG/5ML liquid, TAKE 1-2 TSP BY  MOUTH EVERY 4-6 HOURS AS NEEDED FOR COUGH.  DO NOT TAKE SAME TIME AS ALPRAZOLAM OR AMITRYPTILLINE., Disp: 180 mL, Rfl: 0  •  levothyroxine 75 mcg tablet, Take 1 tablet (75 mcg total) by mouth daily, Disp: , Rfl:   •  lisinopril (ZESTRIL) 10 mg tablet, Take 1 tablet by mouth once daily, Disp: 90 tablet, Rfl: 3  •  metoprolol tartrate (LOPRESSOR) 50 mg tablet, TAKE 1 TABLET BY MOUTH EVERY 12 HOURS, Disp: 60 tablet, Rfl: 0  •  pantoprazole (PROTONIX) 40 mg tablet, TAKE 1 TABLET EVERY DAY, Disp: 90 tablet, Rfl: 0    Current Facility-Administered Medications:   •  cyanocobalamin injection 1,000 mcg, 1,000 mcg, Intramuscular, Q30 Days, HOME [place], 1,000 mcg at 08/18/23 1402    Current Allergies     Allergies as of 08/25/2023   • (No Known Allergies)            The following portions of the patient's history were reviewed and updated as appropriate: allergies, current medications, past family history, past medical history, past social history, past surgical history and problem list.     Past Medical History:   Diagnosis Date   • Anxiety    • Congenital absence of one kidney    • Depression    • GERD (gastroesophageal reflux disease)    • History of transfusion    • Hypertension    • Insomnia    • Menopause    • Other specified hypothyroidism 1/28/2019   • Vitamin B12 deficiency        Past Surgical History:   Procedure Laterality Date   • CARDIAC PACEMAKER PLACEMENT Left 04/16/2022    Procedure: INSERTION PACEMAKER;  Surgeon: Nara Lovelace DO;  [facility];  Service: Cardiology   • COLONOSCOPY     • [place] COLONOSCOPY FLX DX W/COLLJ SPEC WHEN PFRMD N/A 05/30/2017    Procedure: EGD AND COLONOSCOPY;  Surgeon: Dale Gallardo MD;  [facility]; Service: Gastroenterology   • [place] OPEN [address] LAT MALLEOLUS Left 07/13/2020    Procedure: OPEN REDUCTION W/ INTERNAL FIXATION (ORIF) ANKLE;  Surgeon: Bridget Acuña DO;  Location: [facility];  Service: Orthopedics   • REDUCTION MAMMAPLASTY Bilateral 06/06/2011   • SHOULDER SURGERY     • TONSILLECTOMY     • UPPER GASTROINTESTINAL ENDOSCOPY         Family History   Problem Relation Age of Onset   • Lung cancer Mother 79   • Heart disease Father    • No Known Problems Maternal Grandmother    • No Known Problems Maternal Grandfather    • No Known Problems Paternal Grandmother    • No Known Problems Paternal Grandfather    • Lung cancer Brother 48   • No Known Problems Maternal Aunt    • No Known Problems Maternal Aunt    • No Known Problems Maternal Aunt    • No Known Problems Paternal Aunt    • No Known Problems Paternal Aunt    • No Known Problems Paternal Aunt    • No Known Problems Paternal Aunt          Medications have been verified. Objective   BP 97/61 (Patient Position: Sitting)   Pulse 82   Temp (!) 96.2 °F (35.7 °C)   Resp 16   LMP  (LMP Unknown)   SpO2 97%   No LMP recorded (lmp unknown). Patient is postmenopausal.       Physical Exam     Physical Exam  Constitutional:       General: She is not in acute distress. Appearance: Normal appearance. She is well-developed. She is not ill-appearing, toxic-appearing or diaphoretic. HENT:      Head: Normocephalic.       Right Ear: Tympanic membrane, ear canal and external ear normal.      Left Ear: Ear canal and external ear normal.      Ears:      Comments: Superior aspect of left LM dull, erythematous, bulging. Intact. No otorrhea. Canal WNL. Nose: Congestion present. No rhinorrhea. Comments: No sinus tenderness. Mouth/Throat:      Pharynx: No oropharyngeal exudate or posterior oropharyngeal erythema. Eyes:      General:         Right eye: No discharge. Left eye: No discharge. Cardiovascular:      Rate and Rhythm: Normal rate and regular rhythm. Heart sounds: Normal heart sounds. No murmur heard. Pulmonary:      Effort: Pulmonary effort is normal. No respiratory distress. Breath sounds: Normal breath sounds. No stridor. No wheezing, rhonchi or rales. Comments: Breathing easy. RR=18. No cough noted at this time. Chest:      Chest wall: No tenderness. Abdominal:      Tenderness: There is no abdominal tenderness. Musculoskeletal:      Cervical back: Neck supple. Lymphadenopathy:      Cervical: No cervical adenopathy. Skin:     General: Skin is warm and dry. Neurological:      Mental Status: She is alert and oriented to person, place, and time. Deep Tendon Reflexes: Reflexes are normal and symmetric.    Psychiatric:         Mood and Affect: Mood normal.

## 2023-08-28 DIAGNOSIS — I48.0 PAROXYSMAL A-FIB (HCC): ICD-10-CM

## 2023-08-29 RX ORDER — APIXABAN 5 MG/1
5 TABLET, FILM COATED ORAL 2 TIMES DAILY
Qty: 180 TABLET | Refills: 3 | Status: SHIPPED | OUTPATIENT
Start: 2023-08-29

## 2023-09-04 DIAGNOSIS — F41.9 ANXIETY: ICD-10-CM

## 2023-09-06 ENCOUNTER — OFFICE VISIT (OUTPATIENT)
Dept: AUDIOLOGY | Facility: CLINIC | Age: 73
End: 2023-09-06
Payer: MEDICARE

## 2023-09-06 ENCOUNTER — OFFICE VISIT (OUTPATIENT)
Dept: OTOLARYNGOLOGY | Facility: CLINIC | Age: 73
End: 2023-09-06
Payer: MEDICARE

## 2023-09-06 ENCOUNTER — IN-CLINIC DEVICE VISIT (OUTPATIENT)
Dept: CARDIOLOGY CLINIC | Facility: CLINIC | Age: 73
End: 2023-09-06
Payer: MEDICARE

## 2023-09-06 VITALS
TEMPERATURE: 97.5 F | WEIGHT: 159.4 LBS | DIASTOLIC BLOOD PRESSURE: 76 MMHG | SYSTOLIC BLOOD PRESSURE: 122 MMHG | HEIGHT: 65 IN | BODY MASS INDEX: 26.56 KG/M2

## 2023-09-06 DIAGNOSIS — H65.02 ACUTE SEROUS OTITIS MEDIA OF LEFT EAR, RECURRENCE NOT SPECIFIED: Primary | ICD-10-CM

## 2023-09-06 DIAGNOSIS — H90.A32 MIXED CONDUCTIVE AND SENSORINEURAL HEARING LOSS OF LEFT EAR WITH RESTRICTED HEARING OF RIGHT EAR: Primary | ICD-10-CM

## 2023-09-06 DIAGNOSIS — H61.22 LEFT EAR IMPACTED CERUMEN: ICD-10-CM

## 2023-09-06 DIAGNOSIS — H90.A21 SENSORINEURAL HEARING LOSS (SNHL) OF RIGHT EAR WITH RESTRICTED HEARING OF LEFT EAR: ICD-10-CM

## 2023-09-06 DIAGNOSIS — Z95.0 PRESENCE OF PERMANENT CARDIAC PACEMAKER: Primary | ICD-10-CM

## 2023-09-06 PROCEDURE — 93280 PM DEVICE PROGR EVAL DUAL: CPT | Performed by: INTERNAL MEDICINE

## 2023-09-06 PROCEDURE — 92567 TYMPANOMETRY: CPT | Performed by: AUDIOLOGIST

## 2023-09-06 PROCEDURE — 99204 OFFICE O/P NEW MOD 45 MIN: CPT | Performed by: NURSE PRACTITIONER

## 2023-09-06 PROCEDURE — 92557 COMPREHENSIVE HEARING TEST: CPT | Performed by: AUDIOLOGIST

## 2023-09-06 PROCEDURE — 69210 REMOVE IMPACTED EAR WAX UNI: CPT | Performed by: NURSE PRACTITIONER

## 2023-09-06 RX ORDER — AZELASTINE 1 MG/ML
1 SPRAY, METERED NASAL 2 TIMES DAILY
Qty: 1 ML | Refills: 2 | Status: SHIPPED | OUTPATIENT
Start: 2023-09-06

## 2023-09-06 RX ORDER — ALPRAZOLAM 0.5 MG/1
TABLET ORAL
Qty: 30 TABLET | Refills: 0 | Status: SHIPPED | OUTPATIENT
Start: 2023-09-06

## 2023-09-06 RX ORDER — METHYLPREDNISOLONE 4 MG/1
TABLET ORAL
Qty: 1 EACH | Refills: 0 | Status: SHIPPED | OUTPATIENT
Start: 2023-09-06

## 2023-09-06 NOTE — PROGRESS NOTES
Assessment/Plan:    Acute serous otitis media of left ear  Symptoms include left ear blocked for past 3 weeks s/p URI. Previous treatment includes oral antibiotics and intermittent use of Flonase. On exam, small cerumen impaction on left (removed with suction #5), Visible serous fluid, dark yellow color lateral portion left TM, right Tm retraction. Reviewed risks hearing change due to serous fluid. Audiogram today indicating right hearing mild to moderate SNHL, left hearing mixed moderate to severe. Tymps type left B  Right type A    Discussed may take up to 3 months for otitis media in adults to resolve on its own. Reviewed options for otitis media in adult including resume nasal steroids, oral steroids, decongestants, vs in office pe tube. Briefly discussed actual procedure of pe tube in office setting if needed. No proof medications can hasten the drainage of serous fluid. After discussion agreed to   Fluticasone nasal spray one spray each nostril twice per day  Astelin nasal spray one spray each nostril twice per day  Medrol dose pack for 6 days, if unable to tolerate may stop. Once complete Medrol dose pack (steroids) then start Claritin D or Allegra D one tablet daily for 4 weeks    Follow up in 5 to 6 weeks for possible nasal endoscopy and pe tube               Diagnoses and all orders for this visit:    Acute serous otitis media of left ear, recurrence not specified  -     Ambulatory referral to Audiology  -     methylPREDNISolone 4 MG tablet therapy pack; Use as directed on package  -     azelastine (ASTELIN) 0.1 % nasal spray; 1 spray into each nostril 2 (two) times a day Use in each nostril as directed    Left ear impacted cerumen  -     Ear cerumen removal          Subjective:      Patient ID: Duke Jones is a 68 y.o. female. Presents today as a new patient due to ear concerns. Recent episode of otitis media per urgent care. Treated with oral antibiotics about 2 weeks ago. Since then left ear feels unable to pop and Hearing decreased. Crackling tinnitus in left ear. No otalgia or otorrhea. No history of ear surgery. No current hearing aids. The following portions of the patient's history were reviewed and updated as appropriate: allergies, current medications, past family history, past medical history, past social history, past surgical history and problem list.    Review of Systems   Constitutional: Negative. HENT: Positive for hearing loss. Negative for congestion, ear discharge, ear pain, nosebleeds, postnasal drip, rhinorrhea, sinus pressure, sinus pain, sore throat, tinnitus and voice change. Respiratory: Negative for chest tightness and shortness of breath. Skin: Negative for color change. Neurological: Negative for dizziness, numbness and headaches. Psychiatric/Behavioral: Negative. Objective:      /76 (BP Location: Left arm, Patient Position: Sitting, Cuff Size: Adult)   Temp 97.5 °F (36.4 °C) (Temporal)   Ht 5' 5" (1.651 m)   Wt 72.3 kg (159 lb 6.4 oz)   LMP  (LMP Unknown)   BMI 26.53 kg/m²          Physical Exam  Constitutional:       Appearance: She is well-developed. HENT:      Head: Normocephalic. Right Ear: Hearing, tympanic membrane, ear canal and external ear normal. No decreased hearing noted. No drainage or tenderness. Tympanic membrane is not perforated or erythematous. Left Ear: Hearing, ear canal and external ear normal. No decreased hearing noted. No drainage or tenderness. A middle ear effusion is present. There is impacted cerumen. Tympanic membrane is not perforated or erythematous. Nose: Nose normal. No nasal deformity or septal deviation. Mouth/Throat:      Mouth: Mucous membranes are not pale and not dry. No oral lesions. Dentition: Normal dentition. Pharynx: Uvula midline. No oropharyngeal exudate. Neck:      Trachea: No tracheal deviation.    Pulmonary:      Effort: Pulmonary effort is normal. No accessory muscle usage or respiratory distress. Musculoskeletal:      Cervical back: Full passive range of motion without pain and neck supple. Lymphadenopathy:      Cervical: No cervical adenopathy. Skin:     General: Skin is warm and dry. Neurological:      Mental Status: She is alert and oriented to person, place, and time. Cranial Nerves: No cranial nerve deficit. Sensory: No sensory deficit. Psychiatric:         Behavior: Behavior is cooperative. Ear cerumen removal    Date/Time: 9/6/2023 9:00 AM    Performed by: HOME Swenson Ala  Authorized by: HOME Swenson Ala  Universal Protocol:  Consent: Verbal consent obtained. Risks and benefits: risks, benefits and alternatives were discussed  Consent given by: patient  Patient understanding: patient states understanding of the procedure being performed      Patient location:  Clinic  Procedure details:     Local anesthetic:  None    Location:  L ear    Approach:  External  Post-procedure details:     Complication:  None    Hearing quality:  Normal    Patient tolerance of procedure:   Tolerated well, no immediate complications

## 2023-09-06 NOTE — ASSESSMENT & PLAN NOTE
Symptoms include left ear blocked for past 3 weeks s/p URI. Previous treatment includes oral antibiotics and intermittent use of Flonase. On exam, small cerumen impaction on left (removed with suction #5), Visible serous fluid, dark yellow color lateral portion left TM, right Tm retraction. Reviewed risks hearing change due to serous fluid. Audiogram today indicating right hearing mild to moderate SNHL, left hearing mixed moderate to severe. Tymps type left B  Right type A    Discussed may take up to 3 months for otitis media in adults to resolve on its own. Reviewed options for otitis media in adult including resume nasal steroids, oral steroids, decongestants, vs in office pe tube. Briefly discussed actual procedure of pe tube in office setting if needed. No proof medications can hasten the drainage of serous fluid. After discussion agreed to   Fluticasone nasal spray one spray each nostril twice per day  Astelin nasal spray one spray each nostril twice per day  Medrol dose pack for 6 days, if unable to tolerate may stop.   Once complete Medrol dose pack (steroids) then start Claritin D or Allegra D one tablet daily for 4 weeks    Follow up in 5 to 6 weeks for possible nasal endoscopy and pe tube

## 2023-09-06 NOTE — PROGRESS NOTES
ADULT ENT HEARING EVALUATION - [facility] AUDIOLOGY      Patient Name: Clarice Laura   MRN:  [id number]   :  1950   Age: 68 y.o. Gender: female   DOS: 2023     HISTORY:     Clarice Laura, a 68 y.o. female, was seen on 2023 at the referral of HOME Garduno,  for an evaluation of hearing as part of her ENT visit. Ms. Stanley French primary complaint is left hearing loss. She denied otorrhea, tinnitus and dizziness. She endorsed left otalgia and has been treated with Abx. Ms. Ronald Dumas has not had her hearing tested previously. RESULTS:    Otoscopic Evaluation:   Right Ear: Unremarkable, canal clear   Left Ear: Unremarkable, canal clear    Tympanometry:   Right Ear: Type A; normal middle ear pressure and static compliance    Left Ear: Type B; no measurable middle ear pressure or static compliance, consistent with middle ear pathology. Audiometry:  Conventional pure tone audiometry from 250 - 8000 Hz  obtained with good reliability and revealed the following:     Right Ear: mild to moderately-severe sensorineural hearing loss (SNHL)   Left Ear: moderate to severe mixed hearing loss (MHL)     Speech Audiometry:    Speech Reception (SRT)   Right Ear: 65 dB HL   Left Ear: 40 dB HL    Word Recognition Scores (WRS):  Right Ear: excellent (100 % correct)     Left Ear: excellent (100 % correct)   Stimuli: [id number]    *see attached audiogram*      RECOMMENDATIONS:    1.) Follow-up with referring provider. 2.) Med mgmt of [facility]. It was a pleasure working with Ms. Ronald Dumas today. Thank you for referring this patient. Sarah Gupta Se.   Clinical Audiologist    [address] [id number]

## 2023-09-06 NOTE — PROGRESS NOTES
Results for orders placed or performed in visit on 09/06/23   Cardiac EP device report    Narrative    MDT DUAL CHAMBER PPM (AAIR-DDDR 60) - ACTIVE SYSTEM IS MRI CONDITIONAL  DEVICE INTERROGATED IN THE [place] OFFICE: BATTERY VOLTAGE ADEQUATE (12.3 YRS). AP 89.7%  1.5% ALL LEAD PARAMETERS & TRENDS WITHIN NORMAL LIMITS; RV THRESHOLD CHRONICALLY HIGH. SINCE 8/15/22: 6 VT-NS EPISODES WITH AVAILABLE EGM SHOWING PAT W/ QRS SIMILAR TO INTRINSIC (15 BEATS  BPM). 55 FAST A&V EPISODES WITH AVAILABLE EGMS SHOWING AF W/ RVR UP  BPM. 41 AT/AF EPISODES WITH LONGEST AT 17 HRS 32 MINS. AT/AF BURDEN 2.1% SINCE 8/15/22. TAKES ELIQUIS & METOPROLOL TART. EF 65% (ECHO 4/12/2022). NO PROGRAMMING CHANGES MADE TO DEVICE PARAMETERS. TASKED TO DR. SHERMAN NORMAL DEVICE FUNCTION.  AM/ES

## 2023-09-15 ENCOUNTER — TELEPHONE (OUTPATIENT)
Dept: FAMILY MEDICINE CLINIC | Facility: CLINIC | Age: 73
End: 2023-09-15

## 2023-09-21 DIAGNOSIS — K21.9 GASTROESOPHAGEAL REFLUX DISEASE: ICD-10-CM

## 2023-09-21 RX ORDER — PANTOPRAZOLE SODIUM 40 MG/1
40 TABLET, DELAYED RELEASE ORAL DAILY
Qty: 90 TABLET | Refills: 0 | Status: SHIPPED | OUTPATIENT
Start: 2023-09-21 | End: 2023-09-28 | Stop reason: SDUPTHER

## 2023-09-22 ENCOUNTER — CLINICAL SUPPORT (OUTPATIENT)
Dept: FAMILY MEDICINE CLINIC | Facility: CLINIC | Age: 73
End: 2023-09-22
Payer: MEDICARE

## 2023-09-22 DIAGNOSIS — E53.8 B12 DEFICIENCY: Primary | ICD-10-CM

## 2023-09-22 PROCEDURE — 96372 THER/PROPH/DIAG INJ SC/IM: CPT

## 2023-09-22 RX ADMIN — CYANOCOBALAMIN 1000 MCG: 1000 INJECTION, SOLUTION INTRAMUSCULAR; SUBCUTANEOUS at 14:02

## 2023-09-25 DIAGNOSIS — F32.9 MAJOR DEPRESSIVE DISORDER, REMISSION STATUS UNSPECIFIED, UNSPECIFIED WHETHER RECURRENT: Chronic | ICD-10-CM

## 2023-09-25 RX ORDER — ESCITALOPRAM OXALATE 10 MG/1
10 TABLET ORAL DAILY
Qty: 90 TABLET | Refills: 0 | Status: SHIPPED | OUTPATIENT
Start: 2023-09-25

## 2023-09-28 DIAGNOSIS — I10 ESSENTIAL HYPERTENSION: ICD-10-CM

## 2023-09-28 DIAGNOSIS — I48.0 PAROXYSMAL ATRIAL FIBRILLATION (HCC): ICD-10-CM

## 2023-09-28 DIAGNOSIS — E03.8 OTHER SPECIFIED HYPOTHYROIDISM: ICD-10-CM

## 2023-09-28 DIAGNOSIS — F41.9 ANXIETY: ICD-10-CM

## 2023-09-28 DIAGNOSIS — K21.9 GASTROESOPHAGEAL REFLUX DISEASE: ICD-10-CM

## 2023-09-28 DIAGNOSIS — F32.9 MAJOR DEPRESSIVE DISORDER, REMISSION STATUS UNSPECIFIED, UNSPECIFIED WHETHER RECURRENT: Chronic | ICD-10-CM

## 2023-09-28 RX ORDER — ALPRAZOLAM 0.5 MG/1
0.5 TABLET ORAL
Qty: 30 TABLET | Refills: 0 | OUTPATIENT
Start: 2023-09-28

## 2023-09-28 RX ORDER — LEVOTHYROXINE SODIUM 0.07 MG/1
75 TABLET ORAL DAILY
Qty: 90 TABLET | Refills: 1 | Status: SHIPPED | OUTPATIENT
Start: 2023-09-28

## 2023-09-28 RX ORDER — METOPROLOL TARTRATE 50 MG/1
50 TABLET, FILM COATED ORAL EVERY 12 HOURS
Qty: 60 TABLET | Refills: 0 | Status: SHIPPED | OUTPATIENT
Start: 2023-09-28 | End: 2023-10-02

## 2023-09-28 RX ORDER — METOPROLOL TARTRATE 50 MG/1
50 TABLET, FILM COATED ORAL EVERY 12 HOURS
Qty: 60 TABLET | Refills: 0 | OUTPATIENT
Start: 2023-09-28

## 2023-09-28 RX ORDER — ESCITALOPRAM OXALATE 10 MG/1
10 TABLET ORAL DAILY
Qty: 90 TABLET | Refills: 0 | Status: CANCELLED | OUTPATIENT
Start: 2023-09-28

## 2023-09-28 RX ORDER — PANTOPRAZOLE SODIUM 40 MG/1
40 TABLET, DELAYED RELEASE ORAL DAILY
Qty: 90 TABLET | Refills: 1 | Status: SHIPPED | OUTPATIENT
Start: 2023-09-28

## 2023-09-28 RX ORDER — LEVOTHYROXINE SODIUM 0.07 MG/1
75 TABLET ORAL DAILY
Qty: 90 TABLET | Refills: 1 | OUTPATIENT
Start: 2023-09-28

## 2023-09-28 RX ORDER — LISINOPRIL 10 MG/1
10 TABLET ORAL DAILY
Qty: 90 TABLET | Refills: 1 | OUTPATIENT
Start: 2023-09-28

## 2023-09-28 RX ORDER — ALPRAZOLAM 0.5 MG/1
0.5 TABLET ORAL
Qty: 30 TABLET | Refills: 0 | Status: SHIPPED | OUTPATIENT
Start: 2023-09-28

## 2023-09-28 RX ORDER — LISINOPRIL 10 MG/1
10 TABLET ORAL DAILY
Qty: 90 TABLET | Refills: 1 | Status: SHIPPED | OUTPATIENT
Start: 2023-09-28

## 2023-09-30 DIAGNOSIS — I48.0 PAROXYSMAL ATRIAL FIBRILLATION (HCC): ICD-10-CM

## 2023-10-02 RX ORDER — METOPROLOL TARTRATE 50 MG/1
50 TABLET, FILM COATED ORAL EVERY 12 HOURS
Qty: 60 TABLET | Refills: 0 | Status: SHIPPED | OUTPATIENT
Start: 2023-10-02

## 2023-11-01 DIAGNOSIS — F41.9 ANXIETY: ICD-10-CM

## 2023-11-01 RX ORDER — ALPRAZOLAM 0.5 MG/1
TABLET ORAL
Qty: 30 TABLET | Refills: 0 | Status: SHIPPED | OUTPATIENT
Start: 2023-11-01

## 2023-11-05 PROBLEM — H65.02 ACUTE SEROUS OTITIS MEDIA OF LEFT EAR: Status: RESOLVED | Noted: 2023-09-06 | Resolved: 2023-11-05

## 2023-11-08 ENCOUNTER — TELEPHONE (OUTPATIENT)
Age: 73
End: 2023-11-08

## 2023-11-08 NOTE — TELEPHONE ENCOUNTER
Will at [facility] pharmacy called states patient did not receive Alprazolam prescription that was sent out on 10/30/2023, as per post office has not been delivered yet and no tracking information. Requesting a approval to ship out medication again, a label was shipped to patient to return one prescription when received. Will  just needs verbal to resend, [phone] extension [phone] states ok to leave message but is very important to have caller's first and last name due to being a controlled.

## 2023-11-08 NOTE — LETTER
July 22, 2020     Patient: Tito Harrell   YOB: 1950   Date of Visit: 7/22/2020       To Whom it May Concern:    Tito Harrell is under my professional care  She was seen in my office on 7/22/2020  Patient underwent right ankle surgery on 7/13/20 for fracture  Estimated recovery time is approximately 12 weeks  If you have any questions or concerns, please don't hesitate to call           Sincerely,          Alex Cardozo, DO
show

## 2023-11-28 DIAGNOSIS — F41.9 ANXIETY: ICD-10-CM

## 2023-11-29 RX ORDER — ALPRAZOLAM 0.5 MG/1
0.5 TABLET ORAL
Qty: 30 TABLET | Refills: 0 | Status: SHIPPED | OUTPATIENT
Start: 2023-11-29

## 2023-12-04 RX ORDER — AMITRIPTYLINE HYDROCHLORIDE 25 MG/1
TABLET, FILM COATED ORAL
Qty: 30 TABLET | Refills: 0 | Status: SHIPPED | OUTPATIENT
Start: 2023-12-04

## 2023-12-08 ENCOUNTER — REMOTE DEVICE CLINIC VISIT (OUTPATIENT)
Dept: CARDIOLOGY CLINIC | Facility: CLINIC | Age: 73
End: 2023-12-08
Payer: MEDICARE

## 2023-12-08 DIAGNOSIS — Z95.0 CARDIAC PACEMAKER IN SITU: Primary | ICD-10-CM

## 2023-12-08 PROCEDURE — 93294 REM INTERROG EVL PM/LDLS PM: CPT | Performed by: INTERNAL MEDICINE

## 2023-12-08 PROCEDURE — 93296 REM INTERROG EVL PM/IDS: CPT | Performed by: INTERNAL MEDICINE

## 2023-12-08 NOTE — PROGRESS NOTES
Results for orders placed or performed in visit on 12/08/23   Cardiac EP device report    Narrative    MDT DUAL CHAMBER PPM (AAIR-DDDR 60) - ACTIVE SYSTEM IS MRI CONDITIONAL  CARELINK TRANSMISSION: BATTERY STATUS "10 YRS." AP 85%  57%. ALL AVAILABLE LEAD PARAMETERS & TRENDS WITHIN NORMAL LIMITS. CHRONICALLY HIGH RV THRES. 1 VHR NOTED@ 166 BPM; EGRAM PRESENTS AS SVT. 19 FAST A/V NOTED. 5 AT/AF NOTED; 3% BURDEN; LONGEST 21.12 HRS. AVAIL EGRAMS SHOWING AFIB & RVR. PT ON ELIQUIS & METO TART. EF 65% (ECHO 2022). NORMAL DEVICE FUNCTION.  NC

## 2023-12-11 DIAGNOSIS — I48.0 PAROXYSMAL ATRIAL FIBRILLATION (HCC): ICD-10-CM

## 2023-12-11 RX ORDER — METOPROLOL TARTRATE 50 MG/1
50 TABLET, FILM COATED ORAL EVERY 12 HOURS
Qty: 120 TABLET | Refills: 3 | Status: SHIPPED | OUTPATIENT
Start: 2023-12-11

## 2023-12-23 DIAGNOSIS — F41.9 ANXIETY: ICD-10-CM

## 2023-12-26 DIAGNOSIS — F32.9 MAJOR DEPRESSIVE DISORDER, REMISSION STATUS UNSPECIFIED, UNSPECIFIED WHETHER RECURRENT: Chronic | ICD-10-CM

## 2023-12-26 RX ORDER — ESCITALOPRAM OXALATE 10 MG/1
10 TABLET ORAL DAILY
Qty: 90 TABLET | Refills: 1 | Status: SHIPPED | OUTPATIENT
Start: 2023-12-26

## 2023-12-27 RX ORDER — ALPRAZOLAM 0.5 MG/1
TABLET ORAL
Qty: 30 TABLET | Refills: 0 | Status: SHIPPED | OUTPATIENT
Start: 2023-12-27

## 2024-01-08 DIAGNOSIS — R10.13 EPIGASTRIC PAIN: ICD-10-CM

## 2024-01-08 RX ORDER — AMITRIPTYLINE HYDROCHLORIDE 25 MG/1
25 TABLET, FILM COATED ORAL
Qty: 90 TABLET | Refills: 1 | Status: SHIPPED | OUTPATIENT
Start: 2024-01-08

## 2024-01-24 DIAGNOSIS — F41.9 ANXIETY: ICD-10-CM

## 2024-02-01 RX ORDER — ALPRAZOLAM 0.5 MG/1
TABLET ORAL
Qty: 30 TABLET | Refills: 0 | Status: SHIPPED | OUTPATIENT
Start: 2024-02-01

## 2024-02-15 ENCOUNTER — APPOINTMENT (OUTPATIENT)
Dept: LAB | Facility: CLINIC | Age: 74
End: 2024-02-15
Payer: MEDICARE

## 2024-02-15 DIAGNOSIS — E53.8 VITAMIN B12 DEFICIENCY: ICD-10-CM

## 2024-02-15 DIAGNOSIS — E03.8 OTHER SPECIFIED HYPOTHYROIDISM: ICD-10-CM

## 2024-02-15 LAB
TSH SERPL DL<=0.05 MIU/L-ACNC: 0.62 UIU/ML (ref 0.45–4.5)
VIT B12 SERPL-MCNC: 198 PG/ML (ref 180–914)

## 2024-02-15 PROCEDURE — 84443 ASSAY THYROID STIM HORMONE: CPT

## 2024-02-15 PROCEDURE — 82607 VITAMIN B-12: CPT

## 2024-02-15 PROCEDURE — 36415 COLL VENOUS BLD VENIPUNCTURE: CPT

## 2024-02-29 ENCOUNTER — OFFICE VISIT (OUTPATIENT)
Dept: FAMILY MEDICINE CLINIC | Facility: CLINIC | Age: 74
End: 2024-02-29
Payer: MEDICARE

## 2024-02-29 VITALS
DIASTOLIC BLOOD PRESSURE: 70 MMHG | HEART RATE: 78 BPM | TEMPERATURE: 96 F | RESPIRATION RATE: 17 BRPM | SYSTOLIC BLOOD PRESSURE: 116 MMHG | WEIGHT: 150 LBS | HEIGHT: 65 IN | BODY MASS INDEX: 24.99 KG/M2

## 2024-02-29 DIAGNOSIS — K21.9 GASTROESOPHAGEAL REFLUX DISEASE: ICD-10-CM

## 2024-02-29 DIAGNOSIS — F32.9 MAJOR DEPRESSIVE DISORDER, REMISSION STATUS UNSPECIFIED, UNSPECIFIED WHETHER RECURRENT: Primary | Chronic | ICD-10-CM

## 2024-02-29 DIAGNOSIS — I49.5 TACHY-BRADY SYNDROME (HCC): ICD-10-CM

## 2024-02-29 PROBLEM — I62.00 SUBDURAL HEMORRHAGE (HCC): Status: RESOLVED | Noted: 2022-11-30 | Resolved: 2024-02-29

## 2024-02-29 PROBLEM — S06.5X0A TRAUMATIC SUBDURAL HEMORRHAGE WITHOUT LOSS OF CONSCIOUSNESS, INITIAL ENCOUNTER (HCC): Status: RESOLVED | Noted: 2023-05-31 | Resolved: 2024-02-29

## 2024-02-29 PROCEDURE — 99213 OFFICE O/P EST LOW 20 MIN: CPT | Performed by: INTERNAL MEDICINE

## 2024-02-29 RX ORDER — BUPROPION HYDROCHLORIDE 150 MG/1
150 TABLET ORAL EVERY MORNING
Qty: 30 TABLET | Refills: 1 | Status: SHIPPED | OUTPATIENT
Start: 2024-02-29 | End: 2024-08-27

## 2024-02-29 NOTE — ASSESSMENT & PLAN NOTE
This is worsening.  Depression Screening Follow-up Plan: Patient's depression screening was positive with a PHQ-2 score of . Their PHQ-9 score was 18. Patient assessed for underlying major depression. They have no active suicidal ideations. Brief counseling provided and recommend additional follow-up/re-evaluation next office visit.  She will continue on escitalopram, but due to her worsening symptoms of increased sleep and decreased motivation, anhedonia, will add low dose bupropion.  Patient will f/u in 2 weeks but strongly urged her to call or return here sooner than next scheduled appointment for any worsening or uncontrolled symptoms.  Offered crisis for worsening symptoms as well.

## 2024-02-29 NOTE — PROGRESS NOTES
Name: Donald Reyes      : 1950      MRN: [id number]  Encounter Provider: Melita Rangel MD  Encounter Date: 2024   Encounter department: [facility]    Assessment & Plan     1. Major depressive disorder, remission status unspecified, unspecified whether recurrent  Assessment & Plan:  This is worsening.  Depression Screening Follow-up Plan: Patient's depression screening was positive with a PHQ-2 score of . Their PHQ-9 score was 18. Patient assessed for underlying major depression. They have no active suicidal ideations. Brief counseling provided and recommend additional follow-up/re-evaluation next office visit.  She will continue on escitalopram, but due to her worsening symptoms of increased sleep and decreased motivation, anhedonia, will add low dose bupropion.  Patient will f/u in 2 weeks but strongly urged her to call or return here sooner than next scheduled appointment for any worsening or uncontrolled symptoms.  Offered crisis for worsening symptoms as well.       Orders:  -     buPROPion (WELLBUTRIN XL) 150 mg 24 hr tablet; Take 1 tablet (150 mg total) by mouth every morning    2. Tachy-dunia syndrome (HCC)  Assessment & Plan:  Managed by cardiology.           Subjective      She moved here from [facility] initially because her best friend was here; that friend was diagnosed with ALS.  Patient was caring for her and she  2 days after [place].  Since that time, patient has been in a deep depression. She is sleeping 14-16 hours per day. Feels fatigued, has anhedonia and has not been able to leave the house. Has no family in the area and now few friends.  She has been speaking to an ALS grief counsellor.  She is taking her escitalopram but does not feel it is helping.  She denies SI/HI, and states since she is Jainism she would never harm herself.  She is tearful much of the time.       Review of Systems   Constitutional: Negative.    Respiratory: Negative.     Cardiovascular:  "Negative.    Psychiatric/Behavioral:  Positive for decreased concentration, dysphoric mood and sleep disturbance. Negative for self-injury and suicidal ideas. The patient is not nervous/anxious.        Current Outpatient Medications on File Prior to Visit   Medication Sig   • ALPRAZolam (XANAX) 0.5 mg tablet TAKE 1 TABLET BY MOUTH ONCE DAILY AT BEDTIME AS NEEDED FOR ANXIETY   • amitriptyline (ELAVIL) 25 mg tablet TAKE 1 TABLET BY MOUTH ONCE DAILY AT BEDTIME   • Eliquis 5 MG TAKE 1 TABLET TWICE DAILY   • escitalopram (Lexapro) 10 mg tablet Take 1 tablet (10 mg total) by mouth daily   • levothyroxine 75 mcg tablet Take 1 tablet (75 mcg total) by mouth daily   • lisinopril (ZESTRIL) 10 mg tablet Take 1 tablet (10 mg total) by mouth daily   • metoprolol tartrate (LOPRESSOR) 50 mg tablet TAKE 1 TABLET EVERY 12 HOURS   • pantoprazole (PROTONIX) 40 mg tablet Take 1 tablet (40 mg total) by mouth daily   • [DISCONTINUED] amitriptyline (ELAVIL) 25 mg tablet Take 1 tablet (25 mg total) by mouth daily at bedtime (Patient not taking: Reported on 2/29/2024)   • [DISCONTINUED] azelastine (ASTELIN) 0.1 % nasal spray 1 spray into each nostril 2 (two) times a day Use in each nostril as directed (Patient not taking: Reported on 2/29/2024)   • [DISCONTINUED] methylPREDNISolone 4 MG tablet therapy pack Use as directed on package (Patient not taking: Reported on 2/29/2024)       Objective     /70   Pulse 78   Temp (!) 96 °F (35.6 °C)   Resp 17   Ht 5' 5\" (1.651 m)   Wt 68 kg (150 lb) Comment: per patient  LMP  (LMP Unknown)   BMI 24.96 kg/m²     Physical Exam  Constitutional:       Appearance: She is well-developed.   Eyes:      Conjunctiva/sclera: Conjunctivae normal.   Neck:      Thyroid: No thyromegaly.      Vascular: No JVD.   Cardiovascular:      Rate and Rhythm: Normal rate and regular rhythm.      Heart sounds: Normal heart sounds. No murmur heard.     No friction rub. No gallop.   Pulmonary:      Effort: Pulmonary " effort is normal.      Breath sounds: Normal breath sounds. No wheezing or rales.   Abdominal:      General: Bowel sounds are normal. There is no distension.      Palpations: Abdomen is soft.      Tenderness: There is no abdominal tenderness.   Musculoskeletal:      Cervical back: Neck supple.   Psychiatric:         Mood and Affect: Mood is depressed. Affect is tearful.         Behavior: Behavior normal.         Thought Content: Thought content normal. Thought content does not include homicidal or suicidal ideation. Thought content does not include homicidal or suicidal plan.         Judgment: Judgment normal.       Melita Rangel MD

## 2024-03-01 RX ORDER — PANTOPRAZOLE SODIUM 40 MG/1
40 TABLET, DELAYED RELEASE ORAL DAILY
Qty: 90 TABLET | Refills: 1 | Status: SHIPPED | OUTPATIENT
Start: 2024-03-01

## 2024-03-08 ENCOUNTER — REMOTE DEVICE CLINIC VISIT (OUTPATIENT)
Dept: CARDIOLOGY CLINIC | Facility: CLINIC | Age: 74
End: 2024-03-08
Payer: MEDICARE

## 2024-03-08 DIAGNOSIS — Z95.0 PRESENCE OF CARDIAC PACEMAKER: Primary | ICD-10-CM

## 2024-03-08 PROCEDURE — 93294 REM INTERROG EVL PM/LDLS PM: CPT | Performed by: INTERNAL MEDICINE

## 2024-03-08 PROCEDURE — 93296 REM INTERROG EVL PM/IDS: CPT | Performed by: INTERNAL MEDICINE

## 2024-03-08 NOTE — PROGRESS NOTES
Results for orders placed or performed in visit on 03/08/24   Cardiac EP device report    Narrative    MDT DUAL CHAMBER PPM (AAIR-DDDR 60) - ACTIVE SYSTEM IS MRI CONDITIONAL  CARELINK TRANSMISSION: BATTERY VOLTAGE ADEQUATE (10.2 YRS). AP 95.8%  31.3% ALL AVAILABLE LEAD PARAMETERS WITHIN NORMAL LIMITS; CHRONICALLY HIGH RV THRESHOLD. 7 FAST A&V EPISODES W/ 2 AVAILABLE EGMS: #183 EGM SHOWING PAT (20 BEATS  BPM); OTHER EGM SHOWING RVR  BPM, LASTING 2 MINS 39 SECS. 2 ATAF EPISODES W/ LONGEST AT 12 HRS 33 MINS. AT/AF BURDEN 0.9% SINCE 12/7/23. TAKES ELIQUIS, METOPROLOL TART. EF 65%(ECHO 4/12/22). NORMAL DEVICE FUNCTION. AM

## 2024-03-12 DIAGNOSIS — F41.9 ANXIETY: ICD-10-CM

## 2024-03-12 RX ORDER — ALPRAZOLAM 0.5 MG/1
TABLET ORAL
Qty: 30 TABLET | Refills: 0 | Status: SHIPPED | OUTPATIENT
Start: 2024-03-12

## 2024-03-19 ENCOUNTER — TELEPHONE (OUTPATIENT)
Dept: GASTROENTEROLOGY | Facility: CLINIC | Age: 74
End: 2024-03-19

## 2024-03-19 NOTE — TELEPHONE ENCOUNTER
Pt is due for a follow up with Dr Harper, I called the patient and scheduled her for 7/19 at [place] with Dr Harper

## 2024-03-20 ENCOUNTER — RA CDI HCC (OUTPATIENT)
Dept: OTHER | Facility: HOSPITAL | Age: 74
End: 2024-03-20

## 2024-03-20 NOTE — PROGRESS NOTES
It is noted in the patient record that the patient has F32.9 major depressive disorder,  unspecified     Can the depression be further specified as:   F32.0 major depressive disorder, single episode, mild  OR   F32.1 major depressive disorder, single episode, moderate  OR   F32.2 major depressive disorder, single episode, severe without psychotic features OR   F32.4 major depressive disorder, single episode, in partial remission OR   F32.5 major depressive disorder, single episode, in full remission    F33.0 major depressive disorder, recurrent, mild  OR   F33.1 major depressive disorder, recurrent, moderate  OR   F33.2 major depressive disorder, recurrent, severe without psychotic features OR   F33.41 major depressive disorder, recurrent, in partial remission OR   F33.42 major depressive disorder, recurrent, in full remission  HCC coding opportunities          Chart Reviewed number of suggestions sent to Provider: 1     Patients Insurance     Medicare Insurance: Medicare

## 2024-03-25 ENCOUNTER — OFFICE VISIT (OUTPATIENT)
Dept: FAMILY MEDICINE CLINIC | Facility: CLINIC | Age: 74
End: 2024-03-25
Payer: COMMERCIAL

## 2024-03-25 ENCOUNTER — TELEPHONE (OUTPATIENT)
Dept: FAMILY MEDICINE CLINIC | Facility: CLINIC | Age: 74
End: 2024-03-25

## 2024-03-25 VITALS
TEMPERATURE: 96.8 F | DIASTOLIC BLOOD PRESSURE: 68 MMHG | HEART RATE: 88 BPM | SYSTOLIC BLOOD PRESSURE: 116 MMHG | RESPIRATION RATE: 18 BRPM

## 2024-03-25 DIAGNOSIS — I48.0 PAROXYSMAL A-FIB (HCC): ICD-10-CM

## 2024-03-25 DIAGNOSIS — F32.9 MAJOR DEPRESSIVE DISORDER, REMISSION STATUS UNSPECIFIED, UNSPECIFIED WHETHER RECURRENT: Primary | Chronic | ICD-10-CM

## 2024-03-25 PROCEDURE — 99213 OFFICE O/P EST LOW 20 MIN: CPT | Performed by: INTERNAL MEDICINE

## 2024-03-25 PROCEDURE — G2211 COMPLEX E/M VISIT ADD ON: HCPCS | Performed by: INTERNAL MEDICINE

## 2024-03-25 NOTE — PROGRESS NOTES
Name: Donald Reyes      : 1950      MRN: [id number]  Encounter Provider: Melita Rangel MD  Encounter Date: 3/25/2024   Encounter department: [facility]    Assessment & Plan     1. Major depressive disorder, remission status unspecified, unspecified whether recurrent  Assessment & Plan:  This has not improved. We discussed changing her medication vs. Psych referral.  She has been looking into medical marijuana and has a doctor for which she would like a referral. She prefers to try this first, and if ineffective, she was asked to come back to the office.  Will see patient in 2 months for short interval follow up.  Asked patient to call sooner than next scheduled appointment for any complaints or issues.        2. Paroxysmal A-fib (HCC)  Assessment & Plan:  Managed by cardiology.    Orders:  -     apixaban (Eliquis) 5 mg; Take 1 tablet (5 mg total) by mouth 2 (two) times a day           Subjective      Here for follow up of depression. She does not feel this has improved. Is still frequently tearful and wants to lay in bed all day.  She is getting out to see neighbors just a bit.  She denies SI or HI, but just does not want to feel this way.  She has looked into medical marijuana and would like a referral to a Dr. Flood.      Review of Systems   Constitutional: Negative.    Respiratory: Negative.     Cardiovascular: Negative.    Psychiatric/Behavioral:  Positive for dysphoric mood and sleep disturbance. Negative for self-injury and suicidal ideas.        Current Outpatient Medications on File Prior to Visit   Medication Sig   • ALPRAZolam (XANAX) 0.5 mg tablet TAKE 1 TABLET BY MOUTH ONCE DAILY AT BEDTIME AS NEEDED FOR ANXIETY   • amitriptyline (ELAVIL) 25 mg tablet TAKE 1 TABLET BY MOUTH ONCE DAILY AT BEDTIME   • buPROPion (WELLBUTRIN XL) 150 mg 24 hr tablet Take 1 tablet (150 mg total) by mouth every morning   • escitalopram (Lexapro) 10 mg tablet Take 1 tablet (10 mg total) by mouth daily    • levothyroxine 75 mcg tablet Take 1 tablet (75 mcg total) by mouth daily   • lisinopril (ZESTRIL) 10 mg tablet Take 1 tablet (10 mg total) by mouth daily   • metoprolol tartrate (LOPRESSOR) 50 mg tablet TAKE 1 TABLET EVERY 12 HOURS   • pantoprazole (PROTONIX) 40 mg tablet Take 1 tablet (40 mg total) by mouth daily   • [DISCONTINUED] Eliquis 5 MG TAKE 1 TABLET TWICE DAILY       Objective     /68   Pulse 88   Temp (!) 96.8 °F (36 °C)   Resp 18   LMP  (LMP Unknown)     Physical Exam  Constitutional:       Appearance: She is well-developed.   Eyes:      Conjunctiva/sclera: Conjunctivae normal.   Neck:      Thyroid: No thyromegaly.      Vascular: No JVD.   Cardiovascular:      Rate and Rhythm: Normal rate and regular rhythm.      Heart sounds: Normal heart sounds. No murmur heard.     No friction rub. No gallop.   Pulmonary:      Effort: Pulmonary effort is normal.      Breath sounds: Normal breath sounds. No wheezing or rales.   Abdominal:      General: Bowel sounds are normal. There is no distension.      Palpations: Abdomen is soft.      Tenderness: There is no abdominal tenderness.   Musculoskeletal:      Cervical back: Neck supple.   Psychiatric:         Mood and Affect: Mood is depressed. Affect is tearful.         Behavior: Behavior normal.         Thought Content: Thought content normal.         Judgment: Judgment normal.       Melita Rangel MD

## 2024-03-25 NOTE — ASSESSMENT & PLAN NOTE
This has not improved. We discussed changing her medication vs. Psych referral.  She has been looking into medical marijuana and has a doctor for which she would like a referral. She prefers to try this first, and if ineffective, she was asked to come back to the office.  Will see patient in 2 months for short interval follow up.  Asked patient to call sooner than next scheduled appointment for any complaints or issues.

## 2024-03-25 NOTE — TELEPHONE ENCOUNTER
Patient wishes to pursue medical marijuana for anxiety, depression, insomnia.  Please fax referral to Dr. Horace Flood, Rx is in clerical inbasket with fax #.

## 2024-04-19 DIAGNOSIS — F41.9 ANXIETY: ICD-10-CM

## 2024-04-22 RX ORDER — ALPRAZOLAM 0.5 MG/1
TABLET ORAL
Qty: 30 TABLET | Refills: 0 | Status: SHIPPED | OUTPATIENT
Start: 2024-04-22 | End: 2024-04-24

## 2024-04-24 DIAGNOSIS — F41.9 ANXIETY: ICD-10-CM

## 2024-04-24 DIAGNOSIS — I10 ESSENTIAL HYPERTENSION: ICD-10-CM

## 2024-04-24 DIAGNOSIS — I48.0 PAROXYSMAL ATRIAL FIBRILLATION (HCC): ICD-10-CM

## 2024-04-24 RX ORDER — ALPRAZOLAM 0.5 MG/1
TABLET ORAL
Qty: 30 TABLET | Refills: 0 | Status: SHIPPED | OUTPATIENT
Start: 2024-04-24 | End: 2024-05-06

## 2024-04-24 RX ORDER — LISINOPRIL 10 MG/1
10 TABLET ORAL DAILY
Qty: 90 TABLET | Refills: 1 | Status: SHIPPED | OUTPATIENT
Start: 2024-04-24

## 2024-04-25 RX ORDER — LISINOPRIL 10 MG/1
10 TABLET ORAL DAILY
Qty: 90 TABLET | Refills: 1 | Status: SHIPPED | OUTPATIENT
Start: 2024-04-25

## 2024-04-26 RX ORDER — METOPROLOL TARTRATE 50 MG/1
50 TABLET, FILM COATED ORAL EVERY 12 HOURS
Qty: 60 TABLET | Refills: 5 | Status: SHIPPED | OUTPATIENT
Start: 2024-04-26

## 2024-05-05 DIAGNOSIS — F41.9 ANXIETY: ICD-10-CM

## 2024-05-06 DIAGNOSIS — F32.9 MAJOR DEPRESSIVE DISORDER, REMISSION STATUS UNSPECIFIED, UNSPECIFIED WHETHER RECURRENT: Chronic | ICD-10-CM

## 2024-05-06 RX ORDER — BUPROPION HYDROCHLORIDE 150 MG/1
150 TABLET ORAL EVERY MORNING
Qty: 30 TABLET | Refills: 5 | Status: SHIPPED | OUTPATIENT
Start: 2024-05-06

## 2024-05-06 RX ORDER — ALPRAZOLAM 0.5 MG/1
TABLET ORAL
Qty: 30 TABLET | Refills: 0 | Status: SHIPPED | OUTPATIENT
Start: 2024-05-06

## 2024-06-07 ENCOUNTER — REMOTE DEVICE CLINIC VISIT (OUTPATIENT)
Dept: CARDIOLOGY CLINIC | Facility: CLINIC | Age: 74
End: 2024-06-07
Payer: COMMERCIAL

## 2024-06-07 DIAGNOSIS — Z95.0 PRESENCE OF PERMANENT CARDIAC PACEMAKER: Primary | ICD-10-CM

## 2024-06-07 PROCEDURE — 93296 REM INTERROG EVL PM/IDS: CPT | Performed by: INTERNAL MEDICINE

## 2024-06-07 PROCEDURE — 93294 REM INTERROG EVL PM/LDLS PM: CPT | Performed by: INTERNAL MEDICINE

## 2024-06-07 NOTE — PROGRESS NOTES
Results for orders placed or performed in visit on 06/07/24   Cardiac EP device report    Narrative    MDT DUAL CHAMBER PPM (AAIR-DDDR 60) - ACTIVE SYSTEM IS MRI CONDITIONAL  CARELINK TRANSMISSION: BATTERY VOLTAGE ADEQUATE (9.1 YRS). AP 94.4%  80.3% (>40%/AAIR-DDDR 60) ALL AVAILABLE LEAD PARAMETERS WITHIN NORMAL LIMITS; KNOWN HIGH RV THRESHOLD. 2 AT/AF EPISODES W/ LONGEST AT 23 HRS 38 MINS. AT/AF BURDEN 1.9% SINCE 3/8/24. TAKES ELIQUIS, METOPROLOL TART. NORMAL DEVICE FUNCTION. AM

## 2024-06-12 NOTE — TELEPHONE ENCOUNTER
----- Message from Karen Tran DO sent at 12/12/2022  9:57 PM EST -----  approved  ----- Message -----  From: Alen Gonzales  Sent: 12/1/2022   9:15 AM EST  To: Sleep Medicine [facility] Provider    This Home sleep study needs approval      If approved please sign and return to clerical pool  If denied please include reasons why  Also provide alternative testing if warranted  Please sign and return to clerical pool 
PAST SURGICAL HISTORY:  S/P appendectomy     S/P cataract surgery

## 2024-06-19 DIAGNOSIS — E03.8 OTHER SPECIFIED HYPOTHYROIDISM: ICD-10-CM

## 2024-06-19 RX ORDER — LEVOTHYROXINE SODIUM 0.05 MG/1
TABLET ORAL
Qty: 90 TABLET | Refills: 3 | Status: SHIPPED | OUTPATIENT
Start: 2024-06-19

## 2024-06-19 NOTE — TELEPHONE ENCOUNTER
Refill must be reviewed and completed by the office or provider. The refill is unable to be approved or denied by the medication management team.      The original prescription was discontinued on 7/20/2023 by Melita Rangel,

## 2024-07-06 DIAGNOSIS — F32.9 MAJOR DEPRESSIVE DISORDER, REMISSION STATUS UNSPECIFIED, UNSPECIFIED WHETHER RECURRENT: Chronic | ICD-10-CM

## 2024-07-06 RX ORDER — ESCITALOPRAM OXALATE 10 MG/1
10 TABLET ORAL DAILY
Qty: 90 TABLET | Refills: 0 | Status: SHIPPED | OUTPATIENT
Start: 2024-07-06

## 2024-07-10 DIAGNOSIS — F32.9 MAJOR DEPRESSIVE DISORDER, REMISSION STATUS UNSPECIFIED, UNSPECIFIED WHETHER RECURRENT: Chronic | ICD-10-CM

## 2024-07-10 RX ORDER — ESCITALOPRAM OXALATE 10 MG/1
10 TABLET ORAL DAILY
Qty: 90 TABLET | Refills: 1 | Status: SHIPPED | OUTPATIENT
Start: 2024-07-10

## 2024-07-12 DIAGNOSIS — R10.13 EPIGASTRIC PAIN: ICD-10-CM

## 2024-07-12 RX ORDER — AMITRIPTYLINE HYDROCHLORIDE 25 MG/1
TABLET, FILM COATED ORAL
Qty: 100 TABLET | Refills: 1 | Status: SHIPPED | OUTPATIENT
Start: 2024-07-12 | End: 2024-07-19 | Stop reason: SDUPTHER

## 2024-07-19 ENCOUNTER — TELEPHONE (OUTPATIENT)
Dept: GASTROENTEROLOGY | Facility: CLINIC | Age: 74
End: 2024-07-19

## 2024-07-19 ENCOUNTER — OFFICE VISIT (OUTPATIENT)
Dept: GASTROENTEROLOGY | Facility: CLINIC | Age: 74
End: 2024-07-19
Payer: COMMERCIAL

## 2024-07-19 VITALS
HEIGHT: 65 IN | HEART RATE: 85 BPM | SYSTOLIC BLOOD PRESSURE: 127 MMHG | BODY MASS INDEX: 24.96 KG/M2 | DIASTOLIC BLOOD PRESSURE: 80 MMHG

## 2024-07-19 DIAGNOSIS — K21.9 GASTROESOPHAGEAL REFLUX DISEASE WITHOUT ESOPHAGITIS: Primary | ICD-10-CM

## 2024-07-19 DIAGNOSIS — R10.13 EPIGASTRIC PAIN: ICD-10-CM

## 2024-07-19 DIAGNOSIS — R19.8 CHANGE IN BOWEL MOVEMENT: ICD-10-CM

## 2024-07-19 PROCEDURE — 99214 OFFICE O/P EST MOD 30 MIN: CPT | Performed by: INTERNAL MEDICINE

## 2024-07-19 RX ORDER — AMITRIPTYLINE HYDROCHLORIDE 25 MG/1
25 TABLET, FILM COATED ORAL
Qty: 100 TABLET | Refills: 1 | Status: SHIPPED | OUTPATIENT
Start: 2024-07-19

## 2024-07-19 NOTE — TELEPHONE ENCOUNTER
Our mutual patient is scheduled for procedure: colonoscopy and EGD    On: September 25 , 2024     With: Dr. Harry Harper MD    He/She is taking the following blood thinner: Eliquis (Apixaban)    Can this be stopped  2 days prior to the procedure    Physician Approving clearance: Dr Peralta

## 2024-07-20 NOTE — PROGRESS NOTES
[facility]  Donald Reyes 74 y.o. female MRN: [id number]            Assessment & Plan:  74-year-old female, with history of neuropathic musculoskeletal abdominal pain in the past treated with amitriptyline, recently diagnosed A-fib, status post pacemaker, 40 pound intentional weight loss, new onset constipation.    1.  Change in bowel movements and weight loss: Most likely secondary to beta-blockers and medication  -Will proceed with a colonoscopy to exclude luminal pathology as a cause of her symptoms  -Recommend trial of MiraLAX daily  -Discussed with the risks of procedure including bleeding, surgery, perforation    2.  GERD: Continue pantoprazole, take H2 blockers as needed  -Given longstanding history of symptoms we will proceed with an upper endoscopy    3.  Epigastric pain: Most likely musculoskeletal at, has had adequate response to amitriptyline, CT scan in the past was unremarkable  -Advised patient to try to wean off of this if doing well      Donald was seen today for follow-up.    Diagnoses and all orders for this visit:    Gastroesophageal reflux disease without esophagitis  -     EGD; Future    Change in bowel movement  -     Colonoscopy; Future    Epigastric pain  -     amitriptyline (ELAVIL) 25 mg tablet; Take 1 tablet (25 mg total) by mouth daily at bedtime    Other orders  -     Diet NPO; Sips with meds; Standing  -     Void on call to OR; Standing            _____________________________________________________________        CC: Follow-up    HPI:  Donald Reyes is a 74 y.o.female who is here for follow-up with epigastric pain.  Initially seen for epigastric pain with strong neuropathic/musculoskeletal component, was placed on amitriptyline with near complete resolution of symptoms.  We advised her to taper off of this last visit, she reports that she was unable to as she had some mild recurrence of symptoms but overall is doing very well.  Patient continues to have  occasional acid reflux, she is on pantoprazole daily and has had some occasional breakthrough requiring over-the-counter H2 blockers.    New for to the patient, in the last several months she was diagnosed with A-fib, had been placed on beta-blocker, anticoagulation, also had a pacemaker placed.    She reports over the past exam she is a new onset of constipation which is very different for the patient.    Last colonoscopy 7 years ago.    She is also lost 40 pounds which she reports is intentional due to weight watchers.    Denies any dysphagia, melena, rectal bleeding, abdominal pain.      ROS:  The remainder of the ROS was negative except for the pertinent positives mentioned in HPI.      Allergies: Patient has no known allergies.    Medications:   Current Outpatient Medications:     ALPRAZolam (XANAX) 0.5 mg tablet, TAKE 1 TABLET BY MOUTH ONCE DAILY AT BEDTIME AS NEEDED FOR ANXIETY, Disp: 30 tablet, Rfl: 0    amitriptyline (ELAVIL) 25 mg tablet, Take 1 tablet (25 mg total) by mouth daily at bedtime, Disp: 100 tablet, Rfl: 1    apixaban (Eliquis) 5 mg, Take 1 tablet (5 mg total) by mouth 2 (two) times a day, Disp: 180 tablet, Rfl: 3    buPROPion (WELLBUTRIN XL) 150 mg 24 hr tablet, TAKE 1 TABLET BY MOUTH ONCE DAILY IN THE MORNING, Disp: 30 tablet, Rfl: 5    escitalopram (LEXAPRO) 10 mg tablet, Take 1 tablet (10 mg total) by mouth daily, Disp: 90 tablet, Rfl: 1    levothyroxine 75 mcg tablet, Take 1 tablet (75 mcg total) by mouth daily, Disp: 90 tablet, Rfl: 1    lisinopril (ZESTRIL) 10 mg tablet, Take 1 tablet (10 mg total) by mouth daily, Disp: 90 tablet, Rfl: 1    metoprolol tartrate (LOPRESSOR) 50 mg tablet, TAKE 1 TABLET BY MOUTH EVERY 12 HOURS, Disp: 60 tablet, Rfl: 5    pantoprazole (PROTONIX) 40 mg tablet, Take 1 tablet (40 mg total) by mouth daily, Disp: 90 tablet, Rfl: 1    levothyroxine 50 mcg tablet, TAKE 1.5 (ONE AND ONE-HALF) TABLETS BY MOUTH DAILY, Disp: 90 tablet, Rfl: 3    lisinopril (ZESTRIL) 10  mg tablet, Take 1 tablet by mouth once daily, Disp: 90 tablet, Rfl: 1    Current Facility-Administered Medications:     cyanocobalamin injection 1,000 mcg, 1,000 mcg, Intramuscular, Q30 Days, HOME Smith, 1,000 mcg at 09/22/23 1402    Past Medical History:   Diagnosis Date    Anxiety     Chronic kidney disease One kidney    Congenital absence of one kidney     Depression     Disease of thyroid gland     Ear problems     GERD (gastroesophageal reflux disease)     History of transfusion     HL (hearing loss)     Hypertension     Insomnia     Menopause     Other specified hypothyroidism 01/28/2019    Otitis media     Vitamin B12 deficiency        Past Surgical History:   Procedure Laterality Date    CARDIAC PACEMAKER PLACEMENT Left 04/16/2022    Procedure: INSERTION PACEMAKER;  Surgeon: Charles Ashley DO;  [facility];  Service: Cardiology    COLONOSCOPY      MN COLONOSCOPY FLX DX W/COLLJ SPEC WHEN PFRMD N/A 05/30/2017    Procedure: EGD AND COLONOSCOPY;  Surgeon: Harry Harper MD;  [facility];  Service: Gastroenterology    MN OPEN TX DISTAL FIBULAR FRACTURE LAT MALLEOLUS Left 07/13/2020    Procedure: OPEN REDUCTION W/ INTERNAL FIXATION (ORIF) ANKLE;  Surgeon: Nicola Pacheco DO;  [facility]: [facility];  Service: Orthopedics    REDUCTION MAMMAPLASTY Bilateral 06/06/2011    SHOULDER SURGERY      TONSILLECTOMY      UPPER GASTROINTESTINAL ENDOSCOPY         Family History   Problem Relation Age of Onset    Lung cancer Mother 70    Cancer Mother         Lung    Hearing loss Mother     Heart disease Father     Heart failure Father     No Known Problems Maternal Grandmother     No Known Problems Maternal Grandfather     No Known Problems Paternal Grandmother     No Known Problems Paternal Grandfather     Lung cancer Brother 53    Cancer Brother         Lung    Early death Brother         Lung cancer    No Known Problems Maternal Aunt     No Known Problems Maternal Aunt     No Known Problems Maternal  "Aunt     No Known Problems Paternal Aunt     No Known Problems Paternal Aunt     No Known Problems Paternal Aunt     No Known Problems Paternal Aunt         reports that she has never smoked. She has never been exposed to tobacco smoke. She has never used smokeless tobacco. She reports that she does not currently use alcohol after a past usage of about 1.0 standard drink of alcohol per week. She reports that she does not use drugs.      Physical Exam:    /80 (BP Location: Right arm, Patient Position: Sitting, Cuff Size: Standard)   Pulse 85   Ht 5' 5\" (1.651 m)   LMP  (LMP Unknown)   BMI 24.96 kg/m²     Gen: wn/wd, NAD, healthy-appearing female  HEENT: anicteric, MMM, no cervical LAD  CVS: RRR, no m/r/g  CHEST: CTA b/l  ABD: +BS, soft, NT,ND, no hepatosplenomegaly  EXT: no c/c/e  NEURO: aaox3  SKIN: NO rashes    "

## 2024-07-22 NOTE — TELEPHONE ENCOUNTER
Pt has not been seen since 6/26/23 and currently has no pending appts w/ our office. PCP last prescribed Eliquis. Not sure if they can clear her. If they can't then she will need an appt w/ our office.    Thanks

## 2024-07-22 NOTE — TELEPHONE ENCOUNTER
Our mutual patient is scheduled for procedure: colonoscopy and EGD    On: September 25 , 2024     With: Dr. Harry Harper MD    He/She is taking the following blood thinner: Eliquis (Apixaban)    Can this be stopped  2 days prior to the procedure    Physician Approving clearance: Dr Rangel

## 2024-07-22 NOTE — TELEPHONE ENCOUNTER
This patient requires a follow-up with Dr. Peralta or PA.  She is overdue.  PCP gave courtesy refill for Eliquis.  Last OV was June of 2023.    Thank you!

## 2024-07-29 ENCOUNTER — PATIENT MESSAGE (OUTPATIENT)
Dept: FAMILY MEDICINE CLINIC | Facility: CLINIC | Age: 74
End: 2024-07-29

## 2024-07-29 DIAGNOSIS — E03.8 OTHER SPECIFIED HYPOTHYROIDISM: ICD-10-CM

## 2024-07-29 RX ORDER — LEVOTHYROXINE SODIUM 0.07 MG/1
75 TABLET ORAL DAILY
Qty: 90 TABLET | Refills: 3 | Status: SHIPPED | OUTPATIENT
Start: 2024-07-29

## 2024-08-03 DIAGNOSIS — I10 ESSENTIAL HYPERTENSION: ICD-10-CM

## 2024-08-04 RX ORDER — LISINOPRIL 10 MG/1
10 TABLET ORAL DAILY
Qty: 30 TABLET | Refills: 0 | Status: SHIPPED | OUTPATIENT
Start: 2024-08-04

## 2024-08-05 DIAGNOSIS — F41.9 ANXIETY: ICD-10-CM

## 2024-08-05 RX ORDER — ALPRAZOLAM 0.5 MG/1
TABLET ORAL
Qty: 30 TABLET | Refills: 0 | Status: SHIPPED | OUTPATIENT
Start: 2024-08-05

## 2024-08-17 DIAGNOSIS — K21.9 GASTROESOPHAGEAL REFLUX DISEASE: ICD-10-CM

## 2024-08-18 RX ORDER — PANTOPRAZOLE SODIUM 40 MG/1
40 TABLET, DELAYED RELEASE ORAL DAILY
Qty: 90 TABLET | Refills: 0 | Status: SHIPPED | OUTPATIENT
Start: 2024-08-18

## 2024-09-11 ENCOUNTER — ANESTHESIA EVENT (OUTPATIENT)
Dept: ANESTHESIOLOGY | Facility: HOSPITAL | Age: 74
End: 2024-09-11

## 2024-09-11 ENCOUNTER — ANESTHESIA (OUTPATIENT)
Dept: ANESTHESIOLOGY | Facility: HOSPITAL | Age: 74
End: 2024-09-11

## 2024-09-17 NOTE — PROGRESS NOTES
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 (2022)  Does have noted elevated RV threshold (4 at 1) w/ RV pacing around 60%; to continue monitoring lead parameters and device battery life moving forward  HTN  DARIN  Hx of subdural hemorrhage after a fall  hypothyroidism      Discussion/Plan:    Patient's pacemaker was interrogated today showing 1.5% total afib burden     She does have an elevated RV threshold and is RV paced 60% (to continue monitoring lead parameters and battery moving forwards)    In regards to her afib she reports she has been feeling well and denies acute cardiac complaint    Plan to continue her metoprolol and Eliquis at current dosing for now        Patient has an upcoming colonoscopy/endoscopy and presents for cardiac risk stratification prior to this    Patient's keeps active at home having a METS of at least 4     Latest ECHO 2022 - EF 65% with mild valvular disease     She denies chest pain, SOB, dizzy/lightheadedness, orthopnea, syncope/presyncope, palpitation, and leg swelling    Based on the information provided by NSQIP when calculated using the closest approximation to patient's procedure. She is at an \"average\" risk (0.1%) of cardiac complication during her procedure     No absolute cardiac contraindications for this procedure were noted during today's visit    In regards to her Eliquis she would be ok to hold this up to 48h prior to procedure per the operating provider's discretion, however in doing " so stroke risk is not zero given she continues with occasional afib episode    Patient has been instructed to follow up in our EP office in 1 year or as needed. She will call our office with any questions or concerns in the meantime.    Rhythm History:   Atrial fibrillation:      Atrial flutter:      SVT:      VT/VF/PVC:     Device history:   Pacemaker:     Defibrillator:     BIV PPM:     BIV ICD:     ILR:    Interim History/HPI:   Interim history: Donald Reyes is a 74 y.o. female with a PMH of PAF, tachy-dunia s/p PPM, HTN, DARIN, prior fall w/ SDH, and hypothyroidism.      She presents today for routine outpatient follow up given her history of PAF and to have cardiac risk stratification in regards to an upcoming colonoscopy/endoscopy. She reports she has been feeling well since her last visit and denies adverse bleeding on eliquis or acute cardiac complaint currently    EKG: A-paced V-paced rhythm w/ QRS 112ms and ventricular rate 65bpm    Review of Systems   Constitutional:  Negative for appetite change, chills, fatigue, fever and unexpected weight change.   Respiratory:  Negative for chest tightness and shortness of breath.    Cardiovascular:  Negative for chest pain, palpitations and leg swelling.   Neurological:  Negative for dizziness, syncope, weakness and light-headedness.         OBJECTIVE:   Vitals:   LMP  (LMP Unknown)   There is no height or weight on file to calculate BMI.        Physical Exam:   Physical Exam  Constitutional:       General: She is not in acute distress.     Appearance: Normal appearance. She is not ill-appearing.   HENT:      Head: Normocephalic and atraumatic.      Nose: Nose normal.   Eyes:      General:         Right eye: No discharge.         Left eye: No discharge.   Neck:      Vascular: No carotid bruit.   Cardiovascular:      Rate and Rhythm: Normal rate and regular rhythm.      Pulses: Normal pulses.      Heart sounds: Normal heart sounds.   Pulmonary:      Effort: Pulmonary  effort is normal.      Breath sounds: Normal breath sounds.   Musculoskeletal:      Right lower leg: No edema.      Left lower leg: No edema.   Skin:     General: Skin is warm and dry.      Capillary Refill: Capillary refill takes less than 2 seconds.   Neurological:      Mental Status: She is alert.            Medications:      Current Outpatient Medications:     ALPRAZolam (XANAX) 0.5 mg tablet, TAKE 1 TABLET BY MOUTH ONCE DAILY AT BEDTIME AS NEEDED FOR ANXIETY, Disp: 30 tablet, Rfl: 0    lisinopril (ZESTRIL) 10 mg tablet, Take 1 tablet by mouth once daily, Disp: 30 tablet, Rfl: 5    amitriptyline (ELAVIL) 25 mg tablet, Take 1 tablet (25 mg total) by mouth daily at bedtime, Disp: 100 tablet, Rfl: 1    apixaban (Eliquis) 5 mg, Take 1 tablet (5 mg total) by mouth 2 (two) times a day, Disp: 180 tablet, Rfl: 3    buPROPion (WELLBUTRIN XL) 150 mg 24 hr tablet, TAKE 1 TABLET BY MOUTH ONCE DAILY IN THE MORNING, Disp: 30 tablet, Rfl: 5    escitalopram (LEXAPRO) 10 mg tablet, Take 1 tablet (10 mg total) by mouth daily, Disp: 90 tablet, Rfl: 1    levothyroxine 75 mcg tablet, Take 1 tablet (75 mcg total) by mouth daily, Disp: 90 tablet, Rfl: 3    metoprolol tartrate (LOPRESSOR) 50 mg tablet, TAKE 1 TABLET BY MOUTH EVERY 12 HOURS, Disp: 60 tablet, Rfl: 5    pantoprazole (PROTONIX) 40 mg tablet, Take 1 tablet by mouth once daily, Disp: 90 tablet, Rfl: 0    Current Facility-Administered Medications:     cyanocobalamin injection 1,000 mcg, 1,000 mcg, Intramuscular, Q30 Days, HOME Smith, 1,000 mcg at 09/22/23 1402       Historical Information   Past Medical History:   Diagnosis Date    Anxiety     Chronic kidney disease One kidney    Congenital absence of one kidney     Depression     Disease of thyroid gland     Ear problems     GERD (gastroesophageal reflux disease)     History of transfusion     HL (hearing loss)     Hypertension     Insomnia     Menopause     Other specified hypothyroidism 01/28/2019    Otitis  media     Vitamin B12 deficiency        Past Surgical History:   Procedure Laterality Date    CARDIAC PACEMAKER PLACEMENT Left 04/16/2022    Procedure: INSERTION PACEMAKER;  Surgeon: Charles Ashley DO;  [facility];  Service: Cardiology    COLONOSCOPY      CA COLONOSCOPY FLX DX W/COLLJ SPEC WHEN PFRMD N/A 05/30/2017    Procedure: EGD AND COLONOSCOPY;  Surgeon: Harry Harper MD;  [facility];  Service: Gastroenterology    CA OPEN TX DISTAL FIBULAR FRACTURE LAT MALLEOLUS Left 07/13/2020    Procedure: OPEN REDUCTION W/ INTERNAL FIXATION (ORIF) ANKLE;  Surgeon: Nicola Pacheco DO;  [facility];  Service: Orthopedics    REDUCTION MAMMAPLASTY Bilateral 06/06/2011    SHOULDER SURGERY      TONSILLECTOMY      UPPER GASTROINTESTINAL ENDOSCOPY         Social History     Substance and Sexual Activity   Alcohol Use Not Currently    Alcohol/week: 1.0 standard drink of alcohol    Types: 1 Glasses of wine per week    Comment: rarely     Social History     Substance and Sexual Activity   Drug Use No     Social History     Tobacco Use   Smoking Status Never    Passive exposure: Never   Smokeless Tobacco Never       Family History   Problem Relation Age of Onset    Lung cancer Mother 70    Cancer Mother         Lung    Hearing loss Mother     Heart disease Father     Heart failure Father     No Known Problems Maternal Grandmother     No Known Problems Maternal Grandfather     No Known Problems Paternal Grandmother     No Known Problems Paternal Grandfather     Lung cancer Brother 53    Cancer Brother         Lung    Early death Brother         Lung cancer    No Known Problems Maternal Aunt     No Known Problems Maternal Aunt     No Known Problems Maternal Aunt     No Known Problems Paternal Aunt     No Known Problems Paternal Aunt     No Known Problems Paternal Aunt     No Known Problems Paternal Aunt          Labs & Results:  Below is the patient's most recent value for Albumin, ALT, AST, BUN, Calcium,  Chloride, Cholesterol, CO2, Creatinine, GFR, Glucose, HDL, Hematocrit, Hemoglobin, Hemoglobin A1C, LDL, Magnesium, Phosphorus, Platelets, Potassium, PSA, Sodium, Triglycerides, and WBC.   Lab Results   Component Value Date    ALT 18 07/18/2023    AST 13 07/18/2023    BUN 18 07/18/2023    CALCIUM 8.7 07/18/2023     07/18/2023    CHOL 242 (H) 02/10/2017    CO2 30 07/18/2023    CREATININE 0.98 07/18/2023    HDL 63 07/18/2023    HCT 42.5 07/18/2023    HGB 13.5 07/18/2023    HGBA1C 5.2 07/18/2023    MG 2.2 04/16/2022    PHOS 3.1 04/16/2022     07/18/2023    K 4.3 07/18/2023     02/10/2017    TRIG 110 07/18/2023    WBC 5.81 07/18/2023     Note: for a comprehensive list of the patient's lab results, access the Results Review activity.

## 2024-09-18 ENCOUNTER — TELEPHONE (OUTPATIENT)
Dept: GASTROENTEROLOGY | Facility: CLINIC | Age: 74
End: 2024-09-18

## 2024-09-18 NOTE — TELEPHONE ENCOUNTER
Spoke with pt confirming her 9/25 procedure with Dr. Harper. She has a  and will be called day prior with her arrival time. She sees her cardiologist tomorrow and will find out if she can hold the Eliquis 2 days prior. She also has her prep instructions.

## 2024-09-19 ENCOUNTER — OFFICE VISIT (OUTPATIENT)
Dept: CARDIOLOGY CLINIC | Facility: CLINIC | Age: 74
End: 2024-09-19
Payer: COMMERCIAL

## 2024-09-19 ENCOUNTER — IN-CLINIC DEVICE VISIT (OUTPATIENT)
Dept: CARDIOLOGY CLINIC | Facility: CLINIC | Age: 74
End: 2024-09-19
Payer: COMMERCIAL

## 2024-09-19 VITALS
BODY MASS INDEX: 24.32 KG/M2 | HEIGHT: 65 IN | HEART RATE: 65 BPM | DIASTOLIC BLOOD PRESSURE: 62 MMHG | SYSTOLIC BLOOD PRESSURE: 98 MMHG | WEIGHT: 146 LBS

## 2024-09-19 DIAGNOSIS — Z95.0 PRESENCE OF PERMANENT CARDIAC PACEMAKER: Primary | ICD-10-CM

## 2024-09-19 DIAGNOSIS — I49.5 TACHY-BRADY SYNDROME (HCC): ICD-10-CM

## 2024-09-19 DIAGNOSIS — Z95.0 CARDIAC PACEMAKER IN SITU: ICD-10-CM

## 2024-09-19 DIAGNOSIS — G47.33 OBSTRUCTIVE SLEEP APNEA: ICD-10-CM

## 2024-09-19 DIAGNOSIS — I10 ESSENTIAL HYPERTENSION: ICD-10-CM

## 2024-09-19 DIAGNOSIS — I48.0 PAROXYSMAL ATRIAL FIBRILLATION (HCC): Primary | ICD-10-CM

## 2024-09-19 PROCEDURE — 93280 PM DEVICE PROGR EVAL DUAL: CPT | Performed by: INTERNAL MEDICINE

## 2024-09-19 PROCEDURE — 93000 ELECTROCARDIOGRAM COMPLETE: CPT

## 2024-09-19 PROCEDURE — 99214 OFFICE O/P EST MOD 30 MIN: CPT

## 2024-09-19 NOTE — PROGRESS NOTES
Results for orders placed or performed in visit on 09/19/24   Cardiac EP device report    Narrative    MDT DUAL CHAMBER PPM (AAIR-DDDR 60) - ACTIVE SYSTEM IS MRI CONDITIONAL  DEVICE INTERROGATED IN THE [place] OFFICE. BATTERY VOLTAGE ADEQUATE. (8.2 YRS) AP 94%  60%. ALL LEAD PARAMETERS WITHIN NORMAL LIMITS CHRONIC HIGH RV THRESHOLD. NO SIGNIFICANT HIGH RATE EPISODES. NO PROGRAMMING CHANGES MADE TO DEVICE PARAMETERS. NORMAL DEVICE FUNCTION.---LARES

## 2024-09-20 ENCOUNTER — ANESTHESIA (OUTPATIENT)
Dept: ANESTHESIOLOGY | Facility: HOSPITAL | Age: 74
End: 2024-09-20

## 2024-09-20 ENCOUNTER — ANESTHESIA EVENT (OUTPATIENT)
Dept: ANESTHESIOLOGY | Facility: HOSPITAL | Age: 74
End: 2024-09-20

## 2024-09-25 ENCOUNTER — ANESTHESIA EVENT (OUTPATIENT)
Dept: GASTROENTEROLOGY | Facility: AMBULARY SURGERY CENTER | Age: 74
End: 2024-09-25
Payer: COMMERCIAL

## 2024-09-25 ENCOUNTER — HOSPITAL ENCOUNTER (OUTPATIENT)
Dept: GASTROENTEROLOGY | Facility: AMBULARY SURGERY CENTER | Age: 74
Setting detail: OUTPATIENT SURGERY
Discharge: HOME | End: 2024-09-25
Attending: INTERNAL MEDICINE
Payer: COMMERCIAL

## 2024-09-25 VITALS
BODY MASS INDEX: 23.66 KG/M2 | SYSTOLIC BLOOD PRESSURE: 120 MMHG | WEIGHT: 142 LBS | DIASTOLIC BLOOD PRESSURE: 68 MMHG | HEIGHT: 65 IN | HEART RATE: 77 BPM | TEMPERATURE: 97.5 F | RESPIRATION RATE: 16 BRPM | OXYGEN SATURATION: 98 %

## 2024-09-25 DIAGNOSIS — K21.9 GASTROESOPHAGEAL REFLUX DISEASE WITHOUT ESOPHAGITIS: ICD-10-CM

## 2024-09-25 DIAGNOSIS — R19.8 CHANGE IN BOWEL MOVEMENT: ICD-10-CM

## 2024-09-25 PROCEDURE — 88342 IMHCHEM/IMCYTCHM 1ST ANTB: CPT | Performed by: PATHOLOGY

## 2024-09-25 PROCEDURE — 45380 COLONOSCOPY AND BIOPSY: CPT | Performed by: INTERNAL MEDICINE

## 2024-09-25 PROCEDURE — 88305 TISSUE EXAM BY PATHOLOGIST: CPT | Performed by: PATHOLOGY

## 2024-09-25 PROCEDURE — 43239 EGD BIOPSY SINGLE/MULTIPLE: CPT | Performed by: INTERNAL MEDICINE

## 2024-09-25 PROCEDURE — 88341 IMHCHEM/IMCYTCHM EA ADD ANTB: CPT | Performed by: PATHOLOGY

## 2024-09-25 RX ORDER — SODIUM CHLORIDE 9 MG/ML
50 INJECTION, SOLUTION INTRAVENOUS CONTINUOUS
Status: CANCELLED | OUTPATIENT
Start: 2024-09-25

## 2024-09-25 RX ORDER — PROPOFOL 10 MG/ML
INJECTION, EMULSION INTRAVENOUS AS NEEDED
Status: DISCONTINUED | OUTPATIENT
Start: 2024-09-25 | End: 2024-09-25

## 2024-09-25 RX ORDER — LIDOCAINE HYDROCHLORIDE 20 MG/ML
INJECTION, SOLUTION EPIDURAL; INFILTRATION; INTRACAUDAL; PERINEURAL AS NEEDED
Status: DISCONTINUED | OUTPATIENT
Start: 2024-09-25 | End: 2024-09-25

## 2024-09-25 RX ORDER — SODIUM CHLORIDE, SODIUM LACTATE, POTASSIUM CHLORIDE, CALCIUM CHLORIDE 600; 310; 30; 20 MG/100ML; MG/100ML; MG/100ML; MG/100ML
INJECTION, SOLUTION INTRAVENOUS CONTINUOUS PRN
Status: DISCONTINUED | OUTPATIENT
Start: 2024-09-25 | End: 2024-09-25

## 2024-09-25 RX ADMIN — LIDOCAINE HYDROCHLORIDE 100 MG: 20 INJECTION, SOLUTION EPIDURAL; INFILTRATION; INTRACAUDAL at 11:40

## 2024-09-25 RX ADMIN — PROPOFOL 10 MG: 10 INJECTION, EMULSION INTRAVENOUS at 11:43

## 2024-09-25 RX ADMIN — PROPOFOL 120 MG: 10 INJECTION, EMULSION INTRAVENOUS at 11:40

## 2024-09-25 RX ADMIN — PROPOFOL 10 MG: 10 INJECTION, EMULSION INTRAVENOUS at 11:44

## 2024-09-25 RX ADMIN — SODIUM CHLORIDE, SODIUM LACTATE, POTASSIUM CHLORIDE, AND CALCIUM CHLORIDE: .6; .31; .03; .02 INJECTION, SOLUTION INTRAVENOUS at 11:36

## 2024-09-25 RX ADMIN — SODIUM CHLORIDE, SODIUM LACTATE, POTASSIUM CHLORIDE, AND CALCIUM CHLORIDE: .6; .31; .03; .02 INJECTION, SOLUTION INTRAVENOUS at 11:56

## 2024-09-25 RX ADMIN — PROPOFOL 10 MG: 10 INJECTION, EMULSION INTRAVENOUS at 11:45

## 2024-09-25 RX ADMIN — PROPOFOL 100 MG: 10 INJECTION, EMULSION INTRAVENOUS at 12:01

## 2024-09-25 RX ADMIN — PROPOFOL 100 MG: 10 INJECTION, EMULSION INTRAVENOUS at 11:50

## 2024-09-25 NOTE — DISCHARGE INSTRUCTIONS
Upper Endoscopy and Colonoscopy   WHAT YOU NEED TO KNOW:   An upper endoscopy is also called an upper gastrointestinal (GI) endoscopy, or an esophagogastroduodenoscopy (EGD). It is a procedure to examine the inside of your esophagus, stomach, and duodenum (first part of the small intestine) with a scope. You may feel bloated, gassy, or have some abdominal discomfort after your procedure. Your throat may be sore for 24 to 36 hours. You may burp or pass gas from air that is still inside your body.                A colonoscopy is a procedure to examine the inside of your colon (intestine) with a scope. Polyps or tissue growths may have been removed during your colonoscopy. It is normal to feel bloated and to have some abdominal discomfort. You should be passing gas. If you have hemorrhoids or you had polyps removed, you may have a small amount of bleeding.          DISCHARGE INSTRUCTIONS:   Seek care immediately if:   You have sudden, severe abdominal pain.     You have problems swallowing.     You have a large amount of black, sticky bowel movements or blood in your bowel movements.     You have sudden trouble breathing.     You feel weak, lightheaded, or faint or your heart beats faster than normal for you.     Contact your healthcare provider if:   You have a fever and chills.      You have nausea or are vomiting.      Your abdomen is bloated or feels full and hard.     You have abdominal pain.    You have a large amount of black, sticky bowel movements or blood in your bowel movements.    You have not had a bowel movement for 3 days after your procedure.    You have rash or hives.    You have questions or concerns about your procedure.     Activity:   ·       Do not lift, strain, or run for 24 hours after your procedure.     ·       Rest after your procedure. You have been given medicine to relax you. Do not drive or make important decisions until the day after your procedure. Return to your normal activity as  directed.     ·       Relieve gas and discomfort from bloating by lying on your right side with a heating pad on your abdomen. You may need to take short walks to help the gas move out. Eat small meals until bloating is relieved.  Follow up with your healthcare provider as directed: Write down your questions so you remember to ask them during your visits.      If you take a “blood thinner”, please review the specific instructions from your endoscopist about when you should resume it. These can be found in the “Recommendation” and “Your Medication list” sections of this After Visit Summary.   '

## 2024-09-25 NOTE — ANESTHESIA POSTPROCEDURE EVALUATION
Post-Op Assessment Note    CV Status:  Stable  Pain Score: 0    Pain management: adequate       Mental Status:  Alert and awake   Hydration Status:  Euvolemic   PONV Controlled:  Controlled   Airway Patency:  Patent     Post Op Vitals Reviewed: Yes    No anethesia notable event occurred.    Staff: CRNA               /55 (09/25/24 1205)    Temp 98 °F (36.7 °C) (09/25/24 1205)    Pulse 60 (09/25/24 1205)   Resp 13 (09/25/24 1205)    SpO2 100 % (09/25/24 1205)

## 2024-09-25 NOTE — ANESTHESIA PREPROCEDURE EVALUATION
Procedure:  COLONOSCOPY  EGD  ELiquis stopped 48 hours  Relevant Problems   CARDIO   (+) Cardiac pacemaker in situ   (+) Essential hypertension   (+) Paroxysmal atrial fibrillation (HCC)   (+) Tachy-dunia syndrome (HCC)      ENDO   (+) Other specified hypothyroidism      GI/HEPATIC   (+) Esophageal reflux      NEURO/PSYCH   (+) MDD (major depressive disorder)      PULMONARY   (+) Obstructive sleep apnea   (-) Smoking   (-) URI (upper respiratory infection)   4/22-EF-65%     Physical Exam    Airway    Mallampati score: II  TM Distance: >3 FB  Neck ROM: full     Dental   No notable dental hx     Cardiovascular      Pulmonary      Other Findings  post-pubertal.      Anesthesia Plan  ASA Score- 3     Anesthesia Type- IV sedation with anesthesia with ASA Monitors.         Additional Monitors:     Airway Plan:            Plan Factors-Exercise tolerance (METS): >4 METS.    Chart reviewed. EKG reviewed. Imaging results reviewed.  Patient summary reviewed.    Patient is not a current smoker.              Induction- intravenous.    Postoperative Plan-         Informed Consent- Anesthetic plan and risks discussed with patient.  I personally reviewed this patient with the CRNA. Discussed and agreed on the Anesthesia Plan with the CRNA..

## 2024-09-25 NOTE — H&P
History and Physical -  [facility]  Donald Reyes 74 y.o. female MRN: [id number]    HPI: Donald Reyes is a 74 y.o. year old female who presents with epigastric pain, change in bowel movements      Review of Systems    Historical Information   Past Medical History:   Diagnosis Date    Anxiety     Chronic kidney disease One kidney    Congenital absence of one kidney     Depression     Disease of thyroid gland     Ear problems     GERD (gastroesophageal reflux disease)     History of transfusion     HL (hearing loss)     Hypertension     Insomnia     Irregular heart beat     hx a fib    Menopause     Other specified hypothyroidism 01/28/2019    Otitis media     Sleep apnea     Vitamin B12 deficiency      Past Surgical History:   Procedure Laterality Date    CARDIAC PACEMAKER PLACEMENT Left 04/16/2022    Procedure: INSERTION PACEMAKER;  Surgeon: Charles Ashley DO;  [facility];  Service: Cardiology    COLONOSCOPY      DC COLONOSCOPY FLX DX W/COLLJ SPEC WHEN PFRMD N/A 05/30/2017    Procedure: EGD AND COLONOSCOPY;  Surgeon: Harry Harper MD;  Location: [facility];  Service: Gastroenterology    DC OPEN TX DISTAL FIBULAR FRACTURE LAT MALLEOLUS Left 07/13/2020    Procedure: OPEN REDUCTION W/ INTERNAL FIXATION (ORIF) ANKLE;  Surgeon: Nicola Pacheco DO;  Location: [facility];  Service: Orthopedics    REDUCTION MAMMAPLASTY Bilateral 06/06/2011    SHOULDER SURGERY      TONSILLECTOMY      UPPER GASTROINTESTINAL ENDOSCOPY       Social History   Social History     Substance and Sexual Activity   Alcohol Use Not Currently     Social History     Substance and Sexual Activity   Drug Use No     Social History     Tobacco Use   Smoking Status Never    Passive exposure: Never   Smokeless Tobacco Never     Family History   Problem Relation Age of Onset    Lung cancer Mother 70    Cancer Mother         Lung    Hearing loss Mother     Heart disease Father     Heart failure Father     No Known Problems Maternal  "Grandmother     No Known Problems Maternal Grandfather     No Known Problems Paternal Grandmother     No Known Problems Paternal Grandfather     Lung cancer Brother 53    Cancer Brother         Lung    Early death Brother         Lung cancer    No Known Problems Maternal Aunt     No Known Problems Maternal Aunt     No Known Problems Maternal Aunt     No Known Problems Paternal Aunt     No Known Problems Paternal Aunt     No Known Problems Paternal Aunt     No Known Problems Paternal Aunt        Meds/Allergies     Not in a hospital admission.    No Known Allergies    Objective     /61   Pulse 65   Temp (!) 97 °F (36.1 °C) (Temporal)   Resp 17   Ht 5' 5\" (1.651 m)   Wt 64.4 kg (142 lb)   LMP  (LMP Unknown)   SpO2 99%   BMI 23.63 kg/m²       PHYSICAL EXAM    Gen: NAD  CV: RRR  CHEST: Clear  ABD: soft, NT/ND  EXT: no edema  Neuro: AAO      ASSESSMENT/PLAN:  This is a 74 y.o. year old female here for epigastric pain, change in bowel movements    PLAN:   Procedure: egd/colonoscopy      "

## 2024-09-30 PROCEDURE — 88342 IMHCHEM/IMCYTCHM 1ST ANTB: CPT | Performed by: PATHOLOGY

## 2024-09-30 PROCEDURE — 88341 IMHCHEM/IMCYTCHM EA ADD ANTB: CPT | Performed by: PATHOLOGY

## 2024-09-30 PROCEDURE — 88305 TISSUE EXAM BY PATHOLOGIST: CPT | Performed by: PATHOLOGY

## 2024-10-03 DIAGNOSIS — F41.9 ANXIETY: ICD-10-CM

## 2024-10-04 RX ORDER — ALPRAZOLAM 0.5 MG
0.5 TABLET ORAL DAILY PRN
Qty: 30 TABLET | Refills: 0 | Status: SHIPPED | OUTPATIENT
Start: 2024-10-04

## 2024-10-07 DIAGNOSIS — I10 ESSENTIAL HYPERTENSION: ICD-10-CM

## 2024-10-09 RX ORDER — LISINOPRIL 10 MG/1
10 TABLET ORAL DAILY
Qty: 30 TABLET | Refills: 1 | Status: SHIPPED | OUTPATIENT
Start: 2024-10-09

## 2024-10-10 DIAGNOSIS — R10.13 EPIGASTRIC PAIN: ICD-10-CM

## 2024-10-13 DIAGNOSIS — R10.13 EPIGASTRIC PAIN: ICD-10-CM

## 2024-10-30 ENCOUNTER — TRANSCRIBE ORDERS (OUTPATIENT)
Dept: GASTROENTEROLOGY | Facility: CLINIC | Age: 74
End: 2024-10-30

## 2024-10-31 DIAGNOSIS — F41.9 ANXIETY: ICD-10-CM

## 2024-11-02 DIAGNOSIS — F41.9 ANXIETY: ICD-10-CM

## 2024-11-03 DIAGNOSIS — F32.9 MAJOR DEPRESSIVE DISORDER, REMISSION STATUS UNSPECIFIED, UNSPECIFIED WHETHER RECURRENT: Chronic | ICD-10-CM

## 2024-11-04 RX ORDER — ALPRAZOLAM 0.5 MG
TABLET ORAL
Qty: 30 TABLET | Refills: 0 | Status: SHIPPED | OUTPATIENT
Start: 2024-11-04

## 2024-11-04 RX ORDER — BUPROPION HYDROCHLORIDE 150 MG/1
150 TABLET ORAL EVERY MORNING
Qty: 30 TABLET | Refills: 3 | Status: SHIPPED | OUTPATIENT
Start: 2024-11-04

## 2024-11-04 RX ORDER — ALPRAZOLAM 0.5 MG
0.5 TABLET ORAL DAILY PRN
Qty: 30 TABLET | Refills: 0 | Status: SHIPPED | OUTPATIENT
Start: 2024-11-04

## 2024-11-05 ENCOUNTER — OFFICE VISIT (OUTPATIENT)
Dept: FAMILY MEDICINE CLINIC | Facility: CLINIC | Age: 74
End: 2024-11-05
Payer: COMMERCIAL

## 2024-11-05 VITALS
TEMPERATURE: 98.5 F | SYSTOLIC BLOOD PRESSURE: 116 MMHG | RESPIRATION RATE: 16 BRPM | WEIGHT: 150.6 LBS | HEART RATE: 64 BPM | HEIGHT: 65 IN | BODY MASS INDEX: 25.09 KG/M2 | DIASTOLIC BLOOD PRESSURE: 74 MMHG

## 2024-11-05 DIAGNOSIS — H26.9 CATARACT OF BOTH EYES, UNSPECIFIED CATARACT TYPE: ICD-10-CM

## 2024-11-05 DIAGNOSIS — Z01.818 PREOP EXAMINATION: Primary | ICD-10-CM

## 2024-11-05 PROCEDURE — 99214 OFFICE O/P EST MOD 30 MIN: CPT | Performed by: FAMILY MEDICINE

## 2024-11-05 PROCEDURE — G2211 COMPLEX E/M VISIT ADD ON: HCPCS | Performed by: FAMILY MEDICINE

## 2024-11-05 NOTE — PATIENT INSTRUCTIONS
Pre-operative Medication Instructions    Avoid herbs or non-directed vitamins one week prior to surgery  Avoid aspirin containing medications or non-steroidal anti-inflammatory drugs one week preceding surgery  May take tylenol for pain up until the night before surgery    ACE Inhibitors or ARBs     Medication Name     lisinopril (ZESTRIL) 10 mg tablet      Continue this medication up to the evening before surgery/procedure, but do not take the morning of the day of surgery.    Antidepressant Meds     Medication Name     amitriptyline (ELAVIL) 25 mg tablet     buPROPion (WELLBUTRIN XL) 150 mg 24 hr tablet     escitalopram (LEXAPRO) 10 mg tablet      Continue to take this medication on your normal schedule.  If this is an oral medication and you take it in the morning, then you may take this medicine with a sip of water.    Benzodiazepine Antagonist     Medication Name     ALPRAZolam (XANAX) 0.5 mg tablet     ALPRAZolam (XANAX) 0.5 mg tablet      If this medication is needed please continue to take on your normal schedule.  If you take it in the morning, then you may take this medicine with a sip of water.    Beta Blockers     Medication Name     metoprolol tartrate (LOPRESSOR) 50 mg tablet      Continue to take this heart medication on your normal schedule.  If this is an oral medication and you take it in the morning, then you may take this medicine with a sip of water.    Direct Xa Inhibitor     Medication Name     apixaban (Eliquis) 5 mg      STOP taking this medication at least 3 days prior to surgery/procedure with prescribing Physician and Surgeon consultation.    Thyroid Medications     Medication Name     levothyroxine 75 mcg tablet      Continue to take this medication on your normal schedule.  If this is an oral medication and you take it in the morning, then you may take this medicine with a sip of water.            stated

## 2024-11-05 NOTE — PROGRESS NOTES
Pre-operative Clearance  Name: Donald Reyes      : 1950      MRN: [id number]  Encounter Provider: Isela Harrison MD  Encounter Date: 2024   Encounter department: [facility]    Assessment & Plan  Preop examination         Cataract of both eyes, unspecified cataract type         Pre-operative Clearance:     Revised Cardiac Risk Index:  RCI RISK CLASS I (0 risk factors, risk of major cardiac complications approximately 0.5%)    Clearance:  Patient is medically optimized (CLEARED) for proposed surgery without any additional cardiac testing.      Medication Instructions:   - Avoid herbs or non-directed vitamins one week prior to surgery    - Avoid aspirin containing medications or non-steroidal anti-inflammatory drugs one week preceding surgery    - May take tylenol for pain up until the night before surgery    - ACE Inhibitors or ARBs: Continue this medication up to the evening before surgery/procedure, but do not take the morning of the day of surgery.  - Antidepressants: Continue to take this medication on your normal schedule.  - Benzodiazepines (ie, alprazolam, lorazepam, diazepam):  If the medication is needed, continue to take it on your normal schedule.  - Beta blockers:  Continue to take this medication on your normal schedule.  - Direct Xa Inhibitor (ie, Eliquis, Xarelto): Continue this medication prior to surgery per opthalmologist instruction.  - Thyroid meds: Continue to take this medication on your normal schedule.       History of Present Illness     Pre-op Exam  Surgery: Bilateral Cataract Surgery  Anticipated Date of Surgery: 24 (Left Eye) and 24 (Right Eye)  Surgeon: Dr. Villalpando at [facility]    Previous history of bleeding disorders or clots?: No  Previous Anesthesia reaction?: No  Prolonged steroid use in the last 6 months?: No    Assessment of Cardiac Risk:   - Unstable or severe angina or MI in the last 6 weeks or history of stent placement in  the last year?: No   - Decompensated heart failure (e.g. New onset heart failure, NYHA  Class IV heart failure, or worsening existing heart failure)?: No  - Significant arrhythmias such as high grade AV block, symptomatic ventricular arrhythmia, newly recognized ventricular tachycardia, supraventricular tachycardia with resting heart rate >100, or symptomatic bradycardia?: Yes    - Severe heart valve disease including aortic stenosis or symptomatic mitral stenosis?: No      Pre-operative Risk Factors:  Elevated-risk surgery: No    History of cerebrovascular disease: No    History of ischemic heart disease: No  Pre-operative treatment with insulin: No  Pre-operative creatinine >2 mg/dL: No    History of congestive heart failure: No    Medications of Perioperative Concern:   Anti-coagulants (Coumadin, Xarelto, Pradaxa, Eliquis, Lixiana)    Review of Systems   Constitutional: Negative.    HENT: Negative.     Eyes: Negative.    Respiratory: Negative.     Cardiovascular: Negative.    Gastrointestinal: Negative.    Endocrine: Negative.    Genitourinary: Negative.    Musculoskeletal: Negative.    Skin: Negative.    Allergic/Immunologic: Negative.    Neurological: Negative.    Hematological: Negative.    Psychiatric/Behavioral: Negative.       Past Medical History   Past Medical History:   Diagnosis Date    Anxiety     Chronic kidney disease One kidney    Congenital absence of one kidney     Depression     Disease of thyroid gland     Ear problems     GERD (gastroesophageal reflux disease)     History of transfusion     HL (hearing loss)     Hypertension     Insomnia     Irregular heart beat     hx a fib    Menopause     Other specified hypothyroidism 01/28/2019    Otitis media     Sleep apnea     Vitamin B12 deficiency      Past Surgical History:   Procedure Laterality Date    CARDIAC PACEMAKER PLACEMENT Left 04/16/2022    Procedure: INSERTION PACEMAKER;  Surgeon: Charles Ashley DO;  [facility];  Service:  Cardiology    COLONOSCOPY      NH COLONOSCOPY FLX DX W/COLLJ SPEC WHEN PFRMD N/A 05/30/2017    Procedure: EGD AND COLONOSCOPY;  Surgeon: Harry Harper MD;  [facility];  Service: Gastroenterology    NH OPEN TX DISTAL FIBULAR FRACTURE LAT MALLEOLUS Left 07/13/2020    Procedure: OPEN REDUCTION W/ INTERNAL FIXATION (ORIF) ANKLE;  Surgeon: Nicola Pacheco DO;  Location: [facility];  Service: Orthopedics    REDUCTION MAMMAPLASTY Bilateral 06/06/2011    SHOULDER SURGERY      TONSILLECTOMY      UPPER GASTROINTESTINAL ENDOSCOPY       Family History   Problem Relation Age of Onset    Lung cancer Mother 70    Cancer Mother         Lung    Hearing loss Mother     Heart disease Father     Heart failure Father     No Known Problems Maternal Grandmother     No Known Problems Maternal Grandfather     No Known Problems Paternal Grandmother     No Known Problems Paternal Grandfather     Lung cancer Brother 53    Cancer Brother         Lung    Early death Brother         Lung cancer    No Known Problems Maternal Aunt     No Known Problems Maternal Aunt     No Known Problems Maternal Aunt     No Known Problems Paternal Aunt     No Known Problems Paternal Aunt     No Known Problems Paternal Aunt     No Known Problems Paternal Aunt      Social History     Tobacco Use    Smoking status: Never     Passive exposure: Never    Smokeless tobacco: Never   Vaping Use    Vaping status: Never Used   Substance and Sexual Activity    Alcohol use: Not Currently    Drug use: No    Sexual activity: Not Currently     Partners: Female     Birth control/protection: None     Comment: Old age     Current Outpatient Medications on File Prior to Visit   Medication Sig    ALPRAZolam (XANAX) 0.5 mg tablet Take 1 tablet (0.5 mg total) by mouth daily as needed for anxiety    amitriptyline (ELAVIL) 25 mg tablet Take 1 tablet (25 mg total) by mouth daily at bedtime    apixaban (Eliquis) 5 mg Take 1 tablet (5 mg total) by mouth 2 (two) times a day     "buPROPion (WELLBUTRIN XL) 150 mg 24 hr tablet TAKE 1 TABLET BY MOUTH ONCE DAILY IN THE MORNING    escitalopram (LEXAPRO) 10 mg tablet Take 1 tablet (10 mg total) by mouth daily    levothyroxine 75 mcg tablet Take 1 tablet (75 mcg total) by mouth daily    lisinopril (ZESTRIL) 10 mg tablet Take 1 tablet (10 mg total) by mouth daily    metoprolol tartrate (LOPRESSOR) 50 mg tablet TAKE 1 TABLET BY MOUTH EVERY 12 HOURS    pantoprazole (PROTONIX) 40 mg tablet Take 1 tablet by mouth once daily    ALPRAZolam (XANAX) 0.5 mg tablet TAKE 1 TABLET BY MOUTH ONCE DAILY AS NEEDED FOR ANXIETY (Patient not taking: Reported on 11/5/2024)     No Known Allergies  Objective     /74   Pulse 64   Temp 98.5 °F (36.9 °C)   Resp 16   Ht 5' 5\" (1.651 m)   Wt 68.3 kg (150 lb 9.6 oz)   LMP  (LMP Unknown)   BMI 25.06 kg/m²     Physical Exam  Vitals and nursing note reviewed.   Constitutional:       General: She is not in acute distress.     Appearance: She is well-developed.   HENT:      Head: Normocephalic and atraumatic.      Right Ear: Tympanic membrane, ear canal and external ear normal. There is no impacted cerumen.      Left Ear: Tympanic membrane, ear canal and external ear normal. There is no impacted cerumen.      Nose: Nose normal.      Mouth/Throat:      Mouth: Mucous membranes are moist.   Eyes:      Conjunctiva/sclera: Conjunctivae normal.   Cardiovascular:      Rate and Rhythm: Normal rate and regular rhythm.      Heart sounds: No murmur heard.  Pulmonary:      Effort: Pulmonary effort is normal. No respiratory distress.      Breath sounds: Normal breath sounds.   Abdominal:      General: Abdomen is flat. There is no distension.      Palpations: Abdomen is soft. There is no mass.      Tenderness: There is no abdominal tenderness. There is no guarding or rebound.      Hernia: No hernia is present.   Musculoskeletal:         General: Normal range of motion.      Cervical back: Neck supple.   Skin:     General: Skin is " warm and dry.   Neurological:      General: No focal deficit present.      Mental Status: She is alert and oriented to person, place, and time.           Isela Harrison MD

## 2024-11-11 DIAGNOSIS — I48.0 PAROXYSMAL ATRIAL FIBRILLATION (HCC): ICD-10-CM

## 2024-11-11 DIAGNOSIS — K21.9 GASTROESOPHAGEAL REFLUX DISEASE: ICD-10-CM

## 2024-11-11 RX ORDER — PANTOPRAZOLE SODIUM 40 MG/1
40 TABLET, DELAYED RELEASE ORAL DAILY
Qty: 90 TABLET | Refills: 1 | Status: SHIPPED | OUTPATIENT
Start: 2024-11-11

## 2024-11-12 RX ORDER — METOPROLOL TARTRATE 50 MG
50 TABLET ORAL 2 TIMES DAILY
Qty: 60 TABLET | Refills: 5 | Status: SHIPPED | OUTPATIENT
Start: 2024-11-12

## 2024-11-13 DIAGNOSIS — I10 ESSENTIAL HYPERTENSION: ICD-10-CM

## 2024-11-14 RX ORDER — LISINOPRIL 10 MG/1
10 TABLET ORAL DAILY
Qty: 30 TABLET | Refills: 1 | Status: SHIPPED | OUTPATIENT
Start: 2024-11-14

## 2024-12-01 DIAGNOSIS — F41.9 ANXIETY: ICD-10-CM

## 2024-12-02 RX ORDER — ALPRAZOLAM 0.5 MG
0.5 TABLET ORAL DAILY PRN
Qty: 30 TABLET | Refills: 0 | Status: SHIPPED | OUTPATIENT
Start: 2024-12-02

## 2024-12-10 NOTE — ASSESSMENT & PLAN NOTE
- 4/11 CTH: Eccentric right anterior parafalcine subdural hemorrhage measuring 3 mm in thickness  No significant mass effect  No concomitant subarachnoid or parenchymal hemorrhage  Correlate for history of trauma or anticoagulation  No acute findings of the brain parenchyma, itself  - Neuro exam: GCS 15, non-focal  - Continue neurologic checks: Every 1 hours  - Reversal agent administered: none  - Repeat CT head 4/12  - Appreciate Neurosurgery evaluation and recommendations  - Complete 7 day course of Keppra for seizure prophylaxis  - Chemical DVT prophylaxis: Not cleared for chemical prophylaxis by neurosurgery at this time  Continue SCDs bilaterally  - Hold all anticoagulants and anti platelet medications for 2 weeks and/or until cleared by Neurosurgery to resume   - PT and OT (including cognitive evaluation) evaluation and treatment as indicated 
- 4/11 CTH: Eccentric right anterior parafalcine subdural hemorrhage measuring 3 mm in thickness  No significant mass effect  No concomitant subarachnoid or parenchymal hemorrhage  Correlate for history of trauma or anticoagulation  No acute findings of the brain parenchyma, itself  - Neuro exam: GCS 15, non-focal  - Continue neurologic checks: Every 4 hours  - Reversal agent administered: none  - Repeat CT head 4/12 stable  - Appreciate Neurosurgery evaluation and recommendations  - Complete 7 day course of Keppra for seizure prophylaxis  - Chemical DVT prophylaxis: SQH  - Hold all anticoagulants and anti platelet medications for 2 weeks and/or until cleared by Neurosurgery to resume   - PT and OT (including cognitive evaluation) evaluation and treatment as indicated    - Follow up with NSG in 2 weeks
- 4/11 CTH: Eccentric right anterior parafalcine subdural hemorrhage measuring 3 mm in thickness  No significant mass effect  No concomitant subarachnoid or parenchymal hemorrhage  Correlate for history of trauma or anticoagulation  No acute findings of the brain parenchyma, itself  - Neuro exam: GCS 15, non-focal  - Reversal agent administered: none  - Repeat CT head 4/12 stable  - Appreciate Neurosurgery evaluation and recommendations  - Complete 7 day course of Keppra for seizure prophylaxis, ending this evening, 4/17    - Chemical DVT prophylaxis: SQH  - Hold all anticoagulants and anti platelet medications for 2 weeks and/or until cleared by Neurosurgery to resume   - PT and OT (including cognitive evaluation) evaluation and treatment as indicated    - Follow up with NSG in 2 weeks
- 4/11 pt positive for influenza A   - maintain contact and droplet precautions  - Currently on room air, apply supplemental O2 as needed to maintain O2 > 94%   Currently on room air    - Internal medicine following  - f/u with PCP
- 4/11 pt positive for influenza A   - maintain contact and droplet precautions  - Currently on room air, apply supplemental O2 as needed to maintain O2 > 94%  - Internal medicine following
- 4/11 pt positive for influenza A   - maintain contact and droplet precautions  - Currently on room air, apply supplemental O2 as needed to maintain O2 > 94%  - Internal medicine following  - with new expiratory wheezing this a m   Will add albuterol
- 4/11 pt positive for influenza A   Reports she had a fever overnight  - maintain contact and droplet precautions  - Currently on room air, apply supplemental O2 as needed to maintain O2 > 94%  - Internal medicine following
- 4/11 pt positive for influenza A   Reports she had a fever overnight  - maintain contact and droplet precautions  - Currently on room air, apply supplemental O2 as needed to maintain O2 > 94%  - Supportive care with IV fluids, Tylenol  - Internal medicine consult pending
- 4/11 pt positive for influenza A   Reports she had a fever overnight  - maintain contact and droplet precautions  - Currently on room air, apply supplemental O2 as needed to maintain O2 > 94%  - Supportive care with IV fluids, Tylenol  - Internal medicine following
- Elevated CK on admission, 331   Normal CKMB and troponin levels  - Secondary to acute infection with influenza A, has been unable to ambulate due to generalized weakness  - Continue IV fluid resuscitation  - Continue to monitor  - Internal medicine consult pending
- Home medication: Lisinopril   Hold at this time  - Internal Medicine Consult pending
- Home medication: Lisinopril   Hold at this time  - Internal Medicine Consult reviewed, appreciates recs, po Cardizem started
- Home medication: Lisinopril   Hold at this time  - Internal Medicine Consult reviewed, appreciates recs, po Cardizem started  - cardiology consult, appreciate input  - recommending lisinopril and beta-blocker to be initiated on 04/13/2022
- Home medication: Lisinopril  - Internal Medicine Consult appreciated  - cardiology consult, appreciate input  - f/u with PCP and cardiology
- Home medication: Lisinopril  - Internal Medicine Consult appreciated, started Cardizem for a fib  - cardiology consult, appreciate input  - PRN hydralazine
- Home medications: Lexapro, Wellbutrin, Elavil   PRN Xanax and Ambien QHS PRN for insomnia  - Hold Xanax and Ambien at this time  - Geriatric medicine consult pending
- Home medications: Lexapro, Wellbutrin, Elavil   PRN Xanax and Ambien QHS PRN for insomnia  - Hold Xanax and Ambien at this time  - Geriatric medicine consult pending  Pleasant and talkative
- Home medications: Lexapro, Wellbutrin, Elavil  PRN Xanax and Ambien QHS PRN for insomnia  - Hold Xanax and Ambien at this time and on discharge, per Geriatrics recommendations  Will reduce lexapro to 10 mg daily as well and send new script to pharmacy     - Geriatric medicine consult appreciated  - f/u outpatient with psychiatry and family medicine
- Initial EKG in ED was normal sinus rhythm  - Upon my trauma evaluation, patients HR was 130 bpm and higher, EKG indicated that patient was experiencing atrial fibrillation with RVR  - Asymptomatic, pt denies chest pain, palpitations, shortness of breath  - No known history of atrial fibrillation, not on BB outpatient  - ED providers at bedside  - Cardizem bolus and infusion initiated  - Telemetry x 48 hrs  - Internal medicine consult appreciated as well as Cardiology  Heart rate to 120 and then down, per Cardiology daily cardizem started    This morning heart rate 56, no complaints of chest pain
- Initial EKG in ED was normal sinus rhythm  - Upon my trauma evaluation, patients HR was 130 bpm and higher, EKG indicated that patient was experiencing atrial fibrillation with RVR  - Asymptomatic, pt denies chest pain, palpitations, shortness of breath  - No known history of atrial fibrillation, not on BB outpatient  - ED providers at bedside  - Cardizem bolus and infusion initiated  - Telemetry x 48 hrs  - Internal medicine consult pending
- Initial EKG in ED was normal sinus rhythm  - Upon my trauma evaluation, patients HR was 130 bpm and higher, EKG indicated that patient was experiencing atrial fibrillation with RVR  - Asymptomatic, pt denies chest pain, palpitations, shortness of breath  - No known history of atrial fibrillation, not on BB outpatient  - ED providers at bedside  - Telemetry x 48 hrs  - Internal medicine consult appreciated as well as Cardiology  - will continue with Cardizem, metoprolol, amiodarone, lisinopril  - metoprolol and lisinopril initiated on 04/13/2022 by cardiology
- Initial EKG in ED was normal sinus rhythm  - Upon trauma evaluation, patients HR was 130 bpm and higher, EKG indicated that patient was experiencing atrial fibrillation with RVR  - Asymptomatic, pt denies chest pain, palpitations, shortness of breath  - New onset afib felt to be precipitated by Flu/SDH  - Internal medicine consult appreciated as well as Cardiology  - cardiology/EP placed pacemaker on 04/16/2022  - patient cleared for d/c on 4/17/22 and will f/u with cardiology device clinic in 2 weeks  - continue amiodarone 200 mg TID at this time
- Initial EKG in ED was normal sinus rhythm  - Upon trauma evaluation, patients HR was 130 bpm and higher, EKG indicated that patient was experiencing atrial fibrillation with RVR  - Asymptomatic, pt denies chest pain, palpitations, shortness of breath  - New onset afib felt to be precipitated by Flu/SDH  - with significant and ongoing pauses  - Internal medicine consult appreciated as well as Cardiology  - Cardiology recommending discontinue metoprolol tartrate and amiodarone due to bradycardia and monitor only on Cardizem CD which is currently on hold with ongoing bradycardia  Given ongoing pauses patient will need pacemaker  Plan for placement 4/16    - overnight for 14 patient with AFib with RVR into the 170s with several short nonsustained rounds of V-tach  Patient asymptomatic throughout all and no currently AFib in the 110s to 130s  Patient NPO pending cardiology evaluation this morning 
- Initial EKG in ED was normal sinus rhythm  - Upon trauma evaluation, patients HR was 130 bpm and higher, EKG indicated that patient was experiencing atrial fibrillation with RVR  - Asymptomatic, pt denies chest pain, palpitations, shortness of breath  - New onset afib felt to be precipitated by Flu/SDH  - with significant and ongoing pauses  - Internal medicine consult appreciated as well as Cardiology  - cardiology/EP placed pacemaker on 04/16/2022  - patient will likely discharge on 04/17/2022 if cardiology gives clearance based on medication management  - follow-up a m   Recommendations
- Initial EKG in ED was normal sinus rhythm  - Upon trauma evaluation, patients HR was 130 bpm and higher, EKG indicated that patient was experiencing atrial fibrillation with RVR  - Asymptomatic, pt denies chest pain, palpitations, shortness of breath  - New onset afib felt to be precipitated by Flu/SDH  - with significant and ongoing pauses  - Internal medicine consult appreciated as well as Cardiology  - cardiology/EP placing pacemaker today on 04/16/2022  - will follow-up in the postoperative setting; will also monitor rate control medications   - patient will likely discharge on 04/17/2022 if cardiology gives clearance based on medication management
- Initial EKG in ED was normal sinus rhythm  - Upon trauma evaluation, patients HR was 130 bpm and higher, EKG indicated that patient was experiencing atrial fibrillation with RVR  - Asymptomatic, pt denies chest pain, palpitations, shortness of breath  - No known history of atrial fibrillation, not on BB outpatient  - Continue Telemetry   - Internal medicine consult appreciated as well as Cardiology  - Cardiology recommending discontinue metoprolol tartrate and amiodarone due to bradycardia and monitor only on Cardizem CD     - If HR and BP controlled, maintain on Cardizem CD and follow up outpatient   - If pt goes into atrial fibrillation, consult EP   - Continue monitoring
- Pt reports generalized weakness throughout all extremities for the past few days, and this is why she fell  - She reports her legs gave out on her and she fell backwards onto her buttocks, striking her head on a wall  - New onset atrial fib, new diagnosis of Influenza A could be potential causes  - Internal medicine consult pending  - Injuries as listed
- Pt reports generalized weakness throughout all extremities for the past few days, and this is why she fell  - She reports her legs gave out on her and she fell backwards onto her buttocks, striking her head on a wall  - New onset atrial fib, new diagnosis of Influenza A could be potential causes  - Internal medicine consult pending  - Injuries as listed   - only complains of feeling weak when attempting to get out of bed
- Pt reports generalized weakness throughout all extremities for the past few days, and this is why she fell  - She reports her legs gave out on her and she fell backwards onto her buttocks, striking her head on a wall  - New onset atrial fib, new diagnosis of Influenza A could be potential causes  - Internal medicine consult, appreciate input  - Injuries as listed
- Pt reports generalized weakness throughout all extremities for the past few days, and this is why she fell  - She reports her legs gave out on her and she fell backwards onto her buttocks, striking her head on a wall  - New onset atrial fib, new diagnosis of Influenza A could be potential causes  - Internal medicine consult, appreciate input  - Injuries as listed  - PT and OT indicate no rehab needs
- Status post fall with the below noted injuries  - Fall precautions  - Geriatric Medicine consultation for evaluation, medication review and recommendations   - PT and OT evaluation and treatment as indicated  - Case Management consultation for disposition planning 
- Status post fall with the below noted injuries  - Fall precautions  - Geriatric Medicine consultation for evaluation, medication review and recommendations   - PT and OT evaluation and treatment as indicated  Recommending discharge home with outpatient therapy  - Case Management consultation for disposition planning   - Discharge home today 
PT/OT recommend home
 Patient presented s/p fall out of bed at home with a head strike   Patient reports not being able to get up right away with some confusion    No AC/AP medication and no LOC     Imaging:    CT head 4/11/2022:  3 mm anterior falx subdural hematoma centered to the right side  Plan:   · ongoing frequent neurological checks  · Recommend STAT CT head for decline in GCS >2 points in 1 hour  · Recommend repeat CT head for DVT prophylaxis tomorrow morning  · Keppra 500mg BID for seizure ppx per trauma team   · DVT ppx: SCD's only  Recommend stable CT head prior to initiation of pharm DVT ppx  · Hold all AC/AP medication at this time  Patient is not cleared to start [facility] therapy given presence of SDH  · PT/OT evaluation  · Ongoing medical management and pain control per primary team    · CM following for dispo planning  · Neurosurgery will sign off and follow up in 2 weeks with repeat CT head at that time  If improved patient would be cleared to initiate at the discretion of PCP/cardiology  Call with questions or concerns 
· BP reviewed and stable  · Cardiology following
· BP reviewed and stable  · Continue to hold beta-blocker and calcium channel blocker at this time  · Continue lisinopril
· Blood pressure currently 604 systolic  · Continue to hold beta-blocker and calcium channel blocker at this time  · Continue lisinopril
· Blood pressure currently 921 systolic  · Continue to hold beta-blocker and calcium channel blocker at this time  · Continue lisinopril
· Cont lisinopril  · PO cardizem started
· Continue Home medication:  · Escitalopram, wellbutrin, and amitriptyline
· Continue Home medication:  · Escitalopram, wellbutrin, and amitriptyline
· Continue escitalopram, wellbutrin, amitriptyline
· Continue escitalopram, wellbutrin, amitriptyline
· Likely d/t influenza   · PT/OT evaluation: No acute rehab needs
· Likely d/t influenza   · PT/OT evaluation: No acute rehab needs
· Likely d/t influenza   · PT/OT pending
· Likely d/t influenza   · PT/OT recommended
· Lisinopril discontinued yesterday, BP stable this am  · PO cardizem started for atrial fibrillation
· No prior history  · Noted while in ED   · On telemetry   · Cardiology consulted  · Recommend ongoing rate control at this time   · No AC medication at this time given SDH
· POA  · 4/11 CT head: Eccentric right anterior parafalcine subdural hemorrhage measuring 3 mm in thickness   No significant mass effect   No concomitant subarachnoid or parenchymal hemorrhage    · Repeat CT of the head on 04/12 stable  · Continue 7 day course of Keppra for seizure prophylaxis  · Holding all anticoagulant antiplatelet medications for 2 weeks and/or until cleared by Neurosurgery to resume  · As per primary service and Neurosurgery
· POA  · 4/11 CT head: Eccentric right anterior parafalcine subdural hemorrhage measuring 3 mm in thickness   No significant mass effect   No concomitant subarachnoid or parenchymal hemorrhage    · Repeat CT of the head on 04/12 stable  · Continue 7 day course of Keppra for seizure prophylaxis  · Holding all anticoagulant antiplatelet medications for 2 weeks and/or until cleared by Neurosurgery to resume  · As per primary service and Neurosurgery
· POA  · 4/11 CT head: Eccentric right anterior parafalcine subdural hemorrhage measuring 3 mm in thickness   No significant mass effect   No concomitant subarachnoid or parenchymal hemorrhage    · Repeat CT of the head on 04/12 stable  · Continue 7 day course of Keppra for seizure prophylaxis  · Holding all anticoagulant antiplatelet medications for 2 weeks and/or until cleared by Neurosurgery to resume  · Management per primary service and Neurosurgery
· POA  · 4/11 CT head: Eccentric right anterior parafalcine subdural hemorrhage measuring 3 mm in thickness   No significant mass effect   No concomitant subarachnoid or parenchymal hemorrhage    · Repeat CT of the head on 04/12 stable  · Continue 7 day course of Keppra for seizure prophylaxis  · Holding all anticoagulant antiplatelet medications for 2 weeks and/or until cleared by Neurosurgery to resume  · Management per primary service and Neurosurgery
· POA on 4/11/22  · Presented with several days of myalgias and fatigue    · Symptoms vastly improved 4/14/2022  · No indication for Tamiflu given onset of symptoms greater than 48 hours prior to admission
· POA on 4/11/22  · Presented with several days of myalgias and fatigue    · Symptoms vastly improved 4/14/2022  · No indication for Tamiflu given onset of symptoms greater than 48 hours prior to admission
· POA on 4/11/22-> Stable  · Presented with several days of myalgias and fatigue    · Symptoms vastly improved 4/14/2022  · No indication for Tamiflu given onset of symptoms greater than 48 hours prior to admission  · Patient remains free of respiratory distress
· POA on 4/11/22-> Stable  · Presented with several days of myalgias and fatigue    · Symptoms vastly improved 4/14/2022  · No indication for Tamiflu given onset of symptoms greater than 48 hours prior to admission  · Patient remains free of respiratory distress  · Continue supportive care
· POA with ataxia; found to have small anterior SDH  · Per trauma/neurosurgery
· POA with ataxia; found to have small anterior SDH  · Trauma as primary   · Neurosurgery consulted
· POA with ataxia; found to have small anterior SDH  · Trauma as primary, keppra started as proph  · Neurosurgery consulted, recommended repeat CT for DVT proph
· POA, with several days of myalgias and fatigue   · Found to be flu A positive; no clear sick contacts  Tm 102 8, WBC 4   · Given symptom onset >48hours, would not recommend starting tamiflu  
· POA, with several days of myalgias and fatigue   · Found to be flu A positive; no clear sick contacts  · Given symptom onset >48hours, would not recommend starting tamiflu  
· POA, with several days of myalgias and fatigue  Improving symptoms and temperature  · Found to be flu A positive; no clear sick contacts  · Given symptom onset >48hours, would not recommend starting tamiflu  
· PT/OT evaluation: No acute rehab needs
· PT/OT evaluation: No acute rehab needs
· PT/OT recommending discharge to home
· PT/OT recommending discharge to home
· Patient noted to be in AFib with RVR while in the emergency department felt to be provoked by followed, subdural hemorrhage and influenza a  · Patient now appears to have tachy-dunia syndrome with pauses of 3 5 seconds and up to 5 seconds while sleeping  · Patient had been on oral amiodarone and Cardizem secondary to new AFib with RVR  · Continue to hold metoprolol, amiodarone  · Cardizem held 4/14 secondary to persistent bradycardia with heart rate 35-45    · S/P PPM on 4/16  · Telemetry reviewed: Paced with HR 60  · Cardiology following, appreciate recommendations:  · Patient started on amnio drip to load with oral administration  · Can likely be discharged today
· Patient noted to be in AFib with RVR while in the emergency department felt to be provoked by followed, subdural hemorrhage and influenza a  · Patient now appears to have tachy-dunia syndrome with pauses of 3 5 seconds and up to 5 seconds while sleeping  · Patient had been on oral amiodarone and Cardizem secondary to new AFib with RVR  · Continue to hold metoprolol, amiodarone  · Cardizem held 4/14 secondary to persistent bradycardia with heart rate 35-45    · S/P PPM on 4/16  · Telemetry reviewed: Paced with HR 60  · Cardiology following, appreciate recommendations:  · Patient started on amnio drip to load with oral administration  · Continue to hold beta-blockers at this time  · Can likely be discharged tomorrow
· Patient noted to be in AFib with RVR while in the emergency department felt to be provoked by followed, subdural hemorrhage in influenza a  · Patient now appears to have tachy-dunia syndrome with pauses of 3 5 seconds and up to 5 seconds while sleeping  · Patient had been on oral amiodarone and Cardizem secondary to new AFib with RVR  · Continue to hold metoprolol, amiodarone  · Cardizem held 4/14 secondary to persistent bradycardia with heart rate 35-45  · Continue on telemetry  · Continue to hold AV rio blocking agents at this time secondary to persistent bradycardia    · Cardiology discussing with electrophysiology regarding permanent pacemaker - plan for placement on 4/16
· Patient noted to be in AFib with RVR while in the emergency department felt to be provoked by followed, subdural hemorrhage in influenza a  · Patient now appears to have tachy-dunia syndrome with pauses of 3 5 seconds and up to 5 seconds while sleeping  · Patient had been on oral amiodarone and Cardizem secondary to new AFib with RVR  · Continue to hold metoprolol, amiodarone  · Cardizem held this morning secondary to persistent bradycardia with heart rate 35-45  · Continue on telemetry  · Continue to hold AV rio blocking agents at this time secondary to persistent bradycardia    · Cardiology discussing with electrophysiology regarding permanent pacemaker - patient aware
· Pt noted to be afib with RVR while in the ED;   · Received cardizem bolus with sinus rhythm   · Cont telemetry    · Cardiology has been consulted     · Recommending TTE  · Oral cardizem +/- amio   · No AC for now given problem 1
· Pt noted to be afib with RVR while in the ED; Received cardizem bolus with sinus rhythm   · Cont telemetry    · Cardiology has been consulted  · Oral cardizem + amio continued   · No AC for now given problem 1     Echo:   Findings    Left Ventricle Left ventricular cavity size is normal  Wall thickness is normal  The left ventricular ejection fraction is 65%  Systolic function is normal   Although no diagnostic regional wall motion abnormality was identified, this possibility cannot be completely excluded on the basis of this study  Unable to assess diastolic function due to atrial fibrillation  Right Ventricle Right ventricular cavity size is normal  Systolic function is normal  Wall thickness is normal    Left Atrium The atrium is normal in size  Right Atrium The atrium is normal in size  Aortic Valve The aortic valve is trileaflet  The leaflets are not thickened  The leaflets are not calcified  The leaflets exhibit normal mobility  There is trace regurgitation  There is no evidence of stenosis  Mitral Valve The mitral valve has normal structure and function  There is trace regurgitation  There is no evidence of stenosis  Tricuspid Valve Tricuspid valve structure is normal  There is trace regurgitation  There is no evidence of stenosis  Pulmonic Valve Pulmonic valve structure is normal  There is no evidence of regurgitation  There is no evidence of stenosis  Ascending Aorta The aortic root is normal in size  IVC/SVC The inferior vena cava is normal in size  Pericardium There is no pericardial effusion  The pericardium is normal in appearance 
· Pt noted to be afib with RVR while in the ED; Received cardizem bolus with sinus rhythm   · Cont telemetry    · Potassium replete this am  · Cardiology has been consulted     · Recommending TTE (pending today)  · Oral cardizem + amio continued   · No AC for now given problem 1
· Would continue escitalopram, wellbutrin, amitriptyline   · Xanax prn
· elevated BP, cardiology started BB and ACEI today  · PO cardizem started for atrial fibrillation
General Sunscreen Counseling: I recommended a broad spectrum sunscreen with a SPF of 30 or higher.  I explained that SPF 30 sunscreens block approximately 97 percent of the sun's harmful rays.  Sunscreens should be applied at least 15 minutes prior to expected sun exposure and then every 2 hours after that as long as sun exposure continues. If swimming or exercising sunscreen should be reapplied every 45 minutes to an hour after getting wet or sweating.  One ounce, or the equivalent of a shot glass full of sunscreen, is adequate to protect the skin not covered by a bathing suit. I also recommended a lip balm with a sunscreen as well. Sun protective clothing can be used in lieu of sunscreen but must be worn the entire time you are exposed to the sun's rays.
Detail Level: Zone
Products Recommended: SPF 50+
Detail Level: Generalized
General Sunscreen Counseling: I recommended a broad spectrum sunscreen with a SPF of 30 or higher.  I explained that SPF 30 sunscreens block approximately 97 percent of the sun's harmful rays.  Sunscreens should be applied at least 15 minutes prior to expected sun exposure and then every 2 hours after that as long as sun exposure continues. If swimming or exercising sunscreen should be reapplied every 45 minutes to an hour after getting wet or sweating.  One ounce, or the equivalent of a shot glass full of sunscreen, is adequate to protect the skin not covered by a bathing suit. I also recommended a lip balm with a sunscreen as well. Sun protective clothing can be used in lieu of sunscreen but must be worn the entire time you are exposed to the sun's rays. \\n\\n** recommended Neutrogena 50+ (anything active) or Elta MD **
Products Recommended: Neutrogena SPF 70

## 2025-01-05 DIAGNOSIS — F41.9 ANXIETY: ICD-10-CM

## 2025-01-06 RX ORDER — ALPRAZOLAM 0.5 MG
TABLET ORAL
Qty: 30 TABLET | Refills: 0 | Status: SHIPPED | OUTPATIENT
Start: 2025-01-06

## 2025-01-08 ENCOUNTER — REMOTE DEVICE CLINIC VISIT (OUTPATIENT)
Dept: CARDIOLOGY CLINIC | Facility: CLINIC | Age: 75
End: 2025-01-08
Payer: COMMERCIAL

## 2025-01-08 DIAGNOSIS — Z95.0 PRESENCE OF PERMANENT CARDIAC PACEMAKER: Primary | ICD-10-CM

## 2025-01-08 PROCEDURE — 93294 REM INTERROG EVL PM/LDLS PM: CPT | Performed by: INTERNAL MEDICINE

## 2025-01-08 PROCEDURE — 93296 REM INTERROG EVL PM/IDS: CPT | Performed by: INTERNAL MEDICINE

## 2025-01-08 NOTE — PROGRESS NOTES
Results for orders placed or performed in visit on 01/08/25   Cardiac EP device report    Narrative    MDT DUAL CHAMBER PPM (AAIR-DDDR 60) - ACTIVE SYSTEM IS MRI CONDITIONAL  CARELINK TRANSMISSION: BATTERY VOLTAGE ADEQUATE. (7.3 YRS) AP 99%  95%. ALL AVAILABLE LEAD PARAMETERS WITHIN NORMAL LIMITS. NO SIGNIFICANT HIGH RATE EPISODES. 3 AT/AF EPISODES DETECTED <1 MIN. PATIENT IS ON ELIQUIS. NORMAL DEVICE FUNCTION.---LARES

## 2025-01-13 ENCOUNTER — RESULTS FOLLOW-UP (OUTPATIENT)
Dept: CARDIOLOGY CLINIC | Facility: CLINIC | Age: 75
End: 2025-01-13

## 2025-01-16 DIAGNOSIS — I10 ESSENTIAL HYPERTENSION: ICD-10-CM

## 2025-01-16 RX ORDER — LISINOPRIL 10 MG/1
10 TABLET ORAL DAILY
Qty: 30 TABLET | Refills: 0 | Status: SHIPPED | OUTPATIENT
Start: 2025-01-16

## 2025-01-16 RX ORDER — LISINOPRIL 10 MG/1
10 TABLET ORAL DAILY
Qty: 30 TABLET | Refills: 0 | OUTPATIENT
Start: 2025-01-16

## 2025-01-20 DIAGNOSIS — F32.9 MAJOR DEPRESSIVE DISORDER, REMISSION STATUS UNSPECIFIED, UNSPECIFIED WHETHER RECURRENT: Chronic | ICD-10-CM

## 2025-01-20 RX ORDER — BUPROPION HYDROCHLORIDE 150 MG/1
150 TABLET ORAL EVERY MORNING
Qty: 30 TABLET | Refills: 3 | Status: SHIPPED | OUTPATIENT
Start: 2025-01-20

## 2025-02-04 DIAGNOSIS — E03.8 OTHER SPECIFIED HYPOTHYROIDISM: ICD-10-CM

## 2025-02-05 RX ORDER — LEVOTHYROXINE SODIUM 75 UG/1
75 TABLET ORAL DAILY
Qty: 90 TABLET | Refills: 1 | Status: SHIPPED | OUTPATIENT
Start: 2025-02-05

## 2025-02-06 ENCOUNTER — OFFICE VISIT (OUTPATIENT)
Dept: FAMILY MEDICINE CLINIC | Facility: CLINIC | Age: 75
End: 2025-02-06
Payer: COMMERCIAL

## 2025-02-06 VITALS
DIASTOLIC BLOOD PRESSURE: 74 MMHG | HEIGHT: 65 IN | SYSTOLIC BLOOD PRESSURE: 108 MMHG | WEIGHT: 149.2 LBS | HEART RATE: 80 BPM | BODY MASS INDEX: 24.86 KG/M2 | TEMPERATURE: 97 F

## 2025-02-06 DIAGNOSIS — Z00.00 MEDICARE ANNUAL WELLNESS VISIT, SUBSEQUENT: Primary | ICD-10-CM

## 2025-02-06 DIAGNOSIS — Z12.39 SCREENING BREAST EXAMINATION: ICD-10-CM

## 2025-02-06 DIAGNOSIS — E03.8 OTHER SPECIFIED HYPOTHYROIDISM: ICD-10-CM

## 2025-02-06 DIAGNOSIS — Z12.31 ENCOUNTER FOR SCREENING MAMMOGRAM FOR MALIGNANT NEOPLASM OF BREAST: ICD-10-CM

## 2025-02-06 DIAGNOSIS — F32.5 MAJOR DEPRESSIVE DISORDER IN FULL REMISSION, UNSPECIFIED WHETHER RECURRENT (HCC): ICD-10-CM

## 2025-02-06 DIAGNOSIS — I48.0 PAROXYSMAL ATRIAL FIBRILLATION (HCC): ICD-10-CM

## 2025-02-06 DIAGNOSIS — Z78.0 POSTMENOPAUSAL: ICD-10-CM

## 2025-02-06 DIAGNOSIS — F32.9 MAJOR DEPRESSIVE DISORDER, REMISSION STATUS UNSPECIFIED, UNSPECIFIED WHETHER RECURRENT: Chronic | ICD-10-CM

## 2025-02-06 DIAGNOSIS — I10 ESSENTIAL HYPERTENSION: ICD-10-CM

## 2025-02-06 PROBLEM — E66.3 OVERWEIGHT (BMI 25.0-29.9): Status: RESOLVED | Noted: 2022-11-30 | Resolved: 2025-02-06

## 2025-02-06 PROBLEM — R10.13 EPIGASTRIC PAIN: Status: RESOLVED | Noted: 2020-01-07 | Resolved: 2025-02-06

## 2025-02-06 PROBLEM — R53.83 OTHER FATIGUE: Status: RESOLVED | Noted: 2022-11-30 | Resolved: 2025-02-06

## 2025-02-06 PROCEDURE — G2211 COMPLEX E/M VISIT ADD ON: HCPCS | Performed by: INTERNAL MEDICINE

## 2025-02-06 PROCEDURE — 99214 OFFICE O/P EST MOD 30 MIN: CPT | Performed by: INTERNAL MEDICINE

## 2025-02-06 PROCEDURE — G0439 PPPS, SUBSEQ VISIT: HCPCS | Performed by: INTERNAL MEDICINE

## 2025-02-06 NOTE — PROGRESS NOTES
Name: Donald Reyes      : 1950      MRN: [id number]  Encounter Provider: Diandra Rangel MD  Encounter Date: 2025   Encounter department: [facility]    Assessment & Plan  Medicare annual wellness visit, subsequent         Essential hypertension  Well controlled and will continue lisinopril 10 mg daily.  Orders:  •  CBC; Future  •  Comprehensive metabolic panel; Future  •  Lipid panel; Future    Major depressive disorder, remission status unspecified, unspecified whether recurrent  She feels this is well controlled, even with stopping the bupropion. Will continue escitalopram.  Asked patient to call sooner than next scheduled appointment for any complaints or issues.         Other specified hypothyroidism  Clinically euthyroid and will check TSH, adjust levothyroxine dose if necessary.  Orders:  •  TSH, 3rd generation with Free T4 reflex; Future    Major depressive disorder in full remission, unspecified whether recurrent (HCC)   >>ASSESSMENT AND PLAN FOR MAJOR DEPRESSIVE DISORDER IN FULL REMISSION, UNSPECIFIED WHETHER RECURRENT (HCC) WRITTEN ON 2025  3:14 PM BY DIANDRA RANGEL MD    Well controlled as above, continue escitalopram.         >>ASSESSMENT AND PLAN FOR MDD (MAJOR DEPRESSIVE DISORDER) WRITTEN ON 2025  3:14 PM BY DIANDRA RANGEL MD           Paroxysmal atrial fibrillation (HCC)  Managed by cardiology and she will make appointment for follow up.  Continue apixaban and metoprolol.       Screening breast examination    Orders:  •  Mammo screening bilateral w 3d and cad; Future    Encounter for screening mammogram for malignant neoplasm of breast    Orders:  •  Mammo screening bilateral w 3d and cad; Future    Postmenopausal    Orders:  •  DXA bone density spine hip and pelvis; Future       Preventive health issues were discussed with patient, and age appropriate screening tests were ordered as noted in patient's After Visit Summary. Personalized health advice and  appropriate referrals for health education or preventive services given if needed, as noted in patient's After Visit Summary.    History of Present Illness     Here for follow up and AWV. She feels her mood is well controlled. She had thrown out her bupropion by accident and feels fine without it, so does not want a refill. Continues on her escitalopram and feels her depression is well controlled.  There is no chest pain or dyspnea. There is no cold or heat intolerance, diarrhea or constipation, hair or skin changes, unanticipated weight gain or loss.         Patient Care Team:  Melita Rangel MD as PCP - General  MD Ashley Greene MD Sinan Kutty, MD Sinan Kutty, MD as Endoscopist    Review of Systems   Constitutional:  Negative for chills and fever.   HENT:  Negative for ear pain and sore throat.    Eyes:  Negative for pain and visual disturbance.   Respiratory:  Negative for cough and shortness of breath.    Cardiovascular:  Negative for chest pain and palpitations.   Gastrointestinal:  Negative for abdominal pain and vomiting.   Endocrine: Negative for cold intolerance and heat intolerance.   Genitourinary:  Negative for dysuria and hematuria.   Musculoskeletal:  Negative for arthralgias and back pain.   Skin:  Negative for color change and rash.   Neurological:  Negative for seizures and syncope.   All other systems reviewed and are negative.    Medical History Reviewed by provider this encounter:       Annual Wellness Visit Questionnaire   Donald is here for her Subsequent Wellness visit.     Health Risk Assessment:   Patient rates overall health as good. Patient feels that their physical health rating is same. Patient is satisfied with their life. Eyesight was rated as same. Hearing was rated as same. Patient feels that their emotional and mental health rating is slightly worse. Patients states they are never, rarely angry. Patient states they are sometimes unusually tired/fatigued.  Pain experienced in the last 7 days has been some. Patient's pain rating has been 5/10. Patient states that she has experienced no weight loss or gain in last 6 months.     Fall Risk Screening:   In the past year, patient has experienced: no history of falling in past year      Urinary Incontinence Screening:   Patient has not leaked urine accidently in the last six months.     Home Safety:  Patient has trouble with stairs inside or outside of their home. Patient has working smoke alarms and has working carbon monoxide detector. Home safety hazards include: none.     Nutrition:   Current diet is Regular, Low Cholesterol, Low Saturated Fat and Low Carb.     Medications:   Patient is currently taking over-the-counter supplements. OTC medications include: see medication list. Patient is able to manage medications.     Activities of Daily Living (ADLs)/Instrumental Activities of Daily Living (IADLs):   Walk and transfer into and out of bed and chair?: Yes  Dress and groom yourself?: Yes    Bathe or shower yourself?: Yes    Feed yourself? Yes  Do your laundry/housekeeping?: Yes  Manage your money, pay your bills and track your expenses?: Yes  Make your own meals?: Yes    Do your own shopping?: Yes    Previous Hospitalizations:   Any hospitalizations or ED visits within the last 12 months?: No      Advance Care Planning:   Living will: Yes    Durable POA for healthcare: Yes    Advanced directive: Yes    End of Life Decisions reviewed with patient: Yes      Cognitive Screening:   Provider or family/friend/caregiver concerned regarding cognition?: No    PREVENTIVE SCREENINGS      Cardiovascular Screening:    General: Screening Current and Risks and Benefits Discussed    Due for: Lipid Panel      Diabetes Screening:     General: Risks and Benefits Discussed    Due for: Blood Glucose      Colorectal Cancer Screening:     General: Screening Current      Breast Cancer Screening:     General: Risks and Benefits Discussed    Due  for: Mammogram        Cervical Cancer Screening:    General: Screening Not Indicated      Osteoporosis Screening:    General: Risks and Benefits Discussed    Due for: DXA Axial      Abdominal Aortic Aneurysm (AAA) Screening:        General: Patient Declines      Lung Cancer Screening:     General: Screening Not Indicated      Hepatitis C Screening:    General: Screening Current    Screening, Brief Intervention, and Referral to Treatment (SBIRT)    Screening  Typical number of drinks in a day: 0  Typical number of drinks in a week: 0  Interpretation: Low risk drinking behavior.    AUDIT-C Screenin) How often did you have a drink containing alcohol in the past year? monthly or less  2) How many drinks did you have on a typical day when you were drinking in the past year? 0  3) How often did you have 6 or more drinks on one occasion in the past year? never    AUDIT-C Score: 1  Interpretation: Score 0-2 (female): Negative screen for alcohol misuse    Single Item Drug Screening:  How often have you used an illegal drug (including marijuana) or a prescription medication for non-medical reasons in the past year? never    Single Item Drug Screen Score: 0  Interpretation: Negative screen for possible drug use disorder    Brief Intervention  Alcohol & drug use screenings were reviewed. No concerns regarding substance use disorder identified.     Other Counseling Topics:   Car/seat belt/driving safety, skin self-exam, sunscreen and calcium and vitamin D intake and regular weightbearing exercise.     Social Drivers of Health     Financial Resource Strain: Low Risk  (2023)    Overall Financial Resource Strain (CARDIA)    • Difficulty of Paying Living Expenses: Not hard at all   Food Insecurity: No Food Insecurity (2025)    Hunger Vital Sign    • Worried About Running Out of Food in the Last Year: Never true    • Ran Out of Food in the Last Year: Never true   Transportation Needs: No Transportation Needs (2025)  "   PRAPARE - Transportation    • Lack of Transportation (Medical): No    • Lack of Transportation (Non-Medical): No   Housing Stability: Low Risk  (2/5/2025)    Housing Stability Vital Sign    • Unable to Pay for Housing in the Last Year: No    • Number of Times Moved in the Last Year: 0    • Homeless in the Last Year: No   Utilities: Not At Risk (2/5/2025)    [facility] Utilities    • Threatened with loss of utilities: No     No results found.    Objective   /74   Pulse 80   Temp (!) 97 °F (36.1 °C)   Ht 5' 4.57\" (1.64 m)   Wt 67.7 kg (149 lb 3.2 oz)   LMP  (LMP Unknown)   BMI 25.16 kg/m²     Physical Exam  Constitutional:       General: She is not in acute distress.     Appearance: She is well-developed. She is not diaphoretic.   HENT:      Head: Normocephalic and atraumatic.      Right Ear: External ear normal.      Left Ear: External ear normal.      Nose: Nose normal.   Eyes:      Pupils: Pupils are equal, round, and reactive to light.   Neck:      Thyroid: No thyromegaly.      Vascular: No JVD.   Cardiovascular:      Rate and Rhythm: Regular rhythm.      Heart sounds: No murmur heard.     No friction rub. No gallop.   Pulmonary:      Effort: Pulmonary effort is normal.      Breath sounds: Normal breath sounds. No wheezing or rales.   Abdominal:      General: Bowel sounds are normal. There is no distension.      Palpations: Abdomen is soft.      Tenderness: There is no abdominal tenderness.   Musculoskeletal:         General: Normal range of motion.      Cervical back: Normal range of motion and neck supple.   Skin:     General: Skin is warm and dry.      Findings: No rash.   Neurological:      Mental Status: She is alert and oriented to person, place, and time.      Cranial Nerves: No cranial nerve deficit.      Sensory: No sensory deficit.      Motor: No abnormal muscle tone.      Coordination: Coordination normal.      Deep Tendon Reflexes: Reflexes normal.   Psychiatric:         Behavior: Behavior " normal.         Thought Content: Thought content normal.         Judgment: Judgment normal.

## 2025-02-06 NOTE — ASSESSMENT & PLAN NOTE
Well controlled and will continue lisinopril 10 mg daily.  Orders:  •  CBC; Future  •  Comprehensive metabolic panel; Future  •  Lipid panel; Future

## 2025-02-06 NOTE — ASSESSMENT & PLAN NOTE
She feels this is well controlled, even with stopping the bupropion. Will continue escitalopram.  Asked patient to call sooner than next scheduled appointment for any complaints or issues.

## 2025-02-06 NOTE — ASSESSMENT & PLAN NOTE
Managed by cardiology and she will make appointment for follow up.  Continue apixaban and metoprolol.

## 2025-02-06 NOTE — ASSESSMENT & PLAN NOTE
Clinically euthyroid and will check TSH, adjust levothyroxine dose if necessary.  Orders:  •  TSH, 3rd generation with Free T4 reflex; Future

## 2025-02-06 NOTE — PATIENT INSTRUCTIONS
Medicare Preventive Visit Patient Instructions  Thank you for completing your Welcome to Medicare Visit or Medicare Annual Wellness Visit today. Your next wellness visit will be due in one year (2/7/2026).  The screening/preventive services that you may require over the next 5-10 years are detailed below. Some tests may not apply to you based off risk factors and/or age. Screening tests ordered at today's visit but not completed yet may show as past due. Also, please note that scanned in results may not display below.  Preventive Screenings:  Service Recommendations Previous Testing/Comments   Colorectal Cancer Screening  * Colonoscopy    * Fecal Occult Blood Test (FOBT)/Fecal Immunochemical Test (FIT)  * Fecal DNA/Cologuard Test  * Flexible Sigmoidoscopy Age: 45-75 years old   Colonoscopy: every 10 years (may be performed more frequently if at higher risk)  OR  FOBT/FIT: every 1 year  OR  Cologuard: every 3 years  OR  Sigmoidoscopy: every 5 years  Screening may be recommended earlier than age 45 if at higher risk for colorectal cancer. Also, an individualized decision between you and your healthcare provider will decide whether screening between the ages of 76-85 would be appropriate. Colonoscopy: 09/25/2024  FOBT/FIT: Not on file  Cologuard: Not on file  Sigmoidoscopy: Not on file    Screening Current     Breast Cancer Screening Age: 40+ years old  Frequency: every 1-2 years  Not required if history of left and right mastectomy Mammogram: 07/11/2023    Screening Current   Cervical Cancer Screening Between the ages of 21-29, pap smear recommended once every 3 years.   Between the ages of 30-65, can perform pap smear with HPV co-testing every 5 years.   Recommendations may differ for women with a history of total hysterectomy, cervical cancer, or abnormal pap smears in past. Pap Smear: Not on file    Screening Not Indicated   Hepatitis C Screening Once for adults born between 1945 and 1965  More frequently in  patients at high risk for Hepatitis C Hep C Antibody: 09/19/2019    Screening Current   Diabetes Screening 1-2 times per year if you're at risk for diabetes or have pre-diabetes Fasting glucose: 98 mg/dL (2/4/2022)  A1C: 5.2 % (7/18/2023)      Cholesterol Screening Once every 5 years if you don't have a lipid disorder. May order more often based on risk factors. Lipid panel: 07/18/2023    Screening Current     Other Preventive Screenings Covered by Medicare:  Abdominal Aortic Aneurysm (AAA) Screening: covered once if your at risk. You're considered to be at risk if you have a family history of AAA.  Lung Cancer Screening: covers low dose CT scan once per year if you meet all of the following conditions: (1) Age 55-77; (2) No signs or symptoms of lung cancer; (3) Current smoker or have quit smoking within the last 15 years; (4) You have a tobacco smoking history of at least 20 pack years (packs per day multiplied by number of years you smoked); (5) You get a written order from a healthcare provider.  Glaucoma Screening: covered annually if you're considered high risk: (1) You have diabetes OR (2) Family history of glaucoma OR (3)  aged 50 and older OR (4)  American aged 65 and older  Osteoporosis Screening: covered every 2 years if you meet one of the following conditions: (1) You're estrogen deficient and at risk for osteoporosis based off medical history and other findings; (2) Have a vertebral abnormality; (3) On glucocorticoid therapy for more than 3 months; (4) Have primary hyperparathyroidism; (5) On osteoporosis medications and need to assess response to drug therapy.   Last bone density test (DXA Scan): Not on file.  HIV Screening: covered annually if you're between the age of 15-65. Also covered annually if you are younger than 15 and older than 65 with risk factors for HIV infection. For pregnant patients, it is covered up to 3 times per pregnancy.    Immunizations:  Immunization  Recommendations   Influenza Vaccine Annual influenza vaccination during flu season is recommended for all persons aged >= 6 months who do not have contraindications   Pneumococcal Vaccine   * Pneumococcal conjugate vaccine = PCV13 (Prevnar 13), PCV15 (Vaxneuvance), PCV20 (Prevnar 20)  * Pneumococcal polysaccharide vaccine = PPSV23 (Pneumovax) Adults 19-63 yo with certain risk factors or if 65+ yo  If never received any pneumonia vaccine: recommend Prevnar 20 (PCV20)  Give PCV20 if previously received 1 dose of PCV13 or PPSV23   Hepatitis B Vaccine 3 dose series if at intermediate or high risk (ex: diabetes, end stage renal disease, liver disease)   Respiratory syncytial virus (RSV) Vaccine - COVERED BY MEDICARE PART D  * RSVPreF3 (Arexvy) CDC recommends that adults 60 years of age and older may receive a single dose of RSV vaccine using shared clinical decision-making (SCDM)   Tetanus (Td) Vaccine - COST NOT COVERED BY MEDICARE PART B Following completion of primary series, a booster dose should be given every 10 years to maintain immunity against tetanus. Td may also be given as tetanus wound prophylaxis.   Tdap Vaccine - COST NOT COVERED BY MEDICARE PART B Recommended at least once for all adults. For pregnant patients, recommended with each pregnancy.   Shingles Vaccine (Shingrix) - COST NOT COVERED BY MEDICARE PART B  2 shot series recommended in those 19 years and older who have or will have weakened immune systems or those 50 years and older     Health Maintenance Due:      Topic Date Due   • Breast Cancer Screening: Mammogram  07/11/2024   • Colorectal Cancer Screening  09/24/2031   • Hepatitis C Screening  Completed     Immunizations Due:      Topic Date Due   • Influenza Vaccine (1) 09/01/2024   • COVID-19 Vaccine (4 - 2024-25 season) 09/01/2024     Advance Directives   What are advance directives?  Advance directives are legal documents that state your wishes and plans for medical care. These plans are  made ahead of time in case you lose your ability to make decisions for yourself. Advance directives can apply to any medical decision, such as the treatments you want, and if you want to donate organs.   What are the types of advance directives?  There are many types of advance directives, and each state has rules about how to use them. You may choose a combination of any of the following:  Living will:  This is a written record of the treatment you want. You can also choose which treatments you do not want, which to limit, and which to stop at a certain time. This includes surgery, medicine, IV fluid, and tube feedings.   Durable power of  for healthcare (DPAHC):  This is a written record that states who you want to make healthcare choices for you when you are unable to make them for yourself. This person, called a proxy, is usually a family member or a friend. You may choose more than 1 proxy.  Do not resuscitate (DNR) order:  A DNR order is used in case your heart stops beating or you stop breathing. It is a request not to have certain forms of treatment, such as CPR. A DNR order may be included in other types of advance directives.  Medical directive:  This covers the care that you want if you are in a coma, near death, or unable to make decisions for yourself. You can list the treatments you want for each condition. Treatment may include pain medicine, surgery, blood transfusions, dialysis, IV or tube feedings, and a ventilator (breathing machine).  Values history:  This document has questions about your views, beliefs, and how you feel and think about life. This information can help others choose the care that you would choose.  Why are advance directives important?  An advance directive helps you control your care. Although spoken wishes may be used, it is better to have your wishes written down. Spoken wishes can be misunderstood, or not followed. Treatments may be given even if you do not want them.  An advance directive may make it easier for your family to make difficult choices about your care.   Weight Management   Why it is important to manage your weight:  Being overweight increases your risk of health conditions such as heart disease, high blood pressure, type 2 diabetes, and certain types of cancer. It can also increase your risk for osteoarthritis, sleep apnea, and other respiratory problems. Aim for a slow, steady weight loss. Even a small amount of weight loss can lower your risk of health problems.  How to lose weight safely:  A safe and healthy way to lose weight is to eat fewer calories and get regular exercise. You can lose up about 1 pound a week by decreasing the number of calories you eat by 500 calories each day.   Healthy meal plan for weight management:  A healthy meal plan includes a variety of foods, contains fewer calories, and helps you stay healthy. A healthy meal plan includes the following:  Eat whole-grain foods more often.  A healthy meal plan should contain fiber. Fiber is the part of grains, fruits, and vegetables that is not broken down by your body. Whole-grain foods are healthy and provide extra fiber in your diet. Some examples of whole-grain foods are whole-wheat breads and pastas, oatmeal, brown rice, and bulgur.  Eat a variety of vegetables every day.  Include dark, leafy greens such as spinach, kale, evan greens, and mustard greens. Eat yellow and orange vegetables such as carrots, sweet potatoes, and winter squash.   Eat a variety of fruits every day.  Choose fresh or canned fruit (canned in its own juice or light syrup) instead of juice. Fruit juice has very little or no fiber.  Eat low-fat dairy foods.  Drink fat-free (skim) milk or 1% milk. Eat fat-free yogurt and low-fat cottage cheese. Try low-fat cheeses such as mozzarella and other reduced-fat cheeses.  Choose meat and other protein foods that are low in fat.  Choose beans or other legumes such as split peas or  lentils. Choose fish, skinless poultry (chicken or turkey), or lean cuts of red meat (beef or pork). Before you cook meat or poultry, cut off any visible fat.   Use less fat and oil.  Try baking foods instead of frying them. Add less fat, such as margarine, sour cream, regular salad dressing and mayonnaise to foods. Eat fewer high-fat foods. Some examples of high-fat foods include french fries, doughnuts, ice cream, and cakes.  Eat fewer sweets.  Limit foods and drinks that are high in sugar. This includes candy, cookies, regular soda, and sweetened drinks.  Exercise:  Exercise at least 30 minutes per day on most days of the week. Some examples of exercise include walking, biking, dancing, and swimming. You can also fit in more physical activity by taking the stairs instead of the elevator or parking farther away from stores. Ask your healthcare provider about the best exercise plan for you.      © Copyright [employer] 2018 Information is for End User's use only and may not be sold, redistributed or otherwise used for commercial purposes. All illustrations and images included in CareNotes® are the copyrighted property of [employer] or [employer]

## 2025-02-06 NOTE — ASSESSMENT & PLAN NOTE
>>ASSESSMENT AND PLAN FOR MAJOR DEPRESSIVE DISORDER IN FULL REMISSION, UNSPECIFIED WHETHER RECURRENT (HCC) WRITTEN ON 2/6/2025  3:14 PM BY DIANDRA MIJARES MD    Well controlled as above, continue escitalopram.         >>ASSESSMENT AND PLAN FOR MDD (MAJOR DEPRESSIVE DISORDER) WRITTEN ON 2/6/2025  3:14 PM BY DIANDRA MIJARES MD

## 2025-02-07 DIAGNOSIS — F41.9 ANXIETY: ICD-10-CM

## 2025-02-07 DIAGNOSIS — R10.13 EPIGASTRIC PAIN: ICD-10-CM

## 2025-02-07 DIAGNOSIS — I48.0 PAROXYSMAL ATRIAL FIBRILLATION (HCC): ICD-10-CM

## 2025-02-07 DIAGNOSIS — K21.9 GASTROESOPHAGEAL REFLUX DISEASE: ICD-10-CM

## 2025-02-07 RX ORDER — PANTOPRAZOLE SODIUM 40 MG/1
40 TABLET, DELAYED RELEASE ORAL DAILY
Qty: 90 TABLET | Refills: 1 | Status: SHIPPED | OUTPATIENT
Start: 2025-02-07

## 2025-02-10 RX ORDER — ALPRAZOLAM 0.5 MG
TABLET ORAL
Qty: 30 TABLET | Refills: 0 | Status: SHIPPED | OUTPATIENT
Start: 2025-02-10

## 2025-02-10 RX ORDER — METOPROLOL TARTRATE 50 MG
50 TABLET ORAL 2 TIMES DAILY
Qty: 180 TABLET | Refills: 1 | Status: SHIPPED | OUTPATIENT
Start: 2025-02-10

## 2025-02-14 DIAGNOSIS — I10 ESSENTIAL HYPERTENSION: ICD-10-CM

## 2025-02-14 RX ORDER — LISINOPRIL 10 MG/1
10 TABLET ORAL DAILY
Qty: 30 TABLET | Refills: 0 | Status: SHIPPED | OUTPATIENT
Start: 2025-02-14

## 2025-02-20 DIAGNOSIS — F41.9 ANXIETY: ICD-10-CM

## 2025-02-20 DIAGNOSIS — I10 ESSENTIAL HYPERTENSION: ICD-10-CM

## 2025-02-24 DIAGNOSIS — F41.9 ANXIETY: ICD-10-CM

## 2025-02-24 DIAGNOSIS — I10 ESSENTIAL HYPERTENSION: ICD-10-CM

## 2025-02-24 RX ORDER — LISINOPRIL 10 MG/1
10 TABLET ORAL DAILY
Qty: 30 TABLET | Refills: 0 | OUTPATIENT
Start: 2025-02-24

## 2025-02-24 RX ORDER — ALPRAZOLAM 0.5 MG
TABLET ORAL
Qty: 30 TABLET | Refills: 0 | OUTPATIENT
Start: 2025-02-24

## 2025-02-26 RX ORDER — LISINOPRIL 10 MG/1
10 TABLET ORAL DAILY
Qty: 30 TABLET | Refills: 0 | OUTPATIENT
Start: 2025-02-26

## 2025-02-26 RX ORDER — ALPRAZOLAM 0.5 MG
0.5 TABLET ORAL
Qty: 30 TABLET | Refills: 0 | OUTPATIENT
Start: 2025-02-26

## 2025-03-07 DIAGNOSIS — F41.9 ANXIETY: ICD-10-CM

## 2025-03-10 RX ORDER — ALPRAZOLAM 0.5 MG
TABLET ORAL
Qty: 30 TABLET | Refills: 0 | Status: SHIPPED | OUTPATIENT
Start: 2025-03-10

## 2025-03-19 DIAGNOSIS — I10 ESSENTIAL HYPERTENSION: ICD-10-CM

## 2025-03-20 RX ORDER — LISINOPRIL 10 MG/1
10 TABLET ORAL DAILY
Qty: 30 TABLET | Refills: 5 | Status: SHIPPED | OUTPATIENT
Start: 2025-03-20

## 2025-04-08 ENCOUNTER — REMOTE DEVICE CLINIC VISIT (OUTPATIENT)
Dept: CARDIOLOGY CLINIC | Facility: CLINIC | Age: 75
End: 2025-04-08
Payer: COMMERCIAL

## 2025-04-08 DIAGNOSIS — Z95.0 PRESENCE OF PERMANENT CARDIAC PACEMAKER: Primary | ICD-10-CM

## 2025-04-08 PROCEDURE — 93296 REM INTERROG EVL PM/IDS: CPT | Performed by: INTERNAL MEDICINE

## 2025-04-08 PROCEDURE — 93294 REM INTERROG EVL PM/LDLS PM: CPT | Performed by: INTERNAL MEDICINE

## 2025-04-08 NOTE — PROGRESS NOTES
Results for orders placed or performed in visit on 04/08/25   Cardiac EP device report    Narrative    MDT DUAL CHAMBER PPM (AAIR-DDDR 60) - ACTIVE SYSTEM IS MRI CONDITIONAL  CARELINK TRANSMISSION: BATTERY VOLTAGE ADEQUATE. (6.4 YRS) AP 98%  98%. ALL AVAILABLE LEAD PARAMETERS WITHIN NORMAL LIMITS. NO SIGNIFICANT HIGH RATE EPISODES. 2 AT/AF EPISODES DETECTED (1.6% OF TIME) 9 HOURS LONG. PATIENT IS ON ELIQUIS AND METOPROLOL.  NORMAL DEVICE FUNCTION.---LARES

## 2025-04-09 ENCOUNTER — RESULTS FOLLOW-UP (OUTPATIENT)
Dept: NON INVASIVE DIAGNOSTICS | Facility: HOSPITAL | Age: 75
End: 2025-04-09

## 2025-04-09 DIAGNOSIS — F41.9 ANXIETY: ICD-10-CM

## 2025-04-10 RX ORDER — ALPRAZOLAM 0.5 MG
TABLET ORAL
Qty: 30 TABLET | Refills: 0 | Status: SHIPPED | OUTPATIENT
Start: 2025-04-10

## 2025-04-21 DIAGNOSIS — I48.0 PAROXYSMAL A-FIB (HCC): ICD-10-CM

## 2025-04-21 DIAGNOSIS — I10 ESSENTIAL HYPERTENSION: ICD-10-CM

## 2025-04-23 RX ORDER — LISINOPRIL 10 MG/1
10 TABLET ORAL DAILY
Qty: 90 TABLET | Refills: 3 | Status: SHIPPED | OUTPATIENT
Start: 2025-04-23

## 2025-05-06 DIAGNOSIS — F41.9 ANXIETY: ICD-10-CM

## 2025-05-06 DIAGNOSIS — K21.9 GASTROESOPHAGEAL REFLUX DISEASE: ICD-10-CM

## 2025-05-07 DIAGNOSIS — K21.9 GASTROESOPHAGEAL REFLUX DISEASE: ICD-10-CM

## 2025-05-07 RX ORDER — PANTOPRAZOLE SODIUM 40 MG/1
40 TABLET, DELAYED RELEASE ORAL DAILY
Qty: 90 TABLET | Refills: 0 | Status: SHIPPED | OUTPATIENT
Start: 2025-05-07

## 2025-05-07 RX ORDER — ALPRAZOLAM 0.5 MG
0.5 TABLET ORAL
Qty: 30 TABLET | Refills: 0 | Status: SHIPPED | OUTPATIENT
Start: 2025-05-07

## 2025-05-07 RX ORDER — PANTOPRAZOLE SODIUM 40 MG/1
40 TABLET, DELAYED RELEASE ORAL DAILY
Qty: 90 TABLET | Refills: 0 | OUTPATIENT
Start: 2025-05-07

## 2025-05-08 DIAGNOSIS — I10 ESSENTIAL HYPERTENSION: ICD-10-CM

## 2025-05-12 RX ORDER — LISINOPRIL 10 MG/1
10 TABLET ORAL DAILY
Qty: 90 TABLET | Refills: 3 | OUTPATIENT
Start: 2025-05-12

## 2025-05-25 DIAGNOSIS — F32.9 MAJOR DEPRESSIVE DISORDER, REMISSION STATUS UNSPECIFIED, UNSPECIFIED WHETHER RECURRENT: Chronic | ICD-10-CM

## 2025-05-25 DIAGNOSIS — R10.13 EPIGASTRIC PAIN: ICD-10-CM

## 2025-05-25 RX ORDER — ESCITALOPRAM OXALATE 10 MG/1
10 TABLET ORAL DAILY
Qty: 90 TABLET | Refills: 1 | Status: SHIPPED | OUTPATIENT
Start: 2025-05-25

## 2025-05-26 DIAGNOSIS — I48.0 PAROXYSMAL ATRIAL FIBRILLATION (HCC): ICD-10-CM

## 2025-05-26 DIAGNOSIS — E03.8 OTHER SPECIFIED HYPOTHYROIDISM: ICD-10-CM

## 2025-05-27 DIAGNOSIS — I10 ESSENTIAL HYPERTENSION: ICD-10-CM

## 2025-05-27 RX ORDER — LEVOTHYROXINE SODIUM 75 UG/1
75 TABLET ORAL DAILY
Qty: 90 TABLET | Refills: 0 | Status: SHIPPED | OUTPATIENT
Start: 2025-05-27

## 2025-05-27 RX ORDER — METOPROLOL TARTRATE 50 MG
50 TABLET ORAL 2 TIMES DAILY
Qty: 180 TABLET | Refills: 1 | Status: SHIPPED | OUTPATIENT
Start: 2025-05-27

## 2025-05-28 RX ORDER — LISINOPRIL 10 MG/1
10 TABLET ORAL DAILY
Qty: 90 TABLET | Refills: 3 | Status: SHIPPED | OUTPATIENT
Start: 2025-05-28

## 2025-06-04 DIAGNOSIS — F41.9 ANXIETY: ICD-10-CM

## 2025-06-05 RX ORDER — ALPRAZOLAM 0.5 MG
0.5 TABLET ORAL
Qty: 30 TABLET | Refills: 0 | Status: SHIPPED | OUTPATIENT
Start: 2025-06-05

## 2025-06-27 ENCOUNTER — HOSPITAL ENCOUNTER (OUTPATIENT)
Facility: HOSPITAL | Age: 75
Discharge: HOME | End: 2025-06-27
Payer: COMMERCIAL

## 2025-06-27 VITALS — WEIGHT: 149 LBS | BODY MASS INDEX: 25.44 KG/M2 | HEIGHT: 64 IN

## 2025-06-27 DIAGNOSIS — Z78.0 POSTMENOPAUSAL: ICD-10-CM

## 2025-06-27 PROCEDURE — 77080 DXA BONE DENSITY AXIAL: CPT

## 2025-07-01 DIAGNOSIS — F41.9 ANXIETY: ICD-10-CM

## 2025-07-02 RX ORDER — ALPRAZOLAM 0.5 MG
TABLET ORAL
Qty: 30 TABLET | Refills: 0 | Status: SHIPPED | OUTPATIENT
Start: 2025-07-02

## 2025-07-08 ENCOUNTER — REMOTE DEVICE CLINIC VISIT (OUTPATIENT)
Dept: CARDIOLOGY CLINIC | Facility: CLINIC | Age: 75
End: 2025-07-08
Payer: COMMERCIAL

## 2025-07-08 DIAGNOSIS — I48.91 ATRIAL FIBRILLATION, UNSPECIFIED TYPE (HCC): Primary | ICD-10-CM

## 2025-07-08 DIAGNOSIS — I49.5 SSS (SICK SINUS SYNDROME) (HCC): ICD-10-CM

## 2025-07-08 PROCEDURE — 93294 REM INTERROG EVL PM/LDLS PM: CPT | Performed by: INTERNAL MEDICINE

## 2025-07-08 PROCEDURE — 93296 REM INTERROG EVL PM/IDS: CPT | Performed by: INTERNAL MEDICINE

## 2025-07-08 NOTE — PROGRESS NOTES
Results for orders placed or performed in visit on 07/08/25   Cardiac EP device report    Narrative    MDT DUAL CHAMBER PPM (AAIR-DDDR 60) - ACTIVE SYSTEM IS MRI CONDITIONAL  CARELINK TRANSMISSION: BATTERY VOLTAGE ADEQUATE (5.6 YRS). AP :97.8%. : 98.3% (>40%~MVP-ON/60). ALL AVAILABLE LEAD PARAMETERS WITHIN NORMAL LIMITS. 20 AT/AF EPISODES W/ AVAIL EGMS SHOWING AF, MAX DURATION 5 HRS 14 MINS. AF BURDEN: 1.2%. PT TAKES ELIQUIS, METOPROLOL TART. EF: 65% (ECHO 4/12/22). NORMAL DEVICE FUNCTION. CH

## 2025-07-15 DIAGNOSIS — K21.9 GASTROESOPHAGEAL REFLUX DISEASE: ICD-10-CM

## 2025-07-16 RX ORDER — PANTOPRAZOLE SODIUM 40 MG/1
40 TABLET, DELAYED RELEASE ORAL DAILY
Qty: 90 TABLET | Refills: 1 | Status: SHIPPED | OUTPATIENT
Start: 2025-07-16

## 2025-07-21 ENCOUNTER — HOSPITAL ENCOUNTER (OUTPATIENT)
Dept: MAMMOGRAPHY | Facility: HOSPITAL | Age: 75
Discharge: HOME | End: 2025-07-21
Payer: COMMERCIAL

## 2025-07-21 VITALS — BODY MASS INDEX: 25.49 KG/M2 | HEIGHT: 65 IN | WEIGHT: 153 LBS

## 2025-07-21 DIAGNOSIS — Z12.31 ENCOUNTER FOR SCREENING MAMMOGRAM FOR MALIGNANT NEOPLASM OF BREAST: ICD-10-CM

## 2025-07-21 DIAGNOSIS — Z12.39 SCREENING BREAST EXAMINATION: ICD-10-CM

## 2025-07-21 PROCEDURE — 77063 BREAST TOMOSYNTHESIS BI: CPT

## 2025-07-21 PROCEDURE — 77067 SCR MAMMO BI INCL CAD: CPT

## 2025-07-30 DIAGNOSIS — F41.9 ANXIETY: ICD-10-CM

## 2025-07-30 RX ORDER — ALPRAZOLAM 0.5 MG
TABLET ORAL
Qty: 30 TABLET | Refills: 0 | Status: SHIPPED | OUTPATIENT
Start: 2025-07-30

## (undated) DEVICE — 3M™ STERI-DRAPE™ U-DRAPE 1015: Brand: STERI-DRAPE™

## (undated) DEVICE — 2.7MM DRILL BIT/JC/85MM

## (undated) DEVICE — TUBING SUCTION 5MM X 12 FT

## (undated) DEVICE — DRESSING MEPILEX AG BORDER 4 X 4 IN

## (undated) DEVICE — 3M™ IOBAN™ 2 ANTIMICROBIAL INCISE DRAPE 6640EZ: Brand: IOBAN™ 2

## (undated) DEVICE — SUT VICRYL 2-0 SH 27 IN UNDYED J417H

## (undated) DEVICE — INTENDED FOR TISSUE SEPARATION, AND OTHER PROCEDURES THAT REQUIRE A SHARP SURGICAL BLADE TO PUNCTURE OR CUT.: Brand: BARD-PARKER SAFETY BLADES SIZE 15, STERILE

## (undated) DEVICE — DRAPE C-ARMOUR

## (undated) DEVICE — CHLORAPREP HI-LITE 26ML ORANGE

## (undated) DEVICE — DRAPE SHEET X-LG

## (undated) DEVICE — DRAPE SURGIKIT SADDLE BAG

## (undated) DEVICE — LIGHT GLOVE GREEN

## (undated) DEVICE — BANDAGE ACE VELCRO 6 IN LF

## (undated) DEVICE — TIBURON EXTREMITY SHEET: Brand: CONVERTORS

## (undated) DEVICE — 3M™ COBAN™ NL STERILE NON-LATEX SELF-ADHERENT WRAP, 2084S, 4 IN X 5 YD (10 CM X 4,5 M), 18 ROLLS/CASE: Brand: 3M™ COBAN™

## (undated) DEVICE — SUT VICRYL 2-0 CT-1 27 IN J259H

## (undated) DEVICE — BANDAGE ACE VELCRO 4 IN LF

## (undated) DEVICE — SUT VICRYL 3-0 SH 27 IN J416H

## (undated) DEVICE — DRAPE C-ARM X-RAY

## (undated) DEVICE — PROXIMATE SKIN STAPLERS (35 WIDE) CONTAINS 35 STAINLESS STEEL STAPLES (FIXED HEAD): Brand: PROXIMATE

## (undated) DEVICE — GLOVE INDICATOR PI UNDERGLOVE SZ 8 BLUE

## (undated) DEVICE — PADDING CAST 4 IN  COTTON STRL

## (undated) DEVICE — FABRIC REINFORCED, SURGICAL GOWN, XL: Brand: CONVERTORS

## (undated) DEVICE — NEEDLE 25GA X 1 IN SAFETY GLIDE

## (undated) DEVICE — MEDI-VAC YANKAUER SUCTION HANDLE W/BULBOUS AND CONTROL VENT: Brand: CARDINAL HEALTH

## (undated) DEVICE — BETHLEHEM UNIVERSAL MINOR GEN: Brand: CARDINAL HEALTH

## (undated) DEVICE — CUFF TOURNIQUET 30 X 4 IN QUICK CONNECT DISP 1BLA

## (undated) DEVICE — SCD SEQUENTIAL COMPRESSION COMFORT SLEEVE MEDIUM KNEE LENGTH: Brand: KENDALL SCD

## (undated) DEVICE — SUT VICRYL 4-0 PS-2 27 IN J426H

## (undated) DEVICE — BANDAGE, ESMARK LF STR 6"X9' (20/CS): Brand: CYPRESS

## (undated) DEVICE — PACK GENERAL LF

## (undated) DEVICE — 3M™ STERI-STRIP™ REINFORCED ADHESIVE SKIN CLOSURES, R1547, 1/2 IN X 4 IN (12 MM X 100 MM), 6 STRIPS/ENVELOPE: Brand: 3M™ STERI-STRIP™

## (undated) DEVICE — 3.5MM DRILL BIT/QC/110MM

## (undated) DEVICE — GLOVE SRG BIOGEL 8.5

## (undated) DEVICE — GLOVE INDICATOR PI UNDERGLOVE SZ 7.5 BLUE

## (undated) DEVICE — RADIFOCUS GLIDEWIRE: Brand: GLIDEWIRE

## (undated) DEVICE — PADDING CAST 6IN COTTON STRL

## (undated) DEVICE — SUT VICRYL 0 CP-1 27 IN J267H

## (undated) DEVICE — CAST PADDING 4 IN SYNTHETIC STRL

## (undated) DEVICE — SYRINGE 10ML LL CONTROL TOP

## (undated) DEVICE — NEEDLE 25G X 1 1/2

## (undated) DEVICE — INTENDED FOR TISSUE SEPARATION, AND OTHER PROCEDURES THAT REQUIRE A SHARP SURGICAL BLADE TO PUNCTURE OR CUT.: Brand: BARD-PARKER SAFETY BLADES SIZE 11, STERILE

## (undated) DEVICE — GLOVE SRG BIOGEL ECLIPSE 8

## (undated) DEVICE — BASIC DOUBLE BASIN 2-LF: Brand: MEDLINE INDUSTRIES, INC.

## (undated) DEVICE — 3M™ IOBAN™ 2 ANTIMICROBIAL INCISE DRAPE 6650EZ: Brand: IOBAN™ 2

## (undated) DEVICE — BAG DECANTER

## (undated) DEVICE — GLOVE SRG BIOGEL 8